# Patient Record
Sex: FEMALE | Race: WHITE | NOT HISPANIC OR LATINO | Employment: OTHER | URBAN - METROPOLITAN AREA
[De-identification: names, ages, dates, MRNs, and addresses within clinical notes are randomized per-mention and may not be internally consistent; named-entity substitution may affect disease eponyms.]

---

## 2020-06-26 ENCOUNTER — OFFICE VISIT (OUTPATIENT)
Dept: FAMILY MEDICINE CLINIC | Facility: CLINIC | Age: 70
End: 2020-06-26
Payer: COMMERCIAL

## 2020-06-26 VITALS
OXYGEN SATURATION: 95 % | HEART RATE: 75 BPM | RESPIRATION RATE: 16 BRPM | WEIGHT: 250.4 LBS | SYSTOLIC BLOOD PRESSURE: 140 MMHG | BODY MASS INDEX: 46.08 KG/M2 | HEIGHT: 62 IN | DIASTOLIC BLOOD PRESSURE: 70 MMHG | TEMPERATURE: 97.9 F

## 2020-06-26 DIAGNOSIS — R20.2 NUMBNESS AND TINGLING IN BOTH HANDS: ICD-10-CM

## 2020-06-26 DIAGNOSIS — E66.01 MORBID OBESITY (HCC): ICD-10-CM

## 2020-06-26 DIAGNOSIS — R10.13 EPIGASTRIC DISCOMFORT: Primary | ICD-10-CM

## 2020-06-26 DIAGNOSIS — R73.01 ELEVATED FASTING GLUCOSE: ICD-10-CM

## 2020-06-26 DIAGNOSIS — E03.9 ACQUIRED HYPOTHYROIDISM: ICD-10-CM

## 2020-06-26 DIAGNOSIS — K21.00 REFLUX ESOPHAGITIS: ICD-10-CM

## 2020-06-26 DIAGNOSIS — Z87.19 HISTORY OF HIATAL HERNIA: ICD-10-CM

## 2020-06-26 DIAGNOSIS — R20.0 NUMBNESS AND TINGLING IN BOTH HANDS: ICD-10-CM

## 2020-06-26 DIAGNOSIS — R14.0 BLOATING SYMPTOM: ICD-10-CM

## 2020-06-26 PROBLEM — F41.9 CHRONIC ANXIETY: Status: ACTIVE | Noted: 2019-11-01

## 2020-06-26 PROBLEM — R73.9 ELEVATED BLOOD SUGAR: Status: ACTIVE | Noted: 2019-09-09

## 2020-06-26 PROBLEM — R73.03 PRE-DIABETES: Status: ACTIVE | Noted: 2019-09-09

## 2020-06-26 PROBLEM — R26.2 AMBULATORY DYSFUNCTION: Status: ACTIVE | Noted: 2019-09-09

## 2020-06-26 PROBLEM — I10 ESSENTIAL HYPERTENSION: Status: ACTIVE | Noted: 2019-09-09

## 2020-06-26 PROBLEM — F32.A CHRONIC DEPRESSION: Status: ACTIVE | Noted: 2019-09-09

## 2020-06-26 PROBLEM — L40.9 PSORIASIS: Status: ACTIVE | Noted: 2019-09-09

## 2020-06-26 PROBLEM — Z79.1 NSAID LONG-TERM USE: Status: ACTIVE | Noted: 2019-09-09

## 2020-06-26 PROBLEM — E55.9 VITAMIN D DEFICIENCY: Status: ACTIVE | Noted: 2019-09-09

## 2020-06-26 PROBLEM — N18.30 CKD (CHRONIC KIDNEY DISEASE) STAGE 3, GFR 30-59 ML/MIN (HCC): Status: ACTIVE | Noted: 2019-09-09

## 2020-06-26 PROBLEM — M19.90 OSTEOARTHRITIS: Status: ACTIVE | Noted: 2019-09-09

## 2020-06-26 PROCEDURE — 99214 OFFICE O/P EST MOD 30 MIN: CPT | Performed by: NURSE PRACTITIONER

## 2020-06-26 PROCEDURE — 1036F TOBACCO NON-USER: CPT | Performed by: NURSE PRACTITIONER

## 2020-06-26 PROCEDURE — 4040F PNEUMOC VAC/ADMIN/RCVD: CPT | Performed by: NURSE PRACTITIONER

## 2020-06-26 PROCEDURE — 3078F DIAST BP <80 MM HG: CPT | Performed by: NURSE PRACTITIONER

## 2020-06-26 PROCEDURE — 3077F SYST BP >= 140 MM HG: CPT | Performed by: NURSE PRACTITIONER

## 2020-06-26 PROCEDURE — 1160F RVW MEDS BY RX/DR IN RCRD: CPT | Performed by: NURSE PRACTITIONER

## 2020-06-26 PROCEDURE — 3008F BODY MASS INDEX DOCD: CPT | Performed by: NURSE PRACTITIONER

## 2020-06-26 PROCEDURE — 1101F PT FALLS ASSESS-DOCD LE1/YR: CPT | Performed by: NURSE PRACTITIONER

## 2020-06-26 PROCEDURE — 3288F FALL RISK ASSESSMENT DOCD: CPT | Performed by: NURSE PRACTITIONER

## 2020-06-26 RX ORDER — LEVOTHYROXINE SODIUM 0.12 MG/1
125 TABLET ORAL DAILY
COMMUNITY
End: 2020-11-19

## 2020-06-26 RX ORDER — MELOXICAM 15 MG/1
15 TABLET ORAL DAILY
COMMUNITY
End: 2021-03-07

## 2020-06-26 RX ORDER — ACETAMINOPHEN 500 MG
500 TABLET ORAL EVERY 4 HOURS
COMMUNITY

## 2020-06-26 RX ORDER — BENAZEPRIL HYDROCHLORIDE 40 MG/1
40 TABLET, FILM COATED ORAL DAILY
COMMUNITY
Start: 2020-04-05 | End: 2020-12-27

## 2020-06-26 RX ORDER — CITALOPRAM 10 MG/1
10 TABLET ORAL DAILY
COMMUNITY
Start: 2020-04-05 | End: 2020-10-22 | Stop reason: SDUPTHER

## 2020-06-26 RX ORDER — FEXOFENADINE HCL 180 MG/1
180 TABLET ORAL DAILY
COMMUNITY

## 2020-06-26 RX ORDER — AMLODIPINE BESYLATE 5 MG/1
5 TABLET ORAL DAILY
COMMUNITY
Start: 2020-04-05 | End: 2021-03-11

## 2020-06-26 RX ORDER — NAPROXEN SODIUM 220 MG
220 TABLET ORAL EVERY 12 HOURS PRN
COMMUNITY
End: 2021-11-14

## 2020-06-26 RX ORDER — PANTOPRAZOLE SODIUM 40 MG/1
40 TABLET, DELAYED RELEASE ORAL DAILY
Qty: 30 TABLET | Refills: 1 | Status: SHIPPED | OUTPATIENT
Start: 2020-06-26 | End: 2020-08-21 | Stop reason: SDUPTHER

## 2020-06-26 RX ORDER — FAMOTIDINE 20 MG/1
20 TABLET, FILM COATED ORAL 2 TIMES DAILY
COMMUNITY
End: 2020-06-26 | Stop reason: ALTCHOICE

## 2020-06-27 ENCOUNTER — HOSPITAL ENCOUNTER (OUTPATIENT)
Dept: RADIOLOGY | Facility: HOSPITAL | Age: 70
Discharge: HOME | End: 2020-06-27
Payer: COMMERCIAL

## 2020-06-27 ENCOUNTER — OFFICE VISIT (OUTPATIENT)
Dept: LAB | Facility: CLINIC | Age: 70
End: 2020-06-27
Payer: COMMERCIAL

## 2020-06-27 ENCOUNTER — TRANSCRIBE ORDERS (OUTPATIENT)
Dept: LAB | Facility: CLINIC | Age: 70
End: 2020-06-27

## 2020-06-27 ENCOUNTER — APPOINTMENT (OUTPATIENT)
Dept: LAB | Facility: CLINIC | Age: 70
End: 2020-06-27
Payer: COMMERCIAL

## 2020-06-27 DIAGNOSIS — R10.13 EPIGASTRIC DISCOMFORT: ICD-10-CM

## 2020-06-27 DIAGNOSIS — R73.01 ELEVATED FASTING GLUCOSE: ICD-10-CM

## 2020-06-27 DIAGNOSIS — R14.0 BLOATING SYMPTOM: ICD-10-CM

## 2020-06-27 DIAGNOSIS — R20.2 NUMBNESS AND TINGLING IN BOTH HANDS: ICD-10-CM

## 2020-06-27 DIAGNOSIS — E03.9 ACQUIRED HYPOTHYROIDISM: ICD-10-CM

## 2020-06-27 DIAGNOSIS — E66.01 MORBID OBESITY (HCC): ICD-10-CM

## 2020-06-27 DIAGNOSIS — Z87.19 HISTORY OF HIATAL HERNIA: ICD-10-CM

## 2020-06-27 DIAGNOSIS — R20.0 NUMBNESS AND TINGLING IN BOTH HANDS: ICD-10-CM

## 2020-06-27 DIAGNOSIS — K21.00 REFLUX ESOPHAGITIS: ICD-10-CM

## 2020-06-27 LAB
ALBUMIN SERPL BCP-MCNC: 3.9 G/DL (ref 3.5–5)
ALP SERPL-CCNC: 92 U/L (ref 46–116)
ALT SERPL W P-5'-P-CCNC: 37 U/L (ref 12–78)
AMYLASE SERPL-CCNC: 80 IU/L (ref 25–115)
ANION GAP SERPL CALCULATED.3IONS-SCNC: 9 MMOL/L (ref 4–13)
AST SERPL W P-5'-P-CCNC: 22 U/L (ref 5–45)
BACTERIA UR QL AUTO: ABNORMAL /HPF
BILIRUB SERPL-MCNC: 0.47 MG/DL (ref 0.2–1)
BILIRUB UR QL STRIP: NEGATIVE
BUN SERPL-MCNC: 29 MG/DL (ref 5–25)
CALCIUM ALBUM COR SERPL-MCNC: 10.3 MG/DL (ref 8.3–10.1)
CALCIUM SERPL-MCNC: 10.2 MG/DL (ref 8.3–10.1)
CHLORIDE SERPL-SCNC: 102 MMOL/L (ref 100–108)
CHOLEST SERPL-MCNC: 183 MG/DL (ref 50–200)
CLARITY UR: CLEAR
CO2 SERPL-SCNC: 27 MMOL/L (ref 21–32)
COLOR UR: ABNORMAL
CREAT SERPL-MCNC: 1.02 MG/DL (ref 0.6–1.3)
CRP SERPL QL: 3.6 MG/L
ERYTHROCYTE [DISTWIDTH] IN BLOOD BY AUTOMATED COUNT: 13.2 % (ref 11.6–15.1)
ERYTHROCYTE [SEDIMENTATION RATE] IN BLOOD: 33 MM/HOUR (ref 0–20)
EST. AVERAGE GLUCOSE BLD GHB EST-MCNC: 126 MG/DL
GFR SERPL CREATININE-BSD FRML MDRD: 55 ML/MIN/1.73SQ M
GLUCOSE P FAST SERPL-MCNC: 107 MG/DL (ref 65–99)
GLUCOSE UR STRIP-MCNC: NEGATIVE MG/DL
HBA1C MFR BLD: 6 %
HCT VFR BLD AUTO: 42.7 % (ref 34.8–46.1)
HDLC SERPL-MCNC: 58 MG/DL
HGB BLD-MCNC: 13.7 G/DL (ref 11.5–15.4)
HGB UR QL STRIP.AUTO: ABNORMAL
KETONES UR STRIP-MCNC: NEGATIVE MG/DL
LDLC SERPL CALC-MCNC: 111 MG/DL (ref 0–100)
LEUKOCYTE ESTERASE UR QL STRIP: ABNORMAL
LIPASE SERPL-CCNC: 121 U/L (ref 73–393)
MCH RBC QN AUTO: 30.7 PG (ref 26.8–34.3)
MCHC RBC AUTO-ENTMCNC: 32.1 G/DL (ref 31.4–37.4)
MCV RBC AUTO: 96 FL (ref 82–98)
NITRITE UR QL STRIP: NEGATIVE
NON-SQ EPI CELLS URNS QL MICRO: ABNORMAL /HPF
NONHDLC SERPL-MCNC: 125 MG/DL
NT-PROBNP SERPL-MCNC: 45 PG/ML
PH UR STRIP.AUTO: 6 [PH]
PLATELET # BLD AUTO: 230 THOUSANDS/UL (ref 149–390)
PMV BLD AUTO: 10.2 FL (ref 8.9–12.7)
POTASSIUM SERPL-SCNC: 4.7 MMOL/L (ref 3.5–5.3)
PROT SERPL-MCNC: 8.1 G/DL (ref 6.4–8.2)
PROT UR STRIP-MCNC: NEGATIVE MG/DL
RBC # BLD AUTO: 4.46 MILLION/UL (ref 3.81–5.12)
RBC #/AREA URNS AUTO: ABNORMAL /HPF
SODIUM SERPL-SCNC: 138 MMOL/L (ref 136–145)
SP GR UR STRIP.AUTO: 1.01 (ref 1–1.03)
TRIGL SERPL-MCNC: 70 MG/DL
TROPONIN I SERPL-MCNC: <0.02 NG/ML
TSH SERPL DL<=0.05 MIU/L-ACNC: 0.9 UIU/ML (ref 0.36–3.74)
UROBILINOGEN UR QL STRIP.AUTO: 0.2 E.U./DL
WBC # BLD AUTO: 7.96 THOUSAND/UL (ref 4.31–10.16)
WBC #/AREA URNS AUTO: ABNORMAL /HPF

## 2020-06-27 PROCEDURE — 80061 LIPID PANEL: CPT

## 2020-06-27 PROCEDURE — 83880 ASSAY OF NATRIURETIC PEPTIDE: CPT

## 2020-06-27 PROCEDURE — 85652 RBC SED RATE AUTOMATED: CPT

## 2020-06-27 PROCEDURE — 86140 C-REACTIVE PROTEIN: CPT

## 2020-06-27 PROCEDURE — 81001 URINALYSIS AUTO W/SCOPE: CPT | Performed by: NURSE PRACTITIONER

## 2020-06-27 PROCEDURE — 82150 ASSAY OF AMYLASE: CPT

## 2020-06-27 PROCEDURE — 36415 COLL VENOUS BLD VENIPUNCTURE: CPT

## 2020-06-27 PROCEDURE — 80053 COMPREHEN METABOLIC PANEL: CPT

## 2020-06-27 PROCEDURE — 71046 X-RAY EXAM CHEST 2 VIEWS: CPT

## 2020-06-27 PROCEDURE — 83690 ASSAY OF LIPASE: CPT

## 2020-06-27 PROCEDURE — 85027 COMPLETE CBC AUTOMATED: CPT

## 2020-06-27 PROCEDURE — 93005 ELECTROCARDIOGRAM TRACING: CPT

## 2020-06-27 PROCEDURE — 83036 HEMOGLOBIN GLYCOSYLATED A1C: CPT

## 2020-06-27 PROCEDURE — 84443 ASSAY THYROID STIM HORMONE: CPT

## 2020-06-27 PROCEDURE — 84484 ASSAY OF TROPONIN QUANT: CPT

## 2020-06-30 LAB
ATRIAL RATE: 80 BPM
P AXIS: 61 DEGREES
PR INTERVAL: 146 MS
QRS AXIS: -22 DEGREES
QRSD INTERVAL: 94 MS
QT INTERVAL: 386 MS
QTC INTERVAL: 445 MS
T WAVE AXIS: 57 DEGREES
VENTRICULAR RATE: 80 BPM

## 2020-06-30 PROCEDURE — 93010 ELECTROCARDIOGRAM REPORT: CPT | Performed by: INTERNAL MEDICINE

## 2020-07-12 ENCOUNTER — HOSPITAL ENCOUNTER (EMERGENCY)
Facility: HOSPITAL | Age: 70
Discharge: HOME | End: 2020-07-12
Attending: EMERGENCY MEDICINE | Admitting: EMERGENCY MEDICINE
Payer: COMMERCIAL

## 2020-07-12 VITALS
RESPIRATION RATE: 16 BRPM | SYSTOLIC BLOOD PRESSURE: 154 MMHG | BODY MASS INDEX: 47.51 KG/M2 | TEMPERATURE: 98.7 F | DIASTOLIC BLOOD PRESSURE: 69 MMHG | HEIGHT: 62 IN | WEIGHT: 258.16 LBS | HEART RATE: 68 BPM | OXYGEN SATURATION: 97 %

## 2020-07-12 DIAGNOSIS — K92.1 HEMATOCHEZIA: Primary | ICD-10-CM

## 2020-07-12 LAB
ALBUMIN SERPL BCP-MCNC: 3.6 G/DL (ref 3.5–5)
ALP SERPL-CCNC: 108 U/L (ref 46–116)
ALT SERPL W P-5'-P-CCNC: 43 U/L (ref 12–78)
ANION GAP SERPL CALCULATED.3IONS-SCNC: 9 MMOL/L (ref 4–13)
APTT PPP: 34 SECONDS (ref 23–37)
AST SERPL W P-5'-P-CCNC: 23 U/L (ref 5–45)
BASOPHILS # BLD AUTO: 0.06 THOUSANDS/ΜL (ref 0–0.1)
BASOPHILS NFR BLD AUTO: 1 % (ref 0–1)
BILIRUB SERPL-MCNC: 0.35 MG/DL (ref 0.2–1)
BUN SERPL-MCNC: 25 MG/DL (ref 5–25)
CALCIUM SERPL-MCNC: 9.8 MG/DL (ref 8.3–10.1)
CHLORIDE SERPL-SCNC: 103 MMOL/L (ref 100–108)
CO2 SERPL-SCNC: 28 MMOL/L (ref 21–32)
CREAT SERPL-MCNC: 1.19 MG/DL (ref 0.6–1.3)
EOSINOPHIL # BLD AUTO: 0.4 THOUSAND/ΜL (ref 0–0.61)
EOSINOPHIL NFR BLD AUTO: 4 % (ref 0–6)
ERYTHROCYTE [DISTWIDTH] IN BLOOD BY AUTOMATED COUNT: 13.2 % (ref 11.6–15.1)
GFR SERPL CREATININE-BSD FRML MDRD: 46 ML/MIN/1.73SQ M
GLUCOSE SERPL-MCNC: 115 MG/DL (ref 65–140)
HCT VFR BLD AUTO: 40.1 % (ref 34.8–46.1)
HGB BLD-MCNC: 13.1 G/DL (ref 11.5–15.4)
IMM GRANULOCYTES # BLD AUTO: 0.04 THOUSAND/UL (ref 0–0.2)
IMM GRANULOCYTES NFR BLD AUTO: 0 % (ref 0–2)
INR PPP: 1.06 (ref 0.84–1.19)
LYMPHOCYTES # BLD AUTO: 1.6 THOUSANDS/ΜL (ref 0.6–4.47)
LYMPHOCYTES NFR BLD AUTO: 15 % (ref 14–44)
MCH RBC QN AUTO: 30.9 PG (ref 26.8–34.3)
MCHC RBC AUTO-ENTMCNC: 32.7 G/DL (ref 31.4–37.4)
MCV RBC AUTO: 95 FL (ref 82–98)
MONOCYTES # BLD AUTO: 1.27 THOUSAND/ΜL (ref 0.17–1.22)
MONOCYTES NFR BLD AUTO: 12 % (ref 4–12)
NEUTROPHILS # BLD AUTO: 7.28 THOUSANDS/ΜL (ref 1.85–7.62)
NEUTS SEG NFR BLD AUTO: 68 % (ref 43–75)
NRBC BLD AUTO-RTO: 0 /100 WBCS
PLATELET # BLD AUTO: 218 THOUSANDS/UL (ref 149–390)
PMV BLD AUTO: 10 FL (ref 8.9–12.7)
POTASSIUM SERPL-SCNC: 4.4 MMOL/L (ref 3.5–5.3)
PROT SERPL-MCNC: 7.6 G/DL (ref 6.4–8.2)
PROTHROMBIN TIME: 13.2 SECONDS (ref 11.6–14.5)
RBC # BLD AUTO: 4.24 MILLION/UL (ref 3.81–5.12)
SODIUM SERPL-SCNC: 140 MMOL/L (ref 136–145)
WBC # BLD AUTO: 10.65 THOUSAND/UL (ref 4.31–10.16)

## 2020-07-12 PROCEDURE — 36415 COLL VENOUS BLD VENIPUNCTURE: CPT | Performed by: EMERGENCY MEDICINE

## 2020-07-12 PROCEDURE — 99284 EMERGENCY DEPT VISIT MOD MDM: CPT

## 2020-07-12 PROCEDURE — 80053 COMPREHEN METABOLIC PANEL: CPT | Performed by: EMERGENCY MEDICINE

## 2020-07-12 PROCEDURE — 85025 COMPLETE CBC W/AUTO DIFF WBC: CPT | Performed by: EMERGENCY MEDICINE

## 2020-07-12 PROCEDURE — 85730 THROMBOPLASTIN TIME PARTIAL: CPT | Performed by: EMERGENCY MEDICINE

## 2020-07-12 PROCEDURE — 99283 EMERGENCY DEPT VISIT LOW MDM: CPT | Performed by: EMERGENCY MEDICINE

## 2020-07-12 PROCEDURE — 85610 PROTHROMBIN TIME: CPT | Performed by: EMERGENCY MEDICINE

## 2020-07-12 RX ORDER — FOLIC ACID/MULTIVIT,IRON,MINER .4-18-35
1 TABLET,CHEWABLE ORAL DAILY
COMMUNITY

## 2020-07-12 NOTE — ED PROVIDER NOTES
History  Chief Complaint   Patient presents with    Rectal Bleeding     Pt complains of rectal bleeding that started today  Pt states that a large amount of blood with clots came out over several episodes today  pt denies blood thinners but states that she takes NSAIDs       History provided by:  Patient   used: No    68-year-old female presented for evaluation of bright red rectal bleeding beginning this morning  No associated abdominal pain, rectal pain, nausea, vomiting, lightheadedness, dizziness, weakness  No similar episodes in the past   No anticoagulation but does take NSAIDs regularly for arthritis  She is on meloxicam daily and had also used Aleve over the last few days while holding meloxicam   States in total 3 episodes of rectal bleeding this morning without much stool  Notes that there were moderate-sized clots as well  Last colonoscopy was 6 years ago  Recently having some epigastric pain and was started on Protonix x 2 weeks  On exam here abdomen soft and nontender  Vitals are normal   No visible bleeding at the moment  No external anal lesions  Plan labs, monitoring, re-evaluate  Prior to Admission Medications   Prescriptions Last Dose Informant Patient Reported? Taking?    Cholecalciferol 1 25 MG (91357 UT) TABS 7/11/2020 at Unknown time  Yes Yes   Sig: Take 1 25 mg by mouth daily   acetaminophen (TYLENOL) 500 mg tablet 7/12/2020 at Unknown time  Yes Yes   Sig: Take 500 mg by mouth every 4 (four) hours   amLODIPine (NORVASC) 5 mg tablet 7/12/2020 at Unknown time  Yes Yes   Sig: Take 5 mg by mouth daily   benazepril (LOTENSIN) 40 MG tablet 7/12/2020 at Unknown time  Yes Yes   Sig: Take 40 mg by mouth daily   citalopram (CeleXA) 10 mg tablet 7/11/2020 at Unknown time  Yes Yes   Sig: Take 10 mg by mouth daily   fexofenadine (ALLEGRA) 180 MG tablet Past Week at Unknown time  Yes Yes   Sig: Take 180 mg by mouth daily   levothyroxine 125 mcg tablet 7/12/2020 at Unknown time  Yes Yes   Sig: Take 125 mcg by mouth daily   meloxicam (MOBIC) 15 mg tablet 7/12/2020 at Unknown time  Yes Yes   Sig: Take 15 mg by mouth daily   multivitamin-iron-minerals-folic acid (CENTRUM) chewable tablet 7/12/2020 at Unknown time Self Yes Yes   Sig: Chew 1 tablet daily   naproxen sodium (ALEVE) 220 MG tablet Past Week at Unknown time  Yes Yes   Sig: Take 220 mg by mouth every 12 (twelve) hours as needed   pantoprazole (PROTONIX) 40 mg tablet 7/12/2020 at Unknown time  No Yes   Sig: Take 1 tablet (40 mg total) by mouth daily      Facility-Administered Medications: None       Past Medical History:   Diagnosis Date    Anxiety     on med     Arthritis     Back problem     4 Bulging Disks    Fibrocystic disease of both breasts     Hip pain     Left    History of staph infection     Hypertension     Knee pain     Right    Spinal stenosis     Thyroid disease        Past Surgical History:   Procedure Laterality Date    CATARACT EXTRACTION  01/01/2017    CHOLECYSTECTOMY      MAMMO (HISTORICAL)  12/13/2019    OTHER SURGICAL HISTORY      Warts removal    REFRACTIVE SURGERY Bilateral     ROTATOR CUFF REPAIR Right        Family History   Problem Relation Age of Onset    Lung cancer Father     Breast cancer Other      I have reviewed and agree with the history as documented  E-Cigarette/Vaping    E-Cigarette Use Never User      E-Cigarette/Vaping Substances    Nicotine No     THC No     CBD No     Flavoring No     Other No     Unknown No      Social History     Tobacco Use    Smoking status: Former Smoker     Years: 14 00    Smokeless tobacco: Never Used   Substance Use Topics    Alcohol use: Not Currently     Frequency: Monthly or less     Comment: Occasional     Drug use: Not Currently       Review of Systems   Constitutional: Negative for activity change, appetite change, fatigue and fever  Respiratory: Negative for chest tightness and shortness of breath      Gastrointestinal: Positive for blood in stool  Negative for abdominal pain, nausea, rectal pain and vomiting  Musculoskeletal: Negative for back pain and neck pain  Skin: Negative for color change, pallor and rash  Neurological: Negative for dizziness and weakness  All other systems reviewed and are negative  Physical Exam  Physical Exam   Constitutional: She is oriented to person, place, and time  She appears well-developed and well-nourished  No distress  HENT:   Head: Normocephalic  Cardiovascular: Normal rate  Pulmonary/Chest: Effort normal  No respiratory distress  Abdominal: Soft  She exhibits no distension  There is no tenderness  Genitourinary:   Genitourinary Comments: No external anal lesions  No active bleeding at time of exam    Musculoskeletal:   Difficulty ranging left hip (chronic)  Neurological: She is alert and oriented to person, place, and time  Skin: Skin is warm and dry  Psychiatric: She has a normal mood and affect  Her behavior is normal    Nursing note and vitals reviewed        Vital Signs  ED Triage Vitals [07/12/20 1522]   Temperature Pulse Respirations Blood Pressure SpO2   98 7 °F (37 1 °C) 87 20 (!) 193/84 97 %      Temp Source Heart Rate Source Patient Position - Orthostatic VS BP Location FiO2 (%)   Oral Monitor Lying Right arm --      Pain Score       No Pain           Vitals:    07/12/20 1522 07/12/20 1636   BP: (!) 193/84 154/69   Pulse: 87 68   Patient Position - Orthostatic VS: Lying          Visual Acuity      ED Medications  Medications - No data to display    Diagnostic Studies  Results Reviewed     Procedure Component Value Units Date/Time    Comprehensive metabolic panel [[id number]] Collected:  07/12/20 1609    Lab Status:  Final result Specimen:  Blood from Arm, Left Updated:  07/12/20 1643     Sodium 140 mmol/L      Potassium 4 4 mmol/L      Chloride 103 mmol/L      CO2 28 mmol/L      ANION GAP 9 mmol/L      BUN 25 mg/dL      Creatinine 1 19 mg/dL      Glucose 115 mg/dL      Calcium 9 8 mg/dL      AST 23 U/L      ALT 43 U/L      Alkaline Phosphatase 108 U/L      Total Protein 7 6 g/dL      Albumin 3 6 g/dL      Total Bilirubin 0 35 mg/dL      eGFR 46 ml/min/1 73sq m     Narrative:       [place] guidelines for Chronic Kidney Disease (CKD):     Stage 1 with normal or high GFR (GFR > 90 mL/min/1 73 square meters)    Stage 2 Mild CKD (GFR = 60-89 mL/min/1 73 square meters)    Stage 3A Moderate CKD (GFR = 45-59 mL/min/1 73 square meters)    Stage 3B Moderate CKD (GFR = 30-44 mL/min/1 73 square meters)    Stage 4 Severe CKD (GFR = 15-29 mL/min/1 73 square meters)    Stage 5 End Stage CKD (GFR <15 mL/min/1 73 square meters)  Note: GFR calculation is accurate only with a steady state creatinine    Protime-INR [[id number]]  (Normal) Collected:  07/12/20 1609    Lab Status:  Final result Specimen:  Blood from Arm, Left Updated:  07/12/20 1632     Protime 13 2 seconds      INR 1 06    APTT [[id number]]  (Normal) Collected:  07/12/20 1609    Lab Status:  Final result Specimen:  Blood from Arm, Left Updated:  07/12/20 1632     PTT 34 seconds     CBC and differential [[id number]]  (Abnormal) Collected:  07/12/20 1609    Lab Status:  Final result Specimen:  Blood from Arm, Left Updated:  07/12/20 1620     WBC 10 65 Thousand/uL      RBC 4 24 Million/uL      Hemoglobin 13 1 g/dL      Hematocrit 40 1 %      MCV 95 fL      MCH 30 9 pg      MCHC 32 7 g/dL      RDW 13 2 %      MPV 10 0 fL      Platelets 409 Thousands/uL      nRBC 0 /100 WBCs      Neutrophils Relative 68 %      Immat GRANS % 0 %      Lymphocytes Relative 15 %      Monocytes Relative 12 %      Eosinophils Relative 4 %      Basophils Relative 1 %      Neutrophils Absolute 7 28 Thousands/µL      Immature Grans Absolute 0 04 Thousand/uL      Lymphocytes Absolute 1 60 Thousands/µL      Monocytes Absolute 1 27 Thousand/µL      Eosinophils Absolute 0 40 Thousand/µL      Basophils Absolute 0 06 Thousands/µL                  No orders to display              Procedures  Procedures         ED Course  ED Course as of Jul 12 2002   Sun Jul 12, 2020   1659 Patient feeling well has not had any recurrent bloody stool here  Will continue to observe and plan discharge if no continued bleeding  1758 No recurrence of bleeding  Patient stable for discharge home and outpatient GI follow-up  MDM  Number of Diagnoses or Management Options  Hematochezia:   Diagnosis management comments: 78-year-old female presented with 3 episodes bright red rectal bleeding this morning  No associated pain  No nausea or vomiting  No known history of GI bleed  She is currently taking Protonix for GERD  No significant change in hemoglobin from labs 2 weeks ago  No continued rectal bleeding in the ED  Patient is otherwise asymptomatic  She is due for colonoscopy regardless of her acute symptoms  Needs close GI follow-up  Return to ED for any continued significant bleeding  Amount and/or Complexity of Data Reviewed  Clinical lab tests: ordered and reviewed    Patient Progress  Patient progress: improved        Disposition  Final diagnoses:   Hematochezia     Time reflects when diagnosis was documented in both MDM as applicable and the Disposition within this note     Time User Action Codes Description Comment    7/12/2020  5:59 PM Osmandavida Anderson Add [K92 1] Hematochezia       ED Disposition     ED Disposition Condition Date/Time Comment    Discharge Stable [place] Jul 12, 2020  5:59 PM Misael Calrke discharge to home/self care              Follow-up Information     Follow up With Specialties Details Why Jocelyne Riojas MD Gastroenterology   [address] [place] [address]  [phone]            Discharge Medication List as of 7/12/2020  5:59 PM      CONTINUE these medications which have NOT CHANGED    Details   acetaminophen (TYLENOL) 500 mg tablet Take 500 mg by mouth every 4 (four) hours, Historical Med      amLODIPine (NORVASC) 5 mg tablet Take 5 mg by mouth daily, Starting Sun 4/5/2020, Historical Med      benazepril (LOTENSIN) 40 MG tablet Take 40 mg by mouth daily, Starting Sun 4/5/2020, Historical Med      Cholecalciferol 1 25 MG (97738 UT) TABS Take 1 25 mg by mouth daily, Historical Med      citalopram (CeleXA) 10 mg tablet Take 10 mg by mouth daily, Starting Sun 4/5/2020, Historical Med      fexofenadine (ALLEGRA) 180 MG tablet Take 180 mg by mouth daily, Historical Med      levothyroxine 125 mcg tablet Take 125 mcg by mouth daily, Historical Med      meloxicam (MOBIC) 15 mg tablet Take 15 mg by mouth daily, Historical Med      multivitamin-iron-minerals-folic acid (CENTRUM) chewable tablet Chew 1 tablet daily, Historical Med      naproxen sodium (ALEVE) 220 MG tablet Take 220 mg by mouth every 12 (twelve) hours as needed, Historical Med      pantoprazole (PROTONIX) 40 mg tablet Take 1 tablet (40 mg total) by mouth daily, Starting Fri 6/26/2020, Until Sun 7/26/2020, Normal           No discharge procedures on file      PDMP Review     None          ED Provider  Electronically Signed by           Aleksander Montilla MD  07/12/20 2002

## 2020-07-14 ENCOUNTER — OFFICE VISIT (OUTPATIENT)
Dept: FAMILY MEDICINE CLINIC | Facility: CLINIC | Age: 70
End: 2020-07-14
Payer: COMMERCIAL

## 2020-07-14 VITALS
OXYGEN SATURATION: 97 % | WEIGHT: 251 LBS | RESPIRATION RATE: 14 BRPM | SYSTOLIC BLOOD PRESSURE: 118 MMHG | BODY MASS INDEX: 46.19 KG/M2 | HEIGHT: 62 IN | TEMPERATURE: 97.2 F | DIASTOLIC BLOOD PRESSURE: 68 MMHG | HEART RATE: 86 BPM

## 2020-07-14 DIAGNOSIS — Z11.59 NEED FOR HEPATITIS C SCREENING TEST: ICD-10-CM

## 2020-07-14 DIAGNOSIS — K59.03 DRUG-INDUCED CONSTIPATION: ICD-10-CM

## 2020-07-14 DIAGNOSIS — M16.12 PRIMARY OSTEOARTHRITIS OF LEFT HIP: Primary | ICD-10-CM

## 2020-07-14 PROCEDURE — 3078F DIAST BP <80 MM HG: CPT | Performed by: FAMILY MEDICINE

## 2020-07-14 PROCEDURE — 3008F BODY MASS INDEX DOCD: CPT | Performed by: FAMILY MEDICINE

## 2020-07-14 PROCEDURE — 99214 OFFICE O/P EST MOD 30 MIN: CPT | Performed by: FAMILY MEDICINE

## 2020-07-14 PROCEDURE — 1160F RVW MEDS BY RX/DR IN RCRD: CPT | Performed by: FAMILY MEDICINE

## 2020-07-14 PROCEDURE — 1036F TOBACCO NON-USER: CPT | Performed by: FAMILY MEDICINE

## 2020-07-14 PROCEDURE — 3074F SYST BP LT 130 MM HG: CPT | Performed by: FAMILY MEDICINE

## 2020-07-14 PROCEDURE — 4040F PNEUMOC VAC/ADMIN/RCVD: CPT | Performed by: FAMILY MEDICINE

## 2020-07-14 RX ORDER — FAMOTIDINE 20 MG/1
TABLET, FILM COATED ORAL EVERY 12 HOURS
COMMUNITY
End: 2020-07-14

## 2020-07-14 RX ORDER — LEVOFLOXACIN 500 MG/1
TABLET, FILM COATED ORAL
COMMUNITY
End: 2021-04-21

## 2020-07-14 RX ORDER — TRAMADOL HYDROCHLORIDE 50 MG/1
50 TABLET ORAL EVERY 6 HOURS PRN
Qty: 60 TABLET | Refills: 1 | Status: SHIPPED | OUTPATIENT
Start: 2020-07-14 | End: 2021-04-21

## 2020-07-14 NOTE — PATIENT INSTRUCTIONS
Arthritis   AMBULATORY CARE:   Arthritis  is a disease that causes inflammation in one or more joints  There are many types of arthritis, such as osteoarthritis, rheumatoid arthritis, and septic arthritis  Some types cause inflammation in the joints  Other types wear away the cartilage between joints  This makes the bones of the joint rub together when you move the joint  Your symptoms may be constant, or symptoms may come and go  Arthritis often gets worse over time and can cause permanent joint damage  Common signs and symptoms of arthritis:   · Pain, swelling, or stiffness in the joint    · Limited range of motion in the joint    · Warmth or redness over the joint    · Tenderness when you touch the joint    · Stiff joints in the morning that loosen with movement    · A creaking or grinding sound when you move the joint    · Fever  Seek care immediately if:   · You have a fever and severe joint pain or swelling  · You cannot move the affected joint  · You have severe joint pain you cannot tolerate  Contact your healthcare provider if:   · Your pain or swelling does not get better with treatment  · You have questions or concerns about your condition or care  Treatment  will depend on the type of arthritis you have and if it is severe  You may need any of the following:  · Acetaminophen  decreases pain and fever  It is available without a doctor's order  Ask how much to take and how often to take it  Follow directions  Acetaminophen can cause liver damage if not taken correctly  · NSAIDs , such as ibuprofen, help decrease swelling, pain, and fever  This medicine is available with or without a doctor's order  NSAIDs can cause stomach bleeding or kidney problems in certain people  If you take blood thinner medicine, always ask your healthcare provider if NSAIDs are safe for you  Always read the medicine label and follow directions  · Steroid medicine  helps reduce swelling and pain       · Surgery may be needed to repair or replace a damaged joint  Manage arthritis:   · Rest your painful joint so it can heal   Your healthcare provider may recommend crutches or a walker if the affected joint is in a leg  · Apply ice or heat to the joint  Both can help decrease swelling and pain  Ice may also help prevent tissue damage  Use an ice pack, or put crushed ice in a plastic bag  Cover it with a towel and place it on your joint for 15 to 20 minutes every hour or as directed  You can apply heat for 20 minutes every 2 hours  Heat treatment includes hot packs or heat lamps  · Elevate your joint  Elevation helps reduce swelling and pain  Raise your joint above the level of your heart as often as you can  Prop your painful joint on pillows to keep it above your heart comfortably  · Go to physical or occupational therapy as directed  A physical therapist can teach you exercises to improve flexibility and range of motion  You may also be shown non-weight-bearing exercises that are safe for your joints, such as swimming  Exercise can help keep your joints flexible and reduce pain  An occupational therapist can help you learn to do your daily activities when your joints are stiff or sore  · Maintain a healthy weight  Extra weight puts increased pressure on your joints  Ask your healthcare provider what you should weigh  If you need to lose weight, he can help you create a weight loss program  Weight loss can help reduce pain and increase your ability to do your activities  The amount of exercise you do may vary each day, depending on your symptoms  · Wear flat or low-heeled shoes  This will help decrease pain and reduce pressure on your ankle, knee, and hip joints  · Use support devices as directed  You may be given splints to wear on your hands to help your joints rest and to decrease inflammation  While you sleep, use a pillow that is firm enough to support your neck and head    Other equipment  that may help you move and prevent falls:  · Orthotic shoes or insoles  help support your feet when you walk  · Crutches, a cane, or a walker  may help decrease your risk for falling  They also decrease stress on affected joints  · Devices to prevent falls  include raised toilet seats and bathtub bars to help you get up from sitting  Handrails can be placed in areas where you need balance and support  Follow up with your healthcare provider or rheumatologist as directed:  Write down your questions so you remember to ask them during your visits  © 2017 [facility] Information is for End User's use only and may not be sold, redistributed or otherwise used for commercial purposes  All illustrations and images included in CareNotes® are the copyrighted property of [employer] , [employer]  or Justin Pace  The above information is an  only  It is not intended as medical advice for individual conditions or treatments  Talk to your doctor, nurse or pharmacist before following any medical regimen to see if it is safe and effective for you

## 2020-08-21 DIAGNOSIS — R14.0 BLOATING SYMPTOM: ICD-10-CM

## 2020-08-21 DIAGNOSIS — R10.13 EPIGASTRIC DISCOMFORT: ICD-10-CM

## 2020-08-21 DIAGNOSIS — K21.00 REFLUX ESOPHAGITIS: ICD-10-CM

## 2020-08-22 RX ORDER — PANTOPRAZOLE SODIUM 40 MG/1
40 TABLET, DELAYED RELEASE ORAL DAILY
Qty: 90 TABLET | Refills: 3 | Status: SHIPPED | OUTPATIENT
Start: 2020-08-22 | End: 2021-02-24 | Stop reason: SDUPTHER

## 2020-09-20 RX ORDER — CITALOPRAM 10 MG/1
TABLET ORAL
Qty: 90 TABLET | Refills: 3 | OUTPATIENT
Start: 2020-09-20

## 2020-09-28 ENCOUNTER — OFFICE VISIT (OUTPATIENT)
Dept: FAMILY MEDICINE CLINIC | Facility: CLINIC | Age: 70
End: 2020-09-28
Payer: COMMERCIAL

## 2020-09-28 VITALS
HEIGHT: 62 IN | DIASTOLIC BLOOD PRESSURE: 62 MMHG | BODY MASS INDEX: 46.38 KG/M2 | WEIGHT: 252 LBS | RESPIRATION RATE: 18 BRPM | HEART RATE: 90 BPM | OXYGEN SATURATION: 96 % | SYSTOLIC BLOOD PRESSURE: 128 MMHG | TEMPERATURE: 99.2 F

## 2020-09-28 DIAGNOSIS — M16.12 PRIMARY OSTEOARTHRITIS OF LEFT HIP: ICD-10-CM

## 2020-09-28 DIAGNOSIS — R26.2 AMBULATORY DYSFUNCTION: ICD-10-CM

## 2020-09-28 DIAGNOSIS — N18.30 CKD (CHRONIC KIDNEY DISEASE) STAGE 3, GFR 30-59 ML/MIN (HCC): ICD-10-CM

## 2020-09-28 DIAGNOSIS — I10 ESSENTIAL HYPERTENSION: ICD-10-CM

## 2020-09-28 DIAGNOSIS — L40.9 PSORIASIS: Primary | ICD-10-CM

## 2020-09-28 PROCEDURE — 99214 OFFICE O/P EST MOD 30 MIN: CPT | Performed by: FAMILY MEDICINE

## 2020-09-28 PROCEDURE — 1101F PT FALLS ASSESS-DOCD LE1/YR: CPT | Performed by: FAMILY MEDICINE

## 2020-09-28 PROCEDURE — 3725F SCREEN DEPRESSION PERFORMED: CPT | Performed by: FAMILY MEDICINE

## 2020-09-28 PROCEDURE — 3074F SYST BP LT 130 MM HG: CPT | Performed by: FAMILY MEDICINE

## 2020-09-28 PROCEDURE — 3078F DIAST BP <80 MM HG: CPT | Performed by: FAMILY MEDICINE

## 2020-09-28 PROCEDURE — 3288F FALL RISK ASSESSMENT DOCD: CPT | Performed by: FAMILY MEDICINE

## 2020-09-28 RX ORDER — TRIAMCINOLONE ACETONIDE 1 MG/G
CREAM TOPICAL 2 TIMES DAILY
Qty: 30 G | Refills: 0 | Status: SHIPPED | OUTPATIENT
Start: 2020-09-28 | End: 2022-07-19 | Stop reason: SDUPTHER

## 2020-09-28 RX ORDER — KETOCONAZOLE 20 MG/G
CREAM TOPICAL DAILY
Qty: 30 G | Refills: 1 | Status: SHIPPED | OUTPATIENT
Start: 2020-09-28

## 2020-09-28 RX ORDER — BETAMETHASONE DIPROPIONATE 0.5 MG/G
CREAM TOPICAL 2 TIMES DAILY
Qty: 30 G | Refills: 0 | Status: SHIPPED | OUTPATIENT
Start: 2020-09-28

## 2020-09-28 NOTE — PROGRESS NOTES
Assessment/Plan:    64 yo female with CC pain behing the R ear and swollen lobe of ear  Has psoriasis of the scalp and post - auricular  There are 3 prob going on here:    · Psoriasis  · Seborrheic dermatitis  · Eczema, behind the ears         Problem List Items Addressed This Visit     None            Subjective:      Patient ID: Elizabeth Pastrana is a 65 y o  female  HPI    The following portions of the patient's history were reviewed and updated as appropriate:   She has a past medical history of Anxiety, Arthritis, Back problem, Fibrocystic disease of both breasts, Hip pain, History of staph infection, Hypertension, Knee pain, Spinal stenosis, and Thyroid disease  ,  does not have any pertinent problems on file  ,   has a past surgical history that includes Mammo (historical) (12/13/2019); Cataract extraction (01/01/2017); Refractive surgery (Bilateral); Other surgical history; Cholecystectomy; and Rotator cuff repair (Right)  ,  family history includes Breast cancer in her other; Lung cancer in her father  ,   reports that she has quit smoking  She quit after 14 00 years of use  She has never used smokeless tobacco  She reports previous alcohol use  She reports previous drug use ,  is allergic to diphenhydramine; erythromycin; tetanus toxoids; and tetracycline     Current Outpatient Medications   Medication Sig Dispense Refill    acetaminophen (TYLENOL) 500 mg tablet Take 500 mg by mouth every 4 (four) hours      amLODIPine (NORVASC) 5 mg tablet Take 5 mg by mouth daily      benazepril (LOTENSIN) 40 MG tablet Take 40 mg by mouth daily      Cholecalciferol 1 25 MG (57308 UT) TABS Take 1 25 mg by mouth daily      citalopram (CeleXA) 10 mg tablet Take 10 mg by mouth daily      fexofenadine (ALLEGRA) 180 MG tablet Take 180 mg by mouth daily      levofloxacin (LEVAQUIN) 500 mg tablet levofloxacin 500 mg tablet      levothyroxine 125 mcg tablet Take 125 mcg by mouth daily      linaCLOtide (Linzess) 145 MCG CAPS Take 1 capsule (145 mcg total) by mouth daily 30 capsule 3    meloxicam (MOBIC) 15 mg tablet Take 15 mg by mouth daily      multivitamin-iron-minerals-folic acid (CENTRUM) chewable tablet Chew 1 tablet daily      naproxen sodium (ALEVE) 220 MG tablet Take 220 mg by mouth every 12 (twelve) hours as needed      pantoprazole (PROTONIX) 40 mg tablet Take 1 tablet (40 mg total) by mouth daily 90 tablet 3    traMADol (ULTRAM) 50 mg tablet Take 1 tablet (50 mg total) by mouth every 6 (six) hours as needed for moderate pain 60 tablet 1     No current facility-administered medications for this visit  Review of Systems   Constitutional: Negative for activity change, appetite change, chills, diaphoresis, fatigue and fever  HENT: Negative for congestion, ear discharge, ear pain, facial swelling, nosebleeds, sore throat, tinnitus, trouble swallowing and voice change  Eyes: Negative for photophobia, pain, discharge, redness, itching and visual disturbance  Respiratory: Negative for apnea, cough, choking, chest tightness and shortness of breath  Cardiovascular: Negative for chest pain, palpitations and leg swelling  Gastrointestinal: Negative for abdominal distention, abdominal pain, blood in stool, constipation, diarrhea, nausea and vomiting  Endocrine: Negative for cold intolerance, heat intolerance, polydipsia, polyphagia and polyuria  Genitourinary: Negative for decreased urine volume, difficulty urinating, dysuria, enuresis, frequency, hematuria, pelvic pain, urgency and vaginal bleeding  Musculoskeletal: Negative for arthralgias, back pain, gait problem, joint swelling, neck pain and neck stiffness  Skin: Positive for rash  Negative for color change and pallor  Psoriasis behind ears, seb dermatitis of nose, face, eyebrows, and  eczema   Allergic/Immunologic: Negative for immunocompromised state     Neurological: Negative for dizziness, seizures, facial asymmetry, light-headedness, numbness and headaches  Hematological: Negative for adenopathy  Psychiatric/Behavioral: Negative for agitation, behavioral problems, confusion, decreased concentration, dysphoric mood and hallucinations  Objective:  Vitals:    09/28/20 1427   BP: 128/62   Pulse: 90   Resp: 18   Temp: 99 2 °F (37 3 °C)   SpO2: 96%   Weight: 114 kg (252 lb)   Height: 5' 2" (1 575 m)     Body mass index is 46 09 kg/m²  Physical Exam  Vitals signs and nursing note reviewed  Constitutional:       General: She is not in acute distress  Appearance: She is well-developed  She is not diaphoretic  HENT:      Head: Normocephalic and atraumatic  Eyes:      Conjunctiva/sclera: Conjunctivae normal       Pupils: Pupils are equal, round, and reactive to light  Neck:      Musculoskeletal: Normal range of motion and neck supple  Thyroid: No thyromegaly  Trachea: No tracheal deviation  Cardiovascular:      Rate and Rhythm: Normal rate and regular rhythm  Heart sounds: Normal heart sounds  Pulmonary:      Effort: No respiratory distress  Breath sounds: Normal breath sounds  No wheezing or rales  Chest:      Chest wall: No tenderness  Abdominal:      General: Bowel sounds are normal  There is no distension  Palpations: Abdomen is soft  There is no mass  Tenderness: There is no abdominal tenderness  There is no guarding or rebound  Musculoskeletal: Normal range of motion  General: No tenderness or deformity  Comments: Will see Dr Kalen Fabian in 2 days for eval of hip (L) repllacement  Skin:     General: Skin is warm and dry  Coloration: Skin is not pale  Findings: No erythema or rash  Neurological:      Mental Status: She is alert and oriented to person, place, and time  Cranial Nerves: No cranial nerve deficit  Psychiatric:         Behavior: Behavior normal          Thought Content:  Thought content normal          Judgment: Judgment normal  NOTE:  Spent 30 min with pt and  counseling wt losee (abilio 20+ lbs now) and exercise, all meds and explain neuritis

## 2020-09-28 NOTE — PATIENT INSTRUCTIONS
Arthritis   AMBULATORY CARE:   Arthritis  is a disease that causes inflammation in one or more joints  There are many types of arthritis, such as osteoarthritis, rheumatoid arthritis, and septic arthritis  Some types cause inflammation in the joints  Other types wear away the cartilage between joints  This makes the bones of the joint rub together when you move the joint  Your symptoms may be constant, or symptoms may come and go  Arthritis often gets worse over time and can cause permanent joint damage  Common signs and symptoms of arthritis:   · Pain, swelling, or stiffness in the joint    · Limited range of motion in the joint    · Warmth or redness over the joint    · Tenderness when you touch the joint    · Stiff joints in the morning that loosen with movement    · A creaking or grinding sound when you move the joint    · Fever  Seek care immediately if:   · You have a fever and severe joint pain or swelling  · You cannot move the affected joint  · You have severe joint pain you cannot tolerate  Contact your healthcare provider if:   · Your pain or swelling does not get better with treatment  · You have questions or concerns about your condition or care  Treatment  will depend on the type of arthritis you have and if it is severe  You may need any of the following:  · Acetaminophen  decreases pain and fever  It is available without a doctor's order  Ask how much to take and how often to take it  Follow directions  Acetaminophen can cause liver damage if not taken correctly  · NSAIDs , such as ibuprofen, help decrease swelling, pain, and fever  This medicine is available with or without a doctor's order  NSAIDs can cause stomach bleeding or kidney problems in certain people  If you take blood thinner medicine, always ask your healthcare provider if NSAIDs are safe for you  Always read the medicine label and follow directions  · Steroid medicine  helps reduce swelling and pain       · Surgery may be needed to repair or replace a damaged joint  Manage arthritis:   · Rest your painful joint so it can heal   Your healthcare provider may recommend crutches or a walker if the affected joint is in a leg  · Apply ice or heat to the joint  Both can help decrease swelling and pain  Ice may also help prevent tissue damage  Use an ice pack, or put crushed ice in a plastic bag  Cover it with a towel and place it on your joint for 15 to 20 minutes every hour or as directed  You can apply heat for 20 minutes every 2 hours  Heat treatment includes hot packs or heat lamps  · Elevate your joint  Elevation helps reduce swelling and pain  Raise your joint above the level of your heart as often as you can  Prop your painful joint on pillows to keep it above your heart comfortably  · Go to physical or occupational therapy as directed  A physical therapist can teach you exercises to improve flexibility and range of motion  You may also be shown non-weight-bearing exercises that are safe for your joints, such as swimming  Exercise can help keep your joints flexible and reduce pain  An occupational therapist can help you learn to do your daily activities when your joints are stiff or sore  · Maintain a healthy weight  Extra weight puts increased pressure on your joints  Ask your healthcare provider what you should weigh  If you need to lose weight, he can help you create a weight loss program  Weight loss can help reduce pain and increase your ability to do your activities  The amount of exercise you do may vary each day, depending on your symptoms  · Wear flat or low-heeled shoes  This will help decrease pain and reduce pressure on your ankle, knee, and hip joints  · Use support devices as directed  You may be given splints to wear on your hands to help your joints rest and to decrease inflammation  While you sleep, use a pillow that is firm enough to support your neck and head    Other equipment  that may help you move and prevent falls:  · Orthotic shoes or insoles  help support your feet when you walk  · Crutches, a cane, or a walker  may help decrease your risk for falling  They also decrease stress on affected joints  · Devices to prevent falls  include raised toilet seats and bathtub bars to help you get up from sitting  Handrails can be placed in areas where you need balance and support  Follow up with your healthcare provider or rheumatologist as directed:  Write down your questions so you remember to ask them during your visits  © 2017 [facility] Information is for End User's use only and may not be sold, redistributed or otherwise used for commercial purposes  All illustrations and images included in CareNotes® are the copyrighted property of [employer]  or Justin Pace  The above information is an  only  It is not intended as medical advice for individual conditions or treatments  Talk to your doctor, nurse or pharmacist before following any medical regimen to see if it is safe and effective for you

## 2020-10-06 ENCOUNTER — OFFICE VISIT (OUTPATIENT)
Dept: FAMILY MEDICINE CLINIC | Facility: CLINIC | Age: 70
End: 2020-10-06
Payer: COMMERCIAL

## 2020-10-06 DIAGNOSIS — Z23 ENCOUNTER FOR IMMUNIZATION: Primary | ICD-10-CM

## 2020-10-06 PROCEDURE — 1160F RVW MEDS BY RX/DR IN RCRD: CPT | Performed by: FAMILY MEDICINE

## 2020-10-06 PROCEDURE — G0008 ADMIN INFLUENZA VIRUS VAC: HCPCS

## 2020-10-06 PROCEDURE — 1036F TOBACCO NON-USER: CPT | Performed by: FAMILY MEDICINE

## 2020-10-06 PROCEDURE — 90662 IIV NO PRSV INCREASED AG IM: CPT

## 2020-10-22 DIAGNOSIS — F32.A CHRONIC DEPRESSION: Primary | ICD-10-CM

## 2020-10-22 RX ORDER — CITALOPRAM 10 MG/1
10 TABLET ORAL DAILY
Qty: 90 TABLET | Refills: 2 | Status: SHIPPED | OUTPATIENT
Start: 2020-10-22 | End: 2021-07-18

## 2020-11-18 DIAGNOSIS — E03.9 ACQUIRED HYPOTHYROIDISM: Primary | ICD-10-CM

## 2020-11-19 RX ORDER — LEVOTHYROXINE SODIUM 125 UG/1
TABLET ORAL
Qty: 90 TABLET | Refills: 3 | Status: SHIPPED | OUTPATIENT
Start: 2020-11-19 | End: 2021-10-19

## 2020-12-27 DIAGNOSIS — I10 ESSENTIAL HYPERTENSION: Primary | ICD-10-CM

## 2020-12-27 RX ORDER — BENAZEPRIL HYDROCHLORIDE 40 MG/1
TABLET, FILM COATED ORAL
Qty: 90 TABLET | Refills: 3 | Status: SHIPPED | OUTPATIENT
Start: 2020-12-27 | End: 2021-11-14 | Stop reason: SDUPTHER

## 2021-02-24 DIAGNOSIS — R14.0 BLOATING SYMPTOM: ICD-10-CM

## 2021-02-24 DIAGNOSIS — R10.13 EPIGASTRIC DISCOMFORT: ICD-10-CM

## 2021-02-24 DIAGNOSIS — K21.00 REFLUX ESOPHAGITIS: ICD-10-CM

## 2021-02-24 RX ORDER — PANTOPRAZOLE SODIUM 40 MG/1
40 TABLET, DELAYED RELEASE ORAL DAILY
Qty: 90 TABLET | Refills: 3 | Status: SHIPPED | OUTPATIENT
Start: 2021-02-24 | End: 2021-11-14 | Stop reason: SDUPTHER

## 2021-03-02 ENCOUNTER — OFFICE VISIT (OUTPATIENT)
Dept: FAMILY MEDICINE CLINIC | Facility: CLINIC | Age: 71
End: 2021-03-02
Payer: COMMERCIAL

## 2021-03-02 VITALS
RESPIRATION RATE: 18 BRPM | OXYGEN SATURATION: 96 % | WEIGHT: 258 LBS | SYSTOLIC BLOOD PRESSURE: 132 MMHG | BODY MASS INDEX: 47.48 KG/M2 | HEART RATE: 95 BPM | HEIGHT: 62 IN | DIASTOLIC BLOOD PRESSURE: 72 MMHG | TEMPERATURE: 99.3 F

## 2021-03-02 DIAGNOSIS — M16.12 PRIMARY OSTEOARTHRITIS OF LEFT HIP: ICD-10-CM

## 2021-03-02 DIAGNOSIS — E66.01 CLASS 3 SEVERE OBESITY WITH SERIOUS COMORBIDITY AND BODY MASS INDEX (BMI) OF 45.0 TO 49.9 IN ADULT, UNSPECIFIED OBESITY TYPE (HCC): ICD-10-CM

## 2021-03-02 DIAGNOSIS — I10 ESSENTIAL HYPERTENSION: ICD-10-CM

## 2021-03-02 DIAGNOSIS — E66.01 MORBID OBESITY (HCC): ICD-10-CM

## 2021-03-02 DIAGNOSIS — Z79.899 POLYPHARMACY: ICD-10-CM

## 2021-03-02 DIAGNOSIS — R63.8 INCREASED BMI: Primary | ICD-10-CM

## 2021-03-02 PROBLEM — E66.813 CLASS 3 SEVERE OBESITY WITH SERIOUS COMORBIDITY AND BODY MASS INDEX (BMI) OF 45.0 TO 49.9 IN ADULT (HCC): Status: ACTIVE | Noted: 2021-03-02

## 2021-03-02 PROCEDURE — 1125F AMNT PAIN NOTED PAIN PRSNT: CPT | Performed by: FAMILY MEDICINE

## 2021-03-02 PROCEDURE — 1160F RVW MEDS BY RX/DR IN RCRD: CPT | Performed by: FAMILY MEDICINE

## 2021-03-02 PROCEDURE — 3008F BODY MASS INDEX DOCD: CPT | Performed by: FAMILY MEDICINE

## 2021-03-02 PROCEDURE — 3288F FALL RISK ASSESSMENT DOCD: CPT | Performed by: FAMILY MEDICINE

## 2021-03-02 PROCEDURE — 3078F DIAST BP <80 MM HG: CPT | Performed by: FAMILY MEDICINE

## 2021-03-02 PROCEDURE — 1036F TOBACCO NON-USER: CPT | Performed by: FAMILY MEDICINE

## 2021-03-02 PROCEDURE — 3075F SYST BP GE 130 - 139MM HG: CPT | Performed by: FAMILY MEDICINE

## 2021-03-02 PROCEDURE — G0439 PPPS, SUBSEQ VISIT: HCPCS | Performed by: FAMILY MEDICINE

## 2021-03-02 PROCEDURE — 1101F PT FALLS ASSESS-DOCD LE1/YR: CPT | Performed by: FAMILY MEDICINE

## 2021-03-02 PROCEDURE — 99215 OFFICE O/P EST HI 40 MIN: CPT | Performed by: FAMILY MEDICINE

## 2021-03-02 PROCEDURE — 3725F SCREEN DEPRESSION PERFORMED: CPT | Performed by: FAMILY MEDICINE

## 2021-03-02 PROCEDURE — 1170F FXNL STATUS ASSESSED: CPT | Performed by: FAMILY MEDICINE

## 2021-03-02 RX ORDER — AMOXICILLIN 500 MG/1
TABLET, FILM COATED ORAL
COMMUNITY
Start: 2021-01-21 | End: 2021-04-07

## 2021-03-02 NOTE — PATIENT INSTRUCTIONS
Medicare Preventive Visit Patient Instructions  Thank you for completing your Welcome to Medicare Visit or Medicare Annual Wellness Visit today  Your next wellness visit will be due in one year (3/2/2022)  The screening/preventive services that you may require over the next 5-10 years are detailed below  Some tests may not apply to you based off risk factors and/or age  Screening tests ordered at today's visit but not completed yet may show as past due  Also, please note that scanned in results may not display below  Preventive Screenings:  Service Recommendations Previous Testing/Comments   Colorectal Cancer Screening  * Colonoscopy    * Fecal Occult Blood Test (FOBT)/Fecal Immunochemical Test (FIT)  * Fecal DNA/Cologuard Test  * Flexible Sigmoidoscopy Age: 54-65 years old   Colonoscopy: every 10 years (may be performed more frequently if at higher risk)  OR  FOBT/FIT: every 1 year  OR  Cologuard: every 3 years  OR  Sigmoidoscopy: every 5 years  Screening may be recommended earlier than age 48 if at higher risk for colorectal cancer  Also, an individualized decision between you and your healthcare provider will decide whether screening between the ages of 74-80 would be appropriate  Colonoscopy: 07/30/2020  FOBT/FIT: Not on file  Cologuard: Not on file  Sigmoidoscopy: Not on file    Screening Current     Breast Cancer Screening Age: 36 years old  Frequency: every 1-2 years  Not required if history of left and right mastectomy Mammogram: 12/13/2019    Screening Current   Cervical Cancer Screening Between the ages of 21-29, pap smear recommended once every 3 years  Between the ages of 33-67, can perform pap smear with HPV co-testing every 5 years     Recommendations may differ for women with a history of total hysterectomy, cervical cancer, or abnormal pap smears in past  Pap Smear: Not on file    Screening Not Indicated   Hepatitis C Screening Once for adults born between 1945 and 1965  More frequently in patients at high risk for Hepatitis C Hep C Antibody: Not on file       Diabetes Screening 1-2 times per year if you're at risk for diabetes or have pre-diabetes Fasting glucose: 107 mg/dL   A1C: 6 3 %    Screening Current   Cholesterol Screening Once every 5 years if you don't have a lipid disorder  May order more often based on risk factors  Lipid panel: 06/27/2020    Screening Current     Other Preventive Screenings Covered by Medicare:  1  Abdominal Aortic Aneurysm (AAA) Screening: covered once if your at risk  You're considered to be at risk if you have a family history of AAA  2  Lung Cancer Screening: covers low dose CT scan once per year if you meet all of the following conditions: (1) Age 50-69; (2) No signs or symptoms of lung cancer; (3) Current smoker or have quit smoking within the last 15 years; (4) You have a tobacco smoking history of at least 30 pack years (packs per day multiplied by number of years you smoked); (5) You get a written order from a healthcare provider  3  Glaucoma Screening: covered annually if you're considered high risk: (1) You have diabetes OR (2) Family history of glaucoma OR (3)  aged 48 and older OR (3)  American aged 72 and older  3  Osteoporosis Screening: covered every 2 years if you meet one of the following conditions: (1) You're estrogen deficient and at risk for osteoporosis based off medical history and other findings; (2) Have a vertebral abnormality; (3) On glucocorticoid therapy for more than 3 months; (4) Have primary hyperparathyroidism; (5) On osteoporosis medications and need to assess response to drug therapy  · Last bone density test (DXA Scan): Not on file  5  HIV Screening: covered annually if you're between the age of 12-76  Also covered annually if you are younger than 13 and older than 72 with risk factors for HIV infection  For pregnant patients, it is covered up to 3 times per pregnancy      Immunizations:  Immunization Recommendations   Influenza Vaccine Annual influenza vaccination during flu season is recommended for all persons aged >= 6 months who do not have contraindications   Pneumococcal Vaccine (Prevnar and Pneumovax)  * Prevnar = PCV13  * Pneumovax = PPSV23   Adults 25-60 years old: 1-3 doses may be recommended based on certain risk factors  Adults 72 years old: Prevnar (PCV13) vaccine recommended followed by Pneumovax (PPSV23) vaccine  If already received PPSV23 since turning 65, then PCV13 recommended at least one year after PPSV23 dose  Hepatitis B Vaccine 3 dose series if at intermediate or high risk (ex: diabetes, end stage renal disease, liver disease)   Tetanus (Td) Vaccine - COST NOT COVERED BY MEDICARE PART B Following completion of primary series, a booster dose should be given every 10 years to maintain immunity against tetanus  Td may also be given as tetanus wound prophylaxis  Tdap Vaccine - COST NOT COVERED BY MEDICARE PART B Recommended at least once for all adults  For pregnant patients, recommended with each pregnancy  Shingles Vaccine (Shingrix) - COST NOT COVERED BY MEDICARE PART B  2 shot series recommended in those aged 48 and above     Health Maintenance Due:      Topic Date Due    Hepatitis C Screening  1950    MAMMOGRAM  12/13/2020    Colorectal Cancer Screening  07/30/2030     Immunizations Due:      Topic Date Due    DTaP,Tdap,and Td Vaccines (1 - Tdap) 01/25/1969    Pneumococcal Vaccine: 65+ Years (2 of 2 - PPSV23) 01/27/2016     Advance Directives   What are advance directives? Advance directives are legal documents that state your wishes and plans for medical care  These plans are made ahead of time in case you lose your ability to make decisions for yourself  Advance directives can apply to any medical decision, such as the treatments you want, and if you want to donate organs  What are the types of advance directives?   There are many types of advance directives, and each state has rules about how to use them  You may choose a combination of any of the following:  · Living will: This is a written record of the treatment you want  You can also choose which treatments you do not want, which to limit, and which to stop at a certain time  This includes surgery, medicine, IV fluid, and tube feedings  · Durable power of  for healthcare [facility]): This is a written record that states who you want to make healthcare choices for you when you are unable to make them for yourself  This person, called a proxy, is usually a family member or a friend  You may choose more than 1 proxy  · Do not resuscitate (DNR) order:  A DNR order is used in case your heart stops beating or you stop breathing  It is a request not to have certain forms of treatment, such as CPR  A DNR order may be included in other types of advance directives  · Medical directive: This covers the care that you want if you are in a coma, near death, or unable to make decisions for yourself  You can list the treatments you want for each condition  Treatment may include pain medicine, surgery, blood transfusions, dialysis, IV or tube feedings, and a ventilator (breathing machine)  · Values history: This document has questions about your views, beliefs, and how you feel and think about life  This information can help others choose the care that you would choose  Why are advance directives important? An advance directive helps you control your care  Although spoken wishes may be used, it is better to have your wishes written down  Spoken wishes can be misunderstood, or not followed  Treatments may be given even if you do not want them  An advance directive may make it easier for your family to make difficult choices about your care     Weight Management   Why it is important to manage your weight:  Being overweight increases your risk of health conditions such as heart disease, high blood pressure, type 2 diabetes, and certain types of cancer  It can also increase your risk for osteoarthritis, sleep apnea, and other respiratory problems  Aim for a slow, steady weight loss  Even a small amount of weight loss can lower your risk of health problems  How to lose weight safely:  A safe and healthy way to lose weight is to eat fewer calories and get regular exercise  You can lose up about 1 pound a week by decreasing the number of calories you eat by 500 calories each day  Healthy meal plan for weight management:  A healthy meal plan includes a variety of foods, contains fewer calories, and helps you stay healthy  A healthy meal plan includes the following:  · Eat whole-grain foods more often  A healthy meal plan should contain fiber  Fiber is the part of grains, fruits, and vegetables that is not broken down by your body  Whole-grain foods are healthy and provide extra fiber in your diet  Some examples of whole-grain foods are whole-wheat breads and pastas, oatmeal, brown rice, and bulgur  · Eat a variety of vegetables every day  Include dark, leafy greens such as spinach, kale, rex greens, and mustard greens  Eat yellow and orange vegetables such as carrots, sweet potatoes, and winter squash  · Eat a variety of fruits every day  Choose fresh or canned fruit (canned in its own juice or light syrup) instead of juice  Fruit juice has very little or no fiber  · Eat low-fat dairy foods  Drink fat-free (skim) milk or 1% milk  Eat fat-free yogurt and low-fat cottage cheese  Try low-fat cheeses such as mozzarella and other reduced-fat cheeses  · Choose meat and other protein foods that are low in fat  Choose beans or other legumes such as split peas or lentils  Choose fish, skinless poultry (chicken or turkey), or lean cuts of red meat (beef or pork)  Before you cook meat or poultry, cut off any visible fat  · Use less fat and oil  Try baking foods instead of frying them   Add less fat, such as margarine, sour cream, regular salad dressing and mayonnaise to foods  Eat fewer high-fat foods  Some examples of high-fat foods include french fries, doughnuts, ice cream, and cakes  · Eat fewer sweets  Limit foods and drinks that are high in sugar  This includes candy, cookies, regular soda, and sweetened drinks  Exercise:  Exercise at least 30 minutes per day on most days of the week  Some examples of exercise include walking, biking, dancing, and swimming  You can also fit in more physical activity by taking the stairs instead of the elevator or parking farther away from stores  Ask your healthcare provider about the best exercise plan for you  © Copyright [employer] 2018 Information is for End User's use only and may not be sold, redistributed or otherwise used for commercial purposes   All illustrations and images included in CareNotes® are the copyrighted property of [employer]  or [address] [place] [address]

## 2021-03-02 NOTE — PROGRESS NOTES
Assessment and Plan:     Problem List Items Addressed This Visit     None        BMI Counseling: Body mass index is 47 19 kg/m²  The BMI is above normal  Nutrition recommendations include decreasing portion sizes, encouraging healthy choices of fruits and vegetables, decreasing fast food intake, consuming healthier snacks, limiting drinks that contain sugar, moderation in carbohydrate intake, increasing intake of lean protein and reducing intake of saturated and trans fat  Exercise recommendations include moderate physical activity 150 minutes/week and exercising 3-5 times per week  No pharmacotherapy was ordered  Preventive health issues were discussed with patient, and age appropriate screening tests were ordered as noted in patient's After Visit Summary  Personalized health advice and appropriate referrals for health education or preventive services given if needed, as noted in patient's After Visit Summary       History of Present Illness:     Patient presents for Medicare Annual Wellness visit    Patient Care Team:  Kevin Foy DO as PCP - General (Family Medicine)     Problem List:     Patient Active Problem List   Diagnosis    Vitamin D deficiency    Psoriasis    Pre-diabetes    Osteoarthritis    NSAID long-term use    Morbid obesity ([address] )    Hypothyroidism    Essential hypertension    Elevated blood sugar    CKD (chronic kidney disease) stage 3, GFR 30-59 ml/min    Chronic depression    Chronic anxiety    Ambulatory dysfunction      Past Medical and Surgical History:     Past Medical History:   Diagnosis Date    Anxiety     on med     Arthritis     Back problem     4 Bulging Disks    Fibrocystic disease of both breasts     Hip pain     Left    History of staph infection     Hypertension     Knee pain     Right    Spinal stenosis     Thyroid disease      Past Surgical History:   Procedure Laterality Date    CATARACT EXTRACTION  01/01/2017    CHOLECYSTECTOMY      MAMMO (HISTORICAL)  2019    OTHER SURGICAL HISTORY      Warts removal    REFRACTIVE SURGERY Bilateral     ROTATOR CUFF REPAIR Right       Family History:     Family History   Problem Relation Age of Onset    Lung cancer Father     Breast cancer Other       Social History:     E-Cigarette/Vaping    E-Cigarette Use Never User      E-Cigarette/Vaping Substances    Nicotine No     THC No     CBD No     Flavoring No     Other No     Unknown No      Social History     Socioeconomic History    Marital status: /Civil Union     Spouse name: None    Number of children: 1    Years of education: 15    Highest education level: 12th grade   Occupational History    Occupation: reitred    Social Needs    Financial resource strain: None    Food insecurity     Worry: None     Inability: None    Transportation needs     Medical: None     Non-medical: None   Tobacco Use    Smoking status: Former Smoker     Years: 14 00    Smokeless tobacco: Never Used   Substance and Sexual Activity    Alcohol use: Not Currently     Frequency: Monthly or less     Comment: Occasional     Drug use: Not Currently    Sexual activity: Yes     Partners: Male     Birth control/protection: Post-menopausal   Lifestyle    Physical activity     Days per week: 2 days     Minutes per session: 30 min    Stress: None   Relationships    Social connections     Talks on phone: None     Gets together: None     Attends Taoist service: None     Active member of club or organization: None     Attends meetings of clubs or organizations: None     Relationship status: None    Intimate partner violence     Fear of current or ex partner: None     Emotionally abused: None     Physically abused: None     Forced sexual activity: None   Other Topics Concern    None   Social History Narrative    · Most recent tobacco use screenin2019      · Do you currently or have you served in the [employer]:   No      · Were you activated, into active duty, as a member of the [employer] or as a Reservist:   No      · Caffeine intake:   Occasional      · Guns present in home:   No      · Seat belts used routinely:   Yes      · Sunscreen used routinely:   Yes      · Smoke alarm in home:    Yes      · Advance directive:   Yes       Medications and Allergies:     Current Outpatient Medications   Medication Sig Dispense Refill    acetaminophen (TYLENOL) 500 mg tablet Take 500 mg by mouth every 4 (four) hours      amLODIPine (NORVASC) 5 mg tablet Take 5 mg by mouth daily      benazepril (LOTENSIN) 40 MG tablet TAKE 1 TABLET DAILY 90 tablet 3    betamethasone dipropionate (DIPROSONE) 0 05 % cream Apply topically 2 (two) times a day ADD:  To the SCALP and rub in well 30 g 0    Cholecalciferol 1 25 MG (70911 UT) TABS Take 1 25 mg by mouth daily      citalopram (CeleXA) 10 mg tablet Take 1 tablet (10 mg total) by mouth daily 90 tablet 2    fexofenadine (ALLEGRA) 180 MG tablet Take 180 mg by mouth daily      ketoconazole (NIZORAL) 2 % cream Apply topically daily ADD:  To the face, nose, eyebrows 30 g 1    linaCLOtide (Linzess) 145 MCG CAPS Take 1 capsule (145 mcg total) by mouth daily 30 capsule 3    meloxicam (MOBIC) 15 mg tablet Take 15 mg by mouth daily      multivitamin-iron-minerals-folic acid (CENTRUM) chewable tablet Chew 1 tablet daily      naproxen sodium (ALEVE) 220 MG tablet Take 220 mg by mouth every 12 (twelve) hours as needed      pantoprazole (PROTONIX) 40 mg tablet Take 1 tablet (40 mg total) by mouth daily 90 tablet 3    Synthroid 125 MCG tablet TAKE 1 TABLET DAILY 90 tablet 3    triamcinolone (KENALOG) 0 1 % cream Apply topically 2 (two) times a day ADD to the Sig:  Apply behind the ears 30 g 0    amoxicillin (AMOXIL) 500 MG tablet take 4 tablets by mouth 1 hour PRIOR TO PROCEDURE      levofloxacin (LEVAQUIN) 500 mg tablet levofloxacin 500 mg tablet      traMADol (ULTRAM) 50 mg tablet Take 1 tablet (50 mg total) by mouth every 6 (six) hours as needed for moderate pain (Patient not taking: Reported on 3/2/2021) 60 tablet 1     No current facility-administered medications for this visit  Allergies   Allergen Reactions    Diphenhydramine Other (See Comments)    Erythromycin Other (See Comments)    Tetanus Toxoids Other (See Comments)    Tetracycline Other (See Comments)      Immunizations:     Immunization History   Administered Date(s) Administered    INFLUENZA 12/05/2014, 11/09/2015, 11/07/2017, 09/20/2018    Influenza, high dose seasonal 0 7 mL 10/06/2020    Pneumococcal Conjugate 13-Valent 01/27/2015    Pneumococcal Polysaccharide PPV23 01/01/2002, 01/01/2009, 03/10/2009    Td (adult), adsorbed 01/01/1996, 01/01/2006      Health Maintenance:         Topic Date Due    Hepatitis C Screening  1950    MAMMOGRAM  12/13/2020    Colorectal Cancer Screening  07/30/2030         Topic Date Due    DTaP,Tdap,and Td Vaccines (1 - Tdap) 01/25/1969    Pneumococcal Vaccine: 65+ Years (2 of 2 - PPSV23) 01/27/2016      Medicare Health Risk Assessment:     /72   Pulse 95   Temp 99 3 °F (37 4 °C)   Resp 18   Ht 5' 2" (1 575 m)   Wt 117 kg (258 lb)   SpO2 96%   BMI 47 19 kg/m²      Helen Masters is here for her Subsequent Wellness visit  Health Risk Assessment:   Patient rates overall health as fair  Patient feels that their physical health rating is much better  Eyesight was rated as same  Hearing was rated as much worse  Patient feels that their emotional and mental health rating is slightly better  Pain experienced in the last 7 days has been some  Patient's pain rating has been 7/10  Patient states that she has experienced no weight loss or gain in last 6 months  Ont 2020 had L hip replaced - Dr Philippe Brambila    Depression Screening:   PHQ-2 Score: 0  PHQ-9 Score: 0      Fall Risk Screening:    In the past year, patient has experienced: no history of falling in past year      Urinary Incontinence Screening:   Patient has not leaked urine accidently in the last six months  Home Safety:  Patient does not have trouble with stairs inside or outside of their home  Patient has working smoke alarms and has working carbon monoxide detector  Home safety hazards include: none  Nutrition:   Current diet is Regular  Medications:   Patient is currently taking over-the-counter supplements  OTC medications include: see medication list  Patient is able to manage medications  Activities of Daily Living (ADLs)/Instrumental Activities of Daily Living (IADLs):   Walk and transfer into and out of bed and chair?: Yes  Dress and groom yourself?: Yes    Bathe or shower yourself?: Yes    Feed yourself? Yes  Do your laundry/housekeeping?: Yes  Manage your money, pay your bills and track your expenses?: Yes  Make your own meals?: Yes    Do your own shopping?: Yes    Previous Hospitalizations:   Any hospitalizations or ED visits within the last 12 months?: Yes      Hospitalization Comments: Surgery on Left  hip in October    Advance Care Planning:   Living will: Yes    Durable POA for healthcare:  Yes    Advanced directive: Yes    Advanced directive counseling given: Yes    Five wishes given: Yes    Patient declined ACP directive: No    End of Life Decisions reviewed with patient: Yes    Provider agrees with end of life decisions: Yes      Cognitive Screening:   Provider or family/friend/caregiver concerned regarding cognition?: No    PREVENTIVE SCREENINGS      Cardiovascular Screening:    General: Screening Current      Diabetes Screening:     General: Screening Current      Colorectal Cancer Screening:     General: Screening Current      Breast Cancer Screening:     General: Screening Current    Due for: Mammogram        Cervical Cancer Screening:    General: Screening Not Indicated and Risks and Benefits Discussed      Osteoporosis Screening:    General: Patient Declines      Lung Cancer Screening:     General: Screening Not Indicated Hepatitis C Screening:      Hep C Screening Accepted: Yes        Preventive Screening Comments: Mammo done in Jan 2021 at Dr Sammy Galaviz office     Other Counseling Topics:   Alcohol use counseling, car/seat belt/driving safety, skin self-exam, sunscreen and calcium and vitamin D intake and regular weightbearing exercise         Taj Park DO

## 2021-03-02 NOTE — PROGRESS NOTES
BMI Counseling: Body mass index is 47 19 kg/m²  The BMI is above normal  Nutrition recommendations include decreasing portion sizes, encouraging healthy choices of fruits and vegetables, decreasing fast food intake, consuming healthier snacks, limiting drinks that contain sugar, moderation in carbohydrate intake, increasing intake of lean protein and reducing intake of saturated and trans fat  Exercise recommendations include moderate physical activity 150 minutes/week, exercising 3-5 times per week and obtaining a gym membership  No pharmacotherapy was ordered  Assessment/Plan:   66 y o female well-known to me for many years presents for f/u and evaluation of the following medical issues:     · F/u and eval HTN:controlled 132/72    · F/u and eval NIDDM: no issue here     · F/u and eval HLD:  No meds, just diet    · F/u and eval deconditioning: a p[ro needs more activity - after Covid     · F/u and eval polypharmacy:  All medications reviewed in depth and discussed with pt, bertrand the use of acid reducing medications, both OTC and Rx    ·  f/u and eval NEW ISSUE:  R ulnar neuropathy--wants to wait to do the EMG  Pt aware of long term effects  · F/u and eval obesity -- really needs the GBS, but pt refusing     · F/u and eval bilat knee pain, bertrand the R   L hip was replaced earlier       · F/u dry nose - use [facility] Nasal Emolient - sample given     · F/u and eval R CTS - surg in past, NOW, has R ulnar neuropahty on the R hand -- needs EMG                  Problem List Items Addressed This Visit        Cardiovascular and Mediastinum    Essential hypertension       Musculoskeletal and Integument    Osteoarthritis       Other    Morbid obesity ([address] )    Class 3 severe obesity with serious comorbidity and body mass index (BMI) of 45 0 to 49 9 in [facility])     Strongly consider GBS, but not too interested           Other Visit Diagnoses     Increased BMI    -  Primary    Polypharmacy                Subjective:  71 y o white female seen with  Agusto Vega is working   Patient ID: Bryant Soto is a 66 y o  female  HPI-- 66 y o female well-known to me for many years presents for f/u and evaluation of the following medical issues:     F/u and eval HTN:controlled 132/72    F/u and eval NIDDM: no issue here     F/u and eval HLD:  No meds, just diet    F/u and eval deconditioning:  Must need more activity - after Covid     F/u and eval polypharmacy:  All medications reviewed in depth and discussed with pt, bertrand the use of acid reducing medications, both OTC and Rx       f/u and eval NEW ISSUE:  R ulnar neuropathy--wants to wait to do the EMG  Pt aware of long term effects  F/u and eval obesity -- really needs the GBS, but pt refusing     F/u and eval bilat knee pain, bertrand the R   L hip was replaced earlier  The following portions of the patient's history were reviewed and updated as appropriate:   Past Medical History:  She has a past medical history of Anxiety, Arthritis, Back problem, Fibrocystic disease of both breasts, Hip pain, History of staph infection, Hypertension, Knee pain, Spinal stenosis, and Thyroid disease ,  _______________________________________________________________________  Medical Problems:  does not have any pertinent problems on file ,  _______________________________________________________________________  Past Surgical History:   has a past surgical history that includes Mammo (historical) (12/13/2019); Cataract extraction (01/01/2017); Refractive surgery (Bilateral); Other surgical history; Cholecystectomy; and Rotator cuff repair (Right)  ,  _______________________________________________________________________  Family History:  family history includes Breast cancer in her other; Lung cancer in her father ,  _______________________________________________________________________  Social History:   reports that she has quit smoking  She quit after 14 00 years of use   She has never used smokeless tobacco  She reports previous alcohol use  She reports previous drug use ,  _______________________________________________________________________  Allergies:  is allergic to diphenhydramine; erythromycin; tetanus toxoids; and tetracycline     _______________________________________________________________________  Current Outpatient Medications   Medication Sig Dispense Refill    acetaminophen (TYLENOL) 500 mg tablet Take 500 mg by mouth every 4 (four) hours      amLODIPine (NORVASC) 5 mg tablet Take 5 mg by mouth daily      benazepril (LOTENSIN) 40 MG tablet TAKE 1 TABLET DAILY 90 tablet 3    betamethasone dipropionate (DIPROSONE) 0 05 % cream Apply topically 2 (two) times a day ADD:  To the SCALP and rub in well 30 g 0    Cholecalciferol 1 25 MG (79983 UT) TABS Take 1 25 mg by mouth daily      citalopram (CeleXA) 10 mg tablet Take 1 tablet (10 mg total) by mouth daily 90 tablet 2    fexofenadine (ALLEGRA) 180 MG tablet Take 180 mg by mouth daily      ketoconazole (NIZORAL) 2 % cream Apply topically daily ADD:  To the face, nose, eyebrows 30 g 1    linaCLOtide (Linzess) 145 MCG CAPS Take 1 capsule (145 mcg total) by mouth daily 30 capsule 3    meloxicam (MOBIC) 15 mg tablet Take 15 mg by mouth daily      multivitamin-iron-minerals-folic acid (CENTRUM) chewable tablet Chew 1 tablet daily      naproxen sodium (ALEVE) 220 MG tablet Take 220 mg by mouth every 12 (twelve) hours as needed      pantoprazole (PROTONIX) 40 mg tablet Take 1 tablet (40 mg total) by mouth daily 90 tablet 3    Synthroid 125 MCG tablet TAKE 1 TABLET DAILY 90 tablet 3    triamcinolone (KENALOG) 0 1 % cream Apply topically 2 (two) times a day ADD to the Sig:  Apply behind the ears 30 g 0    amoxicillin (AMOXIL) 500 MG tablet take 4 tablets by mouth 1 hour PRIOR TO PROCEDURE      levofloxacin (LEVAQUIN) 500 mg tablet levofloxacin 500 mg tablet      traMADol (ULTRAM) 50 mg tablet Take 1 tablet (50 mg total) by mouth every 6 (six) hours as needed for moderate pain (Patient not taking: Reported on 3/2/2021) 60 tablet 1     No current facility-administered medications for this visit       _______________________________________________________________________  Review of Systems   Constitutional: Negative for activity change, appetite change, chills, diaphoresis, fatigue and fever  HENT: Negative for congestion, ear discharge, ear pain, facial swelling, nosebleeds, sore throat, tinnitus, trouble swallowing and voice change  Eyes: Negative for photophobia, pain, discharge, redness, itching and visual disturbance  Respiratory: Negative for apnea, cough, choking, chest tightness and shortness of breath  Cardiovascular: Negative for chest pain, palpitations and leg swelling  Gastrointestinal: Negative for abdominal distention, abdominal pain, blood in stool, constipation, diarrhea, nausea and vomiting  Endocrine: Negative for cold intolerance, heat intolerance, polydipsia, polyphagia and polyuria  Genitourinary: Negative for decreased urine volume, difficulty urinating, dysuria, enuresis, frequency, hematuria, pelvic pain, urgency and vaginal bleeding  Musculoskeletal: Positive for arthralgias, back pain (due to spinal stenosis and 4 bulging disc), gait problem (walking with cane, due to R knee pain) and myalgias  Negative for joint swelling, neck pain and neck stiffness  Oct, 2020, Dr Marylene Kohler replaced the L hip and no prob  He did the side incision and delayed recovery (due to large pineculus)  Did PT well, etc    This shifted the wt to the R knee and that is the prob now  Pain and some swelling  Burning sensation  Hurts  Will f/u wht Dr Casanova Screen   Skin: Negative for color change, pallor and rash  Psoriasis behind ears, seb dermatitis of nose, face, eyebrows, and  eczema   Allergic/Immunologic: Negative for immunocompromised state     Neurological: Negative for dizziness, seizures, facial asymmetry, light-headedness, numbness and headaches  Hematological: Negative for adenopathy  Psychiatric/Behavioral: Negative for agitation, behavioral problems, confusion, decreased concentration, dysphoric mood and hallucinations  Objective:  Vitals:    03/02/21 1313   BP: 132/72   Pulse: 95   Resp: 18   Temp: 99 3 °F (37 4 °C)   SpO2: 96%   Weight: 117 kg (258 lb)   Height: 5' 2" (1 575 m)     Body mass index is 47 19 kg/m²  Physical Exam  Vitals signs and nursing note reviewed  Constitutional:       General: She is not in acute distress  Appearance: She is well-developed  She is obese  She is not ill-appearing, toxic-appearing or diaphoretic  HENT:      Head: Normocephalic and atraumatic  Eyes:      General: No scleral icterus  Conjunctiva/sclera: Conjunctivae normal       Pupils: Pupils are equal, round, and reactive to light  Neck:      Musculoskeletal: Normal range of motion and neck supple  Thyroid: No thyromegaly  Trachea: No tracheal deviation  Cardiovascular:      Rate and Rhythm: Normal rate and regular rhythm  Heart sounds: Normal heart sounds  Pulmonary:      Effort: No respiratory distress  Breath sounds: Normal breath sounds  No wheezing or rales  Chest:      Chest wall: No tenderness  Musculoskeletal: Normal range of motion  General: No tenderness or deformity  Comments: Will see Dr Sterling Garcia in 2 days for eval of hip (L) repllacement  Skin:     General: Skin is warm and dry  Coloration: Skin is not pale  Findings: No erythema or rash  Comments: WEI vasquez- / [facility] surg  5 days ago -- looks good   Neurological:      General: No focal deficit present  Mental Status: She is alert and oriented to person, place, and time  Cranial Nerves: No cranial nerve deficit  Psychiatric:         Mood and Affect: Mood normal          Behavior: Behavior normal          Thought Content:  Thought content normal          Judgment: Judgment normal          NOTE: all of the above issues discussed include chronic illness(es)  with severe exacerbations or pose threats to life or body function if not managed/monitored effectively  I am as concerned about the long term effects of these disease states, as well as the short term effects  I have spent considerable time in assuring that my patient  understands  In addition, I have reviewed prior internal and external notes, consults, hospital discharges, any other appropriate documents and have discussed the management as well as the interpretation of them to the patient  Again, patient expresses understanding

## 2021-03-07 DIAGNOSIS — M16.12 PRIMARY OSTEOARTHRITIS OF LEFT HIP: Primary | ICD-10-CM

## 2021-03-07 RX ORDER — MELOXICAM 15 MG/1
TABLET ORAL
Qty: 90 TABLET | Refills: 3 | Status: SHIPPED | OUTPATIENT
Start: 2021-03-07 | End: 2022-03-15

## 2021-03-10 DIAGNOSIS — Z23 ENCOUNTER FOR IMMUNIZATION: ICD-10-CM

## 2021-03-11 DIAGNOSIS — I10 ESSENTIAL HYPERTENSION: Primary | ICD-10-CM

## 2021-03-11 RX ORDER — AMLODIPINE BESYLATE 5 MG/1
TABLET ORAL
Qty: 90 TABLET | Refills: 3 | Status: SHIPPED | OUTPATIENT
Start: 2021-03-11 | End: 2021-11-14 | Stop reason: SDUPTHER

## 2021-03-16 ENCOUNTER — IMMUNIZATIONS (OUTPATIENT)
Dept: FAMILY MEDICINE CLINIC | Facility: HOSPITAL | Age: 71
End: 2021-03-16

## 2021-03-16 DIAGNOSIS — Z23 ENCOUNTER FOR IMMUNIZATION: Primary | ICD-10-CM

## 2021-03-16 PROCEDURE — 0001A SARS-COV-2 / COVID-19 MRNA VACCINE (PFIZER-BIONTECH) 30 MCG: CPT

## 2021-03-16 PROCEDURE — 91300 SARS-COV-2 / COVID-19 MRNA VACCINE (PFIZER-BIONTECH) 30 MCG: CPT

## 2021-04-07 ENCOUNTER — OFFICE VISIT (OUTPATIENT)
Dept: FAMILY MEDICINE CLINIC | Facility: CLINIC | Age: 71
End: 2021-04-07
Payer: COMMERCIAL

## 2021-04-07 VITALS
DIASTOLIC BLOOD PRESSURE: 62 MMHG | SYSTOLIC BLOOD PRESSURE: 130 MMHG | TEMPERATURE: 97.7 F | RESPIRATION RATE: 16 BRPM | BODY MASS INDEX: 47.26 KG/M2 | HEIGHT: 62 IN | HEART RATE: 95 BPM | WEIGHT: 256.8 LBS | OXYGEN SATURATION: 97 %

## 2021-04-07 DIAGNOSIS — H65.115 RECURRENT ACUTE ALLERGIC OTITIS MEDIA OF LEFT EAR: Primary | ICD-10-CM

## 2021-04-07 PROBLEM — M16.11 PRIMARY OSTEOARTHRITIS OF RIGHT HIP: Status: ACTIVE | Noted: 2021-01-20

## 2021-04-07 PROBLEM — I73.9 PAD (PERIPHERAL ARTERY DISEASE) (HCC): Status: ACTIVE | Noted: 2021-04-07

## 2021-04-07 PROBLEM — Z96.642 HISTORY OF TOTAL LEFT HIP REPLACEMENT: Status: ACTIVE | Noted: 2020-12-01

## 2021-04-07 PROBLEM — M54.50 LUMBAR PAIN: Status: ACTIVE | Noted: 2020-12-09

## 2021-04-07 PROCEDURE — 99213 OFFICE O/P EST LOW 20 MIN: CPT | Performed by: NURSE PRACTITIONER

## 2021-04-07 PROCEDURE — 1036F TOBACCO NON-USER: CPT | Performed by: NURSE PRACTITIONER

## 2021-04-07 PROCEDURE — 1160F RVW MEDS BY RX/DR IN RCRD: CPT | Performed by: NURSE PRACTITIONER

## 2021-04-07 RX ORDER — CEPHALEXIN 500 MG/1
500 CAPSULE ORAL EVERY 8 HOURS SCHEDULED
Qty: 30 CAPSULE | Refills: 0 | Status: SHIPPED | OUTPATIENT
Start: 2021-04-07 | End: 2021-04-17

## 2021-04-09 ENCOUNTER — IMMUNIZATIONS (OUTPATIENT)
Dept: FAMILY MEDICINE CLINIC | Facility: HOSPITAL | Age: 71
End: 2021-04-09

## 2021-04-09 DIAGNOSIS — Z23 ENCOUNTER FOR IMMUNIZATION: Primary | ICD-10-CM

## 2021-04-09 PROCEDURE — 0002A SARS-COV-2 / COVID-19 MRNA VACCINE (PFIZER-BIONTECH) 30 MCG: CPT

## 2021-04-09 PROCEDURE — 91300 SARS-COV-2 / COVID-19 MRNA VACCINE (PFIZER-BIONTECH) 30 MCG: CPT

## 2021-04-18 ENCOUNTER — TELEPHONE (OUTPATIENT)
Dept: FAMILY MEDICINE CLINIC | Facility: CLINIC | Age: 71
End: 2021-04-18

## 2021-04-18 NOTE — PROGRESS NOTES
BMI Counseling: Body mass index is 46 97 kg/m²  The BMI is above normal  Nutrition recommendations include decreasing portion sizes, encouraging healthy choices of fruits and vegetables, decreasing fast food intake, consuming healthier snacks, limiting drinks that contain sugar, moderation in carbohydrate intake, increasing intake of lean protein, reducing intake of saturated and trans fat and reducing intake of cholesterol  Exercise recommendations include vigorous physical activity 75 minutes/week, exercising 3-5 times per week and strength training exercises  No pharmacotherapy was ordered  Patient referred to PCP due to patient being overweight  BMI 46 97       Falls Plan of Care: Recommended assistive device to help with gait and balance  Medications that increase falls were reviewed  Assessed feet and footwear  Home safety evaluation by OT recommended  Home safety education provided  Assessment/Plan:    Patient of Dr Mariella Lewis present with mild swelling and lump to left ear area  Does have some localized ear pain    has had intermittent headaches   Denies ant fevers cough or congestion   Does have left otitis media   Will treat and have her follow up with us or ENT if further issues   Started on amoxicillin   Take with food   Follow up in few weeks      Problem List Items Addressed This Visit     None      Visit Diagnoses     Recurrent acute allergic otitis media of left ear    -  Primary            Subjective:      Patient ID: Rand Garcia is a 66 y o  female  Patient of Dr Mariella Lewis present with mild swelling and lump to left ear area   Does have some localized ear pain    has had intermittent headaches   Denies ant fevers cough or congestion   Does have left otitis media   Will treat and have her follow up with us or ENT if further issues   Started on amoxicillin   Take with food   Follow up in few weeks         The following portions of the patient's history were reviewed and updated as appropriate:   Past Medical History:  She has a past medical history of Anxiety, Arthritis, Back problem, Fibrocystic disease of both breasts, Hip pain, History of staph infection, Hypertension, Knee pain, Spinal stenosis, and Thyroid disease ,  _______________________________________________________________________  Medical Problems:  does not have any pertinent problems on file ,  _______________________________________________________________________  Past Surgical History:   has a past surgical history that includes Mammo (historical) (12/13/2019); Cataract extraction (01/01/2017); Refractive surgery (Bilateral); Other surgical history; Cholecystectomy; and Rotator cuff repair (Right)  ,  _______________________________________________________________________  Family History:  family history includes Breast cancer in her other; Lung cancer in her father ,  _______________________________________________________________________  Social History:   reports that she has quit smoking  She quit after 14 00 years of use  She has never used smokeless tobacco  She reports previous alcohol use  She reports previous drug use ,  _______________________________________________________________________  Allergies:  is allergic to diphenhydramine; erythromycin; tetanus toxoids; and tetracycline     _______________________________________________________________________  Current Outpatient Medications   Medication Sig Dispense Refill    acetaminophen (TYLENOL) 500 mg tablet Take 500 mg by mouth every 4 (four) hours      amLODIPine (NORVASC) 5 mg tablet TAKE 1 TABLET DAILY 90 tablet 3    benazepril (LOTENSIN) 40 MG tablet TAKE 1 TABLET DAILY 90 tablet 3    betamethasone dipropionate (DIPROSONE) 0 05 % cream Apply topically 2 (two) times a day ADD:  To the SCALP and rub in well 30 g 0    Cholecalciferol 1 25 MG (82084 UT) TABS Take 1 25 mg by mouth daily      citalopram (CeleXA) 10 mg tablet Take 1 tablet (10 mg total) by mouth daily 90 tablet 2  fexofenadine (ALLEGRA) 180 MG tablet Take 180 mg by mouth daily      ketoconazole (NIZORAL) 2 % cream Apply topically daily ADD:  To the face, nose, eyebrows 30 g 1    levofloxacin (LEVAQUIN) 500 mg tablet levofloxacin 500 mg tablet      linaCLOtide (Linzess) 145 MCG CAPS Take 1 capsule (145 mcg total) by mouth daily 30 capsule 3    meloxicam (MOBIC) 15 mg tablet TAKE 1 TABLET DAILY 90 tablet 3    multivitamin-iron-minerals-folic acid (CENTRUM) chewable tablet Chew 1 tablet daily      naproxen sodium (ALEVE) 220 MG tablet Take 220 mg by mouth every 12 (twelve) hours as needed      pantoprazole (PROTONIX) 40 mg tablet Take 1 tablet (40 mg total) by mouth daily 90 tablet 3    Synthroid 125 MCG tablet TAKE 1 TABLET DAILY 90 tablet 3    triamcinolone (KENALOG) 0 1 % cream Apply topically 2 (two) times a day ADD to the Sig:  Apply behind the ears 30 g 0    traMADol (ULTRAM) 50 mg tablet Take 1 tablet (50 mg total) by mouth every 6 (six) hours as needed for moderate pain (Patient not taking: Reported on 3/2/2021) 60 tablet 1     No current facility-administered medications for this visit       _______________________________________________________________________  Review of Systems   Constitutional: Positive for fatigue  Negative for chills and fever  HENT: Positive for ear pain (left ear and facial selling  anterior to the left ear )  Eyes: Negative  Respiratory: Negative for cough and shortness of breath  Cardiovascular: Negative for chest pain and palpitations  Gastrointestinal: Negative for abdominal distention and abdominal pain  Endocrine: Negative  Genitourinary: Negative for difficulty urinating and flank pain  Neurological: Positive for headaches  Psychiatric/Behavioral: Negative for sleep disturbance  The patient is not nervous/anxious            Objective:  Vitals:    04/07/21 1344   BP: 130/62   BP Location: Left arm   Patient Position: Sitting   Cuff Size: Large   Pulse: 95 Resp: 16   Temp: 97 7 °F (36 5 °C)   TempSrc: Temporal   SpO2: 97%   Weight: 116 kg (256 lb 12 8 oz)   Height: 5' 2" (1 575 m)     Body mass index is 46 97 kg/m²  Physical Exam  HENT:      Head: Atraumatic  Comments: Left otitis media   Eyes:      Extraocular Movements: Extraocular movements intact  Neck:      Musculoskeletal: Normal range of motion  Pulmonary:      Effort: Pulmonary effort is normal    Neurological:      Mental Status: She is alert and oriented to person, place, and time     Psychiatric:         Mood and Affect: Mood normal          Behavior: Behavior normal

## 2021-04-18 NOTE — PATIENT INSTRUCTIONS
Otitis Media, Ambulatory Care   GENERAL INFORMATION:   Otitis media  is an ear infection  Common symptoms include the following:   · Fever or a headache    · Ear pain    · Trouble hearing    · Ear feels plugged or full or you have ringing or buzzing in your ear    · Dizziness or you lose your balance    · Nausea or vomiting  Seek immediate care for the following symptoms:   · Seizure    · Fever and a stiff neck  Treatment for otitis media  may include any of the following:  · NSAIDs  help decrease swelling and pain or fever  This medicine is available with or without a doctor's order  NSAIDs can cause stomach bleeding or kidney problems in certain people  If you take blood thinner medicine, always ask your healthcare provider if NSAIDs are safe for you  Always read the medicine label and follow directions  · Ear drops  to help treat your ear pain  · Antibiotics  to help kill the germs that caused your ear infection  Care for otitis media:   · Use heat  Place a warm, moist washcloth on your ear to decrease pain  Apply for 15 to 20 minutes, 3 to 4 times a day    · Use ice  Ice helps decrease swelling and pain  Use an ice pack or put crushed ice in a plastic bag  Cover the ice pack with a towel and place it on your ear for 15 to 20 minutes, 3 to 4 times a day for 2 days  Prevent otitis media:   · Wash your hands often  This will help prevent the spread of germs  Encourage everyone in your house to wash their hands with soap and water after they use the bathroom  Everyone should also wash their hands after they change a child's diaper and before they prepare or eat food  · Stay away from people who are ill  Germs are easily and quickly spread through contact  Follow up with your healthcare provider as directed:  Write down your questions so you remember to ask them during your visits  CARE AGREEMENT:   You have the right to help plan your care   Learn about your health condition and how it may be treated  Discuss treatment options with your caregivers to decide what care you want to receive  You always have the right to refuse treatment  The above information is an  only  It is not intended as medical advice for individual conditions or treatments  Talk to your doctor, nurse or pharmacist before following any medical regimen to see if it is safe and effective for you  © 2014 [address] is for End User's use only and may not be sold, redistributed or otherwise used for commercial purposes  All illustrations and images included in CareNotes® are the copyrighted property of A D A M [employer]  or [employer]

## 2021-04-19 NOTE — TELEPHONE ENCOUNTER
Patient's ear is doing better, however she reports that the lump still remains  Patient is scheduled for a follow up visit on Wednesday 4/21

## 2021-04-21 ENCOUNTER — OFFICE VISIT (OUTPATIENT)
Dept: FAMILY MEDICINE CLINIC | Facility: CLINIC | Age: 71
End: 2021-04-21
Payer: COMMERCIAL

## 2021-04-21 VITALS
TEMPERATURE: 98.2 F | DIASTOLIC BLOOD PRESSURE: 70 MMHG | HEART RATE: 75 BPM | WEIGHT: 254 LBS | RESPIRATION RATE: 18 BRPM | OXYGEN SATURATION: 95 % | BODY MASS INDEX: 46.74 KG/M2 | SYSTOLIC BLOOD PRESSURE: 134 MMHG | HEIGHT: 62 IN

## 2021-04-21 DIAGNOSIS — I73.9 PAD (PERIPHERAL ARTERY DISEASE) (HCC): ICD-10-CM

## 2021-04-21 DIAGNOSIS — N18.9 CHRONIC KIDNEY DISEASE, UNSPECIFIED CKD STAGE: Primary | ICD-10-CM

## 2021-04-21 DIAGNOSIS — R59.1 LYMPHADENOPATHY OF HEAD AND NECK: ICD-10-CM

## 2021-04-21 DIAGNOSIS — I10 ESSENTIAL HYPERTENSION: ICD-10-CM

## 2021-04-21 DIAGNOSIS — R60.1 GENERALIZED EDEMA: ICD-10-CM

## 2021-04-21 DIAGNOSIS — R22.0 SWELLING, MASS, OR LUMP ON FACE: ICD-10-CM

## 2021-04-21 PROCEDURE — 99214 OFFICE O/P EST MOD 30 MIN: CPT | Performed by: NURSE PRACTITIONER

## 2021-04-21 PROCEDURE — 3008F BODY MASS INDEX DOCD: CPT | Performed by: NURSE PRACTITIONER

## 2021-04-21 NOTE — PROGRESS NOTES
Assessment/Plan:    presents in office for follow up swollen lympoh nodes and ear pressure after treated    Will order CT scan at this point and some lab work   Worried about prolonged lymphoadenopathy despite treatment  Will switch antibiotic for now now and follow up in few weeks     Follow up with ENT discuss also especially if not responding to this treatment  Problem List Items Addressed This Visit        Cardiovascular and Mediastinum    Essential hypertension    Relevant Orders    Comprehensive metabolic panel    CBC and differential    TSH, 3rd generation    Lipid panel    UA (URINE) with reflex to Scope    HEMOGLOBIN A1C W/ EAG ESTIMATION    PAD (peripheral artery disease) (HCC)    Relevant Orders    Comprehensive metabolic panel    CBC and differential    TSH, 3rd generation    Lipid panel    UA (URINE) with reflex to Scope    HEMOGLOBIN A1C W/ EAG ESTIMATION      Other Visit Diagnoses     Chronic kidney disease, unspecified CKD stage    -  Primary    Relevant Orders    Comprehensive metabolic panel    CBC and differential    TSH, 3rd generation    Lipid panel    UA (URINE) with reflex to Scope    HEMOGLOBIN A1C W/ EAG ESTIMATION    Generalized edema        Relevant Orders    Comprehensive metabolic panel    CBC and differential    TSH, 3rd generation    Lipid panel    UA (URINE) with reflex to Scope    HEMOGLOBIN A1C W/ EAG ESTIMATION    NT-BNP PRO            Subjective:      Patient ID: Nitin Sigala is a 66 y o  female  Patient of Dr Mcdermott Fus present with mild swelling and lump to left ear area   Does have some localized ear pain    has had intermittent headaches - she was seen few weeks ago  With amoxicillin however did not respond to treatment   Denies ant fevers cough or congestion   Does have left otitis media - continues to have erythema   Will treat and have her follow up with us or ENT if further issues - will discuss changing antibiotics and ordering a CT at this point   I also ordered labs I need to have her do also  Patient Active Problem List:     Vitamin D deficiency     Psoriasis     Pre-diabetes     Osteoarthritis     NSAID long-term use     Morbid obesity ([facility] [address] )     Hypothyroidism     Essential hypertension     Elevated blood sugar     CKD (chronic kidney disease) stage 3, GFR 30-59 ml/min ([facility])     Chronic depression     Chronic anxiety     Ambulatory dysfunction     Class 3 severe obesity with serious comorbidity and body mass index (BMI) of 45 0 to 49 9 in adult [facility])     Primary osteoarthritis of right hip     Lumbar pain     History of total left hip replacement     PAD [facility]per[facility]) [facility] )            The following portions of the patient's history were reviewed and updated as appropriate:   Past Medical History:  She has a past medical history of Anxiety, Arthritis, Back problem, Fibrocystic disease of both breasts, Hip pain, History of staph infection, Hypertension, Knee pain, Spinal stenosis, and Thyroid disease ,  _______________________________________________________________________  Medical Problems:  does not have any pertinent problems on file ,  _______________________________________________________________________  Past Surgical History:   has a past surgical history that includes Mammo (historical) (12/13/2019); Cataract extraction (01/01/2017); Refractive surgery (Bilateral); Other surgical history; Cholecystectomy; and Rotator cuff repair (Right)  ,  _______________________________________________________________________  Family History:  family history includes Breast cancer in her other; Lung cancer in her father ,  _______________________________________________________________________  Social History:   reports that she has quit smoking  She quit after 14 00 years of use  She has never used smokeless tobacco  She reports previous alcohol use   She reports previous drug use ,  _______________________________________________________________________  Allergies:  is allergic to diphenhydramine; erythromycin; tetanus toxoids; and tetracycline     _______________________________________________________________________  Current Outpatient Medications   Medication Sig Dispense Refill    acetaminophen (TYLENOL) 500 mg tablet Take 500 mg by mouth every 4 (four) hours      amLODIPine (NORVASC) 5 mg tablet TAKE 1 TABLET DAILY 90 tablet 3    benazepril (LOTENSIN) 40 MG tablet TAKE 1 TABLET DAILY 90 tablet 3    betamethasone dipropionate (DIPROSONE) 0 05 % cream Apply topically 2 (two) times a day ADD:  To the SCALP and rub in well 30 g 0    Cholecalciferol 1 25 MG (11167 UT) TABS Take 1 25 mg by mouth daily      citalopram (CeleXA) 10 mg tablet Take 1 tablet (10 mg total) by mouth daily 90 tablet 2    fexofenadine (ALLEGRA) 180 MG tablet Take 180 mg by mouth daily      ketoconazole (NIZORAL) 2 % cream Apply topically daily ADD:  To the face, nose, eyebrows 30 g 1    linaCLOtide (Linzess) 145 MCG CAPS Take 1 capsule (145 mcg total) by mouth daily 30 capsule 3    meloxicam (MOBIC) 15 mg tablet TAKE 1 TABLET DAILY 90 tablet 3    multivitamin-iron-minerals-folic acid (CENTRUM) chewable tablet Chew 1 tablet daily      naproxen sodium (ALEVE) 220 MG tablet Take 220 mg by mouth every 12 (twelve) hours as needed      pantoprazole (PROTONIX) 40 mg tablet Take 1 tablet (40 mg total) by mouth daily 90 tablet 3    Synthroid 125 MCG tablet TAKE 1 TABLET DAILY 90 tablet 3    triamcinolone (KENALOG) 0 1 % cream Apply topically 2 (two) times a day ADD to the Sig:  Apply behind the ears 30 g 0     No current facility-administered medications for this visit       _______________________________________________________________________  Review of Systems   Constitutional: Positive for fatigue  Negative for chills and fever     HENT: Positive for ear pain (left ear and facial selling  anterior to the left ear )  Multiple swollen lymph nodes to left neck and anterior to her left ear    Eyes: Negative  Respiratory: Negative for cough and shortness of breath  Cardiovascular: Negative for chest pain and palpitations  Gastrointestinal: Negative for abdominal distention and abdominal pain  Endocrine: Negative  Genitourinary: Negative for difficulty urinating and flank pain  Neurological: Positive for headaches  Psychiatric/Behavioral: Negative for sleep disturbance  The patient is not nervous/anxious  Objective:  Vitals:    04/21/21 1030   BP: 160/92   Pulse: 75   Resp: 18   Temp: 98 2 °F (36 8 °C)   SpO2: 95%   Weight: 115 kg (254 lb)   Height: 5' 2" (1 575 m)     Body mass index is 46 46 kg/m²  Physical Exam  HENT:      Head: Atraumatic  Comments: Left otitis media   Multiple swollen lymph nodes to parotid area   And left face anterior to the left ear      Mouth/Throat:      Pharynx: Posterior oropharyngeal erythema present  Eyes:      Extraocular Movements: Extraocular movements intact  Neck:      Musculoskeletal: Normal range of motion  Cardiovascular:      Rate and Rhythm: Normal rate  Pulmonary:      Effort: Pulmonary effort is normal    Musculoskeletal:         General: Swelling (to left neck ) present  Neurological:      Mental Status: She is alert and oriented to person, place, and time     Psychiatric:         Mood and Affect: Mood normal          Behavior: Behavior normal

## 2021-04-28 ENCOUNTER — HOSPITAL ENCOUNTER (OUTPATIENT)
Dept: CT IMAGING | Facility: HOSPITAL | Age: 71
Discharge: HOME | End: 2021-04-28
Payer: COMMERCIAL

## 2021-04-28 DIAGNOSIS — R22.0 SWELLING, MASS, OR LUMP ON FACE: ICD-10-CM

## 2021-04-28 PROCEDURE — G1004 CDSM NDSC: HCPCS

## 2021-04-28 PROCEDURE — 70486 CT MAXILLOFACIAL W/O DYE: CPT

## 2021-05-03 NOTE — PATIENT INSTRUCTIONS
Lymphadenopathy   WHAT YOU NEED TO KNOW:   Lymphadenopathy is swelling of your lymph nodes  Lymph nodes are small organs that are part of your immune system  The lymph nodes are found throughout your body  They are most easily felt in your neck, under your arms, and near your groin  Lymphadenopathy can occur in one or more areas of your body  It is usually caused by an infection  DISCHARGE INSTRUCTIONS:   Return to the emergency department if:   · The swollen lymph nodes bleed  · You have swollen lymph nodes in your neck that affect your breathing or swallowing  Contact your healthcare provider if:   · You have a fever  · You have a new swollen and painful lymph node  · You have a skin rash  · Your lymph node remains swollen or painful, or it gets bigger  · Your lymph node has red streaks around it, or the skin around the lymph node is red  · You have questions or concerns about your condition or care  Follow up with your healthcare provider as directed:  Write down your questions so you remember to ask them during your visits  Self-care:   · Do not poke or squeeze  the swollen lymph nodes  · Apply heat to the swollen glands  You may use warm compresses, or an electric heating pad set on low  · Rest as needed  If you have a fever, rest until your temperature returns to normal  Return to your normal daily activities slowly after your fever is gone  © Copyright [address] Information is for End User's use only and may not be sold, redistributed or otherwise used for commercial purposes  All illustrations and images included in CareNotes® are the copyrighted property of A D A M , Inc  or [employer]  The above information is an  only  It is not intended as medical advice for individual conditions or treatments  Talk to your doctor, nurse or pharmacist before following any medical regimen to see if it is safe and effective for you

## 2021-05-04 ENCOUNTER — APPOINTMENT (OUTPATIENT)
Dept: LAB | Facility: CLINIC | Age: 71
End: 2021-05-04
Payer: COMMERCIAL

## 2021-05-04 ENCOUNTER — TRANSCRIBE ORDERS (OUTPATIENT)
Dept: LAB | Facility: CLINIC | Age: 71
End: 2021-05-04

## 2021-05-04 ENCOUNTER — OFFICE VISIT (OUTPATIENT)
Dept: FAMILY MEDICINE CLINIC | Facility: CLINIC | Age: 71
End: 2021-05-04
Payer: COMMERCIAL

## 2021-05-04 ENCOUNTER — TELEPHONE (OUTPATIENT)
Dept: HEMATOLOGY ONCOLOGY | Facility: CLINIC | Age: 71
End: 2021-05-04

## 2021-05-04 VITALS
BODY MASS INDEX: 46.41 KG/M2 | SYSTOLIC BLOOD PRESSURE: 144 MMHG | HEART RATE: 76 BPM | DIASTOLIC BLOOD PRESSURE: 76 MMHG | WEIGHT: 252.2 LBS | RESPIRATION RATE: 12 BRPM | OXYGEN SATURATION: 96 % | TEMPERATURE: 97.4 F | HEIGHT: 62 IN

## 2021-05-04 DIAGNOSIS — I10 ESSENTIAL HYPERTENSION: ICD-10-CM

## 2021-05-04 DIAGNOSIS — Z11.59 NEED FOR HEPATITIS C SCREENING TEST: ICD-10-CM

## 2021-05-04 DIAGNOSIS — R60.1 GENERALIZED EDEMA: ICD-10-CM

## 2021-05-04 DIAGNOSIS — R59.1 LYMPHADENOPATHY OF HEAD AND NECK: Primary | ICD-10-CM

## 2021-05-04 DIAGNOSIS — R59.1 LYMPHADENOPATHY OF HEAD AND NECK: ICD-10-CM

## 2021-05-04 DIAGNOSIS — I73.9 PAD (PERIPHERAL ARTERY DISEASE) (HCC): ICD-10-CM

## 2021-05-04 DIAGNOSIS — N18.9 CHRONIC KIDNEY DISEASE, UNSPECIFIED CKD STAGE: ICD-10-CM

## 2021-05-04 DIAGNOSIS — K11.1 ENLARGED PAROTID GLAND: ICD-10-CM

## 2021-05-04 DIAGNOSIS — N18.31 STAGE 3A CHRONIC KIDNEY DISEASE (HCC): ICD-10-CM

## 2021-05-04 LAB
ALBUMIN SERPL BCP-MCNC: 4 G/DL (ref 3.5–5)
ALP SERPL-CCNC: 91 U/L (ref 46–116)
ALT SERPL W P-5'-P-CCNC: 46 U/L (ref 12–78)
ANION GAP SERPL CALCULATED.3IONS-SCNC: 8 MMOL/L (ref 4–13)
AST SERPL W P-5'-P-CCNC: 25 U/L (ref 5–45)
BACTERIA UR QL AUTO: NORMAL /HPF
BASOPHILS # BLD AUTO: 0.06 THOUSANDS/ΜL (ref 0–0.1)
BASOPHILS NFR BLD AUTO: 1 % (ref 0–1)
BILIRUB SERPL-MCNC: 0.58 MG/DL (ref 0.2–1)
BILIRUB UR QL STRIP: NEGATIVE
BUN SERPL-MCNC: 26 MG/DL (ref 5–25)
CALCIUM SERPL-MCNC: 10.7 MG/DL (ref 8.3–10.1)
CHLORIDE SERPL-SCNC: 102 MMOL/L (ref 100–108)
CHOLEST SERPL-MCNC: 176 MG/DL (ref 50–200)
CLARITY UR: CLEAR
CO2 SERPL-SCNC: 29 MMOL/L (ref 21–32)
COLOR UR: YELLOW
CREAT SERPL-MCNC: 1.18 MG/DL (ref 0.6–1.3)
CRP SERPL QL: 4.2 MG/L
EOSINOPHIL # BLD AUTO: 0.19 THOUSAND/ΜL (ref 0–0.61)
EOSINOPHIL NFR BLD AUTO: 2 % (ref 0–6)
ERYTHROCYTE [DISTWIDTH] IN BLOOD BY AUTOMATED COUNT: 14.4 % (ref 11.6–15.1)
ERYTHROCYTE [SEDIMENTATION RATE] IN BLOOD: 24 MM/HOUR (ref 0–29)
EST. AVERAGE GLUCOSE BLD GHB EST-MCNC: 128 MG/DL
GFR SERPL CREATININE-BSD FRML MDRD: 46 ML/MIN/1.73SQ M
GLUCOSE P FAST SERPL-MCNC: 110 MG/DL (ref 65–99)
GLUCOSE UR STRIP-MCNC: NEGATIVE MG/DL
HBA1C MFR BLD: 6.1 %
HCT VFR BLD AUTO: 41.9 % (ref 34.8–46.1)
HCV AB SER QL: NORMAL
HDLC SERPL-MCNC: 60 MG/DL
HGB BLD-MCNC: 13.5 G/DL (ref 11.5–15.4)
HGB UR QL STRIP.AUTO: NEGATIVE
IMM GRANULOCYTES # BLD AUTO: 0.04 THOUSAND/UL (ref 0–0.2)
IMM GRANULOCYTES NFR BLD AUTO: 0 % (ref 0–2)
KETONES UR STRIP-MCNC: NEGATIVE MG/DL
LDLC SERPL CALC-MCNC: 101 MG/DL (ref 0–100)
LEUKOCYTE ESTERASE UR QL STRIP: ABNORMAL
LYMPHOCYTES # BLD AUTO: 1.6 THOUSANDS/ΜL (ref 0.6–4.47)
LYMPHOCYTES NFR BLD AUTO: 17 % (ref 14–44)
MCH RBC QN AUTO: 29.9 PG (ref 26.8–34.3)
MCHC RBC AUTO-ENTMCNC: 32.2 G/DL (ref 31.4–37.4)
MCV RBC AUTO: 93 FL (ref 82–98)
MONOCYTES # BLD AUTO: 0.99 THOUSAND/ΜL (ref 0.17–1.22)
MONOCYTES NFR BLD AUTO: 10 % (ref 4–12)
NEUTROPHILS # BLD AUTO: 6.69 THOUSANDS/ΜL (ref 1.85–7.62)
NEUTS SEG NFR BLD AUTO: 70 % (ref 43–75)
NITRITE UR QL STRIP: NEGATIVE
NON-SQ EPI CELLS URNS QL MICRO: NORMAL /HPF
NONHDLC SERPL-MCNC: 116 MG/DL
NRBC BLD AUTO-RTO: 0 /100 WBCS
NT-PROBNP SERPL-MCNC: 68 PG/ML
PH UR STRIP.AUTO: 6 [PH]
PLATELET # BLD AUTO: 210 THOUSANDS/UL (ref 149–390)
PMV BLD AUTO: 10.3 FL (ref 8.9–12.7)
POTASSIUM SERPL-SCNC: 4.5 MMOL/L (ref 3.5–5.3)
PROT SERPL-MCNC: 8.4 G/DL (ref 6.4–8.2)
PROT UR STRIP-MCNC: ABNORMAL MG/DL
RBC # BLD AUTO: 4.51 MILLION/UL (ref 3.81–5.12)
RBC #/AREA URNS AUTO: NORMAL /HPF
SODIUM SERPL-SCNC: 139 MMOL/L (ref 136–145)
SP GR UR STRIP.AUTO: 1.01 (ref 1–1.03)
TRIGL SERPL-MCNC: 77 MG/DL
TSH SERPL DL<=0.05 MIU/L-ACNC: 0.65 UIU/ML (ref 0.36–3.74)
UROBILINOGEN UR QL STRIP.AUTO: 0.2 E.U./DL
WBC # BLD AUTO: 9.57 THOUSAND/UL (ref 4.31–10.16)
WBC #/AREA URNS AUTO: NORMAL /HPF

## 2021-05-04 PROCEDURE — 86665 EPSTEIN-BARR CAPSID VCA: CPT

## 2021-05-04 PROCEDURE — 99214 OFFICE O/P EST MOD 30 MIN: CPT | Performed by: NURSE PRACTITIONER

## 2021-05-04 PROCEDURE — 83036 HEMOGLOBIN GLYCOSYLATED A1C: CPT

## 2021-05-04 PROCEDURE — 83880 ASSAY OF NATRIURETIC PEPTIDE: CPT

## 2021-05-04 PROCEDURE — 86664 EPSTEIN-BARR NUCLEAR ANTIGEN: CPT

## 2021-05-04 PROCEDURE — 86140 C-REACTIVE PROTEIN: CPT

## 2021-05-04 PROCEDURE — 81001 URINALYSIS AUTO W/SCOPE: CPT | Performed by: NURSE PRACTITIONER

## 2021-05-04 PROCEDURE — 85025 COMPLETE CBC W/AUTO DIFF WBC: CPT

## 2021-05-04 PROCEDURE — 36415 COLL VENOUS BLD VENIPUNCTURE: CPT

## 2021-05-04 PROCEDURE — 88185 FLOWCYTOMETRY/TC ADD-ON: CPT

## 2021-05-04 PROCEDURE — 88184 FLOWCYTOMETRY/ TC 1 MARKER: CPT

## 2021-05-04 PROCEDURE — 86663 EPSTEIN-BARR ANTIBODY: CPT

## 2021-05-04 PROCEDURE — 80061 LIPID PANEL: CPT

## 2021-05-04 PROCEDURE — 85652 RBC SED RATE AUTOMATED: CPT

## 2021-05-04 PROCEDURE — 80053 COMPREHEN METABOLIC PANEL: CPT

## 2021-05-04 PROCEDURE — 86803 HEPATITIS C AB TEST: CPT

## 2021-05-04 PROCEDURE — 84443 ASSAY THYROID STIM HORMONE: CPT

## 2021-05-04 RX ORDER — AMOXICILLIN AND CLAVULANATE POTASSIUM 875; 125 MG/1; MG/1
1 TABLET, FILM COATED ORAL EVERY 12 HOURS SCHEDULED
Qty: 20 TABLET | Refills: 0 | Status: SHIPPED | OUTPATIENT
Start: 2021-05-04 | End: 2021-05-14

## 2021-05-04 NOTE — TELEPHONE ENCOUNTER
New Patient Encounter    New Patient Intake Form   Patient Details:  Radha Lovett  1950  [id number]    Background Information:  [address] starts by opening a telephone encounter and gathering the following information   Who is calling to schedule? If not self, relationship to patient? Leland Gillespie (spouse)   Referring Provider Harris Steele   What is the diagnosis? Lymphadenopathy of head and neck /Enlarged parotid gland    Is this diagnosis confirmed? Yes   When was the diagnosis? 5/3/21   Is there a confirmed diagnosis from a biopsy/tissue reviewed by pathology? na   Were outside slides requested? NA   Is patient aware of diagnosis? Yes   Is there a personal history and what kind? No   Is there a family history and what kind? No   Reason for visit? New Diagnosis   Have you had any imaging or labs done? If so: when, where? yes  [facility]   Are [facility] in [facility]? yes   If patient has a prior history of breast cancer were old records obtained? NA   Was the patient told to bring a disk? No   Does the patient smoke or Vape? No   If yes, how many packs or cartridges per day? Scheduling Information:   Preferred [place]: [facility]     Are there any dates/time the patient cannot be seen? Miscellaneous:    After completing the above information, please route to Financial Counselor and the appropriate Nurse Navigator for review

## 2021-05-04 NOTE — PROGRESS NOTES
Assessment/Plan:    Presents in office for follow up   LEFT parotid gland and left neck lymphoadenopathy now for about 1 month   None painful to touch   Non tender   No redness   Was treated with Keflex - did not respond, was on amoxicillin few months ago for different issue  She noticed some of this swelling after getting her COVID immunization     Ordered CT of the face - reviewed abnormal findings and follow up   Reviewed labs thus far, I added EBV to labs done this morning     GFR 46 - additional testing ordered with be deferred to Hematology if needed and or ENT- to preserve kidney functio    changed antibiotic to Augmentin for now          Problem List Items Addressed This Visit        Genitourinary    Stage 3a chronic kidney disease ([facility] [address] )      Other Visit Diagnoses     Lymphadenopathy of head and neck    -  Primary    Relevant Medications    amoxicillin-clavulanate (Augmentin) 875-125 mg per tablet    Other Relevant Orders    Ambulatory Referral to Otolaryngology    Ambulatory referral to Hematology / Oncology    Enlarged parotid gland        Relevant Medications    amoxicillin-clavulanate (Augmentin) 875-125 mg per tablet    Other Relevant Orders    Ambulatory Referral to Otolaryngology    Ambulatory referral to Hematology / Oncology            Subjective:      Patient ID: Nitin Sigala is a 66 y o  female      HPI    The following portions of the patient's history were reviewed and updated as appropriate:   Past Medical History:  She has a past medical history of Anxiety, Arthritis, Back problem, Fibrocystic disease of both breasts, Hip pain, History of staph infection, Hypertension, Knee pain, Spinal stenosis, and Thyroid disease ,  _______________________________________________________________________  Medical Problems:  does not have any pertinent problems on file ,  _______________________________________________________________________  Past Surgical History:   has a past surgical history that includes Mammo (historical) (12/13/2019); Cataract extraction (01/01/2017); Refractive surgery (Bilateral); Other surgical history; Cholecystectomy; and Rotator cuff repair (Right)  ,  _______________________________________________________________________  Family History:  family history includes Breast cancer in her other; Lung cancer in her father ,  _______________________________________________________________________  Social History:   reports that she has quit smoking  She quit after 14 00 years of use  She has never used smokeless tobacco  She reports previous alcohol use  She reports previous drug use ,  _______________________________________________________________________  Allergies:  is allergic to diphenhydramine; erythromycin; tetanus toxoids; and tetracycline     _______________________________________________________________________  Current Outpatient Medications   Medication Sig Dispense Refill    acetaminophen (TYLENOL) 500 mg tablet Take 500 mg by mouth every 4 (four) hours      amLODIPine (NORVASC) 5 mg tablet TAKE 1 TABLET DAILY 90 tablet 3    benazepril (LOTENSIN) 40 MG tablet TAKE 1 TABLET DAILY 90 tablet 3    betamethasone dipropionate (DIPROSONE) 0 05 % cream Apply topically 2 (two) times a day ADD:  To the SCALP and rub in well 30 g 0    Cholecalciferol 1 25 MG (54902 UT) TABS Take 1 25 mg by mouth daily      citalopram (CeleXA) 10 mg tablet Take 1 tablet (10 mg total) by mouth daily 90 tablet 2    fexofenadine (ALLEGRA) 180 MG tablet Take 180 mg by mouth daily      ketoconazole (NIZORAL) 2 % cream Apply topically daily ADD:  To the face, nose, eyebrows 30 g 1    linaCLOtide (Linzess) 145 MCG CAPS Take 1 capsule (145 mcg total) by mouth daily 30 capsule 3    meloxicam (MOBIC) 15 mg tablet TAKE 1 TABLET DAILY 90 tablet 3    multivitamin-iron-minerals-folic acid (CENTRUM) chewable tablet Chew 1 tablet daily      naproxen sodium (ALEVE) 220 MG tablet Take 220 mg by mouth every 12 (twelve) hours as needed      pantoprazole (PROTONIX) 40 mg tablet Take 1 tablet (40 mg total) by mouth daily 90 tablet 3    Synthroid 125 MCG tablet TAKE 1 TABLET DAILY 90 tablet 3    triamcinolone (KENALOG) 0 1 % cream Apply topically 2 (two) times a day ADD to the Sig:  Apply behind the ears 30 g 0    amoxicillin-clavulanate (Augmentin) 875-125 mg per tablet Take 1 tablet by mouth every 12 (twelve) hours for 10 days 20 tablet 0     No current facility-administered medications for this visit       _______________________________________________________________________  Review of Systems      Objective:  Vitals:    05/04/21 1314   BP: 144/76   Pulse: 76   Resp: 12   Temp: (!) 97 4 °F (36 3 °C)   TempSrc: Temporal   SpO2: 96%   Weight: 114 kg (252 lb 3 2 oz)   Height: 5' 2" (1 575 m)     Body mass index is 46 13 kg/m²  Physical Exam      Urine Microscopic  Order: [id number] - Reflex for Order [id number]  Status:  Final result   Visible to patient:  Yes (Dedra Judd)   Next appt:  06/01/2021 at 11:30 AM in [facility], [facility] [place])   Dx:  Chronic kidney disease, unspecified C    Ref Range & Units 5/4/21 10:18 AM   RBC, UA None Seen, 0-1, 1-2, 2-4, 0-5 /hpf 0-1    WBC, UA None Seen, 0-1, 1-2, 0-5, 2-4 /hpf 1-2    Epithelial Cells None Seen, Occasional /hpf Occasional    Bacteria, UA None Seen, Occasional /hpf Occasional          Specimen Collected: 05/04/21 10:18 AM   Last Resulted: 05/04/21 12:02 PM        Lab Flowsheet     Order Details     View Encounter     Lab and Collection Details     Routing     Result History              Contains abnormal data UA (URINE) with reflex to Scope  Order: [id number]  Status:  Final result   Visible to patient:  Yes ([facility])   Next appt:  06/01/2021 at 11:30 AM in Family Medicine [facility], [facility] [place] [place])   Dx:  Chronic kidney disease, unspecified C       Ref Range & Units 5/4/21 10:18 AM   Color, UA  Yellow    Clarity, UA  Clear    Specific [place], UA 1 003 - 1 030 1 010    pH, UA 4 5, 5 0, 5 5, 6 0, 6 5, 7 0, 7 5, 8 0 6 0    Leukocytes, UA Negative SmallAbnormal     Nitrite, UA Negative Negative    Protein, UA Negative mg/dl TraceAbnormal     Glucose, UA Negative mg/dl Negative    Ketones, UA Negative mg/dl Negative    Urobilinogen, UA 0 2, 1 0 E U /dl E U /dl 0 2    Bilirubin, UA Negative Negative    Blood, UA Negative Negative          Specimen Collected: 05/04/21 10:18 AM   Last Resulted: 05/04/21 11:12 AM        Lab Flowsheet     Order Details     View Encounter     Lab and Collection Details     Routing     Result History              Ordered On 5/4/2021 11:12 AM    Sedimentation rate, automated  Order: [id number]  Status:  Final result   Visible to patient:  Yes ([facility])   Next appt:  06/01/2021 at 11:30 AM in [facility]   Dx:  Lymphadenopathy of head and neck   Ref Range & Units 5/4/21 10:18 AM   Sed Rate 0 - 29 mm/hour 24          Specimen Collected: 05/04/21 10:18 AM   Last Resulted: 05/04/21 11:15 AM        Lab Flowsheet     Order Details     View Encounter     Lab and Collection Details     Routing     Result History         Contains abnormal data C-reactive protein  Order: [id number]  Status:  Final result   Visible to patient:  Yes ([facility])   Next appt:  06/01/2021 at 11:30 AM in [facility]   Dx:  Lymphadenopathy of head and neck   Ref Range & Units 5/4/21 10:18 AM   CRP <3 0 mg/L 4 2High           Specimen Collected: 05/04/21 10:18 AM   Last Resulted: 05/04/21 12:04 PM            Hepatitis C antibody  Order: [id number]  Status:  Final result   Visible to patient:  Yes ([facility])   Next appt:  06/01/2021 at 11:30 AM in [facility]   Dx:  Need for hepatitis C screening test   Ref Range & Units 5/4/21 10:18 AM   Hepatitis C Ab Non-reactive Non-reactive          Specimen Collected: 05/04/21 10:18 AM   Last Resulted: 05/04/21  2:52 PM         NT-BNP PRO  Order: [id number]  Status:  Final result   Visible to patient:  Yes ([address])   Next [facility]   Dx:  Generalized edema   Ref Range & Units 5/4/21 10:18 AM   NT-proBNP <125 pg/mL 68          Specimen Collected: 05/04/21 10:18 AM   Last Resulted: 05/04/21 12:04 PM           Contains abnormal data Lipid panel  Order: [id number]  Status:  Final result   Visible to patient:  Yes ([address])   Next appt:  06/01/2021 at 11:30 AM in [facility])   Dx:  Generalized edema; Essential hyperten    Ref Range & Units 5/4/21 10:18 AM   Cholesterol 50 - 200 mg/dL 176    Comment: Cholesterol:       Desirable         <200 mg/dl       Borderline         200-239 mg/dl       High              >239          Triglycerides <=150 mg/dL 77    Comment: Triglyceride:      Normal          <150 mg/dl      Borderline High 150-199 mg/dl      High            200-499 mg/dl        Very High       >499 mg/dl     Specimen collection should occur prior to N-Acetylcysteine or Metamizole administration due to the potential for falsely depressed results  HDL, Direct >=40 mg/dL 60    Comment: HDL Cholesterol:       Low     <41 mg/dL   Specimen collection should occur prior to Metamizole administration due to the potential for falsley depressed results  LDL Calculated 0 - 100 mg/dL 101High     Comment: LDL Cholesterol:     Optimal           <100 mg/dl     Near Optimal      100-129 mg/dl     Above Optimal       Borderline High 130-159 mg/dl       High            160-189 mg/dl       Very High       >189 mg/dl                TSH, 3rd generation  Order: [id number]  Status:  Final result   Visible to patient:  Yes ([facility])   Next appt:  06/01/2021 at 11:30 AM in [facility] [place])   Dx:  Generalized edema; Essential hyperten       Ref Range & Units 5/4/21 10:18 AM   TSH 3RD GENERATON 0 358 - 3 740 uIU/mL 0 649            CBC and differential  Order: [id number]  Status:  Final result   Visible to patient:  Yes (Dedra Judd)   Next appt:  06/01/2021 at 11:30 AM in [facility])   Dx:  Generalized edema; Essential hyperten    Ref Range & Units 5/4/21 10:18 AM   WBC 4 31 - 10 16 Thousand/uL 9 57    RBC 3 81 - 5 12 Million/uL 4 51    Hemoglobin 11 5 - 15 4 g/dL 13 5    Hematocrit 34 8 - 46 1 % 41 9    MCV 82 - 98 fL 93    MCH 26 8 - 34 3 pg 29 9    MCHC 31 4 - 37 4 g/dL 32 2    RDW 11 6 - 15 1 % 14 4    MPV 8 9 - 12 7 fL 10 3    Platelets 363 - 458 Thousands/uL 210    nRBC /100 WBCs 0    Neutrophils Relative 43 - 75 % 70    Immat GRANS % 0 - 2 % 0    Lymphocytes Relative 14 - 44 % 17    Monocytes Relative 4 - 12 % 10    Eosinophils Relative 0 - 6 % 2    Basophils Relative 0 - 1 % 1    Neutrophils Absolute 1 85 - 7 62 Thousands/µL 6 69    Immature Grans Absolute 0 00 - 0 20 Thousand/uL 0 04    Lymphocytes Absolute 0 60 - 4 47 Thousands/µL 1 60    Monocytes Absolute 0 17 - 1 22 Thousand/µL 0 99    Eosinophils Absolute 0 00 - 0 61 Thousand/µL 0 19    Basophils Absolute 0 00 - 0 10 Thousands/µL 0 06          Specimen Collected: 05/04/21 10:18 AM   Last Resulted: 05/04/21 11:11 AM              Contains abnormal data Comprehensive metabolic panel  Order: [id number]  Status:  Final result   Visible to patient:  Yes ([facility])   Next appt:  06/01/2021 at 11:30 AM in [facility])   Dx:  Generalized edema; Essential hyperten       Ref Range & Units 5/4/21 10:18 AM   Sodium 136 - 145 mmol/L 139    Potassium 3 5 - 5 3 mmol/L 4 5    Chloride 100 - 108 mmol/L 102    CO2 21 - 32 mmol/L 29    ANION GAP 4 - 13 mmol/L 8    BUN 5 - 25 mg/dL 26High     Creatinine 0 60 - 1 30 mg/dL 1 18    Comment: Standardized to IDMS reference method   Glucose, Fasting 65 - 99 mg/dL 110High     Comment: Specimen collection should occur prior to Sulfasalazine administration due to the potential for falsely depressed results  Specimen collection should occur prior to Sulfapyridine administration due to the potential for falsely elevated results  Calcium 8 3 - 10 1 mg/dL 10 7High     AST 5 - 45 U/L 25    Comment: Specimen collection should occur prior to Sulfasalazine administration due to the potential for falsely depressed results  ALT 12 - 78 U/L 46    Comment: Specimen collection should occur prior to Sulfasalazine administration due to the potential for falsely depressed results  Alkaline Phosphatase 46 - 116 U/L 91    Total Protein 6 4 - 8 2 g/dL 8  4High     Albumin 3 5 - 5 0 g/dL 4 0    Total Bilirubin 0 20 - 1 00 mg/dL 0 58    Comment: Use of this assay is not recommended for patients undergoing treatment with eltrombopag due to the potential for falsely elevated results  eGFR ml/min/1 73sq m 46            Contains abnormal data UA (URINE) with reflex to Scope  Order: [id number]  Status:  Final result   Visible to patient:  Yes ([address])   Next appt:  06/01/2021 at 11:30 AM in [address]   Dx:  Chronic kidney disease, unspecified C       Ref Range & Units 5/4/21 10:18 AM   Color, UA  Yellow    Clarity, UA  Clear    Specific [place], UA 1 003 - 1 030 1 010    pH, UA 4 5, 5 0, 5 5, 6 0, 6 5, 7 0, 7 5, 8 0 6 0    Leukocytes, UA Negative SmallAbnormal     Nitrite, UA Negative Negative    Protein, UA Negative mg/dl TraceAbnormal     Glucose, UA Negative mg/dl Negative    Ketones, UA Negative mg/dl Negative    Urobilinogen, UA 0 2, 1 0 E U /dl E U /dl 0 2    Bilirubin, UA Negative Negative    Blood, UA Negative Negative          Specimen Collected: 05/04/21 10:18 AM   Last Resulted: 05/04/21 11:12 AM              CT facial bones wo contrast  Status: Final result   PACS Images     Show images for CT facial bones wo contrast   Study Result    CT FACIAL BONES WITHOUT INTRAVENOUS CONTRAST     INDICATION:   R22 0: Localized swelling, mass and lump, head  Palpable abnormalities      COMPARISON: None      TECHNIQUE:  Axial CT images were obtained through the facial bones with additional sagittal and coronal reconstructions       Radiation dose length product (DLP) for this visit:  630 8 mGy-cm   This examination, like all CT scans performed in the [facility], was performed utilizing techniques to minimize radiation dose exposure, including the use of iterative   reconstruction and automated exposure control        IMAGE QUALITY:  Diagnostic      FINDINGS:      FACIAL BONES:   No facial bone fracture identified  Normal alignment of the temporomandibular joints  No lytic or blastic lesion      ORBITS:  Orbital globes, optic nerves, and extraocular muscles appear symmetric and normal  There is no evidence of retrobulbar mass, abscess, or hematoma      SINUSES:  Normal      SOFT TISSUES:  There are multiple prominent lymph nodes identified throughout the neck including level 1 nodes, intraparotid nodes and jugular chain nodes both anteriorly and posteriorly  These nodes are homogeneous and sharply defined  The largest   level 1 node is seen on the right along the anterolateral aspect of the submandibular gland measuring 1 9 cm in maximum dimension  Intraparotid nodes are seen bilaterally including several on the left below the marker placed at the site of palpable   abnormality      IMPRESSION:     Multiple enlarged lymph nodes are seen level 1, level 2 and within the parotid glands  These nodes are homogeneous and sharply defined on this noncontrast examination  Although findings may be reactive, as a result of an infectious/inflammatory   process, findings are suspicious for a lymphoproliferative disorder such as lymphoma or leukemia  Consider computed tomography of the chest, abdomen and pelvis to evaluate for other lymphadenopathy and hematology workup    Several of these would be   amenable to ultrasound-guided biopsy if necessary      This examination was marked "immediate notification" in Epic in order to begin the standard process by which the radiology reading room liaison alerts the referring practitioner                 Workstation performed: [id number]      Imaging    CT facial bones wo contrast (Order: [id number]) - 4/28/2021

## 2021-05-05 LAB
EBV NA IGG SER IA-ACNC: 244 U/ML (ref 0–17.9)
EBV VCA IGG SER IA-ACNC: 306 U/ML (ref 0–17.9)
EBV VCA IGM SER IA-ACNC: <36 U/ML (ref 0–35.9)
INTERPRETATION: ABNORMAL

## 2021-05-07 LAB — SCAN RESULT: NORMAL

## 2021-05-18 ENCOUNTER — CONSULT (OUTPATIENT)
Dept: HEMATOLOGY ONCOLOGY | Facility: CLINIC | Age: 71
End: 2021-05-18
Payer: COMMERCIAL

## 2021-05-18 VITALS
TEMPERATURE: 97.1 F | WEIGHT: 254 LBS | HEIGHT: 62 IN | OXYGEN SATURATION: 97 % | BODY MASS INDEX: 46.74 KG/M2 | HEART RATE: 86 BPM | DIASTOLIC BLOOD PRESSURE: 84 MMHG | SYSTOLIC BLOOD PRESSURE: 142 MMHG | RESPIRATION RATE: 18 BRPM

## 2021-05-18 DIAGNOSIS — K11.1 ENLARGED PAROTID GLAND: ICD-10-CM

## 2021-05-18 DIAGNOSIS — R59.1 LYMPHADENOPATHY OF HEAD AND NECK: ICD-10-CM

## 2021-05-18 PROCEDURE — 99204 OFFICE O/P NEW MOD 45 MIN: CPT | Performed by: INTERNAL MEDICINE

## 2021-05-18 RX ORDER — AMOXICILLIN 500 MG/1
TABLET, FILM COATED ORAL
COMMUNITY
Start: 2021-05-17

## 2021-05-18 NOTE — LETTER
May 18, 2021     Quang Pastrana, [address] [facility] [place]  [address] [facility]  [address]    Patient: Sarah Hunt   YOB: 1950   Date of Visit: 5/18/2021       Dear Dr Darhsana Templeton: Thank you for referring Sarah Hunt to me for evaluation  Below are my notes for this consultation  If you have questions, please do not hesitate to call me  I look forward to following your patient along with you  Sincerely,        Carol Boo MD        CC: DUY Melo MD Elner Dales, MD  5/18/2021 10:24 AM  Sign when Signing Visit  Hematology / Oncology Outpatient Consult Note    Sarah Hunt 66 y o  female [id number]         Date:  5/18/2021    Assessment / Plan:    A 66-year-old female with left parotid gland enlargement as well as multiple lymphadenopathy of the neck  This was noted 2 weeks after the COVID vaccine injection  We discussed the differential diagnosis  Her swelling in the left parotid gland is not subsiding even 2 months after the 1st vaccination  In addition, location of swelling is quite high  Therefore, this may not be immune reaction to the COVID vaccine  This may be lymphoproliferative disorder  I agree with Dr Gonzalez Rucker recommendation for the excisional lymph node biopsy  I will see her again in mid June 2021 to discuss the pathology report and treatment options  She is in agreement with my recommendation  All the patient and her daughter and son-in-law's questions were answered to their satisfaction  Subjective:     HPI:    A 66-year-old female who had 1st Pfizer COVID vaccine in March 16, 2021  2 weeks after the 1st vaccine, she noticed the swelling in her left upper neck close to the ER  She brought this to medical attention  She had injection of vaccine in the left deltoid  She underwent CT scan of the neck which showed multiple enlarged level 1 and level 2 lymphadenopathy as well as swelling of the parotid gland    She was seen by Dr Aleksandr Miller who recommended excisional lymph node biopsy  She is scheduled to have this procedure in [place] 3, 2021  She presents today with her daughter and son-in-law to discuss further evaluation  She feels well  She has no fever, chills or no or night sweats  She has no recent weight loss  She denied any respiratory symptoms  She has hypertension as well as arthritis  She is status post left hip replacement last year  Therefore, she has mild ambulatory dysfunction  She quit smoking 40 years ago  She does not drink alcohol  She is in usual state of health  Her performance status is 0 to 1/4 on the ECOG scale  Interval History:          Objective:     Primary Diagnosis:      Left neck lymphadenopathy  Cancer Staging:  Cancer Staging  No matching staging information was found for the patient  Previous Hematologic/ Oncologic Treatment:         Current Hematologic/ Oncologic Treatment: To be determined  Disease Status:         Test Results:    Pathology:        Radiology:      CT scan showed multiple enlarged level 1 and level 2 lymphadenopathy as well as left parotid gland enlargement  Laboratory: Flow cytometry of peripheral blood showed no clonal disease  See below for CBC and CMP  Physical Exam:      General Appearance:    Alert, oriented        Eyes:    PERRL   Ears:    Normal external ear canals, both ears  3 cm of enlarged parotid gland was palpable  Nose:   Nares normal, septum midline   Throat:   Mucosa moist  Pharynx without injection  Neck:   Supple       Lungs:     Clear to auscultation bilaterally   Chest Wall:    No tenderness or deformity    Heart:    Regular rate and rhythm       Abdomen:     Soft, non-tender, bowel sounds +, no organomegaly           Extremities:   Extremities no cyanosis or edema       Skin:   no rash or icterus      Lymph nodes:   Cervical, supraclavicular, and axillary nodes normal   Neurologic:   CNII-XII intact, normal strength, sensation and reflexes     Throughout          Breast exam:   NA         ROS: Review of Systems   All other systems reviewed and are negative  Imaging: Ct Facial Bones Wo Contrast    Result Date: 5/3/2021  Narrative: CT FACIAL BONES WITHOUT INTRAVENOUS CONTRAST INDICATION:   R22 0: Localized swelling, mass and lump, head  Palpable abnormalities  COMPARISON: None  TECHNIQUE:  Axial CT images were obtained through the facial bones with additional sagittal and coronal reconstructions  Radiation dose length product (DLP) for this visit:  630 8 mGy-cm   This examination, like all CT scans performed in the [facility], was performed utilizing techniques to minimize radiation dose exposure, including the use of iterative reconstruction and automated exposure control  IMAGE QUALITY:  Diagnostic  FINDINGS: FACIAL BONES:   No facial bone fracture identified  Normal alignment of the temporomandibular joints  No lytic or blastic lesion  ORBITS:  Orbital globes, optic nerves, and extraocular muscles appear symmetric and normal  There is no evidence of retrobulbar mass, abscess, or hematoma  SINUSES:  Normal  SOFT TISSUES:  There are multiple prominent lymph nodes identified throughout the neck including level 1 nodes, intraparotid nodes and jugular chain nodes both anteriorly and posteriorly  These nodes are homogeneous and sharply defined  The largest level 1 node is seen on the right along the anterolateral aspect of the submandibular gland measuring 1 9 cm in maximum dimension  Intraparotid nodes are seen bilaterally including several on the left below the marker placed at the site of palpable abnormality  Impression: Multiple enlarged lymph nodes are seen level 1, level 2 and within the parotid glands  These nodes are homogeneous and sharply defined on this noncontrast examination    Although findings may be reactive, as a result of an infectious/inflammatory process, findings are suspicious for a lymphoproliferative disorder such as lymphoma or leukemia  Consider computed tomography of the chest, abdomen and pelvis to evaluate for other lymphadenopathy and hematology workup  Several of these would be amenable to ultrasound-guided biopsy if necessary  This examination was marked "immediate notification" in Epic in order to begin the standard process by which the radiology reading room liaison alerts the referring practitioner  Workstation performed: [id number]         Labs:   Lab Results   Component Value Date    WBC 9 57 05/04/2021    HGB 13 5 05/04/2021    HCT 41 9 05/04/2021    MCV 93 05/04/2021     05/04/2021     Lab Results   Component Value Date    K 4 5 05/04/2021     05/04/2021    CO2 29 05/04/2021    BUN 26 (H) 05/04/2021    CREATININE 1 18 05/04/2021    GLUF 110 (H) 05/04/2021    CALCIUM 10 7 (H) 05/04/2021    CORRECTEDCA 10 3 (H) 06/27/2020    AST 25 05/04/2021    ALT 46 05/04/2021    ALKPHOS 91 05/04/2021    EGFR 46 05/04/2021           Vital Sign:    Body surface area is 2 11 meters squared      Wt Readings from Last 3 Encounters:   05/18/21 115 kg (254 lb)   05/14/21 114 kg (252 lb)   05/04/21 114 kg (252 lb 3 2 oz)        Temp Readings from Last 3 Encounters:   05/18/21 (!) 97 1 °F (36 2 °C) (Tympanic Core)   05/14/21 (!) 97 °F (36 1 °C)   05/04/21 (!) 97 4 °F (36 3 °C) (Temporal)        BP Readings from Last 3 Encounters:   05/18/21 142/84   05/04/21 144/76   04/21/21 134/70         Pulse Readings from Last 3 Encounters:   05/18/21 86   05/04/21 76   04/21/21 75     @LASTSAO2(3)@    Active Problems:   Patient Active Problem List   Diagnosis    Vitamin D deficiency    Psoriasis    Pre-diabetes    Osteoarthritis    NSAID long-term use    Morbid obesity ([address] )    Hypothyroidism    Essential hypertension    Elevated blood sugar    Stage 3a chronic kidney disease (HCC)    Chronic depression    Chronic anxiety    Ambulatory dysfunction    Class 3 severe obesity with serious comorbidity and body mass index (BMI) of 45 0 to 49 9 in adult [facility])    Primary osteoarthritis of right hip    Lumbar pain    History of total left hip replacement    PAD (peripheral artery disease) (HCC)       Past Medical History:   Past Medical History:   Diagnosis Date    Anxiety     on med     Arthritis     Back problem     4 Bulging Disks    Fibrocystic disease of both breasts     Hip pain     Left    History of staph infection     Hypertension     Knee pain     Right    Spinal stenosis     Thyroid disease        Surgical History:   Past Surgical History:   Procedure Laterality Date    CATARACT EXTRACTION  01/01/2017    CHOLECYSTECTOMY      MAMMO (HISTORICAL)  12/13/2019    OTHER SURGICAL HISTORY      Warts removal    REFRACTIVE SURGERY Bilateral     ROTATOR CUFF REPAIR Right        Family History:    Family History   Problem Relation Age of Onset    Lung cancer Father     Breast cancer Other        Cancer-related family history includes Breast cancer in her other; Lung cancer in her father      Social History:   Social History     Socioeconomic History    Marital status: /Civil Union     Spouse name: Not on file    Number of children: 1    Years of education: 12    Highest education level: 12th grade   Occupational History    Occupation: reitred    Social Needs    Financial resource strain: Not on file    Food insecurity     Worry: Not on file     Inability: Not on file   Pressable needs     Medical: Not on file     Non-medical: Not on file   Tobacco Use    Smoking status: Former Smoker     Years: 14 00    Smokeless tobacco: Never Used   Substance and Sexual Activity    Alcohol use: Not Currently     Frequency: Monthly or less     Comment: Occasional     Drug use: Not Currently    Sexual activity: Yes     Partners: Male     Birth control/protection: Post-menopausal   Lifestyle    Physical activity     Days per week: 2 days Minutes per session: 30 min    Stress: Not on file   Relationships    Social connections     Talks on phone: Not on file     Gets together: Not on file     Attends [employer] service: Not on file     Active member of club or organization: Not on file     Attends meetings of clubs or organizations: Not on file     Relationship status: Not on file    Intimate partner violence     Fear of current or ex partner: Not on file     Emotionally abused: Not on file     Physically abused: Not on file     Forced sexual activity: Not on file   Other Topics Concern    Not on file   Social History Narrative    · Most recent tobacco use screenin2019      · Do you currently or have you served in [employer]:   No      · Were you activated, into active duty, as a member of the [employer] or as a Reservist:   No      · Caffeine intake:   Occasional      · Guns present in home:   No      · Seat belts used routinely:   Yes      · Sunscreen used routinely:   Yes      · Smoke alarm in home:    Yes      · Advance directive:   Yes        Current Medications:   Current Outpatient Medications   Medication Sig Dispense Refill    acetaminophen (TYLENOL) 500 mg tablet Take 500 mg by mouth every 4 (four) hours      amLODIPine (NORVASC) 5 mg tablet TAKE 1 TABLET DAILY 90 tablet 3    amoxicillin (AMOXIL) 500 MG tablet       benazepril (LOTENSIN) 40 MG tablet TAKE 1 TABLET DAILY 90 tablet 3    betamethasone dipropionate (DIPROSONE) 0 05 % cream Apply topically 2 (two) times a day ADD:  To the SCALP and rub in well 30 g 0    Cholecalciferol 1 25 MG (46102 UT) TABS Take 1 25 mg by mouth daily      citalopram (CeleXA) 10 mg tablet Take 1 tablet (10 mg total) by mouth daily 90 tablet 2    docusate calcium (Surfak) 240 mg capsule Take 240 mg by mouth 2 (two) times a day      fexofenadine (ALLEGRA) 180 MG tablet Take 180 mg by mouth daily      ketoconazole (NIZORAL) 2 % cream Apply topically daily ADD:  To the face, nose, eyebrows 30 g 1    linaCLOtide (Linzess) 145 MCG CAPS Take 1 capsule (145 mcg total) by mouth daily 30 capsule 3    meloxicam (MOBIC) 15 mg tablet TAKE 1 TABLET DAILY 90 tablet 3    multivitamin-iron-minerals-folic acid (CENTRUM) chewable tablet Chew 1 tablet daily      naproxen sodium (ALEVE) 220 MG tablet Take 220 mg by mouth every 12 (twelve) hours as needed      Omega 3 1200 MG CAPS Take by mouth      pantoprazole (PROTONIX) 40 mg tablet Take 1 tablet (40 mg total) by mouth daily 90 tablet 3    Synthroid 125 MCG tablet TAKE 1 TABLET DAILY 90 tablet 3    triamcinolone (KENALOG) 0 1 % cream Apply topically 2 (two) times a day ADD to the Sig:  Apply behind the ears 30 g 0     No current facility-administered medications for this visit  Allergies:    Allergies   Allergen Reactions    Diphenhydramine Other (See Comments)    Erythromycin Other (See Comments)    Tetanus Toxoids Other (See Comments)    Tetracycline Other (See Comments)

## 2021-05-18 NOTE — PROGRESS NOTES
Hematology / Oncology Outpatient Consult Note    Nitin Sigala 66 y o  female [id number]         Date:  5/18/2021    Assessment / Plan:    A 78-year-old female with left parotid gland enlargement as well as multiple lymphadenopathy of the neck  This was noted 2 weeks after the COVID vaccine injection  We discussed the differential diagnosis  Her swelling in the left parotid gland is not subsiding even 2 months after the 1st vaccination  In addition, location of swelling is quite high  Therefore, this may not be immune reaction to the COVID vaccine  This may be lymphoproliferative disorder  I agree with Dr Jorge Luis Osborn recommendation for the excisional lymph node biopsy  I will see her again in mid June 2021 to discuss the pathology report and treatment options  She is in agreement with my recommendation  All the patient and her daughter and son-in-law's questions were answered to their satisfaction  Subjective:     HPI:    A 78-year-old female who had 1st Pfizer COVID vaccine in March 16, 2021  2 weeks after the 1st vaccine, she noticed the swelling in her left upper neck close to the ER  She brought this to medical attention  She had injection of vaccine in the left deltoid  She underwent CT scan of the neck which showed multiple enlarged level 1 and level 2 lymphadenopathy as well as swelling of the parotid gland  She was seen by Dr Leeann Castorena who recommended excisional lymph node biopsy  She is scheduled to have this procedure in [place] 3, 2021  She presents today with her daughter and son-in-law to discuss further evaluation  She feels well  She has no fever, chills or no or night sweats  She has no recent weight loss  She denied any respiratory symptoms  She has hypertension as well as arthritis  She is status post left hip replacement last year  Therefore, she has mild ambulatory dysfunction  She quit smoking 40 years ago  She does not drink alcohol    She is in usual state of health  Her performance status is 0 to 1/4 on the ECOG scale  Interval History:          Objective:     Primary Diagnosis:      Left neck lymphadenopathy  Cancer Staging:  Cancer Staging  No matching staging information was found for the patient  Previous Hematologic/ Oncologic Treatment:         Current Hematologic/ Oncologic Treatment: To be determined  Disease Status:         Test Results:    Pathology:        Radiology:      CT scan showed multiple enlarged level 1 and level 2 lymphadenopathy as well as left parotid gland enlargement  Laboratory: Flow cytometry of peripheral blood showed no clonal disease  See below for CBC and CMP  Physical Exam:      General Appearance:    Alert, oriented        Eyes:    PERRL   Ears:    Normal external ear canals, both ears  3 cm of enlarged parotid gland was palpable  Nose:   Nares normal, septum midline   Throat:   Mucosa moist  Pharynx without injection  Neck:   Supple       Lungs:     Clear to auscultation bilaterally   Chest Wall:    No tenderness or deformity    Heart:    Regular rate and rhythm       Abdomen:     Soft, non-tender, bowel sounds +, no organomegaly           Extremities:   Extremities no cyanosis or edema       Skin:   no rash or icterus  Lymph nodes:   Cervical, supraclavicular, and axillary nodes normal   Neurologic:   CNII-XII intact, normal strength, sensation and reflexes     Throughout          Breast exam:   NA         ROS: Review of Systems   All other systems reviewed and are negative  Imaging: Ct Facial Bones Wo Contrast    Result Date: 5/3/2021  Narrative: CT FACIAL BONES WITHOUT INTRAVENOUS CONTRAST INDICATION:   R22 0: Localized swelling, mass and lump, head  Palpable abnormalities  COMPARISON: None  TECHNIQUE:  Axial CT images were obtained through the facial bones with additional sagittal and coronal reconstructions   Radiation dose length product (DLP) for this visit:  630 8 mGy-cm   This examination, like all CT scans performed in the [facility], was performed utilizing techniques to minimize radiation dose exposure, including the use of iterative reconstruction and automated exposure control  IMAGE QUALITY:  Diagnostic  FINDINGS: FACIAL BONES:   No facial bone fracture identified  Normal alignment of the temporomandibular joints  No lytic or blastic lesion  ORBITS:  Orbital globes, optic nerves, and extraocular muscles appear symmetric and normal  There is no evidence of retrobulbar mass, abscess, or hematoma  SINUSES:  Normal  SOFT TISSUES:  There are multiple prominent lymph nodes identified throughout the neck including level 1 nodes, intraparotid nodes and jugular chain nodes both anteriorly and posteriorly  These nodes are homogeneous and sharply defined  The largest level 1 node is seen on the right along the anterolateral aspect of the submandibular gland measuring 1 9 cm in maximum dimension  Intraparotid nodes are seen bilaterally including several on the left below the marker placed at the site of palpable abnormality  Impression: Multiple enlarged lymph nodes are seen level 1, level 2 and within the parotid glands  These nodes are homogeneous and sharply defined on this noncontrast examination  Although findings may be reactive, as a result of an infectious/inflammatory process, findings are suspicious for a lymphoproliferative disorder such as lymphoma or leukemia  Consider computed tomography of the chest, abdomen and pelvis to evaluate for other lymphadenopathy and hematology workup  Several of these would be amenable to ultrasound-guided biopsy if necessary  This examination was marked "immediate notification" in Epic in order to begin the standard process by which the radiology reading room liaison alerts the referring practitioner    Workstation performed: [id number]         Labs:   Lab Results   Component Value Date    WBC 9 57 05/04/2021    HGB 13 5 05/04/2021    HCT 41 9 05/04/2021    MCV 93 05/04/2021     05/04/2021     Lab Results   Component Value Date    K 4 5 05/04/2021     05/04/2021    CO2 29 05/04/2021    BUN 26 (H) 05/04/2021    CREATININE 1 18 05/04/2021    GLUF 110 (H) 05/04/2021    CALCIUM 10 7 (H) 05/04/2021    CORRECTEDCA 10 3 (H) 06/27/2020    AST 25 05/04/2021    ALT 46 05/04/2021    ALKPHOS 91 05/04/2021    EGFR 46 05/04/2021           Vital Sign:    Body surface area is 2 11 meters squared      Wt Readings from Last 3 Encounters:   05/18/21 115 kg (254 lb)   05/14/21 114 kg (252 lb)   05/04/21 114 kg (252 lb 3 2 oz)        Temp Readings from Last 3 Encounters:   05/18/21 (!) 97 1 °F (36 2 °C) (Tympanic Core)   05/14/21 (!) 97 °F (36 1 °C)   05/04/21 (!) 97 4 °F (36 3 °C) (Temporal)        BP Readings from Last 3 Encounters:   05/18/21 142/84   05/04/21 144/76   04/21/21 134/70         Pulse Readings from Last 3 Encounters:   05/18/21 86   05/04/21 76   04/21/21 75     @LASTSAO2(3)@    Active Problems:   Patient Active Problem List   Diagnosis    Vitamin D deficiency    Psoriasis    Pre-diabetes    Osteoarthritis    NSAID long-term use    Morbid obesity ([address] )    Hypothyroidism    Essential hypertension    Elevated blood sugar    Stage 3a chronic kidney disease (HCC)    Chronic depression    Chronic anxiety    Ambulatory dysfunction    Class 3 severe obesity with serious comorbidity and body mass index (BMI) of 45 0 to 49 9 in [facility])    Primary osteoarthritis of right hip    Lumbar pain    History of total left hip replacement    PAD (peripheral artery disease) (HCC)       Past Medical History:   Past Medical History:   Diagnosis Date    Anxiety     on med     Arthritis     Back problem     4 Bulging Disks    Fibrocystic disease of both breasts     Hip pain     Left    History of staph infection     Hypertension     Knee pain     Right    Spinal stenosis     Thyroid disease Surgical History:   Past Surgical History:   Procedure Laterality Date    CATARACT EXTRACTION  01/01/2017    CHOLECYSTECTOMY      MAMMO (HISTORICAL)  12/13/2019    OTHER SURGICAL HISTORY      Warts removal    REFRACTIVE SURGERY Bilateral     ROTATOR CUFF REPAIR Right        Family History:    Family History   Problem Relation Age of Onset    Lung cancer Father     Breast cancer Other        Cancer-related family history includes Breast cancer in her other; Lung cancer in her father      Social History:   Social History     Socioeconomic History    Marital status: /Civil Union     Spouse name: Not on file    Number of children: 1    Years of education: 12    Highest education level: 12th grade   Occupational History    Occupation: reitred    Social Needs    Financial resource strain: Not on file    Food insecurity     Worry: Not on file     Inability: Not on file   [employer] needs     Medical: Not on file     Non-medical: Not on file   Tobacco Use    Smoking status: Former Smoker     Years: 14 00    Smokeless tobacco: Never Used   Substance and Sexual Activity    Alcohol use: Not Currently     Frequency: Monthly or less     Comment: Occasional     Drug use: Not Currently    Sexual activity: Yes     Partners: Male     Birth control/protection: Post-menopausal   Lifestyle    Physical activity     Days per week: 2 days     Minutes per session: 30 min    Stress: Not on file   Relationships    Social connections     Talks on phone: Not on file     Gets together: Not on file     Attends [employer] service: Not on file     Active member of club or organization: Not on file     Attends meetings of clubs or organizations: Not on file     Relationship status: Not on file    Intimate partner violence     Fear of current or ex partner: Not on file     Emotionally abused: Not on file     Physically abused: Not on file     Forced sexual activity: Not on file   Other Topics Concern    Not on file   Social History Narrative    · Most recent tobacco use screenin2019      · Do you currently or have you served in the [employer]:   No      · Were you activated, into active duty, as a member of the [employer] or as a Reservist:   No      · Caffeine intake:   Occasional      · Guns present in home:   No      · Seat belts used routinely:   Yes      · Sunscreen used routinely:   Yes      · Smoke alarm in home:    Yes      · Advance directive:   Yes        Current Medications:   Current Outpatient Medications   Medication Sig Dispense Refill    acetaminophen (TYLENOL) 500 mg tablet Take 500 mg by mouth every 4 (four) hours      amLODIPine (NORVASC) 5 mg tablet TAKE 1 TABLET DAILY 90 tablet 3    amoxicillin (AMOXIL) 500 MG tablet       benazepril (LOTENSIN) 40 MG tablet TAKE 1 TABLET DAILY 90 tablet 3    betamethasone dipropionate (DIPROSONE) 0 05 % cream Apply topically 2 (two) times a day ADD:  To the SCALP and rub in well 30 g 0    Cholecalciferol 1 25 MG (58692 UT) TABS Take 1 25 mg by mouth daily      citalopram (CeleXA) 10 mg tablet Take 1 tablet (10 mg total) by mouth daily 90 tablet 2    docusate calcium (Surfak) 240 mg capsule Take 240 mg by mouth 2 (two) times a day      fexofenadine (ALLEGRA) 180 MG tablet Take 180 mg by mouth daily      ketoconazole (NIZORAL) 2 % cream Apply topically daily ADD:  To the face, nose, eyebrows 30 g 1    linaCLOtide (Linzess) 145 MCG CAPS Take 1 capsule (145 mcg total) by mouth daily 30 capsule 3    meloxicam (MOBIC) 15 mg tablet TAKE 1 TABLET DAILY 90 tablet 3    multivitamin-iron-minerals-folic acid (CENTRUM) chewable tablet Chew 1 tablet daily      naproxen sodium (ALEVE) 220 MG tablet Take 220 mg by mouth every 12 (twelve) hours as needed      Omega 3 1200 MG CAPS Take by mouth      pantoprazole (PROTONIX) 40 mg tablet Take 1 tablet (40 mg total) by mouth daily 90 tablet 3    Synthroid 125 MCG tablet TAKE 1 TABLET DAILY 90 tablet 3    triamcinolone (KENALOG) 0 1 % cream Apply topically 2 (two) times a day ADD to the Sig:  Apply behind the ears 30 g 0     No current facility-administered medications for this visit  Allergies:    Allergies   Allergen Reactions    Diphenhydramine Other (See Comments)    Erythromycin Other (See Comments)    Tetanus Toxoids Other (See Comments)    Tetracycline Other (See Comments)

## 2021-05-20 ENCOUNTER — DOCUMENTATION (OUTPATIENT)
Dept: HEMATOLOGY ONCOLOGY | Facility: CLINIC | Age: 71
End: 2021-05-20

## 2021-05-24 ENCOUNTER — TELEPHONE (OUTPATIENT)
Dept: HEMATOLOGY ONCOLOGY | Facility: CLINIC | Age: 71
End: 2021-05-24

## 2021-05-24 NOTE — TELEPHONE ENCOUNTER
Patient was instructed to call  [facility] with symptoms    FYI to Dr Pooja Alvarado in previous note

## 2021-05-24 NOTE — TELEPHONE ENCOUNTER
Patient was seen in consult by Dr Porsche Pichardo on 5/18/2021    Patient now palpates a lump on right side of neck  the size of a grape, tender to touch and softer than the other side  Patient is scheduled for biospy of left parotid gland on 6/3/2021  Patient is afebrile, has headache, aching  pain in back of neck  Will pass on to Dr Laquita Ortega RN  Emotional support given as patient is tearful and anxious  Patient aware of plan

## 2021-05-24 NOTE — TELEPHONE ENCOUNTER
Patient should follow up with Dr Vick Muñoz office as they are doing the biopsy and have taken care of this since then  We are to see her in June 2021 to discuss treatment options, based on the biopsy results

## 2021-05-25 ENCOUNTER — CLINICAL SUPPORT (OUTPATIENT)
Dept: URGENT CARE | Facility: CLINIC | Age: 71
End: 2021-05-25

## 2021-05-25 DIAGNOSIS — R59.1 LYMPHADENOPATHY OF HEAD AND NECK: ICD-10-CM

## 2021-05-25 DIAGNOSIS — K11.1 ENLARGED PAROTID GLAND: ICD-10-CM

## 2021-05-25 NOTE — PROGRESS NOTES
Chris Can had walk-in EKG completed on 05/25/21 at 4:59 PM by [facility].

## 2021-06-01 ENCOUNTER — CLINICAL SUPPORT (OUTPATIENT)
Dept: URGENT CARE | Facility: CLINIC | Age: 71
End: 2021-06-01
Payer: COMMERCIAL

## 2021-06-01 DIAGNOSIS — K11.1 ENLARGED PAROTID GLAND: ICD-10-CM

## 2021-06-01 DIAGNOSIS — R59.1 LYMPHADENOPATHY OF HEAD AND NECK: Primary | ICD-10-CM

## 2021-06-01 PROCEDURE — 93005 ELECTROCARDIOGRAM TRACING: CPT

## 2021-06-02 LAB
ATRIAL RATE: 84 BPM
P AXIS: 43 DEGREES
PR INTERVAL: 144 MS
QRS AXIS: -26 DEGREES
QRSD INTERVAL: 98 MS
QT INTERVAL: 358 MS
QTC INTERVAL: 423 MS
T WAVE AXIS: 75 DEGREES
VENTRICULAR RATE: 84 BPM

## 2021-06-02 PROCEDURE — 93010 ELECTROCARDIOGRAM REPORT: CPT | Performed by: INTERNAL MEDICINE

## 2021-06-03 ENCOUNTER — LAB REQUISITION (OUTPATIENT)
Dept: LAB | Facility: HOSPITAL | Age: 71
End: 2021-06-03
Payer: COMMERCIAL

## 2021-06-03 DIAGNOSIS — R59.1 GENERALIZED ENLARGED LYMPH NODES: ICD-10-CM

## 2021-06-03 PROCEDURE — 88305 TISSUE EXAM BY PATHOLOGIST: CPT | Performed by: PATHOLOGY

## 2021-06-03 PROCEDURE — 88185 FLOWCYTOMETRY/TC ADD-ON: CPT | Performed by: OTOLARYNGOLOGY

## 2021-06-03 PROCEDURE — 88184 FLOWCYTOMETRY/ TC 1 MARKER: CPT | Performed by: OTOLARYNGOLOGY

## 2021-06-07 LAB — SCAN RESULT: NORMAL

## 2021-06-08 ENCOUNTER — TELEPHONE (OUTPATIENT)
Dept: HEMATOLOGY ONCOLOGY | Facility: CLINIC | Age: 71
End: 2021-06-08

## 2021-06-08 NOTE — TELEPHONE ENCOUNTER
Patient's daughter, Megan Sahu, is calling in inquiring whether waiting until 06/23 for her appointment being that they saw some results in her mychart that are very alarming   Megan Sahu can be reached back at [phone]

## 2021-06-09 ENCOUNTER — OFFICE VISIT (OUTPATIENT)
Dept: HEMATOLOGY ONCOLOGY | Facility: CLINIC | Age: 71
End: 2021-06-09
Payer: COMMERCIAL

## 2021-06-09 VITALS
HEART RATE: 62 BPM | RESPIRATION RATE: 14 BRPM | SYSTOLIC BLOOD PRESSURE: 150 MMHG | DIASTOLIC BLOOD PRESSURE: 88 MMHG | OXYGEN SATURATION: 97 % | HEIGHT: 62 IN | TEMPERATURE: 98 F | WEIGHT: 253.5 LBS | BODY MASS INDEX: 46.65 KG/M2

## 2021-06-09 DIAGNOSIS — C82.31 GRADE 3A FOLLICULAR LYMPHOMA OF LYMPH NODES OF NECK (HCC): Primary | ICD-10-CM

## 2021-06-09 PROCEDURE — 99214 OFFICE O/P EST MOD 30 MIN: CPT | Performed by: INTERNAL MEDICINE

## 2021-06-09 PROCEDURE — 1036F TOBACCO NON-USER: CPT | Performed by: INTERNAL MEDICINE

## 2021-06-09 PROCEDURE — 3079F DIAST BP 80-89 MM HG: CPT | Performed by: INTERNAL MEDICINE

## 2021-06-09 PROCEDURE — 3077F SYST BP >= 140 MM HG: CPT | Performed by: INTERNAL MEDICINE

## 2021-06-09 PROCEDURE — 1160F RVW MEDS BY RX/DR IN RCRD: CPT | Performed by: INTERNAL MEDICINE

## 2021-06-09 PROCEDURE — 3008F BODY MASS INDEX DOCD: CPT | Performed by: INTERNAL MEDICINE

## 2021-06-09 NOTE — PROGRESS NOTES
Hematology / Oncology Outpatient Follow Up Note    Brittany Mace 66 y o  female :1950 [id number]         Date:  2021    Assessment / Plan:    A 70-year-old female with newly diagnosed follicular lymphoma, grade     3A  She has bilateral neck lymphadenopathy  She presents today with her  and daughter to discuss the diagnosis and treatment options  We had extensive discussion regarding the diagnosis, natural course of disease as well as treatment options  I told her the follicular lymphoma is 1 of most common indolent lymphoma  However, she has relatively high grade disease among the follicular lymphoma  Immediate systemic therapy may or may not be indicated  To determine this, I recommended her to have PET-CT scan as well as CBC, CMP, LDH and hepatitis panel  I will see her again in 3 weeks to discuss those results and make further recommendations  She is in agreement with my recommendations         Subjective:      HPI:    A 70-year-old female who had [address] vaccine in 2021  2 weeks after the 1st vaccine, she noticed the swelling in her left upper neck close to the ER  She brought this to medical attention  She had injection of vaccine in the left deltoid  She underwent CT scan of the neck which showed multiple enlarged level 1 and level 2 lymphadenopathy as well as swelling of the parotid gland  She was seen by Dr Sunshine Pool who recommended excisional lymph node biopsy  She is scheduled to have this procedure in [place] 3, 2021  She presents today with her daughter and son-in-law to discuss further evaluation  She feels well  She has no fever, chills or no or night sweats  She has no recent weight loss  She denied any respiratory symptoms  She has hypertension as well as arthritis  She is status post left hip replacement last year  Therefore, she has mild ambulatory dysfunction  She quit smoking 40 years ago  She does not drink alcohol    She is in usual state of health  Her performance status is 0 to 1/4 on the ECOG scale            Interval History:  A 73-year-old female with left parotid gland enlargement as well as multiple lymphadenopathy of the neck  Therefore, she underwent left neck lymph node biopsy by Dr Daisy Do which showed follicular lymphoma, grade 3A  She presents today with her  and daughter to discuss the diagnosis and treatment options  She also noticed a right neck adenopathy  She has no complaint of pain  She denied fever, chills or night sweats  She has no recent weight loss  She has no respiratory symptoms  She has baseline ambulatory dysfunction due to the osteoarthritis of the knee  Her performance status is 1/4 on the ECOG scale            Objective:      Primary Diagnosis:        Follicular lymphoma, grade 3A  Diagnosed in June 2021      Cancer Staging:  Cancer Staging  No matching staging information was found for the patient         Previous Hematologic/ Oncologic Treatment:            Current Hematologic/ Oncologic Treatment:         To be determined      Disease Status:            Test Results:     Pathology:       left neck lymph node biopsy showed follicular lymphoma, grade 3A      Radiology:       CT scan showed multiple enlarged level 1 and level 2 lymphadenopathy as well as left parotid gland enlargement      Laboratory:       See below      Physical Exam:        General Appearance:    Alert, oriented          Eyes:    PERRL   Ears:    Normal external ear canals, both ears  3 cm of enlarged parotid gland was palpable  Nose:   Nares normal, septum midline   Throat:   Mucosa moist  Pharynx without injection      Neck:   Supple         Lungs:     Clear to auscultation bilaterally   Chest Wall:    No tenderness or deformity    Heart:    Regular rate and rhythm         Abdomen:     Soft, non-tender, bowel sounds +, no organomegaly               Extremities:   Extremities no cyanosis or edema         Skin:   no rash or icterus  Lymph nodes:   Cervical, supraclavicular, and axillary nodes normal   Neurologic:   CNII-XII intact, normal strength, sensation and reflexes     Throughout             Breast exam:   NA           ROS: Review of Systems   All other systems reviewed and are negative  Imaging: No results found  Labs:   Lab Results   Component Value Date    WBC 9 57 05/04/2021    HGB 13 5 05/04/2021    HCT 41 9 05/04/2021    MCV 93 05/04/2021     05/04/2021     Lab Results   Component Value Date    K 4 5 05/04/2021     05/04/2021    CO2 29 05/04/2021    BUN 26 (H) 05/04/2021    CREATININE 1 18 05/04/2021    GLUF 110 (H) 05/04/2021    CALCIUM 10 7 (H) 05/04/2021    CORRECTEDCA 10 3 (H) 06/27/2020    AST 25 05/04/2021    ALT 46 05/04/2021    ALKPHOS 91 05/04/2021    EGFR 46 05/04/2021         Current Medications: Reviewed  Allergies: Reviewed  PMH/FH/SH:  Reviewed      Vital Sign:    Body surface area is 2 11 meters squared      Wt Readings from Last 3 Encounters:   06/09/21 115 kg (253 lb 8 oz)   05/18/21 115 kg (254 lb)   05/14/21 114 kg (252 lb)        Temp Readings from Last 3 Encounters:   06/09/21 98 °F (36 7 °C) (Temporal)   05/18/21 (!) 97 1 °F (36 2 °C) (Tympanic Core)   05/14/21 (!) 97 °F (36 1 °C)        BP Readings from Last 3 Encounters:   06/09/21 150/88   05/18/21 142/84   05/04/21 144/76         Pulse Readings from Last 3 Encounters:   06/09/21 62   05/18/21 86   05/04/21 76     @LASTSAO2(3)@

## 2021-06-09 NOTE — LETTER
2021     Enedelia Maguire, [facility]  [address] [facility]  [address] [address]    Patient: Pierce Garcia   YOB: 1950   Date of Visit: 2021       Dear Dr Jamey Macias: Thank you for referring Pierce Garcia to me for evaluation  Below are my notes for this consultation  If you have questions, please do not hesitate to call me  I look forward to following your patient along with you  Sincerely,        Lexie Schofield MD        CC: MD Lexie Anne MD  2021 10:03 AM  Sign when Signing Visit  Hematology / Oncology Outpatient Follow Up Note    Pierce Garcia 66 y o  female :1950 [id number]         Date:  2021    Assessment / Plan:    A 77-year-old female with newly diagnosed follicular lymphoma, grade     3A  She has bilateral neck lymphadenopathy  She presents today with her  and daughter to discuss the diagnosis and treatment options  We had extensive discussion regarding the diagnosis, natural course of disease as well as treatment options  I told her the follicular lymphoma is 1 of most common indolent lymphoma  However, she has relatively high grade disease among the follicular lymphoma  Immediate systemic therapy may or may not be indicated  To determine this, I recommended her to have PET-CT scan as well as CBC, CMP, LDH and hepatitis panel  I will see her again in 3 weeks to discuss those results and make further recommendations  She is in agreement with my recommendations         Subjective:      HPI:    A 77-year-old female who had [address] vaccine in 2021  2 weeks after the 1st vaccine, she noticed the swelling in her left upper neck close to the ER  She brought this to medical attention  She had injection of vaccine in the left deltoid  She underwent CT scan of the neck which showed multiple enlarged level 1 and level 2 lymphadenopathy as well as swelling of the parotid gland    She was seen by Dr Marli Marquez who recommended excisional lymph node biopsy  She is scheduled to have this procedure in [place] 3, 2021  She presents today with her daughter and son-in-law to discuss further evaluation  She feels well  She has no fever, chills or no or night sweats  She has no recent weight loss  She denied any respiratory symptoms  She has hypertension as well as arthritis  She is status post left hip replacement last year  Therefore, she has mild ambulatory dysfunction  She quit smoking 40 years ago  She does not drink alcohol  She is in usual state of health  Her performance status is 0 to 1/4 on the ECOG scale            Interval History:  A 77-year-old female with left parotid gland enlargement as well as multiple lymphadenopathy of the neck  Therefore, she underwent left neck lymph node biopsy by Dr Naren Foy which showed follicular lymphoma, grade 3A  She presents today with her  and daughter to discuss the diagnosis and treatment options  She also noticed a right neck adenopathy  She has no complaint of pain  She denied fever, chills or night sweats  She has no recent weight loss  She has no respiratory symptoms  She has baseline ambulatory dysfunction due to the osteoarthritis of the knee  Her performance status is 1/4 on the ECOG scale            Objective:      Primary Diagnosis:        Follicular lymphoma, grade 3A    Diagnosed in June 2021      Cancer Staging:  Cancer Staging  No matching staging information was found for the patient         Previous Hematologic/ Oncologic Treatment:            Current Hematologic/ Oncologic Treatment:         To be determined      Disease Status:            Test Results:     Pathology:       left neck lymph node biopsy showed follicular lymphoma, grade 3A      Radiology:       CT scan showed multiple enlarged level 1 and level 2 lymphadenopathy as well as left parotid gland enlargement      Laboratory:       See below      Physical Exam:        General Appearance:    Alert, oriented          Eyes:    PERRL   Ears:    Normal external ear canals, both ears  3 cm of enlarged parotid gland was palpable  Nose:   Nares normal, septum midline   Throat:   Mucosa moist  Pharynx without injection  Neck:   Supple         Lungs:     Clear to auscultation bilaterally   Chest Wall:    No tenderness or deformity    Heart:    Regular rate and rhythm         Abdomen:     Soft, non-tender, bowel sounds +, no organomegaly               Extremities:   Extremities no cyanosis or edema         Skin:   no rash or icterus  Lymph nodes:   Cervical, supraclavicular, and axillary nodes normal   Neurologic:   CNII-XII intact, normal strength, sensation and reflexes     Throughout             Breast exam:   NA           ROS: Review of Systems   All other systems reviewed and are negative  Imaging: No results found  Labs:   Lab Results   Component Value Date    WBC 9 57 05/04/2021    HGB 13 5 05/04/2021    HCT 41 9 05/04/2021    MCV 93 05/04/2021     05/04/2021     Lab Results   Component Value Date    K 4 5 05/04/2021     05/04/2021    CO2 29 05/04/2021    BUN 26 (H) 05/04/2021    CREATININE 1 18 05/04/2021    GLUF 110 (H) 05/04/2021    CALCIUM 10 7 (H) 05/04/2021    CORRECTEDCA 10 3 (H) 06/27/2020    AST 25 05/04/2021    ALT 46 05/04/2021    ALKPHOS 91 05/04/2021    EGFR 46 05/04/2021         Current Medications: Reviewed  Allergies: Reviewed  PMH/FH/SH:  Reviewed      Vital Sign:    Body surface area is 2 11 meters squared      Wt Readings from Last 3 Encounters:   06/09/21 115 kg (253 lb 8 oz)   05/18/21 115 kg (254 lb)   05/14/21 114 kg (252 lb)        Temp Readings from Last 3 Encounters:   06/09/21 98 °F (36 7 °C) (Temporal)   05/18/21 (!) 97 1 °F (36 2 °C) (Tympanic Core)   05/14/21 (!) 97 °F (36 1 °C)        BP Readings from Last 3 Encounters:   06/09/21 150/88   05/18/21 142/84   05/04/21 144/76         Pulse Readings from Last 3 Encounters: 06/09/21 62   05/18/21 86   05/04/21 76     @LASTSAO2(3)@

## 2021-06-22 ENCOUNTER — TELEPHONE (OUTPATIENT)
Dept: HEMATOLOGY ONCOLOGY | Facility: CLINIC | Age: 71
End: 2021-06-22

## 2021-06-22 NOTE — TELEPHONE ENCOUNTER
Patient's daughter Scottjudy Azevedogess called to see if Hepatitus Panel is the same lab done on 5-4-2021  I informed Scott Pro it is not the same lab test  Scott Pro understood

## 2021-06-23 ENCOUNTER — APPOINTMENT (OUTPATIENT)
Dept: LAB | Facility: CLINIC | Age: 71
End: 2021-06-23
Payer: COMMERCIAL

## 2021-06-23 DIAGNOSIS — C82.31 GRADE 3A FOLLICULAR LYMPHOMA OF LYMPH NODES OF NECK (HCC): ICD-10-CM

## 2021-06-23 LAB
ALBUMIN SERPL BCP-MCNC: 3.9 G/DL (ref 3.5–5)
ALP SERPL-CCNC: 82 U/L (ref 46–116)
ALT SERPL W P-5'-P-CCNC: 41 U/L (ref 12–78)
ANION GAP SERPL CALCULATED.3IONS-SCNC: 5 MMOL/L (ref 4–13)
AST SERPL W P-5'-P-CCNC: 25 U/L (ref 5–45)
BASOPHILS # BLD AUTO: 0.07 THOUSANDS/ΜL (ref 0–0.1)
BASOPHILS NFR BLD AUTO: 1 % (ref 0–1)
BILIRUB SERPL-MCNC: 0.55 MG/DL (ref 0.2–1)
BUN SERPL-MCNC: 29 MG/DL (ref 5–25)
CALCIUM SERPL-MCNC: 10.7 MG/DL (ref 8.3–10.1)
CHLORIDE SERPL-SCNC: 103 MMOL/L (ref 100–108)
CO2 SERPL-SCNC: 28 MMOL/L (ref 21–32)
CREAT SERPL-MCNC: 1.17 MG/DL (ref 0.6–1.3)
EOSINOPHIL # BLD AUTO: 0.3 THOUSAND/ΜL (ref 0–0.61)
EOSINOPHIL NFR BLD AUTO: 4 % (ref 0–6)
ERYTHROCYTE [DISTWIDTH] IN BLOOD BY AUTOMATED COUNT: 13.7 % (ref 11.6–15.1)
GFR SERPL CREATININE-BSD FRML MDRD: 46 ML/MIN/1.73SQ M
GLUCOSE P FAST SERPL-MCNC: 101 MG/DL (ref 65–99)
HBV CORE AB SER QL: NORMAL
HBV CORE IGM SER QL: NORMAL
HBV SURFACE AG SER QL: NORMAL
HCT VFR BLD AUTO: 42 % (ref 34.8–46.1)
HCV AB SER QL: NORMAL
HGB BLD-MCNC: 13.6 G/DL (ref 11.5–15.4)
IMM GRANULOCYTES # BLD AUTO: 0.03 THOUSAND/UL (ref 0–0.2)
IMM GRANULOCYTES NFR BLD AUTO: 0 % (ref 0–2)
LDH SERPL-CCNC: 242 U/L (ref 81–234)
LYMPHOCYTES # BLD AUTO: 1.54 THOUSANDS/ΜL (ref 0.6–4.47)
LYMPHOCYTES NFR BLD AUTO: 19 % (ref 14–44)
MCH RBC QN AUTO: 30.4 PG (ref 26.8–34.3)
MCHC RBC AUTO-ENTMCNC: 32.4 G/DL (ref 31.4–37.4)
MCV RBC AUTO: 94 FL (ref 82–98)
MONOCYTES # BLD AUTO: 1.08 THOUSAND/ΜL (ref 0.17–1.22)
MONOCYTES NFR BLD AUTO: 13 % (ref 4–12)
NEUTROPHILS # BLD AUTO: 5.3 THOUSANDS/ΜL (ref 1.85–7.62)
NEUTS SEG NFR BLD AUTO: 63 % (ref 43–75)
NRBC BLD AUTO-RTO: 0 /100 WBCS
PLATELET # BLD AUTO: 219 THOUSANDS/UL (ref 149–390)
PMV BLD AUTO: 10.1 FL (ref 8.9–12.7)
POTASSIUM SERPL-SCNC: 4.8 MMOL/L (ref 3.5–5.3)
PROT SERPL-MCNC: 8.1 G/DL (ref 6.4–8.2)
RBC # BLD AUTO: 4.48 MILLION/UL (ref 3.81–5.12)
SODIUM SERPL-SCNC: 136 MMOL/L (ref 136–145)
WBC # BLD AUTO: 8.32 THOUSAND/UL (ref 4.31–10.16)

## 2021-06-23 PROCEDURE — 86704 HEP B CORE ANTIBODY TOTAL: CPT

## 2021-06-23 PROCEDURE — 87340 HEPATITIS B SURFACE AG IA: CPT

## 2021-06-23 PROCEDURE — 80053 COMPREHEN METABOLIC PANEL: CPT

## 2021-06-23 PROCEDURE — 86705 HEP B CORE ANTIBODY IGM: CPT

## 2021-06-23 PROCEDURE — 86803 HEPATITIS C AB TEST: CPT

## 2021-06-23 PROCEDURE — 85025 COMPLETE CBC W/AUTO DIFF WBC: CPT

## 2021-06-23 PROCEDURE — 83615 LACTATE (LD) (LDH) ENZYME: CPT

## 2021-06-23 PROCEDURE — 36415 COLL VENOUS BLD VENIPUNCTURE: CPT

## 2021-06-25 ENCOUNTER — HOSPITAL ENCOUNTER (OUTPATIENT)
Dept: RADIOLOGY | Age: 71
Discharge: HOME | End: 2021-06-25
Payer: COMMERCIAL

## 2021-06-25 DIAGNOSIS — C82.31 GRADE 3A FOLLICULAR LYMPHOMA OF LYMPH NODES OF NECK (HCC): ICD-10-CM

## 2021-06-25 LAB — GLUCOSE SERPL-MCNC: 112 MG/DL (ref 65–140)

## 2021-06-25 PROCEDURE — A9552 F18 FDG: HCPCS

## 2021-06-25 PROCEDURE — 82948 REAGENT STRIP/BLOOD GLUCOSE: CPT

## 2021-06-25 PROCEDURE — 78815 PET IMAGE W/CT SKULL-THIGH: CPT

## 2021-06-30 ENCOUNTER — OFFICE VISIT (OUTPATIENT)
Dept: HEMATOLOGY ONCOLOGY | Facility: CLINIC | Age: 71
End: 2021-06-30
Payer: COMMERCIAL

## 2021-06-30 ENCOUNTER — RA CDI HCC (OUTPATIENT)
Dept: OTHER | Facility: HOSPITAL | Age: 71
End: 2021-06-30

## 2021-06-30 ENCOUNTER — DOCUMENTATION (OUTPATIENT)
Dept: HEMATOLOGY ONCOLOGY | Facility: CLINIC | Age: 71
End: 2021-06-30

## 2021-06-30 VITALS
OXYGEN SATURATION: 95 % | HEART RATE: 86 BPM | TEMPERATURE: 98 F | BODY MASS INDEX: 48.58 KG/M2 | DIASTOLIC BLOOD PRESSURE: 78 MMHG | SYSTOLIC BLOOD PRESSURE: 132 MMHG | RESPIRATION RATE: 18 BRPM | WEIGHT: 264 LBS | HEIGHT: 62 IN

## 2021-06-30 DIAGNOSIS — C82.31 GRADE 3A FOLLICULAR LYMPHOMA OF LYMPH NODES OF NECK (HCC): Primary | ICD-10-CM

## 2021-06-30 PROCEDURE — 99215 OFFICE O/P EST HI 40 MIN: CPT | Performed by: INTERNAL MEDICINE

## 2021-06-30 PROCEDURE — 3075F SYST BP GE 130 - 139MM HG: CPT | Performed by: INTERNAL MEDICINE

## 2021-06-30 PROCEDURE — 3078F DIAST BP <80 MM HG: CPT | Performed by: INTERNAL MEDICINE

## 2021-06-30 PROCEDURE — 1160F RVW MEDS BY RX/DR IN RCRD: CPT | Performed by: INTERNAL MEDICINE

## 2021-06-30 RX ORDER — ALLOPURINOL 100 MG/1
200 TABLET ORAL DAILY
Qty: 60 TABLET | Refills: 0 | Status: SHIPPED | OUTPATIENT
Start: 2021-06-30 | End: 2021-08-10 | Stop reason: ALTCHOICE

## 2021-06-30 RX ORDER — SODIUM CHLORIDE 9 MG/ML
20 INJECTION, SOLUTION INTRAVENOUS ONCE
Status: CANCELLED | OUTPATIENT
Start: 2021-08-17

## 2021-06-30 RX ORDER — SODIUM CHLORIDE 9 MG/ML
20 INJECTION, SOLUTION INTRAVENOUS ONCE
Status: CANCELLED | OUTPATIENT
Start: 2021-07-21

## 2021-06-30 RX ORDER — SODIUM CHLORIDE 9 MG/ML
20 INJECTION, SOLUTION INTRAVENOUS ONCE
Status: CANCELLED | OUTPATIENT
Start: 2021-07-20

## 2021-06-30 RX ORDER — ACETAMINOPHEN 325 MG/1
650 TABLET ORAL ONCE
Status: CANCELLED | OUTPATIENT
Start: 2021-08-17

## 2021-06-30 RX ORDER — SODIUM CHLORIDE 9 MG/ML
20 INJECTION, SOLUTION INTRAVENOUS ONCE
Status: CANCELLED | OUTPATIENT
Start: 2021-08-18

## 2021-06-30 RX ORDER — ACETAMINOPHEN 325 MG/1
650 TABLET ORAL ONCE
Status: CANCELLED | OUTPATIENT
Start: 2021-07-20

## 2021-06-30 NOTE — PROGRESS NOTES
[facility]  [facility] opportunities          Chart reviewed, no opportunity found: CHART REVIEWED, NO OPPORTUNITY FOUND                     Patients insurance company:  [facility] (Medicare Advantage and Commercial)

## 2021-06-30 NOTE — PROGRESS NOTES
Hematology / Oncology Outpatient Follow Up Note    Neelima Gresham 66 y o  female :1950 [id number]         Date:  2021    Assessment / Plan:    A 73-year-old female with newly diagnosed follicular lymphoma, HBAVQ 6T,  At least stage IIIA disease with diffuse hypermetabolic lymphadenopathy in the chest axillary, abdominal as well as pelvis  SUV based on PET-CT scan was up to 15  I had long discussion with patient as well as her daughter and son-in-law regarding treatment options  This is higher grade of follicular lymphoma with more than expected level of FDG uptake based on PET-CT scan  She has no constitutional symptoms  Because of the aggressiveness of the disease as well as high FDG uptake, I recommended her to start systemic chemotherapy with bendamustine and rituximab  Side effects of this regimen was thoroughly discussed, including but not limited to neutropenia, some risk of infection, allergic reaction as well as infusion reaction with rituximab  She understood and wished to proceed  I told her that there is more than 90% of response rate  She may have 60 to 70% chance of complete remission  We discussed the natural course of follicular lymphoma which is not curable  Therefore, follicular lymphoma will recur at some point in the future  They understood  She is going to start 1st cycle of bendamustine next week  She was instructed to give us a call immediately if she has fever above 100 4  I will see her again prior to the 2nd cycle of chemotherapy    All the patient and her family members questions were answered to their satisfaction    I also recommended her to start taking allopurinol 200 mg daily as well as vigorous oral hydration for 7 days after the 1st cycle chemotherapy      Subjective:      HPI:    A 73-year-old female who had [address] vaccine in 2021  2 weeks after the 1st vaccine, she noticed the swelling in her left upper neck close to the ER  Jo Simons brought this to medical attention   She had injection of vaccine in the left deltoid   She underwent CT scan of the neck which showed multiple enlarged level 1 and level 2 lymphadenopathy as well as swelling of the parotid gland   She was seen by Dr Honey Naqvi who recommended excisional lymph node biopsy  Peri Henson is scheduled to have this procedure in [place] 3, 2021  She presents today with her daughter and son-in-law to discuss further evaluation   She feels well  Peri Henson has no fever, chills or no or night sweats   She has no recent weight loss   She denied any respiratory symptoms   She has hypertension as well as arthritis   She is status post left hip replacement last year  Georgette Mathews, she has mild ambulatory dysfunction   She quit smoking 40 years ago  Peri Henson does not drink alcohol   She is in usual state of health   Her performance status is 0 to 1/4 on the ECOG scale            Interval History:  A 28-year-old female with left parotid gland enlargement as well as multiple lymphadenopathy of the neck  Therefore, she underwent left neck lymph node biopsy by Dr Honey Naqvi which showed follicular lymphoma, grade 3A  She subsequent underwent PET-CT scan which showed diffuse hypermetabolic lymphadenopathy in the chest, axillary, abdominal and pelvis  SUV or is up to 15  She presents today with her daughter and son-in-law to discuss the treatment options  She has no complaint of pain  Her weight is stable  She has no fever, chills or night sweats  Her weight is stable  Her performance status is 1/4 on the ECOG scale       Objective:      Primary Diagnosis:        Follicular lymphoma, grade 3A  Diagnosed in June 2021      Cancer Staging:  Cancer Staging  No matching staging information was found for the patient         Previous Hematologic/ Oncologic Treatment:            Current Hematologic/ Oncologic Treatment:         Bendamustine and rituximab x6 cycles    1st cycle to be started in July 7, 2021      Disease Status:          Test Results:     Pathology:       left neck lymph node biopsy showed follicular lymphoma, grade 3A      Radiology:       CT scan showed multiple enlarged level 1 and level 2 lymphadenopathy as well as left parotid gland enlargement  PET-CT scan in June 2021 showed diffuse hypermetabolic lymphadenopathy in the chest, abdomen and pelvis with SUV up to 15      Laboratory:       See below      Physical Exam:        General Appearance:    Alert, oriented          Eyes:    PERRL   Ears:    Normal external ear canals, both ears       Nose:   Nares normal, septum midline   Throat:   Mucosa moist  Pharynx without injection  Neck:   Supple         Lungs:     Clear to auscultation bilaterally   Chest Wall:    No tenderness or deformity    Heart:    Regular rate and rhythm         Abdomen:     Soft, non-tender, bowel sounds +, no organomegaly               Extremities:   Extremities no cyanosis or edema         Skin:   no rash or icterus  Lymph nodes:    2 5 cm of right upper neck adenopathy  No other palpable superficial adenopathy  Neurologic:   CNII-XII intact, normal strength, sensation and reflexes     Throughout             Breast exam:   NA           ROS: Review of Systems   All other systems reviewed and are negative  Imaging: NM PET CT skull base to mid thigh    Result Date: 6/25/2021  Narrative: PET/CT SCAN INDICATION:  K72 11: Follicular lymphoma grade iiia, lymph nodes of head, face, and neck   , initial staging for treatment management MODIFIER: PI COMPARISON: CT facial bones 8/45/9230 CELL TYPE:  Follicular lymphoma, left neck node biopsy 6/3/2021 TECHNIQUE:   12 7 mCi F-18-FDG administered IV  Multiplanar attenuation corrected and non attenuation corrected PET images were acquired 60 minutes post injection  Contiguous, low dose, axial CT sections were obtained from the vertex through the femurs   Intravenous contrast material was not utilized    This examination, like all CT scans performed in the [facility], was performed utilizing techniques to minimize radiation dose exposure, including the use of iterative reconstruction and automated exposure control  Fasting serum glucose: 112 mg/dl FINDINGS: VISUALIZED BRAIN:   No acute abnormalities are seen  HEAD/NECK:   There are numerous hypermetabolic bilateral cervical nodes extending from the level of the parotid glands to the supraclavicular regions, compatible with the history of lymphoma  Example right upper cervical node along the inferior parotid image 301/56 measures 2 4 x 1 5 cm, SUV 38  Left parotid node image 301/51 measures 1 2 x 1 5 cm, SUV 17 4  Small hypermetabolic foci are also noted along the posterior calvarium  There is also nodular intense tonsillar activity bilaterally, SUV measuring 15 3 on the right and 10 on the left  This is concerning for lymphoma involvement as well  CT images: Otherwise unremarkable  CHEST:   There are multiple hypermetabolic mediastinal, bilateral hilar, and left axillary nodes, compatible with lymphoma involvement  Example hypermetabolic node lateral to the aortic arch image 3 over 1/101 measures 2 4 x 1 2 cm, SUV 10 2  Right hilar SUV 5 5  Left hilar SUV 8 9  Left axillary node image 301/110 measures 1 9 x 1 4 cm, SUV 14 1  A tiny left retroclavicular hypermetabolic focus is also noted  CT images: 6 mm right lung base nodule image 301/114, too small for accurate PET evaluation  ABDOMEN/PELVIS:   There is diffusely intense splenic activity, SUV 8, concerning for lymphoma involvement  For comparison, liver parenchyma has an SUV of 4 1  Spleen is otherwise normal in size measuring 11 3 cm AP dimension  There is also hypermetabolic retroperitoneal adenopathy extending into the pelvis along the bilateral iliac vessels and pelvic sidewalls, to the level of the inguinal regions bilaterally  Example lesions include the following:  Aortocaval node image 301/179 measures 2 3 x 1 9 cm, SUV 9 9  Left common iliac node image 301/102 measures 1 3 x 1 7 cm, SUV 17 9  Right external iliac node image 301/221 measures 2 4 x 1 5 cm, SUV 5 4  Left inguinal node image 301/221 measures 3 6 x 1 7 cm, SUV 18 right inguinal node image 301/231 measures 1 7 x 1 7 cm, SUV 14 4  Kim Gaetano 6  CT images: Calcified uterine fibroid  Cholecystectomy  Minimal mesenteric stranding without FDG uptake  Duodenal diverticulum  Colon diverticula  Small fat-containing umbilical hernia  OSSEOUS STRUCTURES: No FDG avid lesions are seen  CT images: Total left hip arthroplasty  Spine degenerative change  Impression: 1  Extensive hypermetabolic adenopathy in the neck, chest, abdomen and pelvis, compatible with the history of lymphoma  2   Intense splenic activity likely representing lymphoma involvement  Workstation performed: [id number]         Labs:   Lab Results   Component Value Date    WBC 8 32 06/23/2021    HGB 13 6 06/23/2021    HCT 42 0 06/23/2021    MCV 94 06/23/2021     06/23/2021     Lab Results   Component Value Date    K 4 8 06/23/2021     06/23/2021    CO2 28 06/23/2021    BUN 29 (H) 06/23/2021    CREATININE 1 17 06/23/2021    GLUF 101 (H) 06/23/2021    CALCIUM 10 7 (H) 06/23/2021    CORRECTEDCA 10 3 (H) 06/27/2020    AST 25 06/23/2021    ALT 41 06/23/2021    ALKPHOS 82 06/23/2021    EGFR 46 06/23/2021         Lab Results   Component Value Date     (H) 06/23/2021         Current Medications: Reviewed  Allergies: Reviewed  PMH/FH/SH:  Reviewed      Vital Sign:    Body surface area is 2 15 meters squared      Wt Readings from Last 3 Encounters:   06/30/21 120 kg (264 lb)   06/11/21 115 kg (253 lb)   06/09/21 115 kg (253 lb 8 oz)        Temp Readings from Last 3 Encounters:   06/30/21 98 °F (36 7 °C) (Tympanic Core)   06/11/21 (!) 97 3 °F (36 3 °C)   06/09/21 98 °F (36 7 °C) (Temporal)        BP Readings from Last 3 Encounters:   06/30/21 132/78   06/09/21 150/88   05/18/21 142/84         Pulse Readings from Last 3 Encounters:   06/30/21 86   06/09/21 62   05/18/21 86     @LASTSAO2(3)@

## 2021-07-01 ENCOUNTER — TELEPHONE (OUTPATIENT)
Dept: HEMATOLOGY ONCOLOGY | Facility: CLINIC | Age: 71
End: 2021-07-01

## 2021-07-01 NOTE — TELEPHONE ENCOUNTER
Call from daughter Ericka Yao  Patient will begin allopurinol 1 week prior to start of chemotherapy  Hanna Ramesh understanding

## 2021-07-02 ENCOUNTER — DOCUMENTATION (OUTPATIENT)
Dept: HEMATOLOGY ONCOLOGY | Facility: CLINIC | Age: 71
End: 2021-07-02

## 2021-07-02 NOTE — PROGRESS NOTES
Called ins & spoke to [place] call ref# [id number] pt has an active  [employer] plan that runs on a rod year  Effective 01/01/15  No deduct co-ins 100% out of pocket $5500 met $0201 68 out of pocket doesn't apply to chemo  For chemo & chemo drugs covered 100%  Called pt to go over this & she sd I need to talk to her husb   She put him on the phone & I went over the benefits whim & he thanked me for the call

## 2021-07-08 ENCOUNTER — OFFICE VISIT (OUTPATIENT)
Dept: FAMILY MEDICINE CLINIC | Facility: CLINIC | Age: 71
End: 2021-07-08
Payer: COMMERCIAL

## 2021-07-08 VITALS
DIASTOLIC BLOOD PRESSURE: 78 MMHG | TEMPERATURE: 97.5 F | RESPIRATION RATE: 22 BRPM | WEIGHT: 254 LBS | HEIGHT: 62 IN | OXYGEN SATURATION: 94 % | HEART RATE: 93 BPM | BODY MASS INDEX: 46.74 KG/M2 | SYSTOLIC BLOOD PRESSURE: 132 MMHG

## 2021-07-08 DIAGNOSIS — E66.01 MORBID OBESITY (HCC): Primary | ICD-10-CM

## 2021-07-08 DIAGNOSIS — I10 ESSENTIAL HYPERTENSION: ICD-10-CM

## 2021-07-08 DIAGNOSIS — F32.A CHRONIC DEPRESSION: ICD-10-CM

## 2021-07-08 DIAGNOSIS — M16.12 PRIMARY OSTEOARTHRITIS OF LEFT HIP: ICD-10-CM

## 2021-07-08 DIAGNOSIS — L40.9 PSORIASIS: ICD-10-CM

## 2021-07-08 DIAGNOSIS — E03.9 ACQUIRED HYPOTHYROIDISM: ICD-10-CM

## 2021-07-08 PROCEDURE — 1036F TOBACCO NON-USER: CPT | Performed by: FAMILY MEDICINE

## 2021-07-08 PROCEDURE — 99215 OFFICE O/P EST HI 40 MIN: CPT | Performed by: FAMILY MEDICINE

## 2021-07-08 PROCEDURE — 1160F RVW MEDS BY RX/DR IN RCRD: CPT | Performed by: FAMILY MEDICINE

## 2021-07-08 PROCEDURE — 3008F BODY MASS INDEX DOCD: CPT | Performed by: FAMILY MEDICINE

## 2021-07-08 NOTE — PATIENT INSTRUCTIONS
B-cell leukemia lymphoma 2 gene rearrangement analysis   GENERAL INFORMATION:  What is this test?  This test identifies the rearrangement of a gene called bcl-2 in the body  It is used to help diagnose and manage lymphoid cancers such as follicular lymphoma and diffuse large B-cell lymphoma  A sample from the bone marrow, cerebrospinal fluid, lymph node tissue, extranodal mass tissue, blood or another body fluid or tissue may be collected for this test   What are other names for this test?  · B-cell leukaemia lymphoma 2 (BCL-2) gene rearrangement analysis  · B-cell leukaemia lymphoma 2 gene rearrangement analysis  · B-cell leukemia lymphoma 2 (BCL-2) gene rearrangement analysis  · BCL-2 gene analysis  Why do I need this test?  Laboratory tests may be done for many reasons  Tests are performed for routine health screenings or if a disease or toxicity is suspected  Lab tests may be used to determine if a medical condition is improving or worsening  Lab tests may also be used to measure the success or failure of a medication or treatment plan  Lab tests may be ordered for professional or legal reasons  You may need this test if you have:   · Diffuse large B-cell lymphoma  · Follicular lymphoma  When and how often should I have this test?  When and how often laboratory tests are done may depend on many factors  The timing of laboratory tests may rely on the results or completion of other tests, procedures, or treatments  Lab tests may be performed immediately in an emergency, or tests may be delayed as a condition is treated or monitored  A test may be suggested or become necessary when certain signs or symptoms appear  Due to changes in the way your body naturally functions through the course of a day, lab tests may need to be performed at a certain time of day  If you have prepared for a test by changing your food or fluid intake, lab tests may be timed in accordance with those changes   Timing of tests may be based on increased and decreased levels of medications, drugs or other substances in the body  The age or gender of the person being tested may affect when and how often a lab test is required  Chronic or progressive conditions may need ongoing monitoring through the use of lab tests  Conditions that worsen and improve may also need frequent monitoring  Certain tests may be repeated to obtain a series of results, or tests may need to be repeated to confirm or disprove results  Timing and frequency of lab tests may vary if they are performed for professional or legal reasons  How should I get ready for the test?  Bone marrow:   A bone marrow biopsy is a procedure that requires written consent  Review the consent form with the healthcare worker and ask any questions that you have before signing the consent form  Tell the healthcare worker if you have a medical condition or are using a medication or supplement that causes excessive bleeding  You should also report if you have a history of allergic or other reactions to local anesthetics  Inform the healthcare worker of any past or present bone diseases  You may need to have other tests done before a bone marrow biopsy  To prepare for a bone marrow biopsy, you may be offered a mild sedative prior to the procedure to help you relax  To decrease pain, you will also receive a topical or local anesthetic injection at the biopsy site  Cerebrospinal fluid:   A lumbar puncture is a procedure that requires your written consent  Review the consent form with the healthcare worker and ask any questions that you have before signing the consent form  You may receive certain medications and need imaging tests done prior to the procedure  Tell the person doing the lumbar puncture if you have a medical condition or are using a medication or supplement that causes excessive bleeding   Contact the healthcare worker if you have a history of chronic back pain, structural defects in your spine, or a past spinal surgery  Tell the healthcare worker if you have an infection on your back or if you have any psychiatric or neurological (nerve) conditions  You should also report if you have a history of allergic or other reactions to local anesthetics  Fluid/Tissue:   Written consent may be required for a fine needle aspiration biopsy  Review the consent form with the healthcare worker and ask any questions you have before signing the form  Tell the healthcare worker if you have a medical condition or are using a medication or supplement that causes excessive bleeding  You should also report if you have a history of allergic or other reactions to local anesthetics  Blood:   Before having blood collected, tell the person drawing your blood if you are allergic to latex  Tell the healthcare worker if you have a medical condition or are using a medication or supplement that causes excessive bleeding  Also tell the healthcare worker if you have felt nauseated, lightheaded, or have fainted while having blood drawn in the past   Other body fluid and tissue samples:   A different sample other than the samples listed above may be used for this test  Ask your healthcare worker for information about how to prepare for this test  If you have questions or concerns about the preparation for this test, talk to the healthcare worker  How is the test done? A sample from the bone marrow, cerebrospinal fluid, lymph node tissue, extranodal mass tissue, blood or another body fluid or tissue may be collected for this test   Bone marrow:   Bone marrow is the tissue inside certain bones where new blood cells are made  A bone marrow sample is collected by biopsy  Local anesthesia may be used for a bone marrow biopsy  Your skin will be shaved and cleaned, and a sterile area will be prepared for the procedure  A needle will be inserted through the skin and into the bone using a twisting motion   A sample of marrow will be removed with a syringe, and another needle will be used to remove a piece of tissue  When the samples are collected, the needle will be removed  Cerebrospinal fluid:   Cerebrospinal fluid is the fluid that surrounds the brain and spinal cord tissues  The procedure that collects a sample of cerebrospinal fluid is called a lumbar puncture  For this procedure, you may need to lie on your side facing away from the person performing the procedure  You may be asked to roll up into a ball with your knees brought close to your chest  This position allows your spine to spread apart slightly and helps direct needle placement  This procedure may also be done while you are in an upright sitting position, with your knees drawn up toward your chest   An area in the lower back will be chosen for the needle insertion site  This is about at the level of the top of your hip bone  Your skin will be cleaned with antiseptic and the area will be draped with sterile cloth  Local anesthetic will be injected with a small needle to numb the area  Anesthetic cream may also be applied  After the anesthetic has taken effect, the lumbar puncture needle is inserted between the vertebrae and into the spinal column  Cerebrospinal fluid is drawn out for testing and the needle is removed  You may need to change positions during the procedure if the healthcare worker is having difficulty collecting a sample of fluid  This procedure may be done more than one time if collecting cerebrospinal fluid is difficult  Fluid/Tissue:   A fine needle aspiration biopsy is used to collect fluid, or take small tissue samples from a cyst, lump or mass  First, the area over the biopsy site will be cleaned  A local anesthetic may be used to numb the area where the needle will be inserted  A very tiny needle will be inserted into the area selected for testing  If fluid is present, it will be withdrawn from the area using a syringe   If a tissue sample is required for testing, doing the test if you feel pain or numbness down your leg during the procedure  You may have back discomfort for several days after the procedure  Fluid/Tissue:   A local anesthetic is used to minimize pain during a fine needle aspiration biopsy  Even when local anesthetic is used, you may feel discomfort or pressure as the needle is inserted and moved during the procedure  After the biopsy, the procedure site may be sore for several days  Blood:   During a blood draw, you may feel mild discomfort at the location where the blood sample is being collected  Other body fluid and tissue samples:   A different sample other than the samples listed above may be used for this test  This test may feel different depending on many factors, including the sample needed and how it is collected  Ask the healthcare worker what to expect during this test   What should I do after the test?  Bone marrow:   After the sample of bone marrow is collected, pressure may be applied and a bandage will be placed over the biopsy site  You will be given instructions for when to remove the bandage, and the signs and symptoms of infection to watch for  Contact your healthcare worker if you experience a fever or increased pain, and if you see increasing redness, swelling, or pus at the procedure site  Cerebrospinal fluid:   After the lumbar puncture is complete, a bandage will be placed over the site and pressure held until the bleeding or drainage has stopped  You will need to lie flat for at least 1 to 2 hours after the lumbar puncture  Healthcare workers will monitor for drainage from the puncture site for a period of time after the test  You may be able carefully turn from your back to your side  You will be offered fluids to drink  Fluid/tissue:   After a fine needle biopsy, pressure may be held to the site briefly and a small bandage will be placed over the biopsy site   Ask for instructions for when to remove the dressing and how to monitor for signs and symptoms of infection  Contact your healthcare worker if you have a fever or increased pain, and if you see increasing redness, swelling, or pus at the procedure site  Blood:   After a blood sample is collected from your vein, a bandage, cotton ball, or gauze may be placed on the area where the needle was inserted  You may be asked to apply pressure to the area  Avoid strenuous exercise immediately after your blood draw  Contact your healthcare worker if you feel pain or see redness, swelling, or discharge from the puncture site  Other body fluid and tissue samples:   A different sample other than the samples listed above may be used for this test  Instructions for what to do after a collection of other body fluid or tissue samples may vary  Ask the healthcare worker to instruct you on what to expect after this test is completed  If you have questions or concerns about what to expect after the test is completed, talk to the healthcare worker  What are the risks? Bone marrow: Bone marrow is collected by a procedure called aspiration and biopsy  The sample may be taken from multiple sites, but the most common site for biopsy is the pelvis  Risks of a bone marrow biopsy vary depending on the biopsy method used and the site selected for the biopsy  General risks of this procedure are infection and bleeding from the biopsy site  If you have a medical condition, or are using a medication or supplement that causes excessive bleeding, you are at a higher risk of bleeding from the procedure site  Bone marrow biopsies performed on the sternum (breastbone) have the most risk  This area is only tested on adults, and only certain types of biopsies are done on the sternum  Due to the location and thickness of the sternum, it is rare but possible to damage the heart, major blood vessels, and the mediastinum (space of the chest that holds essential organs)   A puncture to these areas could lead to severe bleeding, infection, or trapped air in the chest cavity  The person doing this procedure may need to perform it more than once  Talk to your healthcare worker if you have any concerns about the risks of having a bone marrow aspiration and biopsy  Cerebrospinal fluid: Cerebrospinal fluid is collected by a procedure called a lumbar puncture  The most common risk of a lumbar puncture is spinal fluid leakage from the puncture site  This procedure may cause a mild to severe headache, which may last for several days  Although rare, other risks include infection, nerve injury, bleeding in the spinal canal, and damage to the discs in between your spine  If you have a medical condition, or are using a medication or supplement that causes excessive bleeding, you are at a higher risk of bleeding from the puncture site  If you have a condition where you have increased pressure within your skull, such as a traumatic head injury or a large brain tumor, this procedure carries a risk of brain herniation (where the skull contents are pressed down on to the spinal cord causing brain damage)  The person doing this test may need to perform it more than once  Talk to your healthcare worker if you have any concerns about the risks of this procedure  Fluid/Tissue: A fine needle aspiration biopsy of tissue or fluid may be taken from several different body areas  However, this procedure is usually done on cysts, lumps, or masses  Risks of this procedure vary depending on the site where the sample is taken for this test, and include bleeding, bruising, and infection at the puncture site  If you have a medical condition, or are using a medication or supplement that causes excessive bleeding, you are at a higher risk of bleeding during or after this procedure  The person doing this procedure may need to perform it more than once   Talk to your healthcare worker if you have any concerns about the risks of a fine needle aspiration biopsy  Blood: During a blood draw, a hematoma (blood-filled bump under the skin) or slight bleeding from the puncture site may occur  After a blood draw, a bruise or infection may occur at the puncture site  The person doing this test may need to perform it more than once  Talk to your healthcare worker if you have any concerns about the risks of this test   Other body fluid or tissue samples: A different sample other than the samples listed above may be used for this test  Ask your healthcare worker to explain the risks of this test to you  If you have questions or concerns about this test, talk to the healthcare worker  What are normal results for this test?  Experts do not agree on what normal results are for this test   What follow up should I do after this test?  Ask your healthcare worker how you will be informed of the test results  You may be asked to call for results, schedule an appointment to discuss results, or notified of results by mail  Follow up care varies depending on many factors related to your test  Sometimes there is no follow up after you have been notified of test results  At other times follow up may be suggested or necessary  Some examples of follow up care include changes to medication or treatment plans, referral to a specialist, more or less frequent monitoring, and additional tests or procedures  Talk with your healthcare worker about any concerns or questions you have regarding follow up care or instructions  Cerebrospinal fluid:   You may experience headaches for hours to days, or even weeks after a lumbar puncture  The headaches are usually worse when sitting or standing upright and are relieved by lying down  Headaches are often associated with nausea, a stiff neck, tinnitus (ringing in the ears), photophobia (abnormal sensitivity to or intolerance of light), vertigo (dizziness), and blurred vision  Contact your healthcare worker if your symptoms worsen    Other body fluid and tissue samples:   A different sample other than the samples listed above may be collected for this test  Ask the healthcare worker for follow up care instructions after this test    Where can I get more information? Related Companies   ? [employer]  - [employer]   org    CARE AGREEMENT:   You have the right to help plan your care  To help with this plan, you must learn about your health condition and how it may be treated  You can then discuss treatment options with your caregivers  Work with them to decide what care may be used to treat you  You always have the right to refuse treatment  © Copyright [facility] [address]  Information is for End User's use only and may not be sold, redistributed or otherwise used for commercial purposes  The above information is an  only  It is not intended as a substitute for medical advice for your individual conditions or treatments  Talk to your doctor, nurse or pharmacist before following any medical regimen to see if it is safe and effective for you

## 2021-07-08 NOTE — PROGRESS NOTES
Assessment/Plan:  Seen with Ed, esteban, today  Recent Dx of follicular  Lymphoma Grade 3A,  now seeing Dr Van Godoy  Had Bx L neck by Dr Julius Lewis on [facility]  Dx'd about May, 2021 with bx by Dr Julius Lewis on [facility], and bx made  PET scan on June 25  Will start chemo on July 20  Dx is:  "Follicular lymphoma grade 3A" Starting chemTx  July 20--one cycle (2days) q 30 day x 6 mo  No hair loss  · F/u and eval R knee: compromised it after L hip surgery in OCT 2020  May need to see Dr Taffy Opitz  · Both Covid shots were done  · L hip surgery, now better  BUT, this has thrown off the R knee  Not severely swollen, I could barely detect any edema, but OA is prob and , of course, obesity compromises matters /     · F/u and eval A1c of 6 4 and diabetic control  Discussed with pt and :  Diet, wt loss, etc      · F/u and eval HTN - on med and controlled at goal 132/78    · F/u and eval Vit D insuff - stabilized at 39 in July 2019- will jaciel         Problem List Items Addressed This Visit        Endocrine    Hypothyroidism       Cardiovascular and Mediastinum    Essential hypertension       Musculoskeletal and Integument    Psoriasis    Osteoarthritis       Other    Morbid obesity ([address] ) - Primary    Chronic depression            Subjective: 65 yo obese, very pleasant, younger-then stated age female, seen with , in NAD  Patient ID: Matilda Gallegos is a 66 y o  female  HPI Assessment/Plan:  Seen with Ed, esteban, today  Recent Dx of follicular  Lymphoma Grade 3A,  now seeing Dr Van Godoy  Had Bx L neck by Dr Julius Lewis on [facility]  Dx'd about May, 2021 with bx by Dr Julius Lewis on [place] [facility], and bx made  PET scan on June 25  Will start chemo on July 20  Dx is:  "Follicular lymphoma grade 3A"  Starting chemTx  July 20--one cycle (2days) q 30 day x 6 mo  No hair loss  F/u and eval R knee: compromised it after L hip surgery in OCT 2020  May need to see Dr Taffy Opitz       Both Covid shots were done  L hip surgery, now better  BUT, this has thrown off the R knee  Not severely swollen, I could barely detect any edema, but OA is prob and , of course, obesity compromises matters /     F/u and eval A1c of 6 4 and diabetic control  Discussed with pt and :  Diet, wt loss, etc        The following portions of the patient's history were reviewed and updated as appropriate:   Past Medical History:  She has a past medical history of Anxiety, Arthritis, Back problem, Fibrocystic disease of both breasts, Follicular lymphoma grade 3a ([address] ), Hip pain, History of staph infection, Hypertension, Knee pain, Spinal stenosis, and Thyroid disease ,  _______________________________________________________________________  Medical Problems:  does not have any pertinent problems on file ,  _______________________________________________________________________  Past Surgical History:   has a past surgical history that includes Mammo (historical) (12/13/2019); Cataract extraction (01/01/2017); Refractive surgery (Bilateral); Other surgical history; Cholecystectomy; and Rotator cuff repair (Right)  ,  _______________________________________________________________________  Family History:  family history includes Breast cancer in her other; Lung cancer in her father ,  _______________________________________________________________________  Social History:   reports that she has quit smoking  She quit after 14 00 years of use  She has never used smokeless tobacco  She reports previous alcohol use  She reports previous drug use ,  _______________________________________________________________________  Allergies:  is allergic to diphenhydramine, erythromycin, tetanus toxoids, and tetracycline     _______________________________________________________________________  Current Outpatient Medications   Medication Sig Dispense Refill    acetaminophen (TYLENOL) 500 mg tablet Take 500 mg by mouth every 4 (four) hours      allopurinol (ZYLOPRIM) 100 mg tablet Take 2 tablets (200 mg total) by mouth daily 60 tablet 0    amLODIPine (NORVASC) 5 mg tablet TAKE 1 TABLET DAILY 90 tablet 3    amoxicillin (AMOXIL) 500 MG tablet       benazepril (LOTENSIN) 40 MG tablet TAKE 1 TABLET DAILY 90 tablet 3    betamethasone dipropionate (DIPROSONE) 0 05 % cream Apply topically 2 (two) times a day ADD:  To the SCALP and rub in well 30 g 0    Cholecalciferol 1 25 MG (89274 UT) TABS Take 1 25 mg by mouth daily      citalopram (CeleXA) 10 mg tablet Take 1 tablet (10 mg total) by mouth daily 90 tablet 2    docusate calcium (Surfak) 240 mg capsule Take 240 mg by mouth 2 (two) times a day      fexofenadine (ALLEGRA) 180 MG tablet Take 180 mg by mouth daily      ketoconazole (NIZORAL) 2 % cream Apply topically daily ADD:  To the face, nose, eyebrows 30 g 1    linaCLOtide (Linzess) 145 MCG CAPS Take 1 capsule (145 mcg total) by mouth daily 30 capsule 3    meloxicam (MOBIC) 15 mg tablet TAKE 1 TABLET DAILY 90 tablet 3    multivitamin-iron-minerals-folic acid (CENTRUM) chewable tablet Chew 1 tablet daily      naproxen sodium (ALEVE) 220 MG tablet Take 220 mg by mouth every 12 (twelve) hours as needed      Omega 3 1200 MG CAPS Take by mouth      pantoprazole (PROTONIX) 40 mg tablet Take 1 tablet (40 mg total) by mouth daily 90 tablet 3    Synthroid 125 MCG tablet TAKE 1 TABLET DAILY 90 tablet 3    triamcinolone (KENALOG) 0 1 % cream Apply topically 2 (two) times a day ADD to the Sig:  Apply behind the ears 30 g 0     No current facility-administered medications for this visit      _______________________________________________________________________  Review of Systems   Constitutional: Negative for activity change, appetite change, chills, diaphoresis, fatigue and fever  HENT: Negative for congestion, ear discharge, ear pain, facial swelling, nosebleeds, sore throat, tinnitus, trouble swallowing and voice change      Eyes: Negative for photophobia, pain, discharge, redness, itching and visual disturbance  Respiratory: Negative for apnea, cough, choking, chest tightness and shortness of breath  Denies any and all respiratory issues - SOB, orthopnea, etc    Cardiovascular: Negative for chest pain, palpitations and leg swelling  Denies any and all cardiac symptoms   Gastrointestinal: Negative for abdominal distention, abdominal pain, blood in stool, constipation, diarrhea, nausea and vomiting  Endocrine: Negative for cold intolerance, heat intolerance, polydipsia, polyphagia and polyuria  Genitourinary: Negative for decreased urine volume, difficulty urinating, dysuria, enuresis, frequency, hematuria, pelvic pain, urgency and vaginal bleeding  Musculoskeletal: Positive for arthralgias, back pain (due to spinal stenosis and 4 bulging disc), gait problem (walking with cane, due to R knee pain) and myalgias  Negative for joint swelling, neck pain and neck stiffness  Oct, 2020, Dr Miguel Garcia replaced the L hip and no prob  He did the side incision and delayed recovery (due to large pineculus)  Did PT well, etc    This shifted the wt to the R knee and that is the prob now  Pain and some swelling  Burning sensation  Hurts  Will f/u wht Dr Cliff Harrison   Skin: Negative for color change, pallor and rash  Psoriasis behind ears, seb dermatitis of nose, face, eyebrows, and  Eczema    Incision from biopsy sub parotid gland on left noted and healed  This was done about June 3rd, 2021   Allergic/Immunologic: Negative for immunocompromised state  Neurological: Negative for dizziness, seizures, facial asymmetry, light-headedness, numbness and headaches  Hematological: Negative for adenopathy  Psychiatric/Behavioral: Negative for agitation, behavioral problems, confusion, decreased concentration, dysphoric mood and hallucinations           Objective:  Vitals:    07/08/21 1220   BP: 132/78   Pulse: 93   Resp: 22 Temp: 97 5 °F (36 4 °C)   SpO2: 94%   Weight: 115 kg (254 lb)   Height: 5' 2" (1 575 m)     Body mass index is 46 46 kg/m²  Physical Exam  Vitals and nursing note reviewed  Constitutional:       General: She is not in acute distress  Appearance: She is well-developed  She is obese  She is not ill-appearing, toxic-appearing or diaphoretic  HENT:      Head: Normocephalic and atraumatic  Eyes:      General: No scleral icterus  Conjunctiva/sclera: Conjunctivae normal       Pupils: Pupils are equal, round, and reactive to light  Neck:      Thyroid: No thyromegaly  Trachea: No tracheal deviation  Cardiovascular:      Rate and Rhythm: Normal rate and regular rhythm  Heart sounds: Normal heart sounds  Pulmonary:      Effort: No respiratory distress  Breath sounds: Normal breath sounds  No wheezing or rales  Chest:      Chest wall: No tenderness  Musculoskeletal:         General: No tenderness or deformity  Normal range of motion  Cervical back: Normal range of motion and neck supple  Comments: Will see Dr Kash Parekh in 2 days for eval of hip (L) repllacement  Skin:     General: Skin is warm and dry  Coloration: Skin is not pale  Findings: No erythema or rash  Comments: WEI vasquez- / [facility] surg  5 days ago -- looks good   Neurological:      General: No focal deficit present  Mental Status: She is alert and oriented to person, place, and time  Cranial Nerves: No cranial nerve deficit  Psychiatric:         Mood and Affect: Mood normal          Behavior: Behavior normal          Thought Content: Thought content normal          Judgment: Judgment normal        40-44 minutes spent with patient and  in uninterrupted time as pt not seen by me in many mo's and has been through a lot medically and surgically-- see Dr Brandt Nova reports and flow cytometry reports    This time was spent reviewing previous records, reviewing previous laboratory and other tests, taking history from patient, examination of patient, discussion of prognosis and treatment, ordering laboratory tests, ordering medications, and completion of the medical record

## 2021-07-09 ENCOUNTER — TELEPHONE (OUTPATIENT)
Dept: HEMATOLOGY ONCOLOGY | Facility: CLINIC | Age: 71
End: 2021-07-09

## 2021-07-09 NOTE — TELEPHONE ENCOUNTER
Patient's daughter [facility] called back at above number and advised that patient can have her annual GYN appointment on Monday and treatment the following day  There will be no issue with that  Dr Micheal Leyva RN notified as Kathie Corrigan

## 2021-07-09 NOTE — TELEPHONE ENCOUNTER
Danny Vernon would like to discuss patient's upcoming yearly GYN appt on Monday, she  would like to know If she should reschedule her appt, she has infusion the next day and wasn't sure if that would cause any problems  Please call Danny Vernon to discuss at her work [employer]: [phone] and will be there until 4:30 pm today

## 2021-07-15 ENCOUNTER — APPOINTMENT (OUTPATIENT)
Dept: LAB | Facility: CLINIC | Age: 71
End: 2021-07-15
Payer: COMMERCIAL

## 2021-07-15 DIAGNOSIS — C82.31 GRADE 3A FOLLICULAR LYMPHOMA OF LYMPH NODES OF NECK (HCC): ICD-10-CM

## 2021-07-15 LAB
ALBUMIN SERPL BCP-MCNC: 3.7 G/DL (ref 3.5–5)
ALP SERPL-CCNC: 102 U/L (ref 46–116)
ALT SERPL W P-5'-P-CCNC: 39 U/L (ref 12–78)
ANION GAP SERPL CALCULATED.3IONS-SCNC: 9 MMOL/L (ref 4–13)
AST SERPL W P-5'-P-CCNC: 24 U/L (ref 5–45)
BASOPHILS # BLD AUTO: 0.07 THOUSANDS/ΜL (ref 0–0.1)
BASOPHILS NFR BLD AUTO: 1 % (ref 0–1)
BILIRUB SERPL-MCNC: 0.46 MG/DL (ref 0.2–1)
BUN SERPL-MCNC: 29 MG/DL (ref 5–25)
CALCIUM SERPL-MCNC: 10.2 MG/DL (ref 8.3–10.1)
CHLORIDE SERPL-SCNC: 104 MMOL/L (ref 100–108)
CO2 SERPL-SCNC: 29 MMOL/L (ref 21–32)
CREAT SERPL-MCNC: 1.28 MG/DL (ref 0.6–1.3)
EOSINOPHIL # BLD AUTO: 0.39 THOUSAND/ΜL (ref 0–0.61)
EOSINOPHIL NFR BLD AUTO: 4 % (ref 0–6)
ERYTHROCYTE [DISTWIDTH] IN BLOOD BY AUTOMATED COUNT: 13.8 % (ref 11.6–15.1)
GFR SERPL CREATININE-BSD FRML MDRD: 42 ML/MIN/1.73SQ M
GLUCOSE SERPL-MCNC: 105 MG/DL (ref 65–140)
HCT VFR BLD AUTO: 43.2 % (ref 34.8–46.1)
HGB BLD-MCNC: 13.8 G/DL (ref 11.5–15.4)
IMM GRANULOCYTES # BLD AUTO: 0.06 THOUSAND/UL (ref 0–0.2)
IMM GRANULOCYTES NFR BLD AUTO: 1 % (ref 0–2)
LYMPHOCYTES # BLD AUTO: 1.73 THOUSANDS/ΜL (ref 0.6–4.47)
LYMPHOCYTES NFR BLD AUTO: 18 % (ref 14–44)
MCH RBC QN AUTO: 30.7 PG (ref 26.8–34.3)
MCHC RBC AUTO-ENTMCNC: 31.9 G/DL (ref 31.4–37.4)
MCV RBC AUTO: 96 FL (ref 82–98)
MONOCYTES # BLD AUTO: 1.29 THOUSAND/ΜL (ref 0.17–1.22)
MONOCYTES NFR BLD AUTO: 13 % (ref 4–12)
NEUTROPHILS # BLD AUTO: 6.2 THOUSANDS/ΜL (ref 1.85–7.62)
NEUTS SEG NFR BLD AUTO: 63 % (ref 43–75)
NRBC BLD AUTO-RTO: 0 /100 WBCS
PLATELET # BLD AUTO: 230 THOUSANDS/UL (ref 149–390)
PMV BLD AUTO: 10 FL (ref 8.9–12.7)
POTASSIUM SERPL-SCNC: 4.9 MMOL/L (ref 3.5–5.3)
PROT SERPL-MCNC: 7.6 G/DL (ref 6.4–8.2)
RBC # BLD AUTO: 4.5 MILLION/UL (ref 3.81–5.12)
SODIUM SERPL-SCNC: 142 MMOL/L (ref 136–145)
WBC # BLD AUTO: 9.74 THOUSAND/UL (ref 4.31–10.16)

## 2021-07-15 PROCEDURE — 36415 COLL VENOUS BLD VENIPUNCTURE: CPT

## 2021-07-15 PROCEDURE — 85025 COMPLETE CBC W/AUTO DIFF WBC: CPT

## 2021-07-15 PROCEDURE — 80053 COMPREHEN METABOLIC PANEL: CPT

## 2021-07-17 DIAGNOSIS — F32.A CHRONIC DEPRESSION: ICD-10-CM

## 2021-07-18 RX ORDER — CITALOPRAM 10 MG/1
TABLET ORAL
Qty: 90 TABLET | Refills: 3 | Status: SHIPPED | OUTPATIENT
Start: 2021-07-18 | End: 2021-11-14 | Stop reason: SDUPTHER

## 2021-07-19 ENCOUNTER — ANNUAL EXAM (OUTPATIENT)
Dept: OBGYN CLINIC | Facility: CLINIC | Age: 71
End: 2021-07-19
Payer: COMMERCIAL

## 2021-07-19 VITALS
SYSTOLIC BLOOD PRESSURE: 130 MMHG | BODY MASS INDEX: 46.41 KG/M2 | HEIGHT: 62 IN | WEIGHT: 252.2 LBS | DIASTOLIC BLOOD PRESSURE: 80 MMHG

## 2021-07-19 DIAGNOSIS — Z12.31 ENCOUNTER FOR SCREENING MAMMOGRAM FOR BREAST CANCER: ICD-10-CM

## 2021-07-19 DIAGNOSIS — Z01.419 ENCOUNTER FOR GYNECOLOGICAL EXAMINATION WITHOUT ABNORMAL FINDING: Primary | ICD-10-CM

## 2021-07-19 PROCEDURE — G0101 CA SCREEN;PELVIC/BREAST EXAM: HCPCS | Performed by: PHYSICIAN ASSISTANT

## 2021-07-19 NOTE — PROGRESS NOTES
Assessment/Plan:    No problem-specific Assessment & Plan notes found for this encounter  Diagnoses and all orders for this visit:    Encounter for gynecological examination without abnormal finding    Encounter for screening mammogram for breast cancer  -     Mammo screening bilateral w 3d & cad; Future        Pap not indicated  Order for mammogram for 2022 entered  If no problems, patient to return in 1 year for routine gyn care  Subjective:      Patient ID: Bakari Salamanca is a 66 y o  female  Patient is here for yearly gyn exam   Will be starting chemo for lymphoma tomorrow  Had a calcified fibroid noted on PET scan  She denies bowel/bladder changes, vaginal bleeding, pelvic pain, bloating, abdominal pain, n/v, change in appetite, and thyroid disease  Mammogram due in January  She is performing self-breast exam   Denies new masses, skin changes, nipple discharge, and pain/tenderness  The following portions of the patient's history were reviewed and updated as appropriate: allergies, current medications, past family history, past medical history, past social history, past surgical history and problem list     Review of Systems   Constitutional: Negative for appetite change and unexpected weight change  Cardiovascular:        No masses, skin changes, nipple discharge, and pain/tenderness  Gastrointestinal: Negative for abdominal distention, abdominal pain, constipation, diarrhea, nausea and vomiting  Genitourinary: Negative for difficulty urinating, dysuria, frequency, genital sores, hematuria, menstrual problem, pelvic pain, urgency, vaginal bleeding, vaginal discharge and vaginal pain  Objective:      /80 (BP Location: Left arm, Patient Position: Sitting, Cuff Size: Standard)   Ht 5' 2" (1 575 m)   Wt 114 kg (252 lb 3 2 oz)   BMI 46 13 kg/m²          Physical Exam  Vitals reviewed  Exam conducted with a chaperone present     Constitutional:       Appearance: Normal appearance  She is well-developed  She is obese  Neck:      Thyroid: No thyromegaly  Pulmonary:      Effort: Pulmonary effort is normal    Chest:      Breasts: Breasts are symmetrical          Right: Normal  No swelling, bleeding, inverted nipple, mass, nipple discharge, skin change or tenderness  Left: Normal  No swelling, bleeding, inverted nipple, mass, nipple discharge, skin change or tenderness  Abdominal:      General: Abdomen is protuberant  Palpations: Abdomen is soft  Tenderness: There is no abdominal tenderness  Genitourinary:     General: Normal vulva  Pubic Area: No rash  Labia:         Right: No rash, tenderness, lesion or injury  Left: No rash, tenderness, lesion or injury  Vagina: Normal  No vaginal discharge, erythema, tenderness or bleeding  Cervix: Normal       Uterus: Normal        Adnexa: Right adnexa normal and left adnexa normal         Right: No mass, tenderness or fullness  Left: No mass, tenderness or fullness  Musculoskeletal:      Cervical back: Neck supple  Lymphadenopathy:      Upper Body:      Right upper body: No supraclavicular or axillary adenopathy  Left upper body: No supraclavicular or axillary adenopathy  Lower Body: No right inguinal adenopathy  No left inguinal adenopathy  Skin:     General: Skin is warm and dry  Neurological:      Mental Status: She is alert and oriented to person, place, and time  Psychiatric:         Mood and Affect: Mood normal          Behavior: Behavior normal  Behavior is cooperative  Thought Content:  Thought content normal          Judgment: Judgment normal

## 2021-07-20 ENCOUNTER — HOSPITAL ENCOUNTER (OUTPATIENT)
Dept: INFUSION CENTER | Facility: CLINIC | Age: 71
Discharge: HOME | End: 2021-07-20
Payer: COMMERCIAL

## 2021-07-20 ENCOUNTER — PATIENT OUTREACH (OUTPATIENT)
Dept: CASE MANAGEMENT | Facility: HOSPITAL | Age: 71
End: 2021-07-20

## 2021-07-20 VITALS
RESPIRATION RATE: 18 BRPM | SYSTOLIC BLOOD PRESSURE: 114 MMHG | TEMPERATURE: 97.6 F | OXYGEN SATURATION: 96 % | WEIGHT: 252 LBS | HEART RATE: 70 BPM | HEIGHT: 62 IN | DIASTOLIC BLOOD PRESSURE: 72 MMHG | BODY MASS INDEX: 46.38 KG/M2

## 2021-07-20 DIAGNOSIS — C82.31 GRADE 3A FOLLICULAR LYMPHOMA OF LYMPH NODES OF NECK (HCC): Primary | ICD-10-CM

## 2021-07-20 PROCEDURE — 96411 CHEMO IV PUSH ADDL DRUG: CPT

## 2021-07-20 PROCEDURE — 96413 CHEMO IV INFUSION 1 HR: CPT

## 2021-07-20 PROCEDURE — 96367 TX/PROPH/DG ADDL SEQ IV INF: CPT

## 2021-07-20 PROCEDURE — 96415 CHEMO IV INFUSION ADDL HR: CPT

## 2021-07-20 RX ORDER — ACETAMINOPHEN 325 MG/1
650 TABLET ORAL ONCE
Status: COMPLETED | OUTPATIENT
Start: 2021-07-20 | End: 2021-07-20

## 2021-07-20 RX ORDER — SODIUM CHLORIDE 9 MG/ML
20 INJECTION, SOLUTION INTRAVENOUS ONCE
Status: COMPLETED | OUTPATIENT
Start: 2021-07-20 | End: 2021-07-20

## 2021-07-20 RX ADMIN — RITUXIMAB 800 MG: 10 INJECTION, SOLUTION INTRAVENOUS at 09:47

## 2021-07-20 RX ADMIN — BENDAMUSTINE HYDROCHLORIDE 150.5 MG: 25 INJECTION, SOLUTION INTRAVENOUS at 13:51

## 2021-07-20 RX ADMIN — SODIUM CHLORIDE 20 ML/HR: 0.9 INJECTION, SOLUTION INTRAVENOUS at 08:20

## 2021-07-20 RX ADMIN — ACETAMINOPHEN 650 MG: 325 TABLET, FILM COATED ORAL at 08:47

## 2021-07-20 RX ADMIN — DEXAMETHASONE SODIUM PHOSPHATE: 10 INJECTION, SOLUTION INTRAMUSCULAR; INTRAVENOUS at 08:22

## 2021-07-20 RX ADMIN — DIPHENHYDRAMINE HYDROCHLORIDE 25 MG: 50 INJECTION, SOLUTION INTRAMUSCULAR; INTRAVENOUS at 08:47

## 2021-07-20 NOTE — PLAN OF CARE
Problem: Potential for Falls  Goal: Patient will remain free of falls  Description: INTERVENTIONS:  - Educate patient/family on patient safety including physical limitations  - Instruct patient to call for assistance with activity   - Consult OT/PT to assist with strengthening/mobility   - Keep Call bell within reach  - Keep bed low and locked with side rails adjusted as appropriate  - Keep care items and personal belongings within reach  - Initiate and maintain comfort rounds  - Make Fall Risk Sign visible to staff  - Offer Toileting every 2 Hours, in advance of need  - Apply yellow socks and bracelet for high fall risk patients  - Consider moving patient to room near nurses station  Outcome: Progressing

## 2021-07-20 NOTE — PATIENT INSTRUCTIONS
Continue taking allopurinol 200mg daily  Get bloodwork drawn within 7 days of day 1 infusion appointments  Call Dr Millicent Flores office for any new or worsening symptoms such as nausea, vomiting or diarrhea, constipation, signs of bleeding, fever of 100 4 or higher

## 2021-07-20 NOTE — PROGRESS NOTES
Patient here for day 1 cycle 1 rituxan and bendeka  Patient resting with no complaints  Patient's daughter confirmed patient did start taking allopurinol  Vitals stable  Labs within parameters for treatment  Per patient and her daughter patient has had issue with benadryl in the past causing chest pressure and chest pain requiring ED visit and are unsure about taking it today, spoke with Yann Kramer RN in med onc regarding benadryl premedication, per Dr Eddie Roach okay to proceed with treatment if patient does or does not take benadryl today  Patient and daughter decided to take it to help prevent any issues with chemotherapy infusion  Patient tolerated premeds well, no issue  Callbell within reach of patient  Will continue to monitor

## 2021-07-20 NOTE — PROGRESS NOTES
Patient tolerated premedications and treatment today  About half way through rituxan infusion patient had a few minutes of itchy scalp and ears, per patient and daughter this is not new or patient and she sees a dermatologist for this and usually takes an OTC allegra which resolves the itchiness  No hives notes on patients ears, head, chest, back or arms  Patient had no redness or flushing noted, otherwise felt okay during infusion, had drinks, snacks and played games on tablet  Reviewed when to have labs drawn, when to call Dr Salome Felty office, answered patient's questions and reviewed next appointment with patient and daughter  Patient aware to return tomorrow for day 2, given AVS  PIV left in for tomorrow's infusion per patient request, wrapped with gauze and coban  Patient d/c'd using roller walker with daughter

## 2021-07-20 NOTE — PROGRESS NOTES
XAVIERW met with new pt in the [facility] infusion center during pts 1st treatment  Pt was accompanied by her daughter, Haley Bhakta  LSW made introduction and offered support to pt and daughter  LSW provided contact information and educated pt on the role of a   Pt stated she is not in need of any assistance at this time and has her family for support and transportation needs  Pt and her daughter were very pleasant and thanked XAVIERW for the information

## 2021-07-21 ENCOUNTER — HOSPITAL ENCOUNTER (OUTPATIENT)
Dept: INFUSION CENTER | Facility: CLINIC | Age: 71
Discharge: HOME | End: 2021-07-21
Payer: COMMERCIAL

## 2021-07-21 VITALS
BODY MASS INDEX: 46.25 KG/M2 | SYSTOLIC BLOOD PRESSURE: 114 MMHG | RESPIRATION RATE: 18 BRPM | TEMPERATURE: 98.1 F | DIASTOLIC BLOOD PRESSURE: 72 MMHG | WEIGHT: 251.32 LBS | HEART RATE: 70 BPM | HEIGHT: 62 IN

## 2021-07-21 DIAGNOSIS — C82.31 GRADE 3A FOLLICULAR LYMPHOMA OF LYMPH NODES OF NECK (HCC): Primary | ICD-10-CM

## 2021-07-21 PROCEDURE — 96409 CHEMO IV PUSH SNGL DRUG: CPT

## 2021-07-21 PROCEDURE — 96367 TX/PROPH/DG ADDL SEQ IV INF: CPT

## 2021-07-21 RX ORDER — SODIUM CHLORIDE 9 MG/ML
20 INJECTION, SOLUTION INTRAVENOUS ONCE
Status: COMPLETED | OUTPATIENT
Start: 2021-07-21 | End: 2021-07-21

## 2021-07-21 RX ADMIN — BENDAMUSTINE HYDROCHLORIDE 150.5 MG: 25 INJECTION, SOLUTION INTRAVENOUS at 15:23

## 2021-07-21 RX ADMIN — SODIUM CHLORIDE 20 ML/HR: 0.9 INJECTION, SOLUTION INTRAVENOUS at 14:42

## 2021-07-21 RX ADMIN — DEXAMETHASONE SODIUM PHOSPHATE: 10 INJECTION, SOLUTION INTRAMUSCULAR; INTRAVENOUS at 14:43

## 2021-07-21 NOTE — PLAN OF CARE
Problem: Potential for Falls  Goal: Patient will remain free of falls  Description: INTERVENTIONS:  - Educate patient/family on patient safety including physical limitations  - Instruct patient to call for assistance with activity   - Consult OT/PT to assist with strengthening/mobility   - Keep Call bell within reach  - Keep bed low and locked with side rails adjusted as appropriate  - Keep care items and personal belongings within reach  - Initiate and maintain comfort rounds

## 2021-07-21 NOTE — PROGRESS NOTES
Pt resting with no complaints  Stated that she had no residual itching from yesterday's treatment, vitals stable, labs within parameters for treatment, call bell within reach  All questions answered and emotional support given  Will continue to monitor

## 2021-07-23 ENCOUNTER — TELEPHONE (OUTPATIENT)
Dept: HEMATOLOGY ONCOLOGY | Facility: CLINIC | Age: 71
End: 2021-07-23

## 2021-07-23 NOTE — TELEPHONE ENCOUNTER
Call from patient  Patient has not moved bowels in 4 days   Abdomen is soft and non distended and patient is passing gas  Patient will take dulcolax tabs tonight and call if no BM  Will report to ED if no BM, not passing gas and or firmness of abdomen  Patient verbalizes understanding    FYI to Dr Micheal Leyva RN

## 2021-07-23 NOTE — TELEPHONE ENCOUNTER
Patient is calling in to inform that she has been constipated for 4 days and not sure what she can do, she can be reached back [phone]

## 2021-07-26 ENCOUNTER — TELEPHONE (OUTPATIENT)
Dept: HEMATOLOGY ONCOLOGY | Facility: CLINIC | Age: 71
End: 2021-07-26

## 2021-07-26 ENCOUNTER — APPOINTMENT (OUTPATIENT)
Dept: LAB | Facility: CLINIC | Age: 71
End: 2021-07-26
Payer: COMMERCIAL

## 2021-07-26 DIAGNOSIS — C82.31 GRADE 3A FOLLICULAR LYMPHOMA OF LYMPH NODES OF NECK (HCC): ICD-10-CM

## 2021-07-26 DIAGNOSIS — C82.31 GRADE 3A FOLLICULAR LYMPHOMA OF LYMPH NODES OF NECK (HCC): Primary | ICD-10-CM

## 2021-07-26 LAB
BASOPHILS # BLD AUTO: 0.03 THOUSANDS/ΜL (ref 0–0.1)
BASOPHILS NFR BLD AUTO: 0 % (ref 0–1)
EOSINOPHIL # BLD AUTO: 0.57 THOUSAND/ΜL (ref 0–0.61)
EOSINOPHIL NFR BLD AUTO: 7 % (ref 0–6)
ERYTHROCYTE [DISTWIDTH] IN BLOOD BY AUTOMATED COUNT: 14 % (ref 11.6–15.1)
HCT VFR BLD AUTO: 39.9 % (ref 34.8–46.1)
HGB BLD-MCNC: 12.9 G/DL (ref 11.5–15.4)
IMM GRANULOCYTES # BLD AUTO: 0.05 THOUSAND/UL (ref 0–0.2)
IMM GRANULOCYTES NFR BLD AUTO: 1 % (ref 0–2)
LYMPHOCYTES # BLD AUTO: 0.36 THOUSANDS/ΜL (ref 0.6–4.47)
LYMPHOCYTES NFR BLD AUTO: 5 % (ref 14–44)
MCH RBC QN AUTO: 30.4 PG (ref 26.8–34.3)
MCHC RBC AUTO-ENTMCNC: 32.3 G/DL (ref 31.4–37.4)
MCV RBC AUTO: 94 FL (ref 82–98)
MONOCYTES # BLD AUTO: 1.49 THOUSAND/ΜL (ref 0.17–1.22)
MONOCYTES NFR BLD AUTO: 19 % (ref 4–12)
NEUTROPHILS # BLD AUTO: 5.37 THOUSANDS/ΜL (ref 1.85–7.62)
NEUTS SEG NFR BLD AUTO: 68 % (ref 43–75)
NRBC BLD AUTO-RTO: 0 /100 WBCS
PLATELET # BLD AUTO: 169 THOUSANDS/UL (ref 149–390)
PMV BLD AUTO: 10.5 FL (ref 8.9–12.7)
RBC # BLD AUTO: 4.25 MILLION/UL (ref 3.81–5.12)
WBC # BLD AUTO: 7.87 THOUSAND/UL (ref 4.31–10.16)

## 2021-07-26 PROCEDURE — 85025 COMPLETE CBC W/AUTO DIFF WBC: CPT

## 2021-07-26 PROCEDURE — 36415 COLL VENOUS BLD VENIPUNCTURE: CPT

## 2021-07-26 NOTE — TELEPHONE ENCOUNTER
Patient's  Jaxon Julian is calling from the lab  He stated that her MyChart stated that she needed to have a CBCD 1 week post her treatment  IM sent to verify to [facility] to enter CBCD order into Epic  Order entered into Epic  Jaxon Julian advised of above  He verbalized understanding of above  [id number]

## 2021-08-09 ENCOUNTER — TELEPHONE (OUTPATIENT)
Dept: FAMILY MEDICINE CLINIC | Facility: CLINIC | Age: 71
End: 2021-08-09

## 2021-08-09 ENCOUNTER — TELEPHONE (OUTPATIENT)
Dept: HEMATOLOGY ONCOLOGY | Facility: CLINIC | Age: 71
End: 2021-08-09

## 2021-08-09 NOTE — TELEPHONE ENCOUNTER
Patient called she thinks she has a bladder infection  She has pressure and slight burning   ----it started on Saturday   ---she would like something called in she does not want to come to office ---she has an appointment this week with oncology and would prefer not to come in   She prefers not to be around a lot of people at this time  ----[facility] [address]

## 2021-08-09 NOTE — TELEPHONE ENCOUNTER
Call from patient  Patient receiving bendamustine /rituxan for follicular lymphoma  Next treatment scheduled for 8/17    Patient c/o pressure when urinating and oliguria since Saturday  Patient is afebrile  Patient has difficulty reaching PCP  Patient will reach out to PCP and call us back without success  Patient has a pruritic rash on chest and back of arms after starting allopurinol  Patient has 2 doses left, patient instructed to stop allopurinol now  Patient asked if she may take meloxicam and tums  Patient has periodic bouts of arthritic pain and indigestion  Patient will inform orthopedic doctor if these meds are ineffective  Patient is ok to do so  Patient verbalized understanding      FYI to Dr Sameer Masters RN

## 2021-08-10 ENCOUNTER — OFFICE VISIT (OUTPATIENT)
Dept: HEMATOLOGY ONCOLOGY | Facility: CLINIC | Age: 71
End: 2021-08-10
Payer: COMMERCIAL

## 2021-08-10 VITALS
BODY MASS INDEX: 46.38 KG/M2 | HEIGHT: 62 IN | DIASTOLIC BLOOD PRESSURE: 70 MMHG | TEMPERATURE: 98.3 F | OXYGEN SATURATION: 98 % | RESPIRATION RATE: 18 BRPM | HEART RATE: 96 BPM | WEIGHT: 252 LBS | SYSTOLIC BLOOD PRESSURE: 126 MMHG

## 2021-08-10 DIAGNOSIS — C82.31 GRADE 3A FOLLICULAR LYMPHOMA OF LYMPH NODES OF NECK (HCC): Primary | ICD-10-CM

## 2021-08-10 PROCEDURE — 3008F BODY MASS INDEX DOCD: CPT | Performed by: FAMILY MEDICINE

## 2021-08-10 PROCEDURE — 99214 OFFICE O/P EST MOD 30 MIN: CPT | Performed by: INTERNAL MEDICINE

## 2021-08-10 RX ORDER — ACETAMINOPHEN 325 MG/1
650 TABLET ORAL ONCE
Status: CANCELLED | OUTPATIENT
Start: 2021-10-12

## 2021-08-10 RX ORDER — ACETAMINOPHEN 325 MG/1
650 TABLET ORAL ONCE
Status: CANCELLED | OUTPATIENT
Start: 2021-09-14

## 2021-08-10 RX ORDER — SODIUM CHLORIDE 9 MG/ML
20 INJECTION, SOLUTION INTRAVENOUS ONCE
Status: CANCELLED | OUTPATIENT
Start: 2021-09-14

## 2021-08-10 RX ORDER — SODIUM CHLORIDE 9 MG/ML
20 INJECTION, SOLUTION INTRAVENOUS ONCE
Status: CANCELLED | OUTPATIENT
Start: 2021-09-15

## 2021-08-10 RX ORDER — SODIUM CHLORIDE 9 MG/ML
20 INJECTION, SOLUTION INTRAVENOUS ONCE
Status: CANCELLED | OUTPATIENT
Start: 2021-10-13

## 2021-08-10 RX ORDER — SODIUM CHLORIDE 9 MG/ML
20 INJECTION, SOLUTION INTRAVENOUS ONCE
Status: CANCELLED | OUTPATIENT
Start: 2021-10-12

## 2021-08-10 NOTE — PROGRESS NOTES
Hematology / Oncology Outpatient Follow Up Note    Marilee Bamberger 66 y o  female YI OYC:[id number]         Date:  8/10/2021    Assessment / Plan:    A 41-year-old female with newly diagnosed follicular lymphoma, KPTMT 7U,  At least stage IIIA disease with diffuse hypermetabolic lymphadenopathy in the chest axillary, abdominal as well as pelvis  SUV based on PET-CT scan was up to 15  She is currently on systemic chemotherapy with bendamustine and rituximab with excellent tolerance  Based on physical examination, she already have very good partial response  I recommended her to continue with current regimen every 4 weeks  She may discontinue allopurinol  I will see her again in early 2021 prior to the 3rd cycle treatment  She is in agreement with my recommendations        Subjective:      HPI:    A 41-year-old female who had [address] vaccine in 2021  2 weeks after the 1st vaccine, she noticed the swelling in her left upper neck close to the ER  Jose Eduardo Joaquin brought this to medical attention   She had injection of vaccine in the left deltoid   She underwent CT scan of the neck which showed multiple enlarged level 1 and level 2 lymphadenopathy as well as swelling of the parotid gland   She was seen by Dr Cliff Nascimento who recommended excisional lymph node biopsy  Jose Eduardo Joaquin is scheduled to have this procedure in [place] 3, 2021   She presents today with her daughter and son-in-law to discuss further evaluation   She feels well  Jose Eduardo Joaquin has no fever, chills or no or night sweats   She has no recent weight loss   She denied any respiratory symptoms   She has hypertension as well as arthritis   She is status post left hip replacement last year  Hua Gray, she has mild ambulatory dysfunction   She quit smoking 40 years ago  Jose Eduardo Joaquin does not drink alcohol   She is in usual state of health   Her performance status is 0 to 1/4 on the ECOG scale            Interval History:  A 41-year-old female with left parotid gland enlargement as well as multiple lymphadenopathy of the neck    Therefore, she underwent left neck lymph node biopsy by Dr Arnulfo Hutchins which showed follicular lymphoma, grade 3A  She subsequent underwent PET-CT scan which showed diffuse hypermetabolic lymphadenopathy in the chest, axillary, abdominal and pelvis  SUV or is up to 15  She is currently on systemic chemotherapy with bendamustine and rituximab which she tolerated 1st cycle very well  She did not have infusion reaction with rituximab  She had some skin rash  She has no febrile episode  She has no respiratory symptoms  She has no complaint of pain  She is maintaining her weight  Her performance status is normal   She is scheduled to have 2nd cycle treatment next week        Objective:      Primary Diagnosis:        Follicular lymphoma, grade 3A   Diagnosed in June 2021      Cancer Staging:  Cancer Staging  No matching staging information was found for the patient         Previous Hematologic/ Oncologic Treatment:            Current Hematologic/ Oncologic Treatment:         Bendamustine and rituximab x6 cycles  2nd cycle to be given in August 17, 2021      Disease Status:         Clinical good partial response      Test Results:     Pathology:       left neck lymph node biopsy showed follicular lymphoma, grade 3A      Radiology:       CT scan showed multiple enlarged level 1 and level 2 lymphadenopathy as well as left parotid gland enlargement        PET-CT scan in June 2021 showed diffuse hypermetabolic lymphadenopathy in the chest, abdomen and pelvis with SUV up to 15      Laboratory:       See below      Physical Exam:        General Appearance:    Alert, oriented          Eyes:    PERRL   Ears:    Normal external ear canals, both ears       Nose:   Nares normal, septum midline   Throat:   Mucosa moist  Pharynx without injection      Neck:   Supple         Lungs:     Clear to auscultation bilaterally   Chest Wall:    No tenderness or deformity  Heart:    Regular rate and rhythm         Abdomen:     Soft, non-tender, bowel sounds +, no organomegaly               Extremities:   Extremities no cyanosis or edema         Skin:   no rash or icterus  Lymph nodes:   previous bilateral neck adenopathy is no longer palpable  No other palpable superficial adenopathy  Neurologic:   CNII-XII intact, normal strength, sensation and reflexes     Throughout             Breast exam:   NA           ROS: Review of Systems   All other systems reviewed and are negative  Imaging: No results found  Labs:   Lab Results   Component Value Date    WBC 7 87 07/26/2021    HGB 12 9 07/26/2021    HCT 39 9 07/26/2021    MCV 94 07/26/2021     07/26/2021     Lab Results   Component Value Date    K 4 9 07/15/2021     07/15/2021    CO2 29 07/15/2021    BUN 29 (H) 07/15/2021    CREATININE 1 28 07/15/2021    GLUF 101 (H) 06/23/2021    CALCIUM 10 2 (H) 07/15/2021    CORRECTEDCA 10 3 (H) 06/27/2020    AST 24 07/15/2021    ALT 39 07/15/2021    ALKPHOS 102 07/15/2021    EGFR 42 07/15/2021         Lab Results   Component Value Date     (H) 06/23/2021           Current Medications: Reviewed  Allergies: Reviewed  PMH/FH/SH:  Reviewed      Vital Sign:    Body surface area is 2 11 meters squared      Wt Readings from Last 3 Encounters:   08/10/21 114 kg (252 lb)   07/21/21 114 kg (251 lb 5 2 oz)   07/20/21 114 kg (252 lb)        Temp Readings from Last 3 Encounters:   08/10/21 98 3 °F (36 8 °C) (Tympanic Core)   07/21/21 98 1 °F (36 7 °C) (Temporal)   07/20/21 97 6 °F (36 4 °C) (Temporal)        BP Readings from Last 3 Encounters:   08/10/21 126/70   07/21/21 114/72   07/20/21 114/72         Pulse Readings from Last 3 Encounters:   08/10/21 96   07/21/21 70   07/20/21 70     @LASTSAO2(3)@

## 2021-08-11 ENCOUNTER — TELEPHONE (OUTPATIENT)
Dept: HEMATOLOGY ONCOLOGY | Facility: CLINIC | Age: 71
End: 2021-08-11

## 2021-08-11 DIAGNOSIS — C82.31 GRADE 3A FOLLICULAR LYMPHOMA OF LYMPH NODES OF NECK (HCC): Primary | ICD-10-CM

## 2021-08-11 NOTE — TELEPHONE ENCOUNTER
Patient called stating that she would be going for labs on Friday  Entered CBCD and CMP in case the lab would not take standing 8/17/21 orders  Patient stated that she did not want to have an issue at the lab  RN-FYI

## 2021-08-13 ENCOUNTER — TELEPHONE (OUTPATIENT)
Dept: INFUSION CENTER | Facility: CLINIC | Age: 71
End: 2021-08-13

## 2021-08-13 ENCOUNTER — APPOINTMENT (OUTPATIENT)
Dept: LAB | Facility: CLINIC | Age: 71
End: 2021-08-13
Payer: COMMERCIAL

## 2021-08-13 DIAGNOSIS — C82.31 GRADE 3A FOLLICULAR LYMPHOMA OF LYMPH NODES OF NECK (HCC): ICD-10-CM

## 2021-08-13 LAB
ALBUMIN SERPL BCP-MCNC: 3.3 G/DL (ref 3.5–5)
ALP SERPL-CCNC: 87 U/L (ref 46–116)
ALT SERPL W P-5'-P-CCNC: 42 U/L (ref 12–78)
ANION GAP SERPL CALCULATED.3IONS-SCNC: 9 MMOL/L (ref 4–13)
AST SERPL W P-5'-P-CCNC: 31 U/L (ref 5–45)
BASOPHILS # BLD AUTO: 0.12 THOUSANDS/ΜL (ref 0–0.1)
BASOPHILS NFR BLD AUTO: 1 % (ref 0–1)
BILIRUB SERPL-MCNC: 0.53 MG/DL (ref 0.2–1)
BUN SERPL-MCNC: 22 MG/DL (ref 5–25)
CALCIUM ALBUM COR SERPL-MCNC: 10.3 MG/DL (ref 8.3–10.1)
CALCIUM SERPL-MCNC: 9.7 MG/DL (ref 8.3–10.1)
CHLORIDE SERPL-SCNC: 103 MMOL/L (ref 100–108)
CO2 SERPL-SCNC: 26 MMOL/L (ref 21–32)
CREAT SERPL-MCNC: 1.28 MG/DL (ref 0.6–1.3)
EOSINOPHIL # BLD AUTO: 0.71 THOUSAND/ΜL (ref 0–0.61)
EOSINOPHIL NFR BLD AUTO: 8 % (ref 0–6)
ERYTHROCYTE [DISTWIDTH] IN BLOOD BY AUTOMATED COUNT: 14.3 % (ref 11.6–15.1)
GFR SERPL CREATININE-BSD FRML MDRD: 42 ML/MIN/1.73SQ M
GLUCOSE SERPL-MCNC: 117 MG/DL (ref 65–140)
HCT VFR BLD AUTO: 38.8 % (ref 34.8–46.1)
HGB BLD-MCNC: 12.6 G/DL (ref 11.5–15.4)
IMM GRANULOCYTES # BLD AUTO: 0.04 THOUSAND/UL (ref 0–0.2)
IMM GRANULOCYTES NFR BLD AUTO: 0 % (ref 0–2)
LYMPHOCYTES # BLD AUTO: 0.5 THOUSANDS/ΜL (ref 0.6–4.47)
LYMPHOCYTES NFR BLD AUTO: 6 % (ref 14–44)
MCH RBC QN AUTO: 30.9 PG (ref 26.8–34.3)
MCHC RBC AUTO-ENTMCNC: 32.5 G/DL (ref 31.4–37.4)
MCV RBC AUTO: 95 FL (ref 82–98)
MONOCYTES # BLD AUTO: 2.08 THOUSAND/ΜL (ref 0.17–1.22)
MONOCYTES NFR BLD AUTO: 23 % (ref 4–12)
NEUTROPHILS # BLD AUTO: 5.53 THOUSANDS/ΜL (ref 1.85–7.62)
NEUTS SEG NFR BLD AUTO: 62 % (ref 43–75)
NRBC BLD AUTO-RTO: 0 /100 WBCS
PLATELET # BLD AUTO: 207 THOUSANDS/UL (ref 149–390)
PMV BLD AUTO: 10.5 FL (ref 8.9–12.7)
POTASSIUM SERPL-SCNC: 4.4 MMOL/L (ref 3.5–5.3)
PROT SERPL-MCNC: 7.4 G/DL (ref 6.4–8.2)
RBC # BLD AUTO: 4.08 MILLION/UL (ref 3.81–5.12)
SODIUM SERPL-SCNC: 138 MMOL/L (ref 136–145)
WBC # BLD AUTO: 8.98 THOUSAND/UL (ref 4.31–10.16)

## 2021-08-13 PROCEDURE — 36415 COLL VENOUS BLD VENIPUNCTURE: CPT

## 2021-08-13 PROCEDURE — 85025 COMPLETE CBC W/AUTO DIFF WBC: CPT

## 2021-08-13 PROCEDURE — 80053 COMPREHEN METABOLIC PANEL: CPT

## 2021-08-13 NOTE — TELEPHONE ENCOUNTER
Patient contacted and notified of updated visitor policy now in effect  No visitors in the waiting area or in the treatments areas of the infusion center unless accommodations are approved by management for individuals with  special needs

## 2021-08-17 ENCOUNTER — HOSPITAL ENCOUNTER (OUTPATIENT)
Dept: INFUSION CENTER | Facility: CLINIC | Age: 71
Discharge: HOME | End: 2021-08-17
Payer: COMMERCIAL

## 2021-08-17 VITALS
RESPIRATION RATE: 18 BRPM | BODY MASS INDEX: 45.27 KG/M2 | TEMPERATURE: 97.9 F | SYSTOLIC BLOOD PRESSURE: 124 MMHG | DIASTOLIC BLOOD PRESSURE: 60 MMHG | WEIGHT: 246 LBS | HEIGHT: 62 IN | HEART RATE: 70 BPM | OXYGEN SATURATION: 99 %

## 2021-08-17 DIAGNOSIS — C82.31 GRADE 3A FOLLICULAR LYMPHOMA OF LYMPH NODES OF NECK (HCC): Primary | ICD-10-CM

## 2021-08-17 PROCEDURE — 96367 TX/PROPH/DG ADDL SEQ IV INF: CPT

## 2021-08-17 PROCEDURE — 96415 CHEMO IV INFUSION ADDL HR: CPT

## 2021-08-17 PROCEDURE — 96413 CHEMO IV INFUSION 1 HR: CPT

## 2021-08-17 PROCEDURE — 96411 CHEMO IV PUSH ADDL DRUG: CPT

## 2021-08-17 RX ORDER — SODIUM CHLORIDE 9 MG/ML
20 INJECTION, SOLUTION INTRAVENOUS ONCE
Status: COMPLETED | OUTPATIENT
Start: 2021-08-17 | End: 2021-08-17

## 2021-08-17 RX ORDER — ACETAMINOPHEN 325 MG/1
650 TABLET ORAL ONCE
Status: COMPLETED | OUTPATIENT
Start: 2021-08-17 | End: 2021-08-17

## 2021-08-17 RX ADMIN — RITUXIMAB 800 MG: 10 INJECTION, SOLUTION INTRAVENOUS at 09:37

## 2021-08-17 RX ADMIN — DEXAMETHASONE SODIUM PHOSPHATE: 10 INJECTION, SOLUTION INTRAMUSCULAR; INTRAVENOUS at 08:11

## 2021-08-17 RX ADMIN — SODIUM CHLORIDE 20 ML/HR: 0.9 INJECTION, SOLUTION INTRAVENOUS at 07:54

## 2021-08-17 RX ADMIN — BENDAMUSTINE HYDROCHLORIDE 150.5 MG: 25 INJECTION, SOLUTION INTRAVENOUS at 12:32

## 2021-08-17 RX ADMIN — DIPHENHYDRAMINE HYDROCHLORIDE 25 MG: 50 INJECTION, SOLUTION INTRAMUSCULAR; INTRAVENOUS at 08:33

## 2021-08-17 RX ADMIN — ACETAMINOPHEN 650 MG: 325 TABLET, FILM COATED ORAL at 08:12

## 2021-08-17 NOTE — PROGRESS NOTES
Patient tolerated treatment well today without issue  PIV flushed, excellent blood return noted, flushed well  Gauze and coband dressing in place to PIV site, PIV left in place to LEFT flower forearm for treatment tomorrow  Patient offers no comlpaints at this time  AVS printed and provided to patient  Patient AAox4 and ambulatory upon DC with walker

## 2021-08-17 NOTE — PROGRESS NOTES
Patient arrives to infusion center for D1 C2 Rituxan and Bendeka  Patient reports arthritis pain r/t the weather  Patient reports she recently finished antibiotics for a bladder infection, reports resolution of symptoms  Labs reviewed from 8/13/21, within parameters for treatment today  Patient reports her allopurinol was recently discontinued related to rash - patient reports rash cleared up in about 5 days upon stopping that medication  No rash noted  PIV obtained in x2 sticks  Patient will leave PIV in place for treatment tomorrow  Patient resting on recliner chair, call bell within reach

## 2021-08-18 ENCOUNTER — HOSPITAL ENCOUNTER (OUTPATIENT)
Dept: INFUSION CENTER | Facility: CLINIC | Age: 71
Discharge: HOME | End: 2021-08-18
Payer: COMMERCIAL

## 2021-08-18 VITALS
BODY MASS INDEX: 46.1 KG/M2 | RESPIRATION RATE: 18 BRPM | HEIGHT: 62 IN | SYSTOLIC BLOOD PRESSURE: 98 MMHG | WEIGHT: 250.5 LBS | OXYGEN SATURATION: 99 % | HEART RATE: 68 BPM | DIASTOLIC BLOOD PRESSURE: 58 MMHG | TEMPERATURE: 98.1 F

## 2021-08-18 DIAGNOSIS — C82.31 GRADE 3A FOLLICULAR LYMPHOMA OF LYMPH NODES OF NECK (HCC): Primary | ICD-10-CM

## 2021-08-18 PROCEDURE — 96409 CHEMO IV PUSH SNGL DRUG: CPT

## 2021-08-18 PROCEDURE — 96367 TX/PROPH/DG ADDL SEQ IV INF: CPT

## 2021-08-18 RX ORDER — SODIUM CHLORIDE 9 MG/ML
20 INJECTION, SOLUTION INTRAVENOUS ONCE
Status: COMPLETED | OUTPATIENT
Start: 2021-08-18 | End: 2021-08-18

## 2021-08-18 RX ADMIN — BENDAMUSTINE HYDROCHLORIDE 150.5 MG: 25 INJECTION, SOLUTION INTRAVENOUS at 14:58

## 2021-08-18 RX ADMIN — SODIUM CHLORIDE 20 ML/HR: 0.9 INJECTION, SOLUTION INTRAVENOUS at 14:19

## 2021-08-18 RX ADMIN — DEXAMETHASONE SODIUM PHOSPHATE: 10 INJECTION, SOLUTION INTRAMUSCULAR; INTRAVENOUS at 14:17

## 2021-08-18 NOTE — PROGRESS NOTES
Patient tolerated Bendeka infusion without incident  Peripheral IV removed  Patient's next appointment confirmed  AVS offered and declined

## 2021-08-18 NOTE — PROGRESS NOTES
Patient presents today for day two of Bendeka  Patient offers no complaints  VSS  Peripheral IV left in place from yesterday intact, flushes with ease, good blood return noted  No lab parameters ordered on this treatment plan

## 2021-08-19 ENCOUNTER — TELEPHONE (OUTPATIENT)
Dept: HEMATOLOGY ONCOLOGY | Facility: CLINIC | Age: 71
End: 2021-08-19

## 2021-08-19 NOTE — TELEPHONE ENCOUNTER
Patient is in treatment for lymphoma and will receive covid vaccine booster    FYI to Dr Rolando Bentley RN

## 2021-08-23 ENCOUNTER — TELEPHONE (OUTPATIENT)
Dept: HEMATOLOGY ONCOLOGY | Facility: CLINIC | Age: 71
End: 2021-08-23

## 2021-08-23 NOTE — TELEPHONE ENCOUNTER
Patient had treatment on 8/18/21  Patient is complaining that on Saturday she noted a firm lump at previous left lower forearm IV site  Area is pink and firm and painful to touch  Denies hot to touch, discharge, or open area at site  Denies fevers  Advised to do warm compresses to site 4 times a day for 15-20 minutes  Can use Tylenol for discomfort PRN  Will forward to Dr Kathryn Duran RN for review with MD   ? Possible need for antibiotics      Tanya Alvarez (Self) [phone] (H)

## 2021-08-23 NOTE — TELEPHONE ENCOUNTER
Patient advised of above  Patient advised to call on Wednesday with an update or sooner if symptoms worsen  Patient verbalized understanding of above  RN-FYI

## 2021-08-26 NOTE — TELEPHONE ENCOUNTER
Patient advised of above  Patient verbalized understanding of above  She will call back on Monday with an update  She will keep doing warm soaks  She will call if symptoms worsen  RN-FRI

## 2021-08-26 NOTE — TELEPHONE ENCOUNTER
Patient called with an update  Symptoms still persist with only mild improvement  Area is still pink, firm and tender to touch  Denies fevers  Patient stated that she really does not want to go on antibiotics      Will forward to Dr Jasper Aase RN to review with Ramesh STEEN Masters (Self) [phone] (H)

## 2021-08-30 NOTE — TELEPHONE ENCOUNTER
Patient called to report that there really is no improvement in site  Vein is rope like  Reports that site is not as tender or swollen as it was but has not resolved  Scab still noted at old IV insertion site  Denies drainage  + mild fatigue, poor appetite  Denies fevers  8/18/21 last treatment      Will forward to Dr Deion Lambert RN to review with MD Milan Callahan (Self) [phone] (H)

## 2021-08-30 NOTE — TELEPHONE ENCOUNTER
Patient advised of above  Patient verbalized understanding of above  Patient declined to have a port placed  RN-NORAH

## 2021-08-30 NOTE — TELEPHONE ENCOUNTER
Per Dr Christelle Garber: "Nothing need to be done  This is probably the vein damage from inside the vein   Would she consider port placement? "    If she is willing to accept port placement, I will ask our schedulers to set this up

## 2021-09-08 ENCOUNTER — OFFICE VISIT (OUTPATIENT)
Dept: HEMATOLOGY ONCOLOGY | Facility: CLINIC | Age: 71
End: 2021-09-08
Payer: COMMERCIAL

## 2021-09-08 VITALS
BODY MASS INDEX: 47.11 KG/M2 | HEART RATE: 75 BPM | HEIGHT: 62 IN | SYSTOLIC BLOOD PRESSURE: 134 MMHG | OXYGEN SATURATION: 95 % | TEMPERATURE: 97.4 F | DIASTOLIC BLOOD PRESSURE: 76 MMHG | WEIGHT: 256 LBS | RESPIRATION RATE: 18 BRPM

## 2021-09-08 DIAGNOSIS — C82.31 GRADE 3A FOLLICULAR LYMPHOMA OF LYMPH NODES OF NECK (HCC): Primary | ICD-10-CM

## 2021-09-08 PROCEDURE — 99214 OFFICE O/P EST MOD 30 MIN: CPT | Performed by: INTERNAL MEDICINE

## 2021-09-08 PROCEDURE — 3008F BODY MASS INDEX DOCD: CPT | Performed by: INTERNAL MEDICINE

## 2021-09-08 PROCEDURE — 3075F SYST BP GE 130 - 139MM HG: CPT | Performed by: INTERNAL MEDICINE

## 2021-09-08 PROCEDURE — 3078F DIAST BP <80 MM HG: CPT | Performed by: INTERNAL MEDICINE

## 2021-09-08 PROCEDURE — 1160F RVW MEDS BY RX/DR IN RCRD: CPT | Performed by: INTERNAL MEDICINE

## 2021-09-08 RX ORDER — ACETAMINOPHEN 325 MG/1
650 TABLET ORAL ONCE
Status: CANCELLED | OUTPATIENT
Start: 2021-11-09

## 2021-09-08 RX ORDER — SODIUM CHLORIDE 9 MG/ML
20 INJECTION, SOLUTION INTRAVENOUS ONCE
Status: CANCELLED | OUTPATIENT
Start: 2021-11-10

## 2021-09-08 RX ORDER — SODIUM CHLORIDE 9 MG/ML
20 INJECTION, SOLUTION INTRAVENOUS ONCE
Status: CANCELLED | OUTPATIENT
Start: 2021-11-09

## 2021-09-08 NOTE — PROGRESS NOTES
Hematology / Oncology Outpatient Follow Up Note    Agusto Simmons 66 y o  female KSW: [facility]         Date:  2021    Assessment / Plan:    A 77-year-old female with newly diagnosed follicular lymphoma, IHPOL 0X, at least stage IIIA disease with diffuse hypermetabolic lymphadenopathy in the chest axillary, abdominal as well as pelvis   SUV based on PET-CT scan was up to 15  She is currently on systemic chemotherapy with bendamustine and rituximab with excellent tolerance  Based on physical examination, she already have very good partial response  Assuming that she has adequate ANC, she is going to have 3rd cycle treatment next week  I recommended her to continue with current regimen every 4 weeks  I will see her again in 4 weeks at which time I am going to set up PET-CT scan for disease evaluation  She is in agreement with my recommendation        Subjective:      HPI:    A 77-year-old female who had [address] vaccine in 2021  2 weeks after the 1st vaccine, she noticed the swelling in her left upper neck close to the ER  Andriy Suarez brought this to medical attention   She had injection of vaccine in the left deltoid   She underwent CT scan of the neck which showed multiple enlarged level 1 and level 2 lymphadenopathy as well as swelling of the parotid gland   She was seen by Dr Elina Perez who recommended excisional lymph node biopsy  Andriy Suarez is scheduled to have this procedure in [place] 3, 2021   She presents today with her daughter and son-in-law to discuss further evaluation   She feels well  Andriy Suarez has no fever, chills or no or night sweats   She has no recent weight loss   She denied any respiratory symptoms   She has hypertension as well as arthritis   She is status post left hip replacement last year  Jude Heart, she has mild ambulatory dysfunction   She quit smoking 40 years ago  Andriy Suarez does not drink alcohol   She is in usual state of health   Her performance status is 0 to 1/4 on the ECOG scale            Interval History:  A 42-year-old female with left parotid gland enlargement as well as multiple lymphadenopathy of the neck    Therefore, she underwent left neck lymph node biopsy by Dr Miroslava Canada which showed follicular lymphoma, grade 3A   She subsequent underwent PET-CT scan which showed diffuse hypermetabolic lymphadenopathy in the chest, axillary, abdominal and pelvis   SUV or is up to 15  She is currently on systemic chemotherapy with bendamustine and rituximab which she is tolerating very well  She is going to have 3rd cycle of treatment next week  She has no febrile episode  She denied any pain  She has very minimal exertional shortness of breath  She already have clinical good response  Her weight is stable  However, she has mild-to-moderate fatigue  Her performance status is 0 to 1/4 on the ECOG scale       Objective:      Primary Diagnosis:        Follicular lymphoma, grade 3A   Diagnosed in June 2021      Cancer Staging:  Cancer Staging  No matching staging information was found for the patient         Previous Hematologic/ Oncologic Treatment:            Current Hematologic/ Oncologic Treatment:         Bendamustine and rituximab x6 cycles  3rd cycle to be given in  September 14, 2021      Disease Status:         Clinical good partial response      Test Results:     Pathology:       left neck lymph node biopsy showed follicular lymphoma, grade 3A      Radiology:       CT scan showed multiple enlarged level 1 and level 2 lymphadenopathy as well as left parotid gland enlargement        PET-CT scan in June 2021 showed diffuse hypermetabolic lymphadenopathy in the chest, abdomen and pelvis with SUV up to 15      Laboratory:       See below      Physical Exam:        General Appearance:    Alert, oriented          Eyes:    PERRL   Ears:    Normal external ear canals, both ears       Nose:   Nares normal, septum midline   Throat:   Mucosa moist  Pharynx without injection  Neck:   Supple         Lungs:     Clear to auscultation bilaterally   Chest Wall:    No tenderness or deformity    Heart:    Regular rate and rhythm         Abdomen:     Soft, non-tender, bowel sounds +, no organomegaly               Extremities:   Extremities no cyanosis or edema         Skin:   no rash or icterus  Lymph nodes:   previous bilateral neck adenopathy is no longer palpable   No other palpable superficial adenopathy  Neurologic:   CNII-XII intact, normal strength, sensation and reflexes     Throughout             Breast exam:   NA           ROS: Review of Systems   All other systems reviewed and are negative  Imaging: No results found  Labs:   Lab Results   Component Value Date    WBC 8 98 08/13/2021    HGB 12 6 08/13/2021    HCT 38 8 08/13/2021    MCV 95 08/13/2021     08/13/2021     Lab Results   Component Value Date    K 4 4 08/13/2021     08/13/2021    CO2 26 08/13/2021    BUN 22 08/13/2021    CREATININE 1 28 08/13/2021    GLUF 101 (H) 06/23/2021    CALCIUM 9 7 08/13/2021    CORRECTEDCA 10 3 (H) 08/13/2021    AST 31 08/13/2021    ALT 42 08/13/2021    ALKPHOS 87 08/13/2021    EGFR 42 08/13/2021         Lab Results   Component Value Date     (H) 06/23/2021           Current Medications: Reviewed  Allergies: Reviewed  PMH/FH/SH:  Reviewed      Vital Sign:    Body surface area is 2 12 meters squared      Wt Readings from Last 3 Encounters:   09/08/21 116 kg (256 lb)   08/18/21 114 kg (250 lb 8 oz)   08/17/21 112 kg (246 lb)        Temp Readings from Last 3 Encounters:   09/08/21 (!) 97 4 °F (36 3 °C) (Tympanic Core)   08/18/21 98 1 °F (36 7 °C) (Temporal)   08/17/21 97 9 °F (36 6 °C) (Temporal)        BP Readings from Last 3 Encounters:   09/08/21 134/76   08/18/21 98/58   08/17/21 124/60         Pulse Readings from Last 3 Encounters:   09/08/21 75   08/18/21 68   08/17/21 70     @LASTSAO2(3)@

## 2021-09-13 ENCOUNTER — APPOINTMENT (OUTPATIENT)
Dept: LAB | Facility: CLINIC | Age: 71
End: 2021-09-13
Payer: COMMERCIAL

## 2021-09-13 DIAGNOSIS — C82.31 GRADE 3A FOLLICULAR LYMPHOMA OF LYMPH NODES OF NECK (HCC): ICD-10-CM

## 2021-09-13 LAB
ALBUMIN SERPL BCP-MCNC: 3.4 G/DL (ref 3.5–5)
ALP SERPL-CCNC: 103 U/L (ref 46–116)
ALT SERPL W P-5'-P-CCNC: 50 U/L (ref 12–78)
ANION GAP SERPL CALCULATED.3IONS-SCNC: 6 MMOL/L (ref 4–13)
AST SERPL W P-5'-P-CCNC: 31 U/L (ref 5–45)
BASOPHILS # BLD AUTO: 0.09 THOUSANDS/ΜL (ref 0–0.1)
BASOPHILS NFR BLD AUTO: 1 % (ref 0–1)
BILIRUB SERPL-MCNC: 0.53 MG/DL (ref 0.2–1)
BUN SERPL-MCNC: 22 MG/DL (ref 5–25)
CALCIUM ALBUM COR SERPL-MCNC: 10.7 MG/DL (ref 8.3–10.1)
CALCIUM SERPL-MCNC: 10.2 MG/DL (ref 8.3–10.1)
CHLORIDE SERPL-SCNC: 104 MMOL/L (ref 100–108)
CO2 SERPL-SCNC: 28 MMOL/L (ref 21–32)
CREAT SERPL-MCNC: 1.26 MG/DL (ref 0.6–1.3)
EOSINOPHIL # BLD AUTO: 0.5 THOUSAND/ΜL (ref 0–0.61)
EOSINOPHIL NFR BLD AUTO: 8 % (ref 0–6)
ERYTHROCYTE [DISTWIDTH] IN BLOOD BY AUTOMATED COUNT: 14.6 % (ref 11.6–15.1)
GFR SERPL CREATININE-BSD FRML MDRD: 42 ML/MIN/1.73SQ M
GLUCOSE SERPL-MCNC: 127 MG/DL (ref 65–140)
HCT VFR BLD AUTO: 38.5 % (ref 34.8–46.1)
HGB BLD-MCNC: 12.3 G/DL (ref 11.5–15.4)
IMM GRANULOCYTES # BLD AUTO: 0.04 THOUSAND/UL (ref 0–0.2)
IMM GRANULOCYTES NFR BLD AUTO: 1 % (ref 0–2)
LYMPHOCYTES # BLD AUTO: 1.11 THOUSANDS/ΜL (ref 0.6–4.47)
LYMPHOCYTES NFR BLD AUTO: 17 % (ref 14–44)
MCH RBC QN AUTO: 30.6 PG (ref 26.8–34.3)
MCHC RBC AUTO-ENTMCNC: 31.9 G/DL (ref 31.4–37.4)
MCV RBC AUTO: 96 FL (ref 82–98)
MONOCYTES # BLD AUTO: 1.48 THOUSAND/ΜL (ref 0.17–1.22)
MONOCYTES NFR BLD AUTO: 22 % (ref 4–12)
NEUTROPHILS # BLD AUTO: 3.42 THOUSANDS/ΜL (ref 1.85–7.62)
NEUTS SEG NFR BLD AUTO: 51 % (ref 43–75)
NRBC BLD AUTO-RTO: 0 /100 WBCS
PLATELET # BLD AUTO: 192 THOUSANDS/UL (ref 149–390)
PMV BLD AUTO: 9.7 FL (ref 8.9–12.7)
POTASSIUM SERPL-SCNC: 4.2 MMOL/L (ref 3.5–5.3)
PROT SERPL-MCNC: 7.3 G/DL (ref 6.4–8.2)
RBC # BLD AUTO: 4.02 MILLION/UL (ref 3.81–5.12)
SODIUM SERPL-SCNC: 138 MMOL/L (ref 136–145)
WBC # BLD AUTO: 6.64 THOUSAND/UL (ref 4.31–10.16)

## 2021-09-13 PROCEDURE — 36415 COLL VENOUS BLD VENIPUNCTURE: CPT

## 2021-09-13 PROCEDURE — 85025 COMPLETE CBC W/AUTO DIFF WBC: CPT

## 2021-09-13 PROCEDURE — 80053 COMPREHEN METABOLIC PANEL: CPT

## 2021-09-14 ENCOUNTER — HOSPITAL ENCOUNTER (OUTPATIENT)
Dept: INFUSION CENTER | Facility: CLINIC | Age: 71
Discharge: HOME | End: 2021-09-14
Payer: COMMERCIAL

## 2021-09-14 DIAGNOSIS — C82.31 GRADE 3A FOLLICULAR LYMPHOMA OF LYMPH NODES OF NECK (HCC): Primary | ICD-10-CM

## 2021-09-14 PROCEDURE — 96413 CHEMO IV INFUSION 1 HR: CPT

## 2021-09-14 PROCEDURE — 96367 TX/PROPH/DG ADDL SEQ IV INF: CPT

## 2021-09-14 PROCEDURE — 96411 CHEMO IV PUSH ADDL DRUG: CPT

## 2021-09-14 PROCEDURE — 96415 CHEMO IV INFUSION ADDL HR: CPT

## 2021-09-14 RX ORDER — ACETAMINOPHEN 325 MG/1
650 TABLET ORAL ONCE
Status: COMPLETED | OUTPATIENT
Start: 2021-09-14 | End: 2021-09-14

## 2021-09-14 RX ORDER — SODIUM CHLORIDE 9 MG/ML
20 INJECTION, SOLUTION INTRAVENOUS ONCE
Status: COMPLETED | OUTPATIENT
Start: 2021-09-14 | End: 2021-09-14

## 2021-09-14 RX ADMIN — DEXAMETHASONE SODIUM PHOSPHATE: 10 INJECTION, SOLUTION INTRAMUSCULAR; INTRAVENOUS at 08:14

## 2021-09-14 RX ADMIN — SODIUM CHLORIDE 20 ML/HR: 0.9 INJECTION, SOLUTION INTRAVENOUS at 08:14

## 2021-09-14 RX ADMIN — ACETAMINOPHEN 650 MG: 325 TABLET, FILM COATED ORAL at 08:34

## 2021-09-14 RX ADMIN — DIPHENHYDRAMINE HYDROCHLORIDE 25 MG: 50 INJECTION, SOLUTION INTRAMUSCULAR; INTRAVENOUS at 08:34

## 2021-09-14 RX ADMIN — BENDAMUSTINE HYDROCHLORIDE 150.5 MG: 25 INJECTION, SOLUTION INTRAVENOUS at 12:15

## 2021-09-14 RX ADMIN — RITUXIMAB 800 MG: 10 INJECTION, SOLUTION INTRAVENOUS at 09:17

## 2021-09-14 NOTE — PROGRESS NOTES
Pt tolerated treatment well  Pt requested to keep saline lock in place for tomorrow's treatment  SL secured  Aware of next appt  AVS declined

## 2021-09-14 NOTE — PROGRESS NOTES
Pt to clinic for rituxan and bendeka infusion, pt offers no complaints at this time, will continue to monitor

## 2021-09-15 ENCOUNTER — HOSPITAL ENCOUNTER (OUTPATIENT)
Dept: INFUSION CENTER | Facility: CLINIC | Age: 71
Discharge: HOME | End: 2021-09-15
Payer: COMMERCIAL

## 2021-09-15 VITALS
BODY MASS INDEX: 45.54 KG/M2 | RESPIRATION RATE: 18 BRPM | HEIGHT: 62 IN | DIASTOLIC BLOOD PRESSURE: 58 MMHG | SYSTOLIC BLOOD PRESSURE: 124 MMHG | WEIGHT: 247.5 LBS | OXYGEN SATURATION: 100 % | TEMPERATURE: 97.7 F | HEART RATE: 76 BPM

## 2021-09-15 VITALS
RESPIRATION RATE: 18 BRPM | HEIGHT: 62 IN | HEART RATE: 83 BPM | TEMPERATURE: 97.4 F | WEIGHT: 244 LBS | OXYGEN SATURATION: 97 % | BODY MASS INDEX: 44.9 KG/M2 | DIASTOLIC BLOOD PRESSURE: 64 MMHG | SYSTOLIC BLOOD PRESSURE: 118 MMHG

## 2021-09-15 DIAGNOSIS — C82.31 GRADE 3A FOLLICULAR LYMPHOMA OF LYMPH NODES OF NECK (HCC): Primary | ICD-10-CM

## 2021-09-15 PROCEDURE — 96413 CHEMO IV INFUSION 1 HR: CPT

## 2021-09-15 PROCEDURE — 96367 TX/PROPH/DG ADDL SEQ IV INF: CPT

## 2021-09-15 RX ORDER — SODIUM CHLORIDE 9 MG/ML
20 INJECTION, SOLUTION INTRAVENOUS ONCE
Status: COMPLETED | OUTPATIENT
Start: 2021-09-15 | End: 2021-09-15

## 2021-09-15 RX ADMIN — BENDAMUSTINE HYDROCHLORIDE 150.5 MG: 25 INJECTION, SOLUTION INTRAVENOUS at 14:49

## 2021-09-15 RX ADMIN — DEXAMETHASONE SODIUM PHOSPHATE: 10 INJECTION, SOLUTION INTRAMUSCULAR; INTRAVENOUS at 14:12

## 2021-09-15 RX ADMIN — SODIUM CHLORIDE 20 ML/HR: 0.9 INJECTION, SOLUTION INTRAVENOUS at 14:12

## 2021-09-24 ENCOUNTER — TELEPHONE (OUTPATIENT)
Dept: HEMATOLOGY ONCOLOGY | Facility: CLINIC | Age: 71
End: 2021-09-24

## 2021-09-24 NOTE — TELEPHONE ENCOUNTER
Spoke with patient   Explained that per Dr Jing Mancia patient is ok to get the COVID booster and flu vaccine    No specific time recommendation given by Dr Jing Mancia  Patient verbalized understanding

## 2021-09-24 NOTE — TELEPHONE ENCOUNTER
Patient calling to see if she is able to have the covid booster shot and when to get her flu shot in correlation with her chemo   She can be reached at [phone]

## 2021-10-06 ENCOUNTER — OFFICE VISIT (OUTPATIENT)
Dept: HEMATOLOGY ONCOLOGY | Facility: CLINIC | Age: 71
End: 2021-10-06
Payer: COMMERCIAL

## 2021-10-06 VITALS
HEART RATE: 100 BPM | TEMPERATURE: 96.8 F | BODY MASS INDEX: 44.53 KG/M2 | WEIGHT: 242 LBS | DIASTOLIC BLOOD PRESSURE: 70 MMHG | RESPIRATION RATE: 18 BRPM | OXYGEN SATURATION: 95 % | HEIGHT: 62 IN | SYSTOLIC BLOOD PRESSURE: 132 MMHG

## 2021-10-06 DIAGNOSIS — C82.31 GRADE 3A FOLLICULAR LYMPHOMA OF LYMPH NODES OF NECK (HCC): Primary | ICD-10-CM

## 2021-10-06 PROCEDURE — 3075F SYST BP GE 130 - 139MM HG: CPT | Performed by: INTERNAL MEDICINE

## 2021-10-06 PROCEDURE — 99214 OFFICE O/P EST MOD 30 MIN: CPT | Performed by: INTERNAL MEDICINE

## 2021-10-06 PROCEDURE — 1160F RVW MEDS BY RX/DR IN RCRD: CPT | Performed by: INTERNAL MEDICINE

## 2021-10-06 PROCEDURE — 3078F DIAST BP <80 MM HG: CPT | Performed by: INTERNAL MEDICINE

## 2021-10-06 PROCEDURE — 3008F BODY MASS INDEX DOCD: CPT | Performed by: INTERNAL MEDICINE

## 2021-10-06 RX ORDER — ACETAMINOPHEN 325 MG/1
650 TABLET ORAL ONCE
Status: CANCELLED | OUTPATIENT
Start: 2021-12-07

## 2021-10-06 RX ORDER — SODIUM CHLORIDE 9 MG/ML
20 INJECTION, SOLUTION INTRAVENOUS ONCE
Status: CANCELLED | OUTPATIENT
Start: 2021-12-08

## 2021-10-06 RX ORDER — SODIUM CHLORIDE 9 MG/ML
20 INJECTION, SOLUTION INTRAVENOUS ONCE
Status: CANCELLED | OUTPATIENT
Start: 2021-12-07

## 2021-10-09 ENCOUNTER — IMMUNIZATIONS (OUTPATIENT)
Dept: FAMILY MEDICINE CLINIC | Facility: HOSPITAL | Age: 71
End: 2021-10-09

## 2021-10-09 DIAGNOSIS — Z23 ENCOUNTER FOR IMMUNIZATION: Primary | ICD-10-CM

## 2021-10-09 PROCEDURE — 0001A SARS-COV-2 / COVID-19 MRNA VACCINE (PFIZER-BIONTECH) 30 MCG: CPT

## 2021-10-09 PROCEDURE — 91300 SARS-COV-2 / COVID-19 MRNA VACCINE (PFIZER-BIONTECH) 30 MCG: CPT

## 2021-10-11 ENCOUNTER — APPOINTMENT (OUTPATIENT)
Dept: LAB | Facility: CLINIC | Age: 71
End: 2021-10-11
Payer: COMMERCIAL

## 2021-10-11 DIAGNOSIS — C82.31 GRADE 3A FOLLICULAR LYMPHOMA OF LYMPH NODES OF NECK (HCC): ICD-10-CM

## 2021-10-11 LAB
ALBUMIN SERPL BCP-MCNC: 3.5 G/DL (ref 3.5–5)
ALP SERPL-CCNC: 99 U/L (ref 46–116)
ALT SERPL W P-5'-P-CCNC: 46 U/L (ref 12–78)
ANION GAP SERPL CALCULATED.3IONS-SCNC: 10 MMOL/L (ref 4–13)
AST SERPL W P-5'-P-CCNC: 32 U/L (ref 5–45)
BASOPHILS # BLD AUTO: 0.11 THOUSANDS/ΜL (ref 0–0.1)
BASOPHILS NFR BLD AUTO: 2 % (ref 0–1)
BILIRUB SERPL-MCNC: 0.47 MG/DL (ref 0.2–1)
BUN SERPL-MCNC: 24 MG/DL (ref 5–25)
CALCIUM SERPL-MCNC: 9.8 MG/DL (ref 8.3–10.1)
CHLORIDE SERPL-SCNC: 104 MMOL/L (ref 100–108)
CO2 SERPL-SCNC: 27 MMOL/L (ref 21–32)
CREAT SERPL-MCNC: 1.08 MG/DL (ref 0.6–1.3)
EOSINOPHIL # BLD AUTO: 0.51 THOUSAND/ΜL (ref 0–0.61)
EOSINOPHIL NFR BLD AUTO: 7 % (ref 0–6)
ERYTHROCYTE [DISTWIDTH] IN BLOOD BY AUTOMATED COUNT: 14.4 % (ref 11.6–15.1)
GFR SERPL CREATININE-BSD FRML MDRD: 51 ML/MIN/1.73SQ M
GLUCOSE SERPL-MCNC: 121 MG/DL (ref 65–140)
HCT VFR BLD AUTO: 39.6 % (ref 34.8–46.1)
HGB BLD-MCNC: 12.6 G/DL (ref 11.5–15.4)
IMM GRANULOCYTES # BLD AUTO: 0.03 THOUSAND/UL (ref 0–0.2)
IMM GRANULOCYTES NFR BLD AUTO: 0 % (ref 0–2)
LYMPHOCYTES # BLD AUTO: 1.22 THOUSANDS/ΜL (ref 0.6–4.47)
LYMPHOCYTES NFR BLD AUTO: 17 % (ref 14–44)
MCH RBC QN AUTO: 30.7 PG (ref 26.8–34.3)
MCHC RBC AUTO-ENTMCNC: 31.8 G/DL (ref 31.4–37.4)
MCV RBC AUTO: 96 FL (ref 82–98)
MONOCYTES # BLD AUTO: 1.6 THOUSAND/ΜL (ref 0.17–1.22)
MONOCYTES NFR BLD AUTO: 22 % (ref 4–12)
NEUTROPHILS # BLD AUTO: 3.77 THOUSANDS/ΜL (ref 1.85–7.62)
NEUTS SEG NFR BLD AUTO: 52 % (ref 43–75)
NRBC BLD AUTO-RTO: 0 /100 WBCS
PLATELET # BLD AUTO: 198 THOUSANDS/UL (ref 149–390)
PMV BLD AUTO: 9.5 FL (ref 8.9–12.7)
POTASSIUM SERPL-SCNC: 3.7 MMOL/L (ref 3.5–5.3)
PROT SERPL-MCNC: 7.2 G/DL (ref 6.4–8.2)
RBC # BLD AUTO: 4.11 MILLION/UL (ref 3.81–5.12)
SODIUM SERPL-SCNC: 141 MMOL/L (ref 136–145)
WBC # BLD AUTO: 7.24 THOUSAND/UL (ref 4.31–10.16)

## 2021-10-11 PROCEDURE — 85025 COMPLETE CBC W/AUTO DIFF WBC: CPT

## 2021-10-11 PROCEDURE — 80053 COMPREHEN METABOLIC PANEL: CPT

## 2021-10-11 PROCEDURE — 36415 COLL VENOUS BLD VENIPUNCTURE: CPT

## 2021-10-12 ENCOUNTER — HOSPITAL ENCOUNTER (OUTPATIENT)
Dept: INFUSION CENTER | Facility: CLINIC | Age: 71
Discharge: HOME | End: 2021-10-12
Payer: COMMERCIAL

## 2021-10-12 VITALS
OXYGEN SATURATION: 97 % | DIASTOLIC BLOOD PRESSURE: 62 MMHG | SYSTOLIC BLOOD PRESSURE: 118 MMHG | HEIGHT: 62 IN | BODY MASS INDEX: 44.9 KG/M2 | TEMPERATURE: 97.5 F | RESPIRATION RATE: 17 BRPM | WEIGHT: 244 LBS | HEART RATE: 79 BPM

## 2021-10-12 DIAGNOSIS — C82.31 GRADE 3A FOLLICULAR LYMPHOMA OF LYMPH NODES OF NECK (HCC): Primary | ICD-10-CM

## 2021-10-12 PROCEDURE — 96411 CHEMO IV PUSH ADDL DRUG: CPT

## 2021-10-12 PROCEDURE — 96413 CHEMO IV INFUSION 1 HR: CPT

## 2021-10-12 PROCEDURE — 96415 CHEMO IV INFUSION ADDL HR: CPT

## 2021-10-12 PROCEDURE — 96367 TX/PROPH/DG ADDL SEQ IV INF: CPT

## 2021-10-12 RX ORDER — ACETAMINOPHEN 325 MG/1
650 TABLET ORAL ONCE
Status: COMPLETED | OUTPATIENT
Start: 2021-10-12 | End: 2021-10-12

## 2021-10-12 RX ORDER — SODIUM CHLORIDE 9 MG/ML
20 INJECTION, SOLUTION INTRAVENOUS ONCE
Status: COMPLETED | OUTPATIENT
Start: 2021-10-12 | End: 2021-10-12

## 2021-10-12 RX ADMIN — BENDAMUSTINE HYDROCHLORIDE 150.5 MG: 25 INJECTION, SOLUTION INTRAVENOUS at 12:27

## 2021-10-12 RX ADMIN — DIPHENHYDRAMINE HYDROCHLORIDE 25 MG: 50 INJECTION, SOLUTION INTRAMUSCULAR; INTRAVENOUS at 08:28

## 2021-10-12 RX ADMIN — DEXAMETHASONE SODIUM PHOSPHATE: 10 INJECTION, SOLUTION INTRAMUSCULAR; INTRAVENOUS at 08:53

## 2021-10-12 RX ADMIN — SODIUM CHLORIDE 20 ML/HR: 0.9 INJECTION, SOLUTION INTRAVENOUS at 07:57

## 2021-10-12 RX ADMIN — RITUXIMAB 800 MG: 10 INJECTION, SOLUTION INTRAVENOUS at 09:31

## 2021-10-12 RX ADMIN — ACETAMINOPHEN 650 MG: 325 TABLET ORAL at 08:29

## 2021-10-13 ENCOUNTER — HOSPITAL ENCOUNTER (OUTPATIENT)
Dept: INFUSION CENTER | Facility: CLINIC | Age: 71
Discharge: HOME | End: 2021-10-13
Payer: COMMERCIAL

## 2021-10-13 VITALS
BODY MASS INDEX: 44.9 KG/M2 | HEIGHT: 62 IN | RESPIRATION RATE: 18 BRPM | WEIGHT: 244 LBS | HEART RATE: 78 BPM | TEMPERATURE: 97.6 F | DIASTOLIC BLOOD PRESSURE: 62 MMHG | OXYGEN SATURATION: 98 % | SYSTOLIC BLOOD PRESSURE: 114 MMHG

## 2021-10-13 DIAGNOSIS — C82.31 GRADE 3A FOLLICULAR LYMPHOMA OF LYMPH NODES OF NECK (HCC): Primary | ICD-10-CM

## 2021-10-13 PROCEDURE — 96409 CHEMO IV PUSH SNGL DRUG: CPT

## 2021-10-13 PROCEDURE — 96367 TX/PROPH/DG ADDL SEQ IV INF: CPT

## 2021-10-13 RX ORDER — SODIUM CHLORIDE 9 MG/ML
20 INJECTION, SOLUTION INTRAVENOUS ONCE
Status: COMPLETED | OUTPATIENT
Start: 2021-10-13 | End: 2021-10-13

## 2021-10-13 RX ADMIN — BENDAMUSTINE HYDROCHLORIDE 150.5 MG: 25 INJECTION, SOLUTION INTRAVENOUS at 14:57

## 2021-10-13 RX ADMIN — SODIUM CHLORIDE 20 ML/HR: 0.9 INJECTION, SOLUTION INTRAVENOUS at 14:22

## 2021-10-13 RX ADMIN — DEXAMETHASONE SODIUM PHOSPHATE: 10 INJECTION, SOLUTION INTRAMUSCULAR; INTRAVENOUS at 14:23

## 2021-10-15 ENCOUNTER — IMMUNIZATIONS (OUTPATIENT)
Dept: FAMILY MEDICINE CLINIC | Facility: CLINIC | Age: 71
End: 2021-10-15
Payer: COMMERCIAL

## 2021-10-15 DIAGNOSIS — Z23 NEED FOR INFLUENZA VACCINATION: Primary | ICD-10-CM

## 2021-10-15 PROCEDURE — 90662 IIV NO PRSV INCREASED AG IM: CPT

## 2021-10-15 PROCEDURE — G0008 ADMIN INFLUENZA VIRUS VAC: HCPCS

## 2021-10-19 DIAGNOSIS — E03.9 ACQUIRED HYPOTHYROIDISM: ICD-10-CM

## 2021-10-19 RX ORDER — LEVOTHYROXINE SODIUM 0.12 MG/1
TABLET ORAL
Qty: 90 TABLET | Refills: 3 | Status: SHIPPED | OUTPATIENT
Start: 2021-10-19 | End: 2021-11-14 | Stop reason: SDUPTHER

## 2021-10-22 ENCOUNTER — TELEPHONE (OUTPATIENT)
Dept: HEMATOLOGY ONCOLOGY | Facility: CLINIC | Age: 71
End: 2021-10-22

## 2021-10-25 ENCOUNTER — HOSPITAL ENCOUNTER (OUTPATIENT)
Dept: RADIOLOGY | Age: 71
Discharge: HOME | End: 2021-10-25
Payer: COMMERCIAL

## 2021-10-25 DIAGNOSIS — C82.31 GRADE 3A FOLLICULAR LYMPHOMA OF LYMPH NODES OF NECK (HCC): ICD-10-CM

## 2021-10-25 LAB — GLUCOSE SERPL-MCNC: 133 MG/DL (ref 65–140)

## 2021-10-25 PROCEDURE — 82948 REAGENT STRIP/BLOOD GLUCOSE: CPT

## 2021-10-25 PROCEDURE — 78815 PET IMAGE W/CT SKULL-THIGH: CPT

## 2021-10-25 PROCEDURE — A9552 F18 FDG: HCPCS

## 2021-11-03 ENCOUNTER — OFFICE VISIT (OUTPATIENT)
Dept: HEMATOLOGY ONCOLOGY | Facility: CLINIC | Age: 71
End: 2021-11-03
Payer: COMMERCIAL

## 2021-11-03 VITALS
DIASTOLIC BLOOD PRESSURE: 80 MMHG | BODY MASS INDEX: 45.27 KG/M2 | HEART RATE: 107 BPM | SYSTOLIC BLOOD PRESSURE: 132 MMHG | WEIGHT: 246 LBS | TEMPERATURE: 96.6 F | RESPIRATION RATE: 18 BRPM | HEIGHT: 62 IN | OXYGEN SATURATION: 93 %

## 2021-11-03 DIAGNOSIS — C82.31 GRADE 3A FOLLICULAR LYMPHOMA OF LYMPH NODES OF NECK (HCC): Primary | ICD-10-CM

## 2021-11-03 PROCEDURE — 99214 OFFICE O/P EST MOD 30 MIN: CPT | Performed by: INTERNAL MEDICINE

## 2021-11-08 ENCOUNTER — APPOINTMENT (OUTPATIENT)
Dept: LAB | Facility: CLINIC | Age: 71
End: 2021-11-08
Payer: COMMERCIAL

## 2021-11-08 DIAGNOSIS — C82.31 GRADE 3A FOLLICULAR LYMPHOMA OF LYMPH NODES OF NECK (HCC): ICD-10-CM

## 2021-11-08 LAB
ALBUMIN SERPL BCP-MCNC: 3.5 G/DL (ref 3.5–5)
ALP SERPL-CCNC: 88 U/L (ref 46–116)
ALT SERPL W P-5'-P-CCNC: 70 U/L (ref 12–78)
ANION GAP SERPL CALCULATED.3IONS-SCNC: 9 MMOL/L (ref 4–13)
AST SERPL W P-5'-P-CCNC: 41 U/L (ref 5–45)
BASOPHILS # BLD AUTO: 0.04 THOUSANDS/ΜL (ref 0–0.1)
BASOPHILS NFR BLD AUTO: 1 % (ref 0–1)
BILIRUB SERPL-MCNC: 0.57 MG/DL (ref 0.2–1)
BUN SERPL-MCNC: 26 MG/DL (ref 5–25)
CALCIUM SERPL-MCNC: 9.8 MG/DL (ref 8.3–10.1)
CHLORIDE SERPL-SCNC: 107 MMOL/L (ref 100–108)
CO2 SERPL-SCNC: 26 MMOL/L (ref 21–32)
CREAT SERPL-MCNC: 1.18 MG/DL (ref 0.6–1.3)
EOSINOPHIL # BLD AUTO: 0.32 THOUSAND/ΜL (ref 0–0.61)
EOSINOPHIL NFR BLD AUTO: 6 % (ref 0–6)
ERYTHROCYTE [DISTWIDTH] IN BLOOD BY AUTOMATED COUNT: 14.6 % (ref 11.6–15.1)
GFR SERPL CREATININE-BSD FRML MDRD: 46 ML/MIN/1.73SQ M
GLUCOSE SERPL-MCNC: 107 MG/DL (ref 65–140)
HCT VFR BLD AUTO: 39.2 % (ref 34.8–46.1)
HGB BLD-MCNC: 12.5 G/DL (ref 11.5–15.4)
IMM GRANULOCYTES # BLD AUTO: 0.05 THOUSAND/UL (ref 0–0.2)
IMM GRANULOCYTES NFR BLD AUTO: 1 % (ref 0–2)
LYMPHOCYTES # BLD AUTO: 0.67 THOUSANDS/ΜL (ref 0.6–4.47)
LYMPHOCYTES NFR BLD AUTO: 13 % (ref 14–44)
MCH RBC QN AUTO: 31.2 PG (ref 26.8–34.3)
MCHC RBC AUTO-ENTMCNC: 31.9 G/DL (ref 31.4–37.4)
MCV RBC AUTO: 98 FL (ref 82–98)
MONOCYTES # BLD AUTO: 1.7 THOUSAND/ΜL (ref 0.17–1.22)
MONOCYTES NFR BLD AUTO: 33 % (ref 4–12)
NEUTROPHILS # BLD AUTO: 2.32 THOUSANDS/ΜL (ref 1.85–7.62)
NEUTS SEG NFR BLD AUTO: 46 % (ref 43–75)
NRBC BLD AUTO-RTO: 0 /100 WBCS
PLATELET # BLD AUTO: 168 THOUSANDS/UL (ref 149–390)
PMV BLD AUTO: 9.8 FL (ref 8.9–12.7)
POTASSIUM SERPL-SCNC: 3.8 MMOL/L (ref 3.5–5.3)
PROT SERPL-MCNC: 7.1 G/DL (ref 6.4–8.2)
RBC # BLD AUTO: 4.01 MILLION/UL (ref 3.81–5.12)
SODIUM SERPL-SCNC: 142 MMOL/L (ref 136–145)
WBC # BLD AUTO: 5.1 THOUSAND/UL (ref 4.31–10.16)

## 2021-11-08 PROCEDURE — 80053 COMPREHEN METABOLIC PANEL: CPT

## 2021-11-08 PROCEDURE — 85025 COMPLETE CBC W/AUTO DIFF WBC: CPT

## 2021-11-08 PROCEDURE — 36415 COLL VENOUS BLD VENIPUNCTURE: CPT

## 2021-11-09 ENCOUNTER — HOSPITAL ENCOUNTER (OUTPATIENT)
Dept: INFUSION CENTER | Facility: CLINIC | Age: 71
Discharge: HOME | End: 2021-11-09
Payer: COMMERCIAL

## 2021-11-09 VITALS
HEIGHT: 62 IN | SYSTOLIC BLOOD PRESSURE: 126 MMHG | OXYGEN SATURATION: 99 % | BODY MASS INDEX: 45.08 KG/M2 | HEART RATE: 66 BPM | TEMPERATURE: 97.4 F | WEIGHT: 245 LBS | RESPIRATION RATE: 18 BRPM | DIASTOLIC BLOOD PRESSURE: 72 MMHG

## 2021-11-09 DIAGNOSIS — C82.31 GRADE 3A FOLLICULAR LYMPHOMA OF LYMPH NODES OF NECK (HCC): Primary | ICD-10-CM

## 2021-11-09 PROCEDURE — 96413 CHEMO IV INFUSION 1 HR: CPT

## 2021-11-09 PROCEDURE — 96411 CHEMO IV PUSH ADDL DRUG: CPT

## 2021-11-09 PROCEDURE — 96367 TX/PROPH/DG ADDL SEQ IV INF: CPT

## 2021-11-09 PROCEDURE — 96415 CHEMO IV INFUSION ADDL HR: CPT

## 2021-11-09 RX ORDER — SODIUM CHLORIDE 9 MG/ML
20 INJECTION, SOLUTION INTRAVENOUS ONCE
Status: COMPLETED | OUTPATIENT
Start: 2021-11-09 | End: 2021-11-09

## 2021-11-09 RX ORDER — ACETAMINOPHEN 325 MG/1
650 TABLET ORAL ONCE
Status: COMPLETED | OUTPATIENT
Start: 2021-11-09 | End: 2021-11-09

## 2021-11-09 RX ADMIN — SODIUM CHLORIDE 20 ML/HR: 0.9 INJECTION, SOLUTION INTRAVENOUS at 08:11

## 2021-11-09 RX ADMIN — BENDAMUSTINE HYDROCHLORIDE 150.5 MG: 25 INJECTION, SOLUTION INTRAVENOUS at 11:58

## 2021-11-09 RX ADMIN — DEXAMETHASONE SODIUM PHOSPHATE: 10 INJECTION, SOLUTION INTRAMUSCULAR; INTRAVENOUS at 08:11

## 2021-11-09 RX ADMIN — ACETAMINOPHEN 650 MG: 325 TABLET ORAL at 08:13

## 2021-11-09 RX ADMIN — RITUXIMAB 800 MG: 10 INJECTION, SOLUTION INTRAVENOUS at 09:07

## 2021-11-09 RX ADMIN — DIPHENHYDRAMINE HYDROCHLORIDE 25 MG: 50 INJECTION, SOLUTION INTRAMUSCULAR; INTRAVENOUS at 08:34

## 2021-11-10 ENCOUNTER — HOSPITAL ENCOUNTER (OUTPATIENT)
Dept: INFUSION CENTER | Facility: CLINIC | Age: 71
Discharge: HOME | End: 2021-11-10
Payer: COMMERCIAL

## 2021-11-10 VITALS
RESPIRATION RATE: 18 BRPM | TEMPERATURE: 98 F | WEIGHT: 245 LBS | BODY MASS INDEX: 45.08 KG/M2 | HEIGHT: 62 IN | SYSTOLIC BLOOD PRESSURE: 104 MMHG | DIASTOLIC BLOOD PRESSURE: 58 MMHG | OXYGEN SATURATION: 97 % | HEART RATE: 73 BPM

## 2021-11-10 DIAGNOSIS — C82.31 GRADE 3A FOLLICULAR LYMPHOMA OF LYMPH NODES OF NECK (HCC): Primary | ICD-10-CM

## 2021-11-10 PROCEDURE — 96367 TX/PROPH/DG ADDL SEQ IV INF: CPT

## 2021-11-10 PROCEDURE — 96409 CHEMO IV PUSH SNGL DRUG: CPT

## 2021-11-10 RX ORDER — SODIUM CHLORIDE 9 MG/ML
20 INJECTION, SOLUTION INTRAVENOUS ONCE
Status: COMPLETED | OUTPATIENT
Start: 2021-11-10 | End: 2021-11-10

## 2021-11-10 RX ADMIN — BENDAMUSTINE HYDROCHLORIDE 150.5 MG: 25 INJECTION, SOLUTION INTRAVENOUS at 11:13

## 2021-11-10 RX ADMIN — DEXAMETHASONE SODIUM PHOSPHATE: 10 INJECTION, SOLUTION INTRAMUSCULAR; INTRAVENOUS at 10:30

## 2021-11-10 RX ADMIN — SODIUM CHLORIDE 20 ML/HR: 0.9 INJECTION, SOLUTION INTRAVENOUS at 10:25

## 2021-11-12 ENCOUNTER — OFFICE VISIT (OUTPATIENT)
Dept: FAMILY MEDICINE CLINIC | Facility: CLINIC | Age: 71
End: 2021-11-12
Payer: COMMERCIAL

## 2021-11-12 VITALS
HEIGHT: 62 IN | SYSTOLIC BLOOD PRESSURE: 126 MMHG | DIASTOLIC BLOOD PRESSURE: 64 MMHG | HEART RATE: 74 BPM | OXYGEN SATURATION: 98 % | RESPIRATION RATE: 16 BRPM | BODY MASS INDEX: 45.45 KG/M2 | TEMPERATURE: 99.3 F | WEIGHT: 247 LBS

## 2021-11-12 DIAGNOSIS — I10 ESSENTIAL HYPERTENSION: ICD-10-CM

## 2021-11-12 DIAGNOSIS — E03.9 ACQUIRED HYPOTHYROIDISM: Primary | ICD-10-CM

## 2021-11-12 DIAGNOSIS — R10.13 EPIGASTRIC DISCOMFORT: ICD-10-CM

## 2021-11-12 DIAGNOSIS — E66.01 MORBID OBESITY (HCC): ICD-10-CM

## 2021-11-12 DIAGNOSIS — F32.A CHRONIC DEPRESSION: ICD-10-CM

## 2021-11-12 DIAGNOSIS — R14.0 BLOATING SYMPTOM: ICD-10-CM

## 2021-11-12 DIAGNOSIS — K21.00 REFLUX ESOPHAGITIS: ICD-10-CM

## 2021-11-12 DIAGNOSIS — C85.90 LYMPHOMA, UNSPECIFIED BODY REGION, UNSPECIFIED LYMPHOMA TYPE (HCC): ICD-10-CM

## 2021-11-12 PROCEDURE — 3074F SYST BP LT 130 MM HG: CPT | Performed by: NURSE PRACTITIONER

## 2021-11-12 PROCEDURE — 1036F TOBACCO NON-USER: CPT | Performed by: NURSE PRACTITIONER

## 2021-11-12 PROCEDURE — 99214 OFFICE O/P EST MOD 30 MIN: CPT | Performed by: NURSE PRACTITIONER

## 2021-11-12 PROCEDURE — 3008F BODY MASS INDEX DOCD: CPT | Performed by: NURSE PRACTITIONER

## 2021-11-12 PROCEDURE — 1160F RVW MEDS BY RX/DR IN RCRD: CPT | Performed by: NURSE PRACTITIONER

## 2021-11-12 PROCEDURE — 3078F DIAST BP <80 MM HG: CPT | Performed by: NURSE PRACTITIONER

## 2021-11-14 RX ORDER — PANTOPRAZOLE SODIUM 40 MG/1
40 TABLET, DELAYED RELEASE ORAL DAILY
Qty: 90 TABLET | Refills: 3 | Status: SHIPPED | OUTPATIENT
Start: 2021-11-14 | End: 2021-12-14 | Stop reason: SDUPTHER

## 2021-11-14 RX ORDER — BENAZEPRIL HYDROCHLORIDE 40 MG/1
40 TABLET, FILM COATED ORAL DAILY
Qty: 90 TABLET | Refills: 1 | Status: SHIPPED | OUTPATIENT
Start: 2021-11-14 | End: 2022-01-27 | Stop reason: SDUPTHER

## 2021-11-14 RX ORDER — LEVOTHYROXINE SODIUM 0.12 MG/1
125 TABLET ORAL DAILY
Qty: 90 TABLET | Refills: 1 | Status: SHIPPED | OUTPATIENT
Start: 2021-11-14 | End: 2022-03-15

## 2021-11-14 RX ORDER — AMLODIPINE BESYLATE 5 MG/1
5 TABLET ORAL DAILY
Qty: 90 TABLET | Refills: 1 | Status: SHIPPED | OUTPATIENT
Start: 2021-11-14 | End: 2022-01-27 | Stop reason: SDUPTHER

## 2021-11-14 RX ORDER — CITALOPRAM 10 MG/1
10 TABLET ORAL DAILY
Qty: 90 TABLET | Refills: 1 | Status: SHIPPED | OUTPATIENT
Start: 2021-11-14 | End: 2022-01-27 | Stop reason: SDUPTHER

## 2021-11-29 ENCOUNTER — PREPPED CHART (OUTPATIENT)
Dept: URBAN - METROPOLITAN AREA CLINIC 6 | Facility: CLINIC | Age: 71
End: 2021-11-29

## 2021-11-30 ENCOUNTER — ESTABLISHED COMPREHENSIVE EXAM (OUTPATIENT)
Dept: URBAN - METROPOLITAN AREA CLINIC 6 | Facility: CLINIC | Age: 71
End: 2021-11-30

## 2021-11-30 DIAGNOSIS — H40.023: ICD-10-CM

## 2021-11-30 DIAGNOSIS — Z96.1: ICD-10-CM

## 2021-11-30 DIAGNOSIS — H35.363: ICD-10-CM

## 2021-11-30 PROCEDURE — 92014 COMPRE OPH EXAM EST PT 1/>: CPT

## 2021-11-30 PROCEDURE — 92133 CPTRZD OPH DX IMG PST SGM ON: CPT

## 2021-11-30 PROCEDURE — 1036F TOBACCO NON-USER: CPT

## 2021-11-30 PROCEDURE — 92015 DETERMINE REFRACTIVE STATE: CPT

## 2021-11-30 PROCEDURE — G8427 DOCREV CUR MEDS BY ELIG CLIN: HCPCS

## 2021-11-30 ASSESSMENT — TONOMETRY
OD_IOP_MMHG: 18
OS_IOP_MMHG: 19

## 2021-11-30 ASSESSMENT — VISUAL ACUITY
OU_SC: J2
OD_SC: 20/30-2
OS_PH: 20/30+1
OS_SC: 20/40-2

## 2021-12-01 ENCOUNTER — TELEPHONE (OUTPATIENT)
Dept: HEMATOLOGY ONCOLOGY | Facility: CLINIC | Age: 71
End: 2021-12-01

## 2021-12-01 ENCOUNTER — OFFICE VISIT (OUTPATIENT)
Dept: HEMATOLOGY ONCOLOGY | Facility: CLINIC | Age: 71
End: 2021-12-01
Payer: COMMERCIAL

## 2021-12-01 VITALS
BODY MASS INDEX: 45.27 KG/M2 | OXYGEN SATURATION: 95 % | SYSTOLIC BLOOD PRESSURE: 120 MMHG | WEIGHT: 246 LBS | HEIGHT: 62 IN | HEART RATE: 105 BPM | RESPIRATION RATE: 18 BRPM | DIASTOLIC BLOOD PRESSURE: 72 MMHG | TEMPERATURE: 98.8 F

## 2021-12-01 DIAGNOSIS — C82.31 GRADE 3A FOLLICULAR LYMPHOMA OF LYMPH NODES OF NECK (HCC): Primary | ICD-10-CM

## 2021-12-01 PROCEDURE — 3008F BODY MASS INDEX DOCD: CPT | Performed by: INTERNAL MEDICINE

## 2021-12-01 PROCEDURE — 1160F RVW MEDS BY RX/DR IN RCRD: CPT | Performed by: INTERNAL MEDICINE

## 2021-12-01 PROCEDURE — 3078F DIAST BP <80 MM HG: CPT | Performed by: INTERNAL MEDICINE

## 2021-12-01 PROCEDURE — 99214 OFFICE O/P EST MOD 30 MIN: CPT | Performed by: INTERNAL MEDICINE

## 2021-12-01 PROCEDURE — 3074F SYST BP LT 130 MM HG: CPT | Performed by: INTERNAL MEDICINE

## 2021-12-06 ENCOUNTER — APPOINTMENT (OUTPATIENT)
Dept: LAB | Facility: CLINIC | Age: 71
End: 2021-12-06
Payer: COMMERCIAL

## 2021-12-06 DIAGNOSIS — C82.31 GRADE 3A FOLLICULAR LYMPHOMA OF LYMPH NODES OF NECK (HCC): ICD-10-CM

## 2021-12-06 LAB
ALBUMIN SERPL BCP-MCNC: 3.5 G/DL (ref 3.5–5)
ALP SERPL-CCNC: 97 U/L (ref 46–116)
ALT SERPL W P-5'-P-CCNC: 50 U/L (ref 12–78)
ANION GAP SERPL CALCULATED.3IONS-SCNC: 7 MMOL/L (ref 4–13)
AST SERPL W P-5'-P-CCNC: 30 U/L (ref 5–45)
BASOPHILS # BLD AUTO: 0.05 THOUSANDS/ΜL (ref 0–0.1)
BASOPHILS NFR BLD AUTO: 1 % (ref 0–1)
BILIRUB SERPL-MCNC: 0.55 MG/DL (ref 0.2–1)
BUN SERPL-MCNC: 25 MG/DL (ref 5–25)
CALCIUM SERPL-MCNC: 10 MG/DL (ref 8.3–10.1)
CHLORIDE SERPL-SCNC: 104 MMOL/L (ref 100–108)
CO2 SERPL-SCNC: 28 MMOL/L (ref 21–32)
CREAT SERPL-MCNC: 1.2 MG/DL (ref 0.6–1.3)
EOSINOPHIL # BLD AUTO: 0.28 THOUSAND/ΜL (ref 0–0.61)
EOSINOPHIL NFR BLD AUTO: 6 % (ref 0–6)
ERYTHROCYTE [DISTWIDTH] IN BLOOD BY AUTOMATED COUNT: 14.7 % (ref 11.6–15.1)
GFR SERPL CREATININE-BSD FRML MDRD: 45 ML/MIN/1.73SQ M
GLUCOSE SERPL-MCNC: 101 MG/DL (ref 65–140)
HCT VFR BLD AUTO: 37.7 % (ref 34.8–46.1)
HGB BLD-MCNC: 12.4 G/DL (ref 11.5–15.4)
IMM GRANULOCYTES # BLD AUTO: 0.06 THOUSAND/UL (ref 0–0.2)
IMM GRANULOCYTES NFR BLD AUTO: 1 % (ref 0–2)
LYMPHOCYTES # BLD AUTO: 0.67 THOUSANDS/ΜL (ref 0.6–4.47)
LYMPHOCYTES NFR BLD AUTO: 15 % (ref 14–44)
MCH RBC QN AUTO: 32.5 PG (ref 26.8–34.3)
MCHC RBC AUTO-ENTMCNC: 32.9 G/DL (ref 31.4–37.4)
MCV RBC AUTO: 99 FL (ref 82–98)
MONOCYTES # BLD AUTO: 0.98 THOUSAND/ΜL (ref 0.17–1.22)
MONOCYTES NFR BLD AUTO: 23 % (ref 4–12)
NEUTROPHILS # BLD AUTO: 2.31 THOUSANDS/ΜL (ref 1.85–7.62)
NEUTS SEG NFR BLD AUTO: 54 % (ref 43–75)
NRBC BLD AUTO-RTO: 0 /100 WBCS
PLATELET # BLD AUTO: 163 THOUSANDS/UL (ref 149–390)
PMV BLD AUTO: 9.7 FL (ref 8.9–12.7)
POTASSIUM SERPL-SCNC: 4.1 MMOL/L (ref 3.5–5.3)
PROT SERPL-MCNC: 7.3 G/DL (ref 6.4–8.2)
RBC # BLD AUTO: 3.82 MILLION/UL (ref 3.81–5.12)
SODIUM SERPL-SCNC: 139 MMOL/L (ref 136–145)
WBC # BLD AUTO: 4.35 THOUSAND/UL (ref 4.31–10.16)

## 2021-12-06 PROCEDURE — 80053 COMPREHEN METABOLIC PANEL: CPT

## 2021-12-06 PROCEDURE — 36415 COLL VENOUS BLD VENIPUNCTURE: CPT

## 2021-12-06 PROCEDURE — 85025 COMPLETE CBC W/AUTO DIFF WBC: CPT

## 2021-12-07 ENCOUNTER — HOSPITAL ENCOUNTER (OUTPATIENT)
Dept: INFUSION CENTER | Facility: CLINIC | Age: 71
Discharge: HOME | End: 2021-12-07
Payer: COMMERCIAL

## 2021-12-07 VITALS
HEART RATE: 74 BPM | BODY MASS INDEX: 45.08 KG/M2 | RESPIRATION RATE: 18 BRPM | SYSTOLIC BLOOD PRESSURE: 147 MMHG | DIASTOLIC BLOOD PRESSURE: 66 MMHG | WEIGHT: 245 LBS | HEIGHT: 62 IN | OXYGEN SATURATION: 96 % | TEMPERATURE: 97.3 F

## 2021-12-07 DIAGNOSIS — C82.31 GRADE 3A FOLLICULAR LYMPHOMA OF LYMPH NODES OF NECK (HCC): Primary | ICD-10-CM

## 2021-12-07 PROCEDURE — 96413 CHEMO IV INFUSION 1 HR: CPT

## 2021-12-07 PROCEDURE — 96367 TX/PROPH/DG ADDL SEQ IV INF: CPT

## 2021-12-07 PROCEDURE — 96411 CHEMO IV PUSH ADDL DRUG: CPT

## 2021-12-07 PROCEDURE — 96415 CHEMO IV INFUSION ADDL HR: CPT

## 2021-12-07 RX ORDER — ACETAMINOPHEN 325 MG/1
650 TABLET ORAL ONCE
Status: COMPLETED | OUTPATIENT
Start: 2021-12-07 | End: 2021-12-07

## 2021-12-07 RX ORDER — SODIUM CHLORIDE 9 MG/ML
20 INJECTION, SOLUTION INTRAVENOUS ONCE
Status: COMPLETED | OUTPATIENT
Start: 2021-12-07 | End: 2021-12-07

## 2021-12-07 RX ADMIN — DEXAMETHASONE SODIUM PHOSPHATE: 10 INJECTION, SOLUTION INTRAMUSCULAR; INTRAVENOUS at 09:06

## 2021-12-07 RX ADMIN — BENDAMUSTINE HYDROCHLORIDE 150.5 MG: 25 INJECTION, SOLUTION INTRAVENOUS at 12:41

## 2021-12-07 RX ADMIN — RITUXIMAB 800 MG: 10 INJECTION, SOLUTION INTRAVENOUS at 09:47

## 2021-12-07 RX ADMIN — ACETAMINOPHEN 650 MG: 325 TABLET, FILM COATED ORAL at 08:45

## 2021-12-07 RX ADMIN — DIPHENHYDRAMINE HYDROCHLORIDE 25 MG: 50 INJECTION, SOLUTION INTRAMUSCULAR; INTRAVENOUS at 08:43

## 2021-12-07 RX ADMIN — SODIUM CHLORIDE 20 ML/HR: 0.9 INJECTION, SOLUTION INTRAVENOUS at 08:40

## 2021-12-08 ENCOUNTER — HOSPITAL ENCOUNTER (OUTPATIENT)
Dept: INFUSION CENTER | Facility: CLINIC | Age: 71
Discharge: HOME | End: 2021-12-08
Payer: COMMERCIAL

## 2021-12-08 VITALS
RESPIRATION RATE: 18 BRPM | OXYGEN SATURATION: 99 % | DIASTOLIC BLOOD PRESSURE: 76 MMHG | WEIGHT: 249 LBS | SYSTOLIC BLOOD PRESSURE: 128 MMHG | HEIGHT: 62 IN | BODY MASS INDEX: 45.82 KG/M2 | HEART RATE: 74 BPM | TEMPERATURE: 97.5 F

## 2021-12-08 DIAGNOSIS — C82.31 GRADE 3A FOLLICULAR LYMPHOMA OF LYMPH NODES OF NECK (HCC): Primary | ICD-10-CM

## 2021-12-08 PROCEDURE — 96409 CHEMO IV PUSH SNGL DRUG: CPT

## 2021-12-08 PROCEDURE — 96367 TX/PROPH/DG ADDL SEQ IV INF: CPT

## 2021-12-08 RX ORDER — SODIUM CHLORIDE 9 MG/ML
20 INJECTION, SOLUTION INTRAVENOUS ONCE
Status: COMPLETED | OUTPATIENT
Start: 2021-12-08 | End: 2021-12-08

## 2021-12-08 RX ADMIN — BENDAMUSTINE HYDROCHLORIDE 150.5 MG: 25 INJECTION, SOLUTION INTRAVENOUS at 10:46

## 2021-12-08 RX ADMIN — SODIUM CHLORIDE 20 ML/HR: 0.9 INJECTION, SOLUTION INTRAVENOUS at 10:10

## 2021-12-08 RX ADMIN — DEXAMETHASONE SODIUM PHOSPHATE: 10 INJECTION, SOLUTION INTRAMUSCULAR; INTRAVENOUS at 10:10

## 2021-12-14 DIAGNOSIS — R10.13 EPIGASTRIC DISCOMFORT: ICD-10-CM

## 2021-12-14 DIAGNOSIS — K21.00 REFLUX ESOPHAGITIS: ICD-10-CM

## 2021-12-14 DIAGNOSIS — R14.0 BLOATING SYMPTOM: ICD-10-CM

## 2021-12-14 RX ORDER — PANTOPRAZOLE SODIUM 40 MG/1
40 TABLET, DELAYED RELEASE ORAL DAILY
Qty: 90 TABLET | Refills: 3 | Status: SHIPPED | OUTPATIENT
Start: 2021-12-14

## 2022-01-27 DIAGNOSIS — F32.A CHRONIC DEPRESSION: ICD-10-CM

## 2022-01-27 DIAGNOSIS — I10 ESSENTIAL HYPERTENSION: ICD-10-CM

## 2022-01-27 RX ORDER — AMLODIPINE BESYLATE 5 MG/1
5 TABLET ORAL DAILY
Qty: 90 TABLET | Refills: 1 | Status: SHIPPED | OUTPATIENT
Start: 2022-01-27 | End: 2022-07-21

## 2022-01-27 RX ORDER — CITALOPRAM 10 MG/1
10 TABLET ORAL DAILY
Qty: 90 TABLET | Refills: 1 | Status: SHIPPED | OUTPATIENT
Start: 2022-01-27 | End: 2022-07-21

## 2022-01-27 RX ORDER — BENAZEPRIL HYDROCHLORIDE 40 MG/1
40 TABLET, FILM COATED ORAL DAILY
Qty: 90 TABLET | Refills: 1 | Status: SHIPPED | OUTPATIENT
Start: 2022-01-27 | End: 2022-07-21

## 2022-03-03 ENCOUNTER — RA CDI HCC (OUTPATIENT)
Dept: OTHER | Facility: HOSPITAL | Age: 72
End: 2022-03-03

## 2022-03-03 NOTE — PROGRESS NOTES
[id number]    [facility] 75  coding opportunities             Chart Reviewed * (Number of) Inbaskdonal suggestions sent to Provider: 1                  Patients insurance company: [employer] (Medicare [employer]age and Commercial)

## 2022-03-11 ENCOUNTER — APPOINTMENT (OUTPATIENT)
Dept: LAB | Facility: CLINIC | Age: 72
End: 2022-03-11
Payer: COMMERCIAL

## 2022-03-11 DIAGNOSIS — E66.01 MORBID OBESITY (HCC): ICD-10-CM

## 2022-03-11 DIAGNOSIS — E03.9 ACQUIRED HYPOTHYROIDISM: ICD-10-CM

## 2022-03-11 DIAGNOSIS — C82.31 GRADE 3A FOLLICULAR LYMPHOMA OF LYMPH NODES OF NECK (HCC): ICD-10-CM

## 2022-03-11 DIAGNOSIS — I10 ESSENTIAL HYPERTENSION: ICD-10-CM

## 2022-03-11 LAB
ALBUMIN SERPL BCP-MCNC: 3.8 G/DL (ref 3.5–5)
ALP SERPL-CCNC: 87 U/L (ref 46–116)
ALT SERPL W P-5'-P-CCNC: 39 U/L (ref 12–78)
ANION GAP SERPL CALCULATED.3IONS-SCNC: 10 MMOL/L (ref 4–13)
AST SERPL W P-5'-P-CCNC: 23 U/L (ref 5–45)
BACTERIA UR QL AUTO: ABNORMAL /HPF
BASOPHILS # BLD AUTO: 0.03 THOUSANDS/ΜL (ref 0–0.1)
BASOPHILS NFR BLD AUTO: 1 % (ref 0–1)
BILIRUB SERPL-MCNC: 0.63 MG/DL (ref 0.2–1)
BILIRUB UR QL STRIP: NEGATIVE
BUN SERPL-MCNC: 25 MG/DL (ref 5–25)
CALCIUM SERPL-MCNC: 10.5 MG/DL (ref 8.3–10.1)
CHLORIDE SERPL-SCNC: 105 MMOL/L (ref 100–108)
CHOLEST SERPL-MCNC: 179 MG/DL
CLARITY UR: CLEAR
CO2 SERPL-SCNC: 26 MMOL/L (ref 21–32)
COLOR UR: YELLOW
CREAT SERPL-MCNC: 1.13 MG/DL (ref 0.6–1.3)
EOSINOPHIL # BLD AUTO: 0.17 THOUSAND/ΜL (ref 0–0.61)
EOSINOPHIL NFR BLD AUTO: 3 % (ref 0–6)
ERYTHROCYTE [DISTWIDTH] IN BLOOD BY AUTOMATED COUNT: 13.1 % (ref 11.6–15.1)
EST. AVERAGE GLUCOSE BLD GHB EST-MCNC: 128 MG/DL
GFR SERPL CREATININE-BSD FRML MDRD: 47 ML/MIN/1.73SQ M
GLUCOSE P FAST SERPL-MCNC: 103 MG/DL (ref 65–99)
GLUCOSE UR STRIP-MCNC: NEGATIVE MG/DL
HBA1C MFR BLD: 6.1 %
HCT VFR BLD AUTO: 39.7 % (ref 34.8–46.1)
HDLC SERPL-MCNC: 60 MG/DL
HGB BLD-MCNC: 13.1 G/DL (ref 11.5–15.4)
HGB UR QL STRIP.AUTO: NEGATIVE
IMM GRANULOCYTES # BLD AUTO: 0.02 THOUSAND/UL (ref 0–0.2)
IMM GRANULOCYTES NFR BLD AUTO: 0 % (ref 0–2)
KETONES UR STRIP-MCNC: NEGATIVE MG/DL
LDLC SERPL CALC-MCNC: 103 MG/DL (ref 0–100)
LEUKOCYTE ESTERASE UR QL STRIP: ABNORMAL
LYMPHOCYTES # BLD AUTO: 1.04 THOUSANDS/ΜL (ref 0.6–4.47)
LYMPHOCYTES NFR BLD AUTO: 17 % (ref 14–44)
MCH RBC QN AUTO: 32.8 PG (ref 26.8–34.3)
MCHC RBC AUTO-ENTMCNC: 33 G/DL (ref 31.4–37.4)
MCV RBC AUTO: 100 FL (ref 82–98)
MONOCYTES # BLD AUTO: 1.11 THOUSAND/ΜL (ref 0.17–1.22)
MONOCYTES NFR BLD AUTO: 18 % (ref 4–12)
NEUTROPHILS # BLD AUTO: 3.66 THOUSANDS/ΜL (ref 1.85–7.62)
NEUTS SEG NFR BLD AUTO: 61 % (ref 43–75)
NITRITE UR QL STRIP: NEGATIVE
NON-SQ EPI CELLS URNS QL MICRO: ABNORMAL /HPF
NONHDLC SERPL-MCNC: 119 MG/DL
NRBC BLD AUTO-RTO: 0 /100 WBCS
PH UR STRIP.AUTO: 6 [PH]
PLATELET # BLD AUTO: 222 THOUSANDS/UL (ref 149–390)
PMV BLD AUTO: 10 FL (ref 8.9–12.7)
POTASSIUM SERPL-SCNC: 4.7 MMOL/L (ref 3.5–5.3)
PROT SERPL-MCNC: 7.7 G/DL (ref 6.4–8.2)
PROT UR STRIP-MCNC: NEGATIVE MG/DL
RBC # BLD AUTO: 3.99 MILLION/UL (ref 3.81–5.12)
RBC #/AREA URNS AUTO: ABNORMAL /HPF
SODIUM SERPL-SCNC: 141 MMOL/L (ref 136–145)
SP GR UR STRIP.AUTO: 1.01 (ref 1–1.03)
TRIGL SERPL-MCNC: 80 MG/DL
TSH SERPL DL<=0.05 MIU/L-ACNC: 0.27 UIU/ML (ref 0.36–3.74)
UROBILINOGEN UR QL STRIP.AUTO: 0.2 E.U./DL
WBC # BLD AUTO: 6.03 THOUSAND/UL (ref 4.31–10.16)
WBC #/AREA URNS AUTO: ABNORMAL /HPF

## 2022-03-11 PROCEDURE — 84443 ASSAY THYROID STIM HORMONE: CPT

## 2022-03-11 PROCEDURE — 80061 LIPID PANEL: CPT

## 2022-03-11 PROCEDURE — 80053 COMPREHEN METABOLIC PANEL: CPT

## 2022-03-11 PROCEDURE — 83036 HEMOGLOBIN GLYCOSYLATED A1C: CPT

## 2022-03-11 PROCEDURE — 85025 COMPLETE CBC W/AUTO DIFF WBC: CPT

## 2022-03-11 PROCEDURE — 81001 URINALYSIS AUTO W/SCOPE: CPT | Performed by: NURSE PRACTITIONER

## 2022-03-11 PROCEDURE — 36415 COLL VENOUS BLD VENIPUNCTURE: CPT

## 2022-03-15 ENCOUNTER — OFFICE VISIT (OUTPATIENT)
Dept: FAMILY MEDICINE CLINIC | Facility: CLINIC | Age: 72
End: 2022-03-15
Payer: COMMERCIAL

## 2022-03-15 VITALS
TEMPERATURE: 98.7 F | BODY MASS INDEX: 44.53 KG/M2 | WEIGHT: 242 LBS | OXYGEN SATURATION: 98 % | SYSTOLIC BLOOD PRESSURE: 138 MMHG | HEART RATE: 79 BPM | HEIGHT: 62 IN | DIASTOLIC BLOOD PRESSURE: 82 MMHG | RESPIRATION RATE: 18 BRPM

## 2022-03-15 DIAGNOSIS — Z79.899 ENCOUNTER FOR LONG-TERM (CURRENT) USE OF MEDICATIONS: ICD-10-CM

## 2022-03-15 DIAGNOSIS — I10 ESSENTIAL HYPERTENSION: ICD-10-CM

## 2022-03-15 DIAGNOSIS — F33.41 RECURRENT MAJOR DEPRESSIVE DISORDER, IN PARTIAL REMISSION (HCC): ICD-10-CM

## 2022-03-15 DIAGNOSIS — E66.01 MORBID OBESITY (HCC): ICD-10-CM

## 2022-03-15 DIAGNOSIS — E03.9 ACQUIRED HYPOTHYROIDISM: Primary | ICD-10-CM

## 2022-03-15 DIAGNOSIS — R04.0 RIGHT-SIDED EPISTAXIS: ICD-10-CM

## 2022-03-15 DIAGNOSIS — N18.31 CHRONIC KIDNEY DISEASE, STAGE 3A (HCC): ICD-10-CM

## 2022-03-15 DIAGNOSIS — Z79.899 POLYPHARMACY: ICD-10-CM

## 2022-03-15 DIAGNOSIS — M17.11 PRIMARY OSTEOARTHRITIS OF RIGHT KNEE: ICD-10-CM

## 2022-03-15 DIAGNOSIS — C82.31 GRADE 3A FOLLICULAR LYMPHOMA OF LYMPH NODES OF HEAD (HCC): ICD-10-CM

## 2022-03-15 DIAGNOSIS — Z00.00 ENCOUNTER FOR MEDICARE ANNUAL WELLNESS EXAM: ICD-10-CM

## 2022-03-15 DIAGNOSIS — I73.9 PAD (PERIPHERAL ARTERY DISEASE) (HCC): ICD-10-CM

## 2022-03-15 PROCEDURE — 99215 OFFICE O/P EST HI 40 MIN: CPT | Performed by: FAMILY MEDICINE

## 2022-03-15 PROCEDURE — 1036F TOBACCO NON-USER: CPT | Performed by: FAMILY MEDICINE

## 2022-03-15 PROCEDURE — 3725F SCREEN DEPRESSION PERFORMED: CPT | Performed by: FAMILY MEDICINE

## 2022-03-15 PROCEDURE — G0438 PPPS, INITIAL VISIT: HCPCS | Performed by: FAMILY MEDICINE

## 2022-03-15 PROCEDURE — 3008F BODY MASS INDEX DOCD: CPT | Performed by: FAMILY MEDICINE

## 2022-03-15 PROCEDURE — 1101F PT FALLS ASSESS-DOCD LE1/YR: CPT | Performed by: FAMILY MEDICINE

## 2022-03-15 PROCEDURE — 3079F DIAST BP 80-89 MM HG: CPT | Performed by: FAMILY MEDICINE

## 2022-03-15 PROCEDURE — 3288F FALL RISK ASSESSMENT DOCD: CPT | Performed by: FAMILY MEDICINE

## 2022-03-15 PROCEDURE — 1125F AMNT PAIN NOTED PAIN PRSNT: CPT | Performed by: FAMILY MEDICINE

## 2022-03-15 PROCEDURE — 1170F FXNL STATUS ASSESSED: CPT | Performed by: FAMILY MEDICINE

## 2022-03-15 PROCEDURE — 3075F SYST BP GE 130 - 139MM HG: CPT | Performed by: FAMILY MEDICINE

## 2022-03-15 PROCEDURE — 1160F RVW MEDS BY RX/DR IN RCRD: CPT | Performed by: FAMILY MEDICINE

## 2022-03-15 RX ORDER — LEVOTHYROXINE SODIUM 0.1 MG/1
100 TABLET ORAL DAILY
Qty: 60 TABLET | Refills: 1 | Status: SHIPPED | OUTPATIENT
Start: 2022-03-15 | End: 2022-05-16 | Stop reason: SDUPTHER

## 2022-03-15 NOTE — PATIENT INSTRUCTIONS
Medicare Preventive Visit Patient Instructions  Thank you for completing your Welcome to Medicare Visit or Medicare Annual Wellness Visit today  Your next wellness visit will be due in one year (3/16/2023)  The screening/preventive services that you may require over the next 5-10 years are detailed below  Some tests may not apply to you based off risk factors and/or age  Screening tests ordered at today's visit but not completed yet may show as past due  Also, please note that scanned in results may not display below  Preventive Screenings:  Service Recommendations Previous Testing/Comments   Colorectal Cancer Screening  * Colonoscopy    * Fecal Occult Blood Test (FOBT)/Fecal Immunochemical Test (FIT)  * Fecal DNA/Cologuard Test  * Flexible Sigmoidoscopy Age: 54-65 years old   Colonoscopy: every 10 years (may be performed more frequently if at higher risk)  OR  FOBT/FIT: every 1 year  OR  Cologuard: every 3 years  OR  Sigmoidoscopy: every 5 years  Screening may be recommended earlier than age 48 if at higher risk for colorectal cancer  Also, an individualized decision between you and your healthcare provider will decide whether screening between the ages of 74-80 would be appropriate  Colonoscopy: 07/30/2020  FOBT/FIT: Not on file  Cologuard: Not on file  Sigmoidoscopy: Not on file    Screening Current     Breast Cancer Screening Age: 36 years old  Frequency: every 1-2 years  Not required if history of left and right mastectomy Mammogram: 12/13/2019        Cervical Cancer Screening Between the ages of 21-29, pap smear recommended once every 3 years  Between the ages of 33-67, can perform pap smear with HPV co-testing every 5 years     Recommendations may differ for women with a history of total hysterectomy, cervical cancer, or abnormal pap smears in past  Pap Smear: 07/19/2021    Screening Not Indicated   Hepatitis C Screening Once for adults born between 1945 and 1965  More frequently in patients at high risk for Hepatitis C Hep C Antibody: 05/04/2021    Screening Current   Diabetes Screening 1-2 times per year if you're at risk for diabetes or have pre-diabetes Fasting glucose: 103 mg/dL   A1C: 6 1 %    Screening Current   Cholesterol Screening Once every 5 years if you don't have a lipid disorder  May order more often based on risk factors  Lipid panel: 03/11/2022    Screening Current     Other Preventive Screenings Covered by Medicare:  1  Abdominal Aortic Aneurysm (AAA) Screening: covered once if your at risk  You're considered to be at risk if you have a family history of AAA  2  Lung Cancer Screening: covers low dose CT scan once per year if you meet all of the following conditions: (1) Age 50-69; (2) No signs or symptoms of lung cancer; (3) Current smoker or have quit smoking within the last 15 years; (4) You have a tobacco smoking history of at least 30 pack years (packs per day multiplied by number of years you smoked); (5) You get a written order from a healthcare provider  3  Glaucoma Screening: covered annually if you're considered high risk: (1) You have diabetes OR (2) Family history of glaucoma OR (3)  aged 48 and older OR (3)  American aged 72 and older  3  Osteoporosis Screening: covered every 2 years if you meet one of the following conditions: (1) You're estrogen deficient and at risk for osteoporosis based off medical history and other findings; (2) Have a vertebral abnormality; (3) On glucocorticoid therapy for more than 3 months; (4) Have primary hyperparathyroidism; (5) On osteoporosis medications and need to assess response to drug therapy  · Last bone density test (DXA Scan): Not on file  5  HIV Screening: covered annually if you're between the age of 12-76  Also covered annually if you are younger than 13 and older than 72 with risk factors for HIV infection  For pregnant patients, it is covered up to 3 times per pregnancy      Immunizations:  Immunization Recommendations   Influenza Vaccine Annual influenza vaccination during flu season is recommended for all persons aged >= 6 months who do not have contraindications   Pneumococcal Vaccine (Prevnar and Pneumovax)  * Prevnar = PCV13  * Pneumovax = PPSV23   Adults 25-60 years old: 1-3 doses may be recommended based on certain risk factors  Adults 72 years old: Prevnar (PCV13) vaccine recommended followed by Pneumovax (PPSV23) vaccine  If already received PPSV23 since turning 65, then PCV13 recommended at least one year after PPSV23 dose  Hepatitis B Vaccine 3 dose series if at intermediate or high risk (ex: diabetes, end stage renal disease, liver disease)   Tetanus (Td) Vaccine - COST NOT COVERED BY MEDICARE PART B Following completion of primary series, a booster dose should be given every 10 years to maintain immunity against tetanus  Td may also be given as tetanus wound prophylaxis  Tdap Vaccine - COST NOT COVERED BY MEDICARE PART B Recommended at least once for all adults  For pregnant patients, recommended with each pregnancy  Shingles Vaccine (Shingrix) - COST NOT COVERED BY MEDICARE PART B  2 shot series recommended in those aged 48 and above     Health Maintenance Due:      Topic Date Due    Breast Cancer Screening: Mammogram  12/13/2020    Colorectal Cancer Screening  07/30/2030    Hepatitis C Screening  Completed     Immunizations Due:      Topic Date Due    Pneumococcal Vaccine: 65+ Years (4 of 4 - PPSV23) 01/27/2016    COVID-19 Vaccine (4 - Booster for [employer] series) 03/09/2022     Advance Directives   What are advance directives? Advance directives are legal documents that state your wishes and plans for medical care  These plans are made ahead of time in case you lose your ability to make decisions for yourself  Advance directives can apply to any medical decision, such as the treatments you want, and if you want to donate organs  What are the types of advance directives?   There are many types of advance directives, and each state has rules about how to use them  You may choose a combination of any of the following:  · Living will: This is a written record of the treatment you want  You can also choose which treatments you do not want, which to limit, and which to stop at a certain time  This includes surgery, medicine, IV fluid, and tube feedings  · Durable power of  for healthcare [facility]): This is a written record that states who you want to make healthcare choices for you when you are unable to make them for yourself  This person, called a proxy, is usually a family member or a friend  You may choose more than 1 proxy  · Do not resuscitate (DNR) order:  A DNR order is used in case your heart stops beating or you stop breathing  It is a request not to have certain forms of treatment, such as CPR  A DNR order may be included in other types of advance directives  · Medical directive: This covers the care that you want if you are in a coma, near death, or unable to make decisions for yourself  You can list the treatments you want for each condition  Treatment may include pain medicine, surgery, blood transfusions, dialysis, IV or tube feedings, and a ventilator (breathing machine)  · Values history: This document has questions about your views, beliefs, and how you feel and think about life  This information can help others choose the care that you would choose  Why are advance directives important? An advance directive helps you control your care  Although spoken wishes may be used, it is better to have your wishes written down  Spoken wishes can be misunderstood, or not followed  Treatments may be given even if you do not want them  An advance directive may make it easier for your family to make difficult choices about your care     Weight Management   Why it is important to manage your weight:  Being overweight increases your risk of health conditions such as heart disease, high blood pressure, type 2 diabetes, and certain types of cancer  It can also increase your risk for osteoarthritis, sleep apnea, and other respiratory problems  Aim for a slow, steady weight loss  Even a small amount of weight loss can lower your risk of health problems  How to lose weight safely:  A safe and healthy way to lose weight is to eat fewer calories and get regular exercise  You can lose up about 1 pound a week by decreasing the number of calories you eat by 500 calories each day  Healthy meal plan for weight management:  A healthy meal plan includes a variety of foods, contains fewer calories, and helps you stay healthy  A healthy meal plan includes the following:  · Eat whole-grain foods more often  A healthy meal plan should contain fiber  Fiber is the part of grains, fruits, and vegetables that is not broken down by your body  Whole-grain foods are healthy and provide extra fiber in your diet  Some examples of whole-grain foods are whole-wheat breads and pastas, oatmeal, brown rice, and bulgur  · Eat a variety of vegetables every day  Include dark, leafy greens such as spinach, kale, rex greens, and mustard greens  Eat yellow and orange vegetables such as carrots, sweet potatoes, and winter squash  · Eat a variety of fruits every day  Choose fresh or canned fruit (canned in its own juice or light syrup) instead of juice  Fruit juice has very little or no fiber  · Eat low-fat dairy foods  Drink fat-free (skim) milk or 1% milk  Eat fat-free yogurt and low-fat cottage cheese  Try low-fat cheeses such as mozzarella and other reduced-fat cheeses  · Choose meat and other protein foods that are low in fat  Choose beans or other legumes such as split peas or lentils  Choose fish, skinless poultry (chicken or turkey), or lean cuts of red meat (beef or pork)  Before you cook meat or poultry, cut off any visible fat  · Use less fat and oil  Try baking foods instead of frying them   Add less fat, such as margarine, sour cream, regular salad dressing and mayonnaise to foods  Eat fewer high-fat foods  Some examples of high-fat foods include french fries, doughnuts, ice cream, and cakes  · Eat fewer sweets  Limit foods and drinks that are high in sugar  This includes candy, cookies, regular soda, and sweetened drinks  Exercise:  Exercise at least 30 minutes per day on most days of the week  Some examples of exercise include walking, biking, dancing, and swimming  You can also fit in more physical activity by taking the stairs instead of the elevator or parking farther away from stores  Ask your healthcare provider about the best exercise plan for you  © Copyright [employer] 2018 Information is for End User's use only and may not be sold, redistributed or otherwise used for commercial purposes   All illustrations and images included in CareNotes® are the copyrighted property of A D A M , Inc  or [address] [place] [address]

## 2022-03-15 NOTE — PROGRESS NOTES
Assessment and Plan:     Problem List Items Addressed This Visit        Endocrine    Hypothyroidism    Relevant Medications    levothyroxine (Synthroid) 100 mcg tablet    Other Relevant Orders    TSH, 3rd generation       Cardiovascular and Mediastinum    Essential hypertension - Primary    PAD (peripheral artery disease) (HCC)       Musculoskeletal and Integument    Osteoarthritis       Other    Morbid obesity ([facility] )    Lymphoma ([facility] )    Recurrent major depressive disorder, in partial remission ([facility] )      Other Visit Diagnoses     Encounter for long-term (current) use of medications        on many meds and all reviewed  Encounter for Medicare annual wellness exam        Polypharmacy        see med list - revieewed  Chronic kidney disease, stage 3a ([facility] )        gfr is 50 on March 11, 2022 and that is running normal for her  Advised no NSAIDs    Right-sided epistaxis        only recent past 3 wks - PE neg - driness:  use Ponaris Emollient oil and sample given            Preventive health issues were discussed with patient, and age appropriate screening tests were ordered as noted in patient's After Visit Summary  Personalized health advice and appropriate referrals for health education or preventive services given if needed, as noted in patient's After Visit Summary       History of Present Illness:     Patient presents for Medicare Annual Wellness visit    Patient Care Team:  Felecia Jerome DO as PCP - General (Family Medicine)     Problem List:     Patient Active Problem List   Diagnosis    Vitamin D deficiency    Psoriasis    Pre-diabetes    Osteoarthritis    NSAID long-term use    Morbid obesity ([facility] )    Hypothyroidism    Essential hypertension    Elevated blood sugar    Stage 3a chronic kidney disease ([facility] )    Chronic depression    Chronic anxiety    Ambulatory dysfunction    Class 3 severe obesity with serious comorbidity and body mass index (BMI) of 45 0 to 49 9 in adult 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)    Lymphoma (HCC)    Rec[facility] major depressive disorder[facility]      Past Medical and Surgical History:     Past Medical History:   Diagnosis Date    Anxiety     on med     Arthritis     Back problem     4 Bulging Disks    Fibrocystic disease of both breasts     Follicular lymphoma grade 3a (HCC)     Hip pain     Left    History of staph infection     Hypertension     Knee pain     Right    Spinal stenosis     Thyroid disease      Past Surgical History:   Procedure Laterality Date    CATARACT EXTRACTION  2017    CHOLECYSTECTOMY      MAMMO (HISTORICAL)  2019    OTHER SURGICAL HISTORY      Warts removal    REFRACTIVE SURGERY Bilateral     ROTATOR CUFF REPAIR Right       Family History:     Family History   Problem Relation Age of Onset    Lung cancer Father     Breast cancer Other       Social History:     Social History     Socioeconomic History    Marital status: /Civil Union     Spouse name: None    Number of children: 1    Years of education: 12    Highest education level: 12th grade   Occupational History    Occupation: reitred    Tobacco Use    Smoking status: Former Smoker     Years: 14 00    Smokeless tobacco: Never Used   Vaping Use    Vaping Use: Never used   Substance and Sexual Activity    Alcohol use: Not Currently     Comment: Occasional     Drug use: Not Currently    Sexual activity: Yes     Partners: Male     Birth control/protection: Post-menopausal   Other Topics Concern    None   Social History Narrative    · Most recent tobacco use screenin2019      · Do you currently or have you served in the [employer]:   No      · Were you activated, into active duty, as a member of the [employer] or as a Reservist:   No · Caffeine intake:   Occasional      · Guns present in home:   No      · Seat belts used routinely:   Yes      · Sunscreen used routinely:   Yes      · Smoke alarm in home:    Yes      · Advance directive:   Yes      Social Determinants of Health     Financial Resource Strain: Not on file   Food Insecurity: Not on file   Transportation Needs: Not on file   Physical Activity: Not on file   Stress: Not on file   Social Connections: Not on file   Intimate Partner Violence: Not on file   Housing Stability: Not on file      Medications and Allergies:     Current Outpatient Medications   Medication Sig Dispense Refill    acetaminophen (TYLENOL) 500 mg tablet Take 500 mg by mouth every 4 (four) hours      amLODIPine (NORVASC) 5 mg tablet Take 1 tablet (5 mg total) by mouth daily 90 tablet 1    amoxicillin (AMOXIL) 500 MG tablet       benazepril (LOTENSIN) 40 MG tablet Take 1 tablet (40 mg total) by mouth daily 90 tablet 1    betamethasone dipropionate (DIPROSONE) 0 05 % cream Apply topically 2 (two) times a day ADD:  To the SCALP and rub in well 30 g 0    Cholecalciferol 1 25 MG (82427 UT) TABS Take 1 25 mg by mouth daily      citalopram (CeleXA) 10 mg tablet Take 1 tablet (10 mg total) by mouth daily 90 tablet 1    docusate calcium (Surfak) 240 mg capsule Take 240 mg by mouth 2 (two) times a day      fexofenadine (ALLEGRA) 180 MG tablet Take 180 mg by mouth daily      ketoconazole (NIZORAL) 2 % cream Apply topically daily ADD:  To the face, nose, eyebrows 30 g 1    levothyroxine (Synthroid) 100 mcg tablet Take 1 tablet (100 mcg total) by mouth daily 60 tablet 1    linaCLOtide (Linzess) 145 MCG CAPS Take 1 capsule (145 mcg total) by mouth daily 30 capsule 3    multivitamin-iron-minerals-folic acid (CENTRUM) chewable tablet Chew 1 tablet daily      Omega 3 1200 MG CAPS Take by mouth      pantoprazole (PROTONIX) 40 mg tablet Take 1 tablet (40 mg total) by mouth daily 90 tablet 3    triamcinolone (KENALOG) 0 1 % cream Apply topically 2 (two) times a day ADD to the Sig:  Apply behind the ears 30 g 0     No current facility-administered medications for this visit  Allergies   Allergen Reactions    Diphenhydramine Other (See Comments)     Per patient and daughter have been told this is not an allergy, patient has had chest pressure/pain with benadryl once in the past     Erythromycin Other (See Comments)    Tetanus Toxoids Other (See Comments)    Tetracycline Other (See Comments)      Immunizations:     Immunization History   Administered Date(s) Administered    COVID-19 PFIZER VACCINE 0 3 ML IM 03/16/2021, 04/09/2021, 10/09/2021    INFLUENZA 12/05/2014, 11/09/2015, 11/07/2017, 09/20/2018    Influenza, high dose seasonal 0 7 mL 10/06/2020, 10/15/2021    Pneumococcal Conjugate 13-Valent 01/27/2015    Pneumococcal Polysaccharide PPV23 01/01/2002, 01/01/2009, 03/10/2009    Td (adult), adsorbed 01/01/1996, 01/01/2006      Health Maintenance:         Topic Date Due    Breast Cancer Screening: Mammogram  12/13/2020    Colorectal Cancer Screening  07/30/2030    Hepatitis C Screening  Completed         Topic Date Due    Pneumococcal Vaccine: 65+ Years (4 of 4 - PPSV23) 01/27/2016    COVID-19 Vaccine (4 - Booster for [employer] series) 03/09/2022      Medicare Health Risk Assessment:     /82   Pulse 79   Temp 98 7 °F (37 1 °C)   Resp 18   Ht 5' 2" (1 575 m)   Wt 110 kg (242 lb)   SpO2 98%   BMI 44 26 kg/m²      Sena Obando is here for her Subsequent Wellness visit  Health Risk Assessment:   Patient rates overall health as fair  Patient feels that their physical health rating is same  Patient is satisfied with their life  Eyesight was rated as same  Hearing was rated as same  Patient feels that their emotional and mental health rating is same  Patients states they are never, rarely angry  Patient states they are never, rarely unusually tired/fatigued  Pain experienced in the last 7 days has been none  Patient states that she has experienced no weight loss or gain in last 6 months  Depression Screening:   PHQ-9 Score: 0      Fall Risk Screening: In the past year, patient has experienced: no history of falling in past year      Urinary Incontinence Screening:   Patient has not leaked urine accidently in the last six months  Home Safety:  Patient does not have trouble with stairs inside or outside of their home  Patient has working smoke alarms and has working carbon monoxide detector  Home safety hazards include: none  Nutrition:   Current diet is Low Carb and Regular  Top wt was 268  lb and now 242 lbs  Had L THR in Oct 2020    Medications:   Patient is currently taking over-the-counter supplements  OTC medications include: see medication list  Patient is able to manage medications  Activities of Daily Living (ADLs)/Instrumental Activities of Daily Living (IADLs):   Walk and transfer into and out of bed and chair?: Yes  Dress and groom yourself?: Yes    Bathe or shower yourself?: Yes    Feed yourself? Yes  Do your laundry/housekeeping?: Yes  Manage your money, pay your bills and track your expenses?: Yes  Make your own meals?: Yes    Do your own shopping?: Yes    Previous Hospitalizations:   Any hospitalizations or ED visits within the last 12 months?: No      Advance Care Planning:   Living will: Yes    Durable POA for healthcare:  Yes    Advanced directive: Yes    Advanced directive counseling given: Yes    Five wishes given: Yes    Patient declined ACP directive: No    End of Life Decisions reviewed with patient: Yes    Provider agrees with end of life decisions: Yes      Comments: Cyn avila is a  and he helped few yrs ago    Cognitive Screening:   Provider or family/friend/caregiver concerned regarding cognition?: No    PREVENTIVE SCREENINGS      Cardiovascular Screening:    General: Screening Current and Risks and Benefits Discussed      Diabetes Screening:     General: Screening Current and Risks and Benefits Discussed      Colorectal Cancer Screening:     General: Screening Current      Breast Cancer Screening:     General: Risks and Benefits Discussed      Cervical Cancer Screening:    General: Screening Not Indicated and Risks and Benefits Discussed      Osteoporosis Screening:    General: Risks and Benefits Discussed      Lung Cancer Screening:     General: Screening Not Indicated and Risks and Benefits Discussed      Hepatitis C Screening:    General: Screening Current      Preventive Screening Comments: Dr Justine Leal did mammo in Jan 2022 and all ok  Had at the [facility]-- Jan 23, 2022    Screening, Brief Intervention, and Referral to Treatment (SBIRT)    Screening  Typical number of drinks in a day: 0  Typical number of drinks in a week: 0  Interpretation: Low risk drinking behavior  Single Item Drug Screening:  How often have you used an illegal drug (including marijuana) or a prescription medication for non-medical reasons in the past year? never    Single Item Drug Screen Score: 0  Interpretation: Negative screen for possible drug use disorder    Brief Intervention  Alcohol & drug use screenings were reviewed  No concerns regarding substance use disorder identified  Healthy alcohol use/limits discussed  Other Counseling Topics:   Car/seat belt/driving safety, skin self-exam, sunscreen and calcium and vitamin D intake and regular weightbearing exercise   Just saw ivet Rich,

## 2022-03-15 NOTE — PROGRESS NOTES
Assessment/Plan:    BMI Counseling: Body mass index is 44 26 kg/m²  The BMI is above normal  Nutrition recommendations include decreasing portion sizes, encouraging healthy choices of fruits and vegetables, decreasing fast food intake, consuming healthier snacks, limiting drinks that contain sugar, moderation in carbohydrate intake, increasing intake of lean protein and reducing intake of saturated and trans fat  Exercise recommendations include moderate physical activity 150 minutes/week and exercising 3-5 times per week  No pharmacotherapy was ordered  Rationale for BMI follow-up plan is due to patient being overweight or obese  1  Acquired hypothyroidism  Comments:  TSH has been falling the last 3 times, and now is 0 2, so reduce the synthroid from 125 to 100 and ck q8w  Orders:  -     TSH, 3rd generation; Standing  -     TSH, 3rd generation  -     levothyroxine (Synthroid) 100 mcg tablet; Take 1 tablet (100 mcg total) by mouth daily    2  Essential hypertension  Comments:  on 2 agents and BP_ is good  3  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 ([facility] )  Comments:  Dx in Spring, 2021, chemo started in June and ended in Dec, 2021  Good now- full remission    4  Encounter for long-term (current) use of medications  Comments:  on many meds and all reviewed  5  Encounter for Medicare annual wellness exam    6  Polypharmacy  Comments:  see med list - revieewed  7  Recurrent major depressive disorder, in partial remission (HCC)  Comments:  on Celexa 10 mg OD and doing ok  Covid was an issue  8  PAD (peripheral artery disease) ([facility] )    9  Morbid [facility] ([facility] )  Comments:  wt is 242; normal wt 268 before L hip replaced in Oct 2020  And doing fine now re the L hip, but the R knee now is in need of replacing  Keep losing wt     10  Chronic kidney disease, stage 3a ([facility] )  Comments:  gfr is 52 on March 11, 2022 and that is running normal for her  Advised no NSAIDs    11   Primary osteoarthritis of right knee  Comments:  Had one injection in R knee in Nov and very helpful - Dr Janet Santana (he did the L hip total replacement)    12  Right-sided epistaxis  Comments:  only recent past 3 wks - PE neg - driness:  use Ponaris Emollient oil and sample given           Subjective:      Patient ID: Imelda Salazar is a 74 y o  female  HPI    The following portions of the patient's history were reviewed and updated as appropriate: She  has a past medical history of Anxiety, Arthritis, Back problem, Fibrocystic disease of both breasts, Follicular lymphoma grade [facility]a ([facility] ), Hip pain, History of staph infection, Hypertension, Knee pain, Spinal stenosis, and Thyroid disease  She   Patient Active Problem List    Diagnosis Date Noted    Recurrent major depressive disorder, in partial remission ([facility] ) 03/15/2022    Lymphoma ([facility] ) 06/21/2021    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 ([facility] ) 06/09/2021    PAD (peripheral artery disease) ([facility] ) 04/07/2021    Class 3 severe obesity with serious comorbidity and body mass index (BMI) of 45 0 to 49 9 in adult [facility]) 03/02/2021    Primary osteoarthritis of right hip 01/20/2021    Lumbar pain 12/09/2020    History of total left hip replacement 12/01/2020    Chronic anxiety 11/01/2019    Vitamin D deficiency 09/09/2019    Psoriasis 09/09/2019    Pre-diabetes 09/09/2019    Osteoarthritis 09/09/2019    NSAID long-term use 09/09/2019    Morbid obesity ([facility] ) 09/09/2019    Hypothyroidism 09/09/2019    Essential hypertension 09/09/2019    Elevated blood sugar 09/09/2019    Stage 3a chronic kidney disease ([facility] ) 09/09/2019    Chronic depression 09/09/2019    Ambulatory dysfunction 09/09/2019     She  has a past surgical history that includes Mammo (historical) (12/13/2019); Cataract extraction (01/01/2017); Refractive surgery (Bilateral); Other surgical history; Cholecystectomy; and Rotator cuff repair (Right)    Her family history includes Breast cancer in her other; Lung cancer in her father  She  reports that she has quit smoking  She quit after 14 00 years of use  She has never used smokeless tobacco  She reports previous alcohol use  She reports previous drug use  Current Outpatient Medications   Medication Sig Dispense Refill    acetaminophen (TYLENOL) 500 mg tablet Take 500 mg by mouth every 4 (four) hours      amLODIPine (NORVASC) 5 mg tablet Take 1 tablet (5 mg total) by mouth daily 90 tablet 1    amoxicillin (AMOXIL) 500 MG tablet       benazepril (LOTENSIN) 40 MG tablet Take 1 tablet (40 mg total) by mouth daily 90 tablet 1    betamethasone dipropionate (DIPROSONE) 0 05 % cream Apply topically 2 (two) times a day ADD:  To the SCALP and rub in well 30 g 0    Cholecalciferol 1 25 MG (62057 UT) TABS Take 1 25 mg by mouth daily      citalopram (CeleXA) 10 mg tablet Take 1 tablet (10 mg total) by mouth daily 90 tablet 1    docusate calcium (Surfak) 240 mg capsule Take 240 mg by mouth 2 (two) times a day      fexofenadine (ALLEGRA) 180 MG tablet Take 180 mg by mouth daily      ketoconazole (NIZORAL) 2 % cream Apply topically daily ADD:  To the face, nose, eyebrows 30 g 1    levothyroxine (Synthroid) 100 mcg tablet Take 1 tablet (100 mcg total) by mouth daily 60 tablet 1    linaCLOtide (Linzess) 145 MCG CAPS Take 1 capsule (145 mcg total) by mouth daily 30 capsule 3    multivitamin-iron-minerals-folic acid (CENTRUM) chewable tablet Chew 1 tablet daily      Omega 3 1200 MG CAPS Take by mouth      pantoprazole (PROTONIX) 40 mg tablet Take 1 tablet (40 mg total) by mouth daily 90 tablet 3    triamcinolone (KENALOG) 0 1 % cream Apply topically 2 (two) times a day ADD to the Sig:  Apply behind the ears 30 g 0     No current facility-administered medications for this visit       Current Outpatient Medications on File Prior to Visit   Medication Sig    acetaminophen (TYLENOL) 500 mg tablet Take 500 mg by mouth every 4 (four) hours    amLODIPine (NORVASC) 5 mg tablet Take 1 tablet (5 mg total) by mouth daily    amoxicillin (AMOXIL) 500 MG tablet     benazepril (LOTENSIN) 40 MG tablet Take 1 tablet (40 mg total) by mouth daily    betamethasone dipropionate (DIPROSONE) 0 05 % cream Apply topically 2 (two) times a day ADD:  To the SCALP and rub in well    Cholecalciferol 1 25 MG (19483 UT) TABS Take 1 25 mg by mouth daily    citalopram (CeleXA) 10 mg tablet Take 1 tablet (10 mg total) by mouth daily    docusate calcium (Surfak) 240 mg capsule Take 240 mg by mouth 2 (two) times a day    fexofenadine (ALLEGRA) 180 MG tablet Take 180 mg by mouth daily    ketoconazole (NIZORAL) 2 % cream Apply topically daily ADD:  To the face, nose, eyebrows    linaCLOtide (Linzess) 145 MCG CAPS Take 1 capsule (145 mcg total) by mouth daily    multivitamin-iron-minerals-folic acid (CENTRUM) chewable tablet Chew 1 tablet daily    Omega 3 1200 MG CAPS Take by mouth    pantoprazole (PROTONIX) 40 mg tablet Take 1 tablet (40 mg total) by mouth daily    triamcinolone (KENALOG) 0 1 % cream Apply topically 2 (two) times a day ADD to the Sig:  Apply behind the ears    [DISCONTINUED] levothyroxine 125 mcg tablet Take 1 tablet (125 mcg total) by mouth daily    [DISCONTINUED] meloxicam (MOBIC) 15 mg tablet TAKE 1 TABLET DAILY     No current facility-administered medications on file prior to visit  She is allergic to diphenhydramine, erythromycin, tetanus toxoids, and tetracycline       Review of Systems   Constitutional: Negative for activity change, appetite change, chills, diaphoresis, fatigue and fever  HENT: Negative for congestion, ear discharge, ear pain, facial swelling, nosebleeds, sore throat, tinnitus, trouble swallowing and voice change  Eyes: Negative for photophobia, pain, discharge, redness, itching and visual disturbance  Respiratory: Negative for apnea, cough, choking, chest tightness and shortness of breath           Denies any and all respiratory issues - SOB, orthopnea, etc    Cardiovascular: Negative for chest pain, palpitations and leg swelling  Denies any and all cardiac symptoms  HTN stable    Gastrointestinal: Negative for abdominal distention, abdominal pain, blood in stool, constipation, diarrhea, nausea and vomiting  Endocrine: Negative for cold intolerance, heat intolerance, polydipsia, polyphagia and polyuria  Genitourinary: Negative for decreased urine volume, difficulty urinating, dysuria, enuresis, frequency, hematuria, pelvic pain, urgency and vaginal bleeding  Musculoskeletal: Positive for arthralgias, back pain (due to spinal stenosis and 4 bulging disc), gait problem (walking with cane, due to R knee pain) and myalgias  Negative for joint swelling, neck pain and neck stiffness  Oct, 2020, Dr Maite Johansen replaced the L hip and no prob  He did the side incision and delayed recovery (due to large pineculus)  Did PT well, etc    This shifted the wt to the R knee and that is the prob now  Pain and some swelling  Burning sensation  Hurts  Will f/u wht Dr Taveras [facility]   Skin: Negative for color change, pallor and rash  Psoriasis behind ears, seb dermatitis of nose, face, eyebrows, and  Eczema    Incision from biopsy sub parotid gland on left noted and healed  This was done about June 3rd, 2021   Allergic/Immunologic: Negative for immunocompromised state  Neurological: Negative for dizziness, seizures, facial asymmetry, light-headedness, numbness and headaches  Hematological: Negative for adenopathy  Currently undergoing treatment for lymphoma    Psychiatric/Behavioral: Negative for agitation, behavioral problems, confusion, decreased concentration, dysphoric mood, hallucinations and suicidal ideas  The patient is nervous/anxious (stable on Celexa )            Objective:      /82   Pulse 79   Temp 98 7 °F (37 1 °C)   Resp 18   Ht 5' 2" (1 575 m)   Wt 110 kg (242 lb)   SpO2 98%   BMI 44 26 kg/m² Physical Exam  Vitals and nursing note reviewed  Constitutional:       General: She is not in acute distress  Appearance: She is well-developed  She is obese  She is not ill-appearing, toxic-appearing or diaphoretic  Comments: BMI 45 18   HENT:      Head: Normocephalic and atraumatic  Eyes:      General: No scleral icterus  Conjunctiva/sclera: Conjunctivae normal       Pupils: Pupils are equal, round, and reactive to light  Neck:      Thyroid: No thyromegaly  Trachea: No tracheal deviation  Cardiovascular:      Rate and Rhythm: Normal rate and regular rhythm  Heart sounds: Normal heart sounds  Pulmonary:      Effort: No respiratory distress  Breath sounds: Normal breath sounds  No wheezing or rales  Chest:      Chest wall: No tenderness  Musculoskeletal:         General: No tenderness or deformity  Normal range of motion  Cervical back: Normal range of motion and neck supple  Comments: Will see Dr Rosamaria Benton in 2 days for eval of hip (L) repllacement  Skin:     General: Skin is warm and dry  Coloration: Skin is not pale  Findings: No erythema or rash  Comments: R pedro- / Honorio surg  5 days ago -- looks good   Neurological:      General: No focal deficit present  Mental Status: She is alert and oriented to person, place, and time  Cranial Nerves: No cranial nerve deficit  Psychiatric:         Mood and Affect: Mood normal          Behavior: Behavior normal          Thought Content: Thought content normal          Judgment: Judgment normal          45-50 min with pt and  and long, long talk on hypothyroid and generic meds vs brand, etc     NOTE: all of the above issues discussed include chronic illness(es)  with severe exacerbations OR pose threats to life or body function if not managed/monitored effectively  I am as concerned about the long term effects of these disease states, as much as the short term effects   I spent considerable time assuring that my patient  understands  I reviewed prior internal and external notes,  consults,  hospital discharges,  and any other appropriate documents  I  have discussed the management and interpretation of them as well  Again, patient expresses understanding

## 2022-03-21 NOTE — PROGRESS NOTES
Elizabeth Pastrana had walk-in EKG completed on 06/01/21 at 5:19 PM by Bal Gibson LPN  numerical 0-10

## 2022-04-13 ENCOUNTER — TELEPHONE (OUTPATIENT)
Dept: HEMATOLOGY ONCOLOGY | Facility: CLINIC | Age: 72
End: 2022-04-13

## 2022-05-16 DIAGNOSIS — E03.9 ACQUIRED HYPOTHYROIDISM: ICD-10-CM

## 2022-05-16 RX ORDER — LEVOTHYROXINE SODIUM 0.1 MG/1
100 TABLET ORAL DAILY
Qty: 90 TABLET | Refills: 1 | Status: SHIPPED | OUTPATIENT
Start: 2022-05-16

## 2022-05-16 NOTE — TELEPHONE ENCOUNTER
Patient called refill line requesting a refill on her synthroid 100 mcg - brand name only  Medication sent to the pharmacy at this time  Patient would like it sent to [facility]

## 2022-05-17 ENCOUNTER — TELEPHONE (OUTPATIENT)
Dept: FAMILY MEDICINE CLINIC | Facility: CLINIC | Age: 72
End: 2022-05-17

## 2022-05-17 ENCOUNTER — APPOINTMENT (OUTPATIENT)
Dept: LAB | Facility: CLINIC | Age: 72
End: 2022-05-17
Payer: COMMERCIAL

## 2022-05-17 LAB — TSH SERPL DL<=0.05 MIU/L-ACNC: 1.47 UIU/ML (ref 0.45–4.5)

## 2022-05-17 PROCEDURE — 84443 ASSAY THYROID STIM HORMONE: CPT | Performed by: FAMILY MEDICINE

## 2022-05-17 PROCEDURE — 36415 COLL VENOUS BLD VENIPUNCTURE: CPT | Performed by: FAMILY MEDICINE

## 2022-05-17 NOTE — TELEPHONE ENCOUNTER
Patient stopped in she needs synthroid 100 one daily   She is upset she needs namebrand only not generic  It should be in her chart?   [facility]

## 2022-05-17 NOTE — TELEPHONE ENCOUNTER
Patient only has synthroid 125 at home does she continue to take it till the 100's come in  Please call patient and advise thank you

## 2022-05-23 ENCOUNTER — TELEPHONE (OUTPATIENT)
Dept: HEMATOLOGY ONCOLOGY | Facility: CLINIC | Age: 72
End: 2022-05-23

## 2022-05-23 DIAGNOSIS — C82.31 GRADE 3A FOLLICULAR LYMPHOMA OF LYMPH NODES OF NECK (HCC): Primary | ICD-10-CM

## 2022-05-23 DIAGNOSIS — R14.0 BLOATING: ICD-10-CM

## 2022-05-23 NOTE — TELEPHONE ENCOUNTER
CALL RETURN FORM   Reason for patient call? Patient is calling in requesting a call back  She indicates that she is suffering from bloating    Patient's primary oncologist?  Coleman Quevedo    Name of person the patient was calling for? Krys    Any additional information to add, if applicable? n/a   Informed patient that the message will be forwarded to the team and someone will get back to them as soon as possible    Did you relay this information to the patient?   Patient can be reached back at [phone]

## 2022-05-23 NOTE — TELEPHONE ENCOUNTER
Please set up patient for CT CAP, with oral contrast  This needs to be done prior to her next appointment on 6/22 with Dr Aarti Bland  Spoke with patient to make her aware  She verbalized understanding and agreed to plan

## 2022-06-15 ENCOUNTER — HOSPITAL ENCOUNTER (OUTPATIENT)
Dept: RADIOLOGY | Age: 72
Discharge: HOME | End: 2022-06-15
Payer: COMMERCIAL

## 2022-06-15 DIAGNOSIS — C82.31 GRADE 3A FOLLICULAR LYMPHOMA OF LYMPH NODES OF NECK (HCC): ICD-10-CM

## 2022-06-15 DIAGNOSIS — R14.0 BLOATING: ICD-10-CM

## 2022-06-15 PROCEDURE — 71250 CT THORAX DX C-: CPT

## 2022-06-15 PROCEDURE — 74176 CT ABD & PELVIS W/O CONTRAST: CPT

## 2022-06-21 ENCOUNTER — APPOINTMENT (OUTPATIENT)
Dept: LAB | Facility: AMBULARY SURGERY CENTER | Age: 72
End: 2022-06-21
Payer: COMMERCIAL

## 2022-06-22 ENCOUNTER — OFFICE VISIT (OUTPATIENT)
Dept: HEMATOLOGY ONCOLOGY | Facility: CLINIC | Age: 72
End: 2022-06-22
Payer: COMMERCIAL

## 2022-06-22 VITALS
TEMPERATURE: 97.7 F | SYSTOLIC BLOOD PRESSURE: 124 MMHG | HEART RATE: 82 BPM | BODY MASS INDEX: 46.01 KG/M2 | RESPIRATION RATE: 18 BRPM | HEIGHT: 62 IN | DIASTOLIC BLOOD PRESSURE: 80 MMHG | OXYGEN SATURATION: 98 % | WEIGHT: 250 LBS

## 2022-06-22 DIAGNOSIS — C82.31 GRADE 3A FOLLICULAR LYMPHOMA OF LYMPH NODES OF NECK (HCC): Primary | ICD-10-CM

## 2022-06-22 PROCEDURE — 1036F TOBACCO NON-USER: CPT | Performed by: INTERNAL MEDICINE

## 2022-06-22 PROCEDURE — 3074F SYST BP LT 130 MM HG: CPT | Performed by: INTERNAL MEDICINE

## 2022-06-22 PROCEDURE — 99214 OFFICE O/P EST MOD 30 MIN: CPT | Performed by: INTERNAL MEDICINE

## 2022-06-22 PROCEDURE — 3079F DIAST BP 80-89 MM HG: CPT | Performed by: INTERNAL MEDICINE

## 2022-06-22 PROCEDURE — 1160F RVW MEDS BY RX/DR IN RCRD: CPT | Performed by: INTERNAL MEDICINE

## 2022-06-22 PROCEDURE — 3008F BODY MASS INDEX DOCD: CPT | Performed by: INTERNAL MEDICINE

## 2022-06-23 ENCOUNTER — RA CDI HCC (OUTPATIENT)
Dept: OTHER | Facility: HOSPITAL | Age: 72
End: 2022-06-23

## 2022-06-23 NOTE — PROGRESS NOTES
[facility]  coding opportunities       Chart reviewed, no opportunity found:   [address]        Patients Insurance     Medicare Insurance: [employer]

## 2022-07-05 ENCOUNTER — HOSPITAL ENCOUNTER (EMERGENCY)
Facility: HOSPITAL | Age: 72
Discharge: HOME | End: 2022-07-05
Attending: EMERGENCY MEDICINE | Admitting: EMERGENCY MEDICINE
Payer: COMMERCIAL

## 2022-07-05 VITALS
OXYGEN SATURATION: 100 % | RESPIRATION RATE: 20 BRPM | HEART RATE: 68 BPM | TEMPERATURE: 98.1 F | DIASTOLIC BLOOD PRESSURE: 68 MMHG | SYSTOLIC BLOOD PRESSURE: 151 MMHG

## 2022-07-05 DIAGNOSIS — R20.2 TINGLING OF LEFT UPPER EXTREMITY: Primary | ICD-10-CM

## 2022-07-05 DIAGNOSIS — R73.9 ELEVATED BLOOD SUGAR: ICD-10-CM

## 2022-07-05 DIAGNOSIS — R07.89 DISCOMFORT IN CHEST: ICD-10-CM

## 2022-07-05 LAB
2HR DELTA HS TROPONIN: 1 NG/L
ALBUMIN SERPL BCP-MCNC: 4.3 G/DL (ref 3.5–5)
ALP SERPL-CCNC: 86 U/L (ref 34–104)
ALT SERPL W P-5'-P-CCNC: 29 U/L (ref 7–52)
ANION GAP SERPL CALCULATED.3IONS-SCNC: 5 MMOL/L (ref 4–13)
AST SERPL W P-5'-P-CCNC: 19 U/L (ref 13–39)
ATRIAL RATE: 67 BPM
ATRIAL RATE: 76 BPM
BASOPHILS # BLD AUTO: 0.04 THOUSANDS/ΜL (ref 0–0.1)
BASOPHILS NFR BLD AUTO: 1 % (ref 0–1)
BILIRUB SERPL-MCNC: 0.59 MG/DL (ref 0.2–1)
BUN SERPL-MCNC: 32 MG/DL (ref 5–25)
CALCIUM SERPL-MCNC: 9.8 MG/DL (ref 8.4–10.2)
CARDIAC TROPONIN I PNL SERPL HS: 7 NG/L
CARDIAC TROPONIN I PNL SERPL HS: 8 NG/L
CHLORIDE SERPL-SCNC: 106 MMOL/L (ref 96–108)
CO2 SERPL-SCNC: 27 MMOL/L (ref 21–32)
CREAT SERPL-MCNC: 0.93 MG/DL (ref 0.6–1.3)
EOSINOPHIL # BLD AUTO: 0.25 THOUSAND/ΜL (ref 0–0.61)
EOSINOPHIL NFR BLD AUTO: 4 % (ref 0–6)
ERYTHROCYTE [DISTWIDTH] IN BLOOD BY AUTOMATED COUNT: 13.4 % (ref 11.6–15.1)
GFR SERPL CREATININE-BSD FRML MDRD: 60 ML/MIN/1.73SQ M
GLUCOSE SERPL-MCNC: 141 MG/DL (ref 65–140)
HCT VFR BLD AUTO: 41.4 % (ref 34.8–46.1)
HGB BLD-MCNC: 13.4 G/DL (ref 11.5–15.4)
IMM GRANULOCYTES # BLD AUTO: 0.06 THOUSAND/UL (ref 0–0.2)
IMM GRANULOCYTES NFR BLD AUTO: 1 % (ref 0–2)
LYMPHOCYTES # BLD AUTO: 0.86 THOUSANDS/ΜL (ref 0.6–4.47)
LYMPHOCYTES NFR BLD AUTO: 14 % (ref 14–44)
MCH RBC QN AUTO: 33 PG (ref 26.8–34.3)
MCHC RBC AUTO-ENTMCNC: 32.4 G/DL (ref 31.4–37.4)
MCV RBC AUTO: 102 FL (ref 82–98)
MONOCYTES # BLD AUTO: 0.87 THOUSAND/ΜL (ref 0.17–1.22)
MONOCYTES NFR BLD AUTO: 15 % (ref 4–12)
NEUTROPHILS # BLD AUTO: 3.93 THOUSANDS/ΜL (ref 1.85–7.62)
NEUTS SEG NFR BLD AUTO: 65 % (ref 43–75)
NRBC BLD AUTO-RTO: 0 /100 WBCS
P AXIS: 57 DEGREES
P AXIS: 60 DEGREES
PLATELET # BLD AUTO: 221 THOUSANDS/UL (ref 149–390)
PMV BLD AUTO: 9.7 FL (ref 8.9–12.7)
POTASSIUM SERPL-SCNC: 4.3 MMOL/L (ref 3.5–5.3)
PR INTERVAL: 148 MS
PR INTERVAL: 154 MS
PROT SERPL-MCNC: 7.2 G/DL (ref 6.4–8.4)
QRS AXIS: -18 DEGREES
QRS AXIS: -19 DEGREES
QRSD INTERVAL: 92 MS
QRSD INTERVAL: 92 MS
QT INTERVAL: 372 MS
QT INTERVAL: 398 MS
QTC INTERVAL: 418 MS
QTC INTERVAL: 420 MS
RBC # BLD AUTO: 4.06 MILLION/UL (ref 3.81–5.12)
SODIUM SERPL-SCNC: 138 MMOL/L (ref 135–147)
T WAVE AXIS: 23 DEGREES
T WAVE AXIS: 42 DEGREES
VENTRICULAR RATE: 67 BPM
VENTRICULAR RATE: 76 BPM
WBC # BLD AUTO: 6.01 THOUSAND/UL (ref 4.31–10.16)

## 2022-07-05 PROCEDURE — 99285 EMERGENCY DEPT VISIT HI MDM: CPT

## 2022-07-05 PROCEDURE — 99285 EMERGENCY DEPT VISIT HI MDM: CPT | Performed by: EMERGENCY MEDICINE

## 2022-07-05 PROCEDURE — 93010 ELECTROCARDIOGRAM REPORT: CPT | Performed by: INTERNAL MEDICINE

## 2022-07-05 PROCEDURE — 85025 COMPLETE CBC W/AUTO DIFF WBC: CPT

## 2022-07-05 PROCEDURE — 84484 ASSAY OF TROPONIN QUANT: CPT

## 2022-07-05 PROCEDURE — 93005 ELECTROCARDIOGRAM TRACING: CPT

## 2022-07-05 PROCEDURE — 80053 COMPREHEN METABOLIC PANEL: CPT

## 2022-07-05 PROCEDURE — 36415 COLL VENOUS BLD VENIPUNCTURE: CPT

## 2022-07-05 NOTE — ED PROVIDER NOTES
History  Chief Complaint   Patient presents with    Chest Pain     Cp WHILE SITTING IN CHAIR GOT BETTER NOW ONLY PINS AND NEEDLES IN LEFT ARM     Larry Kaur is a 74year old female presenting for evaluation of a tingling" sensation in her chest that radiated down her left arm and to her left hand  The patient was sitting in her chair at rest when the symptoms began almost 3 hours ago  She endorses some mild shortness of breath  She denied any chest pain  She denies any recent cough, congestion, upper respiratory infection  She denies any significant cardiac history  She denies any history of DVT or PE  She denies any recent travel, new/worsening lower extremity edema or tenderness  Patient has a smoking history, she has not smoked though for over 40 years  Patient was recently treated for lymphoma, she bleed her treatment in December, CT of her chest, abdomen, pelvis worse after recheck 1 month ago was negative  Patient's main concern today was that her symptoms were secondary to her heart  She was also concerned that she might have a nerve impingement, she endorses lumbar spinal stenosis  History provided by:  Patient   used: No    Chest Pain  Associated symptoms: shortness of breath    Associated symptoms: no abdominal pain, no back pain, no cough, no fever, no palpitations and not vomiting        Prior to Admission Medications   Prescriptions Last Dose Informant Patient Reported? Taking?    Cholecalciferol 1 25 MG (04198 UT) TABS  Self Yes No   Sig: Take 1 25 mg by mouth daily   Omega 3 1200 MG CAPS  Self Yes No   Sig: Take by mouth   acetaminophen (TYLENOL) 500 mg tablet  Self Yes No   Sig: Take 500 mg by mouth every 4 (four) hours   amLODIPine (NORVASC) 5 mg tablet   No No   Sig: Take 1 tablet (5 mg total) by mouth daily   amoxicillin (AMOXIL) 500 MG tablet  Self Yes No   benazepril (LOTENSIN) 40 MG tablet   No No   Sig: Take 1 tablet (40 mg total) by mouth daily   betamethasone dipropionate (DIPROSONE) 0 05 % cream  Self No No   Sig: Apply topically 2 (two) times a day ADD:  To the SCALP and rub in well   citalopram (CeleXA) 10 mg tablet   No No   Sig: Take 1 tablet (10 mg total) by mouth daily   docusate calcium (SURFAK) 240 mg capsule  Self Yes No   Sig: Take 240 mg by mouth 2 (two) times a day   fexofenadine (ALLEGRA) 180 MG tablet  Self Yes No   Sig: Take 180 mg by mouth daily   ketoconazole (NIZORAL) 2 % cream  Self No No   Sig: Apply topically daily ADD:  To the face, nose, eyebrows   levothyroxine (Synthroid) 100 mcg tablet   No No   Sig: Take 1 tablet (100 mcg total) by mouth in the morning  linaCLOtide (Linzess) 145 MCG CAPS  Self No No   Sig: Take 1 capsule (145 mcg total) by mouth daily   multivitamin-iron-minerals-folic acid (CENTRUM) chewable tablet  Self Yes No   Sig: Chew 1 tablet daily   pantoprazole (PROTONIX) 40 mg tablet   No No   Sig: Take 1 tablet (40 mg total) by mouth daily   triamcinolone (KENALOG) 0 1 % cream  Self No No   Sig: Apply topically 2 (two) times a day ADD to the Sig:  Apply behind the ears      Facility-Administered Medications: None       Past Medical History:   Diagnosis Date    Anxiety     on med     Arthritis     Back problem     4 Bulging Disks    Fibrocystic disease of both breasts     Follicular lymphoma grade 3a (HCC)     Hip pain     Left    History of staph infection     Hypertension     Knee pain     Right    Spinal stenosis     Thyroid disease        Past Surgical History:   Procedure Laterality Date    CATARACT EXTRACTION  01/01/2017    CHOLECYSTECTOMY      MAMMO (HISTORICAL)  12/13/2019    OTHER SURGICAL HISTORY      Warts removal    REFRACTIVE SURGERY Bilateral     ROTATOR CUFF REPAIR Right        Family History   Problem Relation Age of Onset    Lung cancer Father     Breast cancer Other      I have reviewed and agree with the history as documented      E-Cigarette/Vaping    E-Cigarette Use Never User E-Cigarette/Vaping Substances    Nicotine No     THC No     CBD No     Flavoring No     Other No     Unknown No      Social History     Tobacco Use    Smoking status: Former Smoker     Years: 14 00    Smokeless tobacco: Never Used   Vaping Use    Vaping Use: Never used   Substance Use Topics    Alcohol use: Not Currently     Comment: Occasional     Drug use: Not Currently        Review of Systems   Constitutional: Negative for chills and fever  HENT: Negative for ear pain and sore throat  Eyes: Negative for pain and visual disturbance  Respiratory: Positive for shortness of breath  Negative for cough  Cardiovascular: Negative for palpitations  Chest tingling radiating into the left arm   Gastrointestinal: Negative for abdominal pain and vomiting  Genitourinary: Negative for dysuria and hematuria  Musculoskeletal: Negative for arthralgias and back pain  Skin: Negative for color change and rash  Neurological: Negative for seizures and syncope  All other systems reviewed and are negative  Physical Exam  ED Triage Vitals [07/05/22 1036]   Temperature Pulse Respirations Blood Pressure SpO2   98 1 °F (36 7 °C) 97 20 (!) 195/89 98 %      Temp Source Heart Rate Source Patient Position - Orthostatic VS BP Location FiO2 (%)   Oral Monitor Sitting Left arm --      Pain Score       4             Orthostatic Vital Signs  Vitals:    07/05/22 1100 07/05/22 1130 07/05/22 1230 07/05/22 1400   BP: 158/67 126/79 135/63 151/68   Pulse: 72 70 68 68   Patient Position - Orthostatic VS: Sitting Sitting Sitting Sitting       Physical Exam  Vitals and nursing note reviewed  Constitutional:       Appearance: Normal appearance  She is not ill-appearing or diaphoretic  HENT:      Head: Normocephalic and atraumatic  Mouth/Throat:      Mouth: Mucous membranes are moist       Pharynx: Oropharynx is clear  Eyes:      General: No scleral icterus       Conjunctiva/sclera: Conjunctivae normal  Cardiovascular:      Rate and Rhythm: Normal rate and regular rhythm  Heart sounds: Normal heart sounds  Pulmonary:      Effort: Pulmonary effort is normal  No tachypnea, accessory muscle usage or respiratory distress  Breath sounds: Normal breath sounds  No wheezing, rhonchi or rales  Abdominal:      General: Abdomen is flat  There is no distension  Tenderness: There is no abdominal tenderness  Musculoskeletal:         General: No tenderness or signs of injury  Right shoulder: Normal  Normal strength  Left shoulder: Normal  No tenderness  Normal range of motion  Normal strength  Normal pulse  Right upper arm: Normal       Left upper arm: Normal       Right hand: Normal       Left hand: No tenderness  Normal range of motion  Normal strength  Cervical back: Neck supple  No rigidity  Right lower leg: No tenderness  No edema  Left lower leg: No tenderness  No edema  Skin:     General: Skin is warm  Coloration: Skin is not jaundiced  Findings: No erythema or rash  Neurological:      General: No focal deficit present  Mental Status: She is alert  Mental status is at baseline     Psychiatric:         Mood and Affect: Mood normal          Behavior: Behavior normal          ED Medications  Medications - No data to display    Diagnostic Studies  Results Reviewed     Procedure Component Value Units Date/Time    HS Troponin I 2hr [[id number]]  (Normal) Collected: 07/05/22 1326    Lab Status: Final result Specimen: Blood from Line, Venous Updated: 07/05/22 1410     hs TnI 2hr 8 ng/L      Delta 2hr hsTnI 1 ng/L     HS Troponin 0hr (reflex protocol) [[id number]]  (Normal) Collected: 07/05/22 1124    Lab Status: Final result Specimen: Blood from Line, Venous Updated: 07/05/22 1203     hs TnI 0hr 7 ng/L     Comprehensive metabolic panel [[id number]]  (Abnormal) Collected: 07/05/22 1124    Lab Status: Final result Specimen: Blood from Line, Venous Updated: 07/05/22 1154     Sodium 138 mmol/L      Potassium 4 3 mmol/L      Chloride 106 mmol/L      CO2 27 mmol/L      ANION GAP 5 mmol/L      BUN 32 mg/dL      Creatinine 0 93 mg/dL      Glucose 141 mg/dL      Calcium 9 8 mg/dL      AST 19 U/L      ALT 29 U/L      Alkaline Phosphatase 86 U/L      Total Protein 7 2 g/dL      Albumin 4 3 g/dL      Total Bilirubin 0 59 mg/dL      eGFR 60 ml/min/1 73sq m     Narrative:      National Kid[employer] Disease Foundation guidelines for Chronic Kidney Disease (CKD):     Stage 1 with normal or high GFR (GFR > 90 mL/min/1 73 square meters)    Stage 2 Mild CKD (GFR = 60-89 mL/min/1 73 square meters)    Stage 3A Moderate CKD (GFR = 45-59 mL/min/1 73 square meters)    Stage 3B Moderate CKD (GFR = 30-44 mL/min/1 73 square meters)    Stage 4 Severe CKD (GFR = 15-29 mL/min/1 73 square meters)    Stage 5 End Stage CKD (GFR <15 mL/min/1 73 square meters)  Note: GFR calculation is accurate only with a steady state creatinine    CBC and differential [[id number]]  (Abnormal) Collected: 07/05/22 1124    Lab Status: Final result Specimen: Blood from Line, Venous Updated: 07/05/22 1134     WBC 6 01 Thousand/uL      RBC 4 06 Million/uL      Hemoglobin 13 4 g/dL      Hematocrit 41 4 %       fL      MCH 33 0 pg      MCHC 32 4 g/dL      RDW 13 4 %      MPV 9 7 fL      Platelets 840 Thousands/uL      nRBC 0 /100 WBCs      Neutrophils Relative 65 %      Immat GRANS % 1 %      Lymphocytes Relative 14 %      Monocytes Relative 15 %      Eosinophils Relative 4 %      Basophils Relative 1 %      Neutrophils Absolute 3 93 Thousands/µL      Immature Grans Absolute 0 06 Thousand/uL      Lymphocytes Absolute 0 86 Thousands/µL      Monocytes Absolute 0 87 Thousand/µL      Eosinophils Absolute 0 25 Thousand/µL      Basophils Absolute 0 04 Thousands/µL                  No orders to display         Procedures  ECG 12 Lead Documentation Only    Date/Time: 7/5/2022 11:27 AM  Performed by: Joseph Fuentes   Authorized by: Ethan Johnson DO     Indications / Diagnosis:  Chest tingling  ECG reviewed by me, the ED Provider: yes    Patient location:  ED  Previous ECG:     Previous ECG:  Compared to current    Similarity:  No change  Interpretation:     Interpretation: normal    Rate:     ECG rate:  84    ECG rate assessment: normal    Rhythm:     Rhythm: sinus rhythm    Ectopy:     Ectopy: none    QRS:     QRS axis:  Left    QRS intervals:  Normal  Conduction:     Conduction: normal    ST segments:     ST segments:  Normal  T waves:     T waves: normal    Comments:      Normal sinus rhythm unchanged from previous    ECG 12 Lead Documentation Only    Date/Time: 7/5/2022 1:27 PM  Performed by: Ethan Johnson DO  Authorized by: Ethan Johnson DO     Indications / Diagnosis:  Chest tingling  Patient location:  ED  Previous ECG:     Previous ECG:  Compared to current    Similarity:  No change  Interpretation:     Interpretation: normal    Rate:     ECG rate:  67    ECG rate assessment: normal    Rhythm:     Rhythm: sinus rhythm    Ectopy:     Ectopy: none    QRS:     QRS axis:  Normal    QRS intervals:  Normal  Conduction:     Conduction: normal    ST segments:     ST segments:  Normal  T waves:     T waves: normal    Comments:      Normal sinus rhythm unchanged from previous          ED Course                             SBIRT 22yo+    Flowsheet Row Most Recent Value   SBIRT (25 yo +)    In order to provide better care to our patients, we are screening all of our patients for alcohol and drug use  Would it be okay to ask you these screening questions? Yes Filed at: 07/05/2022 1125   Initial Alcohol Screen: US AUDIT-C     1  How often do you have a drink containing alcohol? 0 Filed at: 07/05/2022 1125   2  How many drinks containing alcohol do you have on a typical day you are drinking? 0 Filed at: 07/05/2022 1125   3a  Male UNDER 65:  How often do you have five or more drinks on one occasion? 0 Filed at: 07/05/2022 1125   3b  FEMALE Any Age, or MALE 65+: How often do you have 4 or more drinks on one occassion? 0 Filed at: 07/05/2022 1125   Audit-C Score 0 Filed at: 07/05/2022 1125   JITENDRA: How many times in the past year have you    Used an illegal drug or used a prescription medication for non-medical reasons? Never Filed at: 07/05/2022 1125                [facility]  Number of Diagnoses or Management Options  Discomfort in chest: new and requires workup  Elevated blood sugar: new and requires workup  Tingling of left upper extremity: new and requires workup  Risk of Complications, Morbidity, and/or Mortality  General comments: Erick Goodson is a 74year old female presenting for evaluation of a "tingling" sensation in her chest that radiated down her left arm and to her left hand  She denies any chest pain, she had mild shortness of breath  She was concerned about cardiac etiology  CBC, CMP unremarkable  Delta troponin also within normal limits  ECGs unremarkable  Patient was stable for discharge  I advised that she call and schedule follow-up appointment with your primary care doctor, gave strict return precautions, she was agreeable to the plan  Patient Progress  Patient progress: stable      Disposition  Final diagnoses:   Tingling of left upper extremity   Discomfort in chest   Elevated blood sugar     Time reflects when diagnosis was documented in both MDM as applicable and the Disposition within this note     Time User Action Codes Description Comment    7/5/2022  2:22 PM Imtiaz Palomares Add [R20 2] Tingling of left upper extremity     7/5/2022  2:23 PM Imtiaz Palomares Add [R07 89] Discomfort in chest     7/5/2022  2:31 PM Vivi Weiss Add [R73 9] Elevated blood sugar       ED Disposition     ED Disposition   Discharge    Condition   Stable    Date/Time   Tue Jul 5, 2022  2:22 PM    Finesse Mace discharge to home/self care                 Follow-up Information Follow up With Specialties Details Why [address], DO Family Medicine Schedule an appointment as soon as possible for a visit   [address]  [phone]            Discharge Medication List as of 7/5/2022  2:24 PM      CONTINUE these medications which have NOT CHANGED    Details   acetaminophen (TYLENOL) 500 mg tablet Take 500 mg by mouth every 4 (four) hours, Historical Med      amLODIPine (NORVASC) 5 mg tablet Take 1 tablet (5 mg total) by mouth daily, Starting Thu 1/27/2022, Until Wed 4/27/2022, Normal      amoxicillin (AMOXIL) 500 MG tablet Starting Mon 5/17/2021, Historical Med      benazepril (LOTENSIN) 40 MG tablet Take 1 tablet (40 mg total) by mouth daily, Starting Thu 1/27/2022, Until Wed 4/27/2022, Normal      betamethasone dipropionate (DIPROSONE) 0 05 % cream Apply topically 2 (two) times a day ADD:  To the SCALP and rub in well, Starting Mon 9/28/2020, Normal      Cholecalciferol 1 25 MG (23953 UT) TABS Take 1 25 mg by mouth daily, Historical Med      citalopram (CeleXA) 10 mg tablet Take 1 tablet (10 mg total) by mouth daily, Starting Thu 1/27/2022, Until Wed 4/27/2022, Normal      docusate calcium (SURFAK) 240 mg capsule Take 240 mg by mouth 2 (two) times a day, Historical Med      fexofenadine (ALLEGRA) 180 MG tablet Take 180 mg by mouth daily, Historical Med      ketoconazole (NIZORAL) 2 % cream Apply topically daily ADD:  To the face, nose, eyebrows, Starting Mon 9/28/2020, Normal      levothyroxine (Synthroid) 100 mcg tablet Take 1 tablet (100 mcg total) by mouth in the morning , Starting Mon 5/16/2022, Normal      linaCLOtide (Linzess) 145 MCG CAPS Take 1 capsule (145 mcg total) by mouth daily, Starting Tue 7/14/2020, Normal      multivitamin-iron-minerals-folic acid (CENTRUM) chewable tablet Chew 1 tablet daily, Historical Med      Omega 3 1200 MG CAPS Take by mouth, Historical Med      pantoprazole (PROTONIX) 40 mg tablet Take 1 tablet (40 mg total) by mouth daily, Starting Tue 12/14/2021, Normal      triamcinolone (KENALOG) 0 1 % cream Apply topically 2 (two) times a day ADD to the Sig:  Apply behind the ears, Starting Mon 9/28/2020, Normal           No discharge procedures on file  PDMP Review     None           ED Provider  Attending physically available and evaluated Jaxon Lyn I managed the patient along with the ED Attending      Electronically Signed by         Lanette Godfrey DO  07/05/22 0117

## 2022-07-05 NOTE — DISCHARGE INSTRUCTIONS
Call and schedule a follow-up appointment with your primary care doctor  Please return to the emergency department should you develop any chest pain, difficulty breathing, or any other concerning symptoms

## 2022-07-11 NOTE — ED ATTENDING ATTESTATION
7/5/2022  I, Teodora Stevenson MD, saw and evaluated the patient  I have discussed the patient with the resident/non-physician practitioner and agree with the resident's/non-physician practitioner's findings, Plan of Care, and MDM as documented in the resident's/non-physician practitioner's note, except where noted  All available labs and Radiology studies were reviewed  I was present for key portions of any procedure(s) performed by the resident/non-physician practitioner and I was immediately available to provide assistance  At this point I agree with the current assessment done in the Emergency Department    I have conducted an independent evaluation of this patient a history and physical is as follows:see h and p abvoe agree with resident tx plan/ dispo    ED Course         Critical Care Time  Procedures

## 2022-07-18 ENCOUNTER — APPOINTMENT (OUTPATIENT)
Dept: LAB | Facility: AMBULARY SURGERY CENTER | Age: 72
End: 2022-07-18
Payer: COMMERCIAL

## 2022-07-19 ENCOUNTER — OFFICE VISIT (OUTPATIENT)
Dept: FAMILY MEDICINE CLINIC | Facility: CLINIC | Age: 72
End: 2022-07-19
Payer: COMMERCIAL

## 2022-07-19 VITALS
HEART RATE: 52 BPM | TEMPERATURE: 97.2 F | WEIGHT: 259 LBS | DIASTOLIC BLOOD PRESSURE: 68 MMHG | RESPIRATION RATE: 20 BRPM | BODY MASS INDEX: 47.66 KG/M2 | HEIGHT: 62 IN | SYSTOLIC BLOOD PRESSURE: 146 MMHG | OXYGEN SATURATION: 95 %

## 2022-07-19 DIAGNOSIS — Z79.899 ENCOUNTER FOR LONG-TERM CURRENT USE OF HIGH RISK MEDICATION: ICD-10-CM

## 2022-07-19 DIAGNOSIS — E66.01 MORBID OBESITY (HCC): ICD-10-CM

## 2022-07-19 DIAGNOSIS — E03.9 ACQUIRED HYPOTHYROIDISM: Primary | ICD-10-CM
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
## 2022-07-19 DIAGNOSIS — F33.41 RECURRENT MAJOR DEPRESSIVE DISORDER, IN PARTIAL REMISSION (HCC): ICD-10-CM

## 2022-07-19 DIAGNOSIS — Z79.899 ENCOUNTER FOR LONG-TERM (CURRENT) USE OF MEDICATIONS: ICD-10-CM

## 2022-07-19 DIAGNOSIS — Z79.899 POLYPHARMACY: ICD-10-CM

## 2022-07-19 DIAGNOSIS — C85.90 LYMPHOMA, UNSPECIFIED BODY REGION, UNSPECIFIED LYMPHOMA TYPE (HCC): ICD-10-CM

## 2022-07-19 DIAGNOSIS — I10 ESSENTIAL HYPERTENSION: ICD-10-CM

## 2022-07-19 DIAGNOSIS — L40.9 PSORIASIS: ICD-10-CM

## 2022-07-19 PROCEDURE — 3077F SYST BP >= 140 MM HG: CPT | Performed by: FAMILY MEDICINE

## 2022-07-19 PROCEDURE — 1160F RVW MEDS BY RX/DR IN RCRD: CPT | Performed by: FAMILY MEDICINE

## 2022-07-19 PROCEDURE — 3078F DIAST BP <80 MM HG: CPT | Performed by: FAMILY MEDICINE

## 2022-07-19 PROCEDURE — 99214 OFFICE O/P EST MOD 30 MIN: CPT | Performed by: FAMILY MEDICINE

## 2022-07-19 RX ORDER — TRIAMCINOLONE ACETONIDE 1 MG/G
CREAM TOPICAL 2 TIMES DAILY
Qty: 30 G | Refills: 0 | Status: SHIPPED | OUTPATIENT
Start: 2022-07-19

## 2022-07-19 NOTE — PROGRESS NOTES
Assessment/Plan:  74 y o female, seen with , and is in remission with follicular lymphoma, SIKTS 2P, GC least stage IIIA disease with diffuse hypermetabolic lymphadenopathy in the chest axillary, abdominal as well as pelvis  Last chemotx was Dec   CTScan in June was good also  1  Acquired hypothyroidism  Comments:  on Synthroid 100 mcg OD  Orders:  -     TSH, 3rd generation; Standing  -     TSH, 3rd generation    2  Essential hypertension    3  Encounter for long-term (current) use of medications    4  Polypharmacy  Comments:  all meds reviewed    5  Recurrent major depressive disorder, in partial remission ([facility] )    6  Morbid obesity ([facility] )  Comments:  Consider M[place] wk'ly once  when it is available for weight loss    7  Encounter for long-term current use of high risk medication    8  Lymphoma, unspecified body region, unspecified lymphoma type ([facility] )  Comments:  with follicular lymphoma, JIXMB 6I, CU least stage IIIA disease with diffuse hypermetabolic lymphadenopathy in the chest axillary, abdominal as well as pelvis  9  Mosquito bite, initial encounter  Comments:  Occurred on or about July16th    10  Psoriasis  -     triamcinolone (KENALOG) 0 1 % cream; Apply topically 2 (two) times a day          Subjective:      Patient ID: Brittany Mace is a 74 y o  female  HPI    The following portions of the patient's history were reviewed and updated as appropriate: She  has a past medical history of Anxiety, Arthritis, Back problem, Fibrocystic disease of both breasts, Follicular lymphoma grade 3a ([facility] ), Hip pain, History of staph infection, Hypertension, Knee pain, Spinal stenosis, and Thyroid disease    She   Patient Active Problem List    Diagnosis Date Noted    Recurrent major depressive disorder, in partial remission ([facility] ) 03/15/2022    Lymphoma ([facility] ) 06/21/2021    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 ([facility] ) 06/09/2021    PAD (peripheral artery disease) ([facility] ) 04/07/2021    Class 3 severe obesity with serious comorbidity and body mass index (BMI) of 45 0 to 49 9 in adult [facility]) 03/02/2021    Primary osteoarthritis of right hip 01/20/2021    Lumbar pain 12/09/2020    History of total left hip replacement 12/01/2020    Chronic anxiety 11/01/2019    Vitamin D deficiency 09/09/2019    Psoriasis 09/09/2019    Pre-diabetes 09/09/2019    Osteoarthritis 09/09/2019    NSAID long-term use 09/09/2019    Morbid obesity ([facility] ) 09/09/2019    Hypothyroidism 09/09/2019    Essential hypertension 09/09/2019    Elevated blood sugar 09/09/2019    Stage [facility]a [facility] ) 09/09/2019    Chronic depression 09/09/2019    Ambulatory dysfunction 09/09/2019     She  has a past surgical history that includes Mammo (historical) (12/13/2019); Cataract extraction (01/01/2017); Refractive surgery (Bilateral); Other surgical history; Cholecystectomy; and Rotator cuff repair (Right)  Her family history includes Breast cancer in her other; Lung cancer in her father  She  reports that she has quit smoking  She quit after 14 00 years of use  She has never used smokeless tobacco  She reports previous alcohol use  She reports previous drug use    Current Outpatient Medications   Medication Sig Dispense Refill    triamcinolone (KENALOG) 0 1 % cream Apply topically 2 (two) times a day 30 g 0    acetaminophen (TYLENOL) 500 mg tablet Take 500 mg by mouth every 4 (four) hours      amLODIPine (NORVASC) 5 mg tablet TAKE 1 TABLET DAILY 90 tablet 3    amoxicillin (AMOXIL) 500 MG tablet       benazepril (LOTENSIN) 40 MG tablet TAKE 1 TABLET DAILY 90 tablet 3    betamethasone dipropionate (DIPROSONE) 0 05 % cream Apply topically 2 (two) times a day ADD:  To the SCALP and rub in well 30 g 0    Cholecalciferol 1 25 MG (01084 UT) TABS Take 1 25 mg by mouth daily      citalopram (CeleXA) 10 mg tablet TAKE 1 TABLET DAILY 90 tablet 3    docusate calcium (SURFAK) 240 mg capsule Take 240 mg by mouth 2 (two) times a day      fexofenadine (ALLEGRA) 180 MG tablet Take 180 mg by mouth daily      ketoconazole (NIZORAL) 2 % cream Apply topically daily ADD:  To the face, nose, eyebrows 30 g 1    levothyroxine (Synthroid) 100 mcg tablet Take 1 tablet (100 mcg total) by mouth in the morning  90 tablet 1    linaCLOtide (Linzess) 145 MCG CAPS Take 1 capsule (145 mcg total) by mouth daily 30 capsule 3    multivitamin-iron-minerals-folic acid (CENTRUM) chewable tablet Chew 1 tablet daily      Omega 3 1200 MG CAPS Take by mouth      pantoprazole (PROTONIX) 40 mg tablet Take 1 tablet (40 mg total) by mouth daily 90 tablet 3     No current facility-administered medications for this visit  Current Outpatient Medications on File Prior to Visit   Medication Sig    acetaminophen (TYLENOL) 500 mg tablet Take 500 mg by mouth every 4 (four) hours    amoxicillin (AMOXIL) 500 MG tablet     betamethasone dipropionate (DIPROSONE) 0 05 % cream Apply topically 2 (two) times a day ADD:  To the SCALP and rub in well    Cholecalciferol 1 25 MG (11360 UT) TABS Take 1 25 mg by mouth daily    docusate calcium (SURFAK) 240 mg capsule Take 240 mg by mouth 2 (two) times a day    fexofenadine (ALLEGRA) 180 MG tablet Take 180 mg by mouth daily    ketoconazole (NIZORAL) 2 % cream Apply topically daily ADD:  To the face, nose, eyebrows    levothyroxine (Synthroid) 100 mcg tablet Take 1 tablet (100 mcg total) by mouth in the morning   linaCLOtide (Linzess) 145 MCG CAPS Take 1 capsule (145 mcg total) by mouth daily    multivitamin-iron-minerals-folic acid (CENTRUM) chewable tablet Chew 1 tablet daily    Omega 3 1200 MG CAPS Take by mouth    pantoprazole (PROTONIX) 40 mg tablet Take 1 tablet (40 mg total) by mouth daily     No current facility-administered medications on file prior to visit  She is allergic to diphenhydramine, erythromycin, tetanus toxoids, and tetracycline       Review of Systems   Constitutional: Negative for activity change, appetite change, chills, diaphoresis, fatigue and fever  HENT: Negative for congestion, ear discharge, ear pain, facial swelling, nosebleeds, sore throat, tinnitus, trouble swallowing and voice change  Eyes: Negative for photophobia, pain, discharge, redness, itching and visual disturbance  Respiratory: Negative for apnea, cough, choking, chest tightness and shortness of breath  Denies any and all respiratory issues - SOB, orthopnea, etc    Cardiovascular: Negative for chest pain, palpitations and leg swelling  Denies any and all cardiac symptoms  HTN stable    Gastrointestinal: Negative for abdominal distention, abdominal pain, blood in stool, constipation, diarrhea, nausea and vomiting  Endocrine: Negative for cold intolerance, heat intolerance, polydipsia, polyphagia and polyuria  Genitourinary: Negative for decreased urine volume, difficulty urinating, dysuria, enuresis, frequency, hematuria, pelvic pain, urgency and vaginal bleeding  Musculoskeletal: Positive for arthralgias, back pain (due to spinal stenosis and 4 bulging disc), gait problem (walking with cane, due to R knee pain) and myalgias  Negative for joint swelling, neck pain and neck stiffness  Oct, 2020, Dr Fahad Montanez replaced the L hip and no prob  He did the side incision and delayed recovery (due to large pineculus)  Did PT well, etc    This shifted the wt to the R knee and that is the prob now  Pain and some swelling  Burning sensation  Hurts  Will f/u wht Dr Sheela Velázquez   Skin: Negative for color change, pallor and rash  Psoriasis behind ears, seb dermatitis of nose, face, eyebrows, and  Eczema    Incision from biopsy sub parotid gland on left noted and healed  This was done about June 3rd, 2021   Allergic/Immunologic: Negative for immunocompromised state  Neurological: Negative for dizziness, seizures, facial asymmetry, light-headedness, numbness and headaches     Hematological: Negative for adenopathy  Currently undergoing treatment for lymphoma    Psychiatric/Behavioral: Negative for agitation, behavioral problems, confusion, decreased concentration, dysphoric mood, hallucinations and suicidal ideas  The patient is nervous/anxious (stable on Celexa )  Objective:      /68 (BP Location: Left arm, Patient Position: Sitting, Cuff Size: Standard)   Pulse (!) 52   Temp (!) 97 2 °F (36 2 °C) (Temporal)   Resp 20   Ht 5' 2" (1 575 m)   Wt 117 kg (259 lb)   SpO2 95%   BMI 47 37 kg/m²          Physical Exam  Vitals and nursing note reviewed  Constitutional:       General: She is not in acute distress  Appearance: She is well-developed  She is obese  She is not ill-appearing, toxic-appearing or diaphoretic  Comments: BMI 45 18   HENT:      Head: Normocephalic and atraumatic  Eyes:      General: No scleral icterus  Conjunctiva/sclera: Conjunctivae normal       Pupils: Pupils are equal, round, and reactive to light  Neck:      Thyroid: No thyromegaly  Trachea: No tracheal deviation  Cardiovascular:      Rate and Rhythm: Normal rate and regular rhythm  Heart sounds: Normal heart sounds  Pulmonary:      Effort: No respiratory distress  Breath sounds: Normal breath sounds  No wheezing or rales  Chest:      Chest wall: No tenderness  Musculoskeletal:         General: No tenderness or deformity  Normal range of motion  Cervical back: Normal range of motion and neck supple  Comments: Will see Dr Babatunde Carty in 2 days for eval of hip (L) repllacement  Skin:     General: Skin is warm and dry  Coloration: Skin is not pale  Findings: No erythema or rash  Comments: R pedro- / Honorio surg  5 days ago -- looks good   Neurological:      General: No focal deficit present  Mental Status: She is alert and oriented to person, place, and time  Cranial Nerves: No cranial nerve deficit     Psychiatric: Mood and Affect: Mood normal          Behavior: Behavior normal          Thought Content: Thought content normal          Judgment: Judgment normal              This time was spent reviewing previous records, reviewing previous laboratory and other tests, taking history from patient, examination of patient, discussion of prognosis and treatment, ordering laboratory tests, ordering medications, and completion of the medical record

## 2022-07-21 DIAGNOSIS — F32.A CHRONIC DEPRESSION: ICD-10-CM

## 2022-07-21 DIAGNOSIS — I10 ESSENTIAL HYPERTENSION: ICD-10-CM

## 2022-07-21 RX ORDER — BENAZEPRIL HYDROCHLORIDE 40 MG/1
TABLET, FILM COATED ORAL
Qty: 90 TABLET | Refills: 3 | Status: SHIPPED | OUTPATIENT
Start: 2022-07-21

## 2022-07-21 RX ORDER — CITALOPRAM 10 MG/1
TABLET ORAL
Qty: 90 TABLET | Refills: 3 | Status: SHIPPED | OUTPATIENT
Start: 2022-07-21

## 2022-07-21 RX ORDER — AMLODIPINE BESYLATE 5 MG/1
TABLET ORAL
Qty: 90 TABLET | Refills: 3 | Status: SHIPPED | OUTPATIENT
Start: 2022-07-21

## 2022-09-12 ENCOUNTER — OFFICE VISIT (OUTPATIENT)
Dept: FAMILY MEDICINE CLINIC | Facility: CLINIC | Age: 72
End: 2022-09-12
Payer: COMMERCIAL

## 2022-09-12 VITALS
DIASTOLIC BLOOD PRESSURE: 80 MMHG | WEIGHT: 252.4 LBS | BODY MASS INDEX: 46.45 KG/M2 | SYSTOLIC BLOOD PRESSURE: 140 MMHG | TEMPERATURE: 97.8 F | OXYGEN SATURATION: 98 % | HEART RATE: 75 BPM | RESPIRATION RATE: 16 BRPM | HEIGHT: 62 IN

## 2022-09-12 DIAGNOSIS — L03.115 CELLULITIS OF RIGHT LOWER EXTREMITY: Primary | ICD-10-CM

## 2022-09-12 PROCEDURE — 99213 OFFICE O/P EST LOW 20 MIN: CPT | Performed by: FAMILY MEDICINE

## 2022-09-12 RX ORDER — CEPHALEXIN 500 MG/1
500 CAPSULE ORAL EVERY 6 HOURS SCHEDULED
Qty: 40 CAPSULE | Refills: 0 | Status: SHIPPED | OUTPATIENT
Start: 2022-09-12 | End: 2022-09-22

## 2022-09-12 NOTE — PROGRESS NOTES
Assessment/Plan:  73 yo seen urgently with  with acute onset of cellulitis of both LE's, seems to be insect related with cellulitis  Had MASA about 20 yrs ago  I think this is related to insects  Will begin Keflex 500 mg q i d  times 10 days and warm soaks 5 minutes 3 4 times per day and elevation of lower extremities          1  Cellulitis of right lower extremity  -     cephalexin (KEFLEX) 500 mg capsule; Take 1 capsule (500 mg total) by mouth every 6 (six) hours for 10 days        Subjective:      Patient ID: Misael Clarke is a 74 y o  female  HPI    The following portions of the patient's history were reviewed and updated as appropriate: She  has a past medical history of Anxiety, Arthritis, Back problem, Fibrocystic disease of both breasts, Follicular lymphoma grade 3a ([facility] ), Hip pain, History of staph infection, Hypertension, Knee pain, Spinal stenosis, and Thyroid disease    She   Patient Active Problem List    Diagnosis Date Noted    Recurrent major depressive disorder, in partial remission ([facility] ) 03/15/2022    Lymphoma ([facility] ) 06/21/2021    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 ([facility] ) 06/09/2021    PAD (peripheral artery disease) ([facility] ) 04/07/2021    Class 3 severe obesity with serious comorbidity and body mass index (BMI) of 45 0 to 49 9 in adult [facility]) 03/02/2021    Primary osteoarthritis of right hip 01/20/2021    Lumbar pain 12/09/2020    History of total left hip replacement 12/01/2020    Chronic anxiety 11/01/2019    Vitamin D deficiency 09/09/2019    Psoriasis 09/09/2019    Pre-diabetes 09/09/2019    Osteoarthritis 09/09/2019    NSAID long-term use 09/09/2019    Morbid obesity ([address] ) 09/09/2019    Hypothyroidism 09/09/2019    Essential hypertension 09/09/2019    Elevated blood sugar 09/09/2019    Stage 3a chronic kidney disease ([facility] ) 09/09/2019    Chronic depression 09/09/2019    Ambulatory dysfunction 09/09/2019     She  has a past surgical history that includes Mammo (historical) (12/13/2019); Cataract extraction (01/01/2017); Refractive surgery (Bilateral); Other surgical history; Cholecystectomy; and Rotator cuff repair (Right)  Her family history includes Breast cancer in her other; Lung cancer in her father  She  reports that she has quit smoking  She quit after 14 00 years of use  She has never used smokeless tobacco  She reports previous alcohol use  She reports previous drug use  Current Outpatient Medications   Medication Sig Dispense Refill    acetaminophen (TYLENOL) 500 mg tablet Take 500 mg by mouth every 4 (four) hours      amLODIPine (NORVASC) 5 mg tablet TAKE 1 TABLET DAILY 90 tablet 3    benazepril (LOTENSIN) 40 MG tablet TAKE 1 TABLET DAILY 90 tablet 3    cephalexin (KEFLEX) 500 mg capsule Take 1 capsule (500 mg total) by mouth every 6 (six) hours for 10 days 40 capsule 0    Cholecalciferol 1 25 MG (73412 UT) TABS Take 1 25 mg by mouth daily      citalopram (CeleXA) 10 mg tablet TAKE 1 TABLET DAILY 90 tablet 3    fexofenadine (ALLEGRA) 180 MG tablet Take 180 mg by mouth daily      levothyroxine (Synthroid) 100 mcg tablet Take 1 tablet (100 mcg total) by mouth in the morning   90 tablet 1    multivitamin-iron-minerals-folic acid (CENTRUM) chewable tablet Chew 1 tablet daily      Omega 3 1200 MG CAPS Take by mouth      triamcinolone (KENALOG) 0 1 % cream Apply topically 2 (two) times a day 30 g 0    betamethasone dipropionate (DIPROSONE) 0 05 % cream Apply topically 2 (two) times a day ADD:  To the SCALP and rub in well (Patient not taking: Reported on 9/12/2022) 30 g 0    docusate calcium (SURFAK) 240 mg capsule Take 240 mg by mouth 2 (two) times a day (Patient not taking: Reported on 9/12/2022)      ketoconazole (NIZORAL) 2 % cream Apply topically daily ADD:  To the face, nose, eyebrows (Patient not taking: Reported on 9/12/2022) 30 g 1    linaCLOtide (Linzess) 145 MCG CAPS Take 1 capsule (145 mcg total) by mouth daily (Patient not taking: Reported on 9/12/2022) 30 capsule 3    pantoprazole (PROTONIX) 40 mg tablet Take 1 tablet (40 mg total) by mouth daily (Patient not taking: Reported on 9/12/2022) 90 tablet 3     No current facility-administered medications for this visit  Current Outpatient Medications on File Prior to Visit   Medication Sig    acetaminophen (TYLENOL) 500 mg tablet Take 500 mg by mouth every 4 (four) hours    amLODIPine (NORVASC) 5 mg tablet TAKE 1 TABLET DAILY    benazepril (LOTENSIN) 40 MG tablet TAKE 1 TABLET DAILY    Cholecalciferol 1 25 MG (80983 UT) TABS Take 1 25 mg by mouth daily    citalopram (CeleXA) 10 mg tablet TAKE 1 TABLET DAILY    fexofenadine (ALLEGRA) 180 MG tablet Take 180 mg by mouth daily    levothyroxine (Synthroid) 100 mcg tablet Take 1 tablet (100 mcg total) by mouth in the morning   multivitamin-iron-minerals-folic acid (CENTRUM) chewable tablet Chew 1 tablet daily    Omega 3 1200 MG CAPS Take by mouth    triamcinolone (KENALOG) 0 1 % cream Apply topically 2 (two) times a day    betamethasone dipropionate (DIPROSONE) 0 05 % cream Apply topically 2 (two) times a day ADD:  To the SCALP and rub in well (Patient not taking: Reported on 9/12/2022)    docusate calcium (SURFAK) 240 mg capsule Take 240 mg by mouth 2 (two) times a day (Patient not taking: Reported on 9/12/2022)    ketoconazole (NIZORAL) 2 % cream Apply topically daily ADD:  To the face, nose, eyebrows (Patient not taking: Reported on 9/12/2022)    linaCLOtide (Linzess) 145 MCG CAPS Take 1 capsule (145 mcg total) by mouth daily (Patient not taking: Reported on 9/12/2022)    pantoprazole (PROTONIX) 40 mg tablet Take 1 tablet (40 mg total) by mouth daily (Patient not taking: Reported on 9/12/2022)    [DISCONTINUED] amoxicillin (AMOXIL) 500 MG tablet      No current facility-administered medications on file prior to visit  She is allergic to diphenhydramine, erythromycin, tetanus toxoids, and tetracycline       Review of Systems   Respiratory: Negative  Cardiovascular: Negative  Gastrointestinal: Negative  Genitourinary: Negative  Skin: Positive for color change and rash  Right lower extremity worse than left with near blistering with appears to be some sort of insect bite lateral aspect custodial between knee and ankle on the right more posteriorly less inflamed but still cellulitic region will begin Keflex right away   Psychiatric/Behavioral: Negative  Objective:      /80 (BP Location: Left arm, Patient Position: Sitting, Cuff Size: Standard)   Pulse 75   Temp 97 8 °F (36 6 °C) (Temporal)   Resp 16   Ht 5' 2" (1 575 m)   Wt 114 kg (252 lb 6 4 oz)   SpO2 98%   BMI 46 16 kg/m²          Physical Exam  Vitals and nursing note reviewed  Constitutional:       General: She is not in acute distress  Appearance: She is well-developed  She is obese  She is not ill-appearing, toxic-appearing or diaphoretic  Comments: BMI 46 16   HENT:      Head: Normocephalic and atraumatic  Eyes:      General: No scleral icterus  Conjunctiva/sclera: Conjunctivae normal       Pupils: Pupils are equal, round, and reactive to light  Neck:      Thyroid: No thyromegaly  Trachea: No tracheal deviation  Cardiovascular:      Rate and Rhythm: Normal rate and regular rhythm  Heart sounds: Normal heart sounds  Pulmonary:      Effort: No respiratory distress  Breath sounds: Normal breath sounds  No wheezing or rales  Chest:      Chest wall: No tenderness  Musculoskeletal:         General: No tenderness or deformity  Normal range of motion  Cervical back: Normal range of motion and neck supple  Comments: Will see Dr Subhash Gottlieb in 2 days for eval of hip (L) repllacement  Skin:     General: Skin is warm and dry  Coloration: Skin is not pale  Findings: Erythema and rash present        Comments: WEI vasquez- / Honorio surg  5 days ago -- looks good   Neurological: General: No focal deficit present  Mental Status: She is alert and oriented to person, place, and time  Cranial Nerves: No cranial nerve deficit  Psychiatric:         Mood and Affect: Mood normal          Behavior: Behavior normal          Thought Content: Thought content normal          Judgment: Judgment normal              This time was spent reviewing previous records, reviewing previous laboratory and other tests, taking history from patient, examination of patient, discussion of prognosis and treatment, ordering laboratory tests, ordering medications, and completion of the medical record

## 2022-09-15 ENCOUNTER — TELEPHONE (OUTPATIENT)
Dept: FAMILY MEDICINE CLINIC | Facility: CLINIC | Age: 72
End: 2022-09-15

## 2022-09-15 NOTE — TELEPHONE ENCOUNTER
Pt called and stated that she was told to call and let Dr Porter Ann know how she is doing ,, she stated that she is getting better , she stated it has only been 3 days she stated that she knows it takes a long time

## 2022-09-20 ENCOUNTER — RA CDI HCC (OUTPATIENT)
Dept: OTHER | Facility: HOSPITAL | Age: 72
End: 2022-09-20

## 2022-09-20 NOTE — PROGRESS NOTES
Previous suggestion depression used  [facility]  coding opportunities       Chart reviewed, no opportunity found:   Millie Arias        Patients Insurance     Medicare Insurance: [facility]

## 2022-09-27 ENCOUNTER — OFFICE VISIT (OUTPATIENT)
Dept: FAMILY MEDICINE CLINIC | Facility: CLINIC | Age: 72
End: 2022-09-27
Payer: COMMERCIAL

## 2022-09-27 VITALS
HEART RATE: 79 BPM | OXYGEN SATURATION: 96 % | BODY MASS INDEX: 46.41 KG/M2 | WEIGHT: 252.2 LBS | HEIGHT: 62 IN | SYSTOLIC BLOOD PRESSURE: 130 MMHG | DIASTOLIC BLOOD PRESSURE: 80 MMHG | TEMPERATURE: 97.2 F

## 2022-09-27 DIAGNOSIS — R63.8 INCREASED BMI: ICD-10-CM

## 2022-09-27 DIAGNOSIS — E66.01 MORBID OBESITY (HCC): ICD-10-CM

## 2022-09-27 DIAGNOSIS — L03.115 CELLULITIS OF RIGHT LOWER EXTREMITY: Primary | ICD-10-CM

## 2022-09-27 DIAGNOSIS — Z79.899 ENCOUNTER FOR LONG-TERM (CURRENT) USE OF MEDICATIONS: ICD-10-CM

## 2022-09-27 DIAGNOSIS — I10 ESSENTIAL HYPERTENSION: ICD-10-CM

## 2022-09-27 PROCEDURE — 3079F DIAST BP 80-89 MM HG: CPT | Performed by: FAMILY MEDICINE

## 2022-09-27 PROCEDURE — 99213 OFFICE O/P EST LOW 20 MIN: CPT | Performed by: FAMILY MEDICINE

## 2022-09-27 PROCEDURE — 1160F RVW MEDS BY RX/DR IN RCRD: CPT | Performed by: FAMILY MEDICINE

## 2022-09-27 PROCEDURE — 3075F SYST BP GE 130 - 139MM HG: CPT | Performed by: FAMILY MEDICINE

## 2022-09-27 NOTE — PROGRESS NOTES
Gordon Park was seen today for follow-up  Diagnoses and all orders for this visit:    Cellulitis of right lower extremity  Comments:  Bilateral lower extremities remarkably better no evidence of cellulitis totally 100%--Keflex taken times 10 days    Essential hypertension  Comments:  Blood pressure 130/80 of nicely on amlodipine 5 Lotensin 40    Encounter for long-term (current) use of medications    Morbid obesity ([facility] )  Comments:  Weight 252, with normal wt has been 241  Hi'st wt 12  Rought summer -hot, Covid infect, etc     Increased BMI       1 minutes spent on chart prep, 15 minutes spent with patient counseling/educating on their diagnoses, tests completed and any new tests ordered, any referrals placed, treatment options, and documentation of above today  In prescribing new medications, or changing doses, we reviewed the risks and benefits and side effects of these medications along with other treatment options if appropriate             Subjective:   Gordon Park is a 74 y o  female well-known to me for many years presents for f/u and evaluation of the following medical issues:       Patient Active Problem List   Diagnosis    Vitamin D deficiency    Psoriasis    Pre-diabetes    Osteoarthritis    NSAID long-term use    Morbid obesity ([facility] )    Hypothyroidism    Essential hypertension    Elevated blood sugar    Stage 3a chronic kidney disease ([facility] )    Chronic depression    Chronic anxiety    Ambulatory dysfunction    Class 3 severe obesity with serious comorbidity and body mass index (BMI) of 45 0 to 49 9 in adult [facility])    Primary osteoarthritis of right hip    Lumbar pain    History of total left hip replacement    PAD (peripheral artery disease) (HCC)    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 (HCC)    Lymphoma (HCC)    Recurrent major depressive disorder, in partial remission [facility])       Patient Care Team:  Levester Severin, DO as PCP - General (Family Medicine)    Current Medications:  Current Outpatient Medications   Medication Sig Dispense Refill    acetaminophen (TYLENOL) 500 mg tablet Take 500 mg by mouth every 4 (four) hours      amLODIPine (NORVASC) 5 mg tablet TAKE 1 TABLET DAILY 90 tablet 3    benazepril (LOTENSIN) 40 MG tablet TAKE 1 TABLET DAILY 90 tablet 3    Cholecalciferol 1 25 MG (89576 UT) TABS Take 1 25 mg by mouth daily      citalopram (CeleXA) 10 mg tablet TAKE 1 TABLET DAILY 90 tablet 3    fexofenadine (ALLEGRA) 180 MG tablet Take 180 mg by mouth daily      multivitamin-iron-minerals-folic acid (CENTRUM) chewable tablet Chew 1 tablet daily      Omega 3 1200 MG CAPS Take by mouth      triamcinolone (KENALOG) 0 1 % cream Apply topically 2 (two) times a day 30 g 0    betamethasone dipropionate (DIPROSONE) 0 05 % cream Apply topically 2 (two) times a day ADD:  To the SCALP and rub in well (Patient not taking: Reported on 9/12/2022) 30 g 0    docusate calcium (SURFAK) 240 mg capsule Take 240 mg by mouth 2 (two) times a day (Patient not taking: Reported on 9/12/2022)      ketoconazole (NIZORAL) 2 % cream Apply topically daily ADD:  To the face, nose, eyebrows (Patient not taking: Reported on 9/12/2022) 30 g 1    levothyroxine (Synthroid) 100 mcg tablet Take 1 tablet (100 mcg total) by mouth in the morning  90 tablet 1    linaCLOtide (Linzess) 145 MCG CAPS Take 1 capsule (145 mcg total) by mouth daily (Patient not taking: Reported on 9/12/2022) 30 capsule 3    pantoprazole (PROTONIX) 40 mg tablet Take 1 tablet (40 mg total) by mouth daily (Patient not taking: Reported on 9/12/2022) 90 tablet 3     No current facility-administered medications for this visit  HPI:  Chief Complaint   Patient presents with    Follow-up     Recheck of both legs     -- Above per clinical staff and reviewed  --    PHQ-2/9 Depression Screening            47 yo to Ed  One daughter  Worked [employer] 15 yrs  Today:   We are evaluating the course of Rx with abx for the bilat cellulitis both LE  There is no redness, edema, scalling, pain, edema or seeping anywhere  Both LE's are normal and cellulitis is fully healed  She was advised to lose wt and use support hose and elevate LE's wherever possible  The following portions of the patient's history were reviewed and updated as appropriate: allergies, current medications, past family history, past medical history, past social history, past surgical history and problem list     Objective:  Vitals:  /80 (BP Location: Left arm, Patient Position: Sitting, Cuff Size: Standard)   Pulse 79   Temp (!) 97 2 °F (36 2 °C) (Temporal)   Ht 5' 2" (1 575 m)   Wt 114 kg (252 lb 3 2 oz)   SpO2 96%   BMI 46 13 kg/m²    Wt Readings from Last 3 Encounters:   09/27/22 114 kg (252 lb 3 2 oz)   09/12/22 114 kg (252 lb 6 4 oz)   07/19/22 117 kg (259 lb)      BP Readings from Last 3 Encounters:   09/27/22 130/80   09/12/22 140/80   07/19/22 146/68        Review of Systems   Respiratory: Negative  Cardiovascular: Negative  Gastrointestinal: Negative  Genitourinary: Negative  Skin: Negative for color change and rash  Both LE's are totally cleared    Psychiatric/Behavioral: Negative  Physical Exam  Vitals and nursing note reviewed  Constitutional:       General: She is not in acute distress  Appearance: She is well-developed  She is obese  She is not ill-appearing, toxic-appearing or diaphoretic  Comments: BMI 46 16   HENT:      Head: Normocephalic and atraumatic  Eyes:      General: No scleral icterus  Conjunctiva/sclera: Conjunctivae normal       Pupils: Pupils are equal, round, and reactive to light  Neck:      Thyroid: No thyromegaly  Trachea: No tracheal deviation  Cardiovascular:      Rate and Rhythm: Normal rate and regular rhythm  Heart sounds: Normal heart sounds  Pulmonary:      Effort: No respiratory distress  Breath sounds: Normal breath sounds   No wheezing or rales    Chest:      Chest wall: No tenderness  Musculoskeletal:         General: No tenderness or deformity  Normal range of motion  Cervical back: Normal range of motion and neck supple  Comments: Will see Dr Adeline Veronica in 2 days for eval of hip (L) repllacement  Skin:     General: Skin is warm and dry  Coloration: Skin is not pale  Findings: No erythema, lesion or rash  Comments: R pedro- / Honorio surg  5 days ago -- looks good   Neurological:      General: No focal deficit present  Mental Status: She is alert and oriented to person, place, and time  Cranial Nerves: No cranial nerve deficit  Psychiatric:         Mood and Affect: Mood normal          Behavior: Behavior normal          Thought Content:  Thought content normal          Judgment: Judgment normal

## 2022-10-17 ENCOUNTER — TELEPHONE (OUTPATIENT)
Dept: FAMILY MEDICINE CLINIC | Facility: CLINIC | Age: 72
End: 2022-10-17

## 2022-10-17 NOTE — TELEPHONE ENCOUNTER
Patient called she was in a couple weeks ago   She was given an antibiotic for the bites on her legs   ---she developed buring with urination and pressure   She drank cranberry juice for 5 days   Burning went away but she still has pressure   ---please advise thank you

## 2022-10-18 ENCOUNTER — CLINICAL SUPPORT (OUTPATIENT)
Dept: FAMILY MEDICINE CLINIC | Facility: CLINIC | Age: 72
End: 2022-10-18

## 2022-10-18 DIAGNOSIS — R39.9 URINARY SYMPTOM OR SIGN: Primary | ICD-10-CM

## 2022-10-19 ENCOUNTER — CLINICAL SUPPORT (OUTPATIENT)
Dept: FAMILY MEDICINE CLINIC | Facility: CLINIC | Age: 72
End: 2022-10-19
Payer: COMMERCIAL

## 2022-10-19 DIAGNOSIS — R39.9 URINARY TRACT INFECTION SYMPTOMS: Primary | ICD-10-CM

## 2022-10-19 LAB
SL AMB  POCT GLUCOSE, UA: NEGATIVE
SL AMB LEUKOCYTE ESTERASE,UA: ABNORMAL
SL AMB POCT BILIRUBIN,UA: ABNORMAL
SL AMB POCT BLOOD,UA: NEGATIVE
SL AMB POCT CLARITY,UA: ABNORMAL
SL AMB POCT COLOR,UA: YELLOW
SL AMB POCT KETONES,UA: ABNORMAL
SL AMB POCT NITRITE,UA: NEGATIVE
SL AMB POCT PH,UA: 5
SL AMB POCT SPECIFIC GRAVITY,UA: 1.01
SL AMB POCT URINE PROTEIN: NEGATIVE
SL AMB POCT UROBILINOGEN: NORMAL

## 2022-10-19 PROCEDURE — 87086 URINE CULTURE/COLONY COUNT: CPT | Performed by: FAMILY MEDICINE

## 2022-10-19 PROCEDURE — 87186 SC STD MICRODIL/AGAR DIL: CPT | Performed by: FAMILY MEDICINE

## 2022-10-19 PROCEDURE — 87077 CULTURE AEROBIC IDENTIFY: CPT | Performed by: FAMILY MEDICINE

## 2022-10-19 PROCEDURE — 81002 URINALYSIS NONAUTO W/O SCOPE: CPT

## 2022-10-20 ENCOUNTER — TELEPHONE (OUTPATIENT)
Dept: FAMILY MEDICINE CLINIC | Facility: CLINIC | Age: 72
End: 2022-10-20

## 2022-10-20 NOTE — TELEPHONE ENCOUNTER
Below 100,00 cfu/ml  Does not require antibitoics    Contains abnormal data Urine culture  Order: [id number]  Status: Preliminary result    Visible to patient: No (not released)    Next appt: 10/24/2022 at 10:30 AM in [facility] ([facility] Nurse)    Dx: Urinary tract infection symptoms    Specimen Information: Urine, Other        0 Result Notes    Urine Culture 80,000-89,000 cfu/ml Gram Negative Tayo Enteric Like Abnormal                   Specimen Collected: 10/19/22 12:10 PM

## 2022-10-21 ENCOUNTER — TELEPHONE (OUTPATIENT)
Dept: FAMILY MEDICINE CLINIC | Facility: CLINIC | Age: 72
End: 2022-10-21

## 2022-10-21 LAB — BACTERIA UR CULT: ABNORMAL

## 2022-10-24 ENCOUNTER — CLINICAL SUPPORT (OUTPATIENT)
Dept: FAMILY MEDICINE CLINIC | Facility: CLINIC | Age: 72
End: 2022-10-24
Payer: COMMERCIAL

## 2022-10-24 ENCOUNTER — TELEPHONE (OUTPATIENT)
Dept: HEMATOLOGY ONCOLOGY | Facility: CLINIC | Age: 72
End: 2022-10-24

## 2022-10-24 DIAGNOSIS — Z23 ENCOUNTER FOR IMMUNIZATION: Primary | ICD-10-CM

## 2022-10-24 DIAGNOSIS — R39.9 UTI SYMPTOMS: Primary | ICD-10-CM

## 2022-10-24 DIAGNOSIS — E03.9 ACQUIRED HYPOTHYROIDISM: ICD-10-CM

## 2022-10-24 PROCEDURE — G0008 ADMIN INFLUENZA VIRUS VAC: HCPCS

## 2022-10-24 PROCEDURE — 90662 IIV NO PRSV INCREASED AG IM: CPT

## 2022-10-24 RX ORDER — CIPROFLOXACIN 500 MG/1
500 TABLET, FILM COATED ORAL EVERY 12 HOURS SCHEDULED
Qty: 10 TABLET | Refills: 0 | Status: SHIPPED | OUTPATIENT
Start: 2022-10-24 | End: 2022-10-29

## 2022-10-24 RX ORDER — LEVOTHYROXINE SODIUM 0.1 MG/1
100 TABLET ORAL DAILY
Qty: 90 TABLET | Refills: 0 | Status: SHIPPED | OUTPATIENT
Start: 2022-10-24

## 2022-10-24 NOTE — TELEPHONE ENCOUNTER
There is no sooner availability  Can you see if patient willing to go to the [facility] office?

## 2022-10-24 NOTE — TELEPHONE ENCOUNTER
CALL RETURN FORM   Reason for patient call? Patient wants an earlier appointment  She stating she thinks her Lymphoma is back   Patient's primary oncologist? Dr Jens Vásquez    Name of person the patient was calling for? Krys    Any additional information to add, if applicable? N/A   Informed patient that the message will be forwarded to the team and someone will get back to them as soon as possible    Did you relay this information to the patient?  Yes

## 2022-10-26 ENCOUNTER — OFFICE VISIT (OUTPATIENT)
Dept: HEMATOLOGY ONCOLOGY | Facility: CLINIC | Age: 72
End: 2022-10-26
Payer: COMMERCIAL

## 2022-10-26 VITALS
TEMPERATURE: 99.3 F | RESPIRATION RATE: 18 BRPM | SYSTOLIC BLOOD PRESSURE: 112 MMHG | WEIGHT: 252 LBS | DIASTOLIC BLOOD PRESSURE: 68 MMHG | BODY MASS INDEX: 46.38 KG/M2 | HEART RATE: 88 BPM | OXYGEN SATURATION: 96 % | HEIGHT: 62 IN

## 2022-10-26 DIAGNOSIS — C82.31 GRADE 3A FOLLICULAR LYMPHOMA OF LYMPH NODES OF NECK (HCC): Primary | ICD-10-CM

## 2022-10-26 PROCEDURE — 99214 OFFICE O/P EST MOD 30 MIN: CPT | Performed by: INTERNAL MEDICINE

## 2022-10-26 NOTE — PROGRESS NOTES
Hematology / Oncology Outpatient Follow Up Note    Rand Hernandez 74 y o  female AGS:[id number] EJT:[id number]         Date:  10/26/2022    Assessment / Plan:    A 49-ODZP-YGF female with follicular lymphoma, FXZAN 8H, MH least stage IIIA disease with diffuse hypermetabolic lymphadenopathy in the chest axillary, abdominal as well as pelvis   SUV based on PET-CT scan was up to 15  She was treated with bendamustine rituximab x6 cycles, resulting in complete remission  She is currently on observation  She presents today because she has noticed some swelling of her left parotid gland  Based on examination, left parotid gland is minimally enlarged  I recommended her to have CT scan of neck, chest, abdomen and pelvis as well as CBC and CMP  Once obtain the scan result, I will contact her  She will keep appointment in January 2023  If actionable change exist, she may see me sooner  She and her family are in agreement with my recommendations         Subjective:      HPI:    A 52-year-old female who had [address] vaccine in March 16, 2021  2 weeks after the 1st vaccine, she noticed the swelling in her left upper neck close to the ER  Ana Macias brought this to medical attention   She had injection of vaccine in the left deltoid   She underwent CT scan of the neck which showed multiple enlarged level 1 and level 2 lymphadenopathy as well as swelling of the parotid gland   She was seen by Dr Gina Aguilar who recommended excisional lymph node biopsy  Ana Macias is scheduled to have this procedure in [place] 3, 2021  She presents today with her daughter and son-in-law to discuss further evaluation   She feels well  Ana Macias has no fever, chills or no or night sweats   She has no recent weight loss   She denied any respiratory symptoms   She has hypertension as well as arthritis   She is status post left hip replacement last year  Meredith Yarbrough, she has mild ambulatory dysfunction   She quit smoking 40 years ago  Ana Macias does not drink alcohol   She is in usual state of health   Her performance status is 0 to 1/4 on the ECOG scale            Interval History:  A 27-year-old female with left parotid gland enlargement as well as multiple lymphadenopathy of the neck    Therefore, she underwent left neck lymph node biopsy by Dr Luanne Landeros which showed follicular lymphoma, grade 3A   She subsequent underwent PET-CT scan which showed diffuse hypermetabolic lymphadenopathy in the chest, axillary, abdominal and pelvis   SUV or is up to 15  She underwent systemic chemotherapy with bendamustine and rituximab x6 cycles with excellent tolerance  She completed chemotherapy in December 2021, resulting in complete remission  She is currently on observation  She presents today because she noticed some swelling in her left parotid gland  She has no complaint of pain  She denied fever, chills or night sweats  Her weight is stable  She has normal performance status         Objective:      Primary Diagnosis:        Follicular lymphoma, grade 3A   Diagnosed in June 2021      Cancer Staging:  Cancer Staging  No matching staging information was found for the patient         Previous Hematologic/ Oncologic Treatment:       Bendamustine and rituximab x6 cycles completed in December 2021      Current Hematologic/ Oncologic Treatment:       Observation      Disease Status:       complete remission      Test Results:     Pathology:       left neck lymph node biopsy showed follicular lymphoma, grade 3A      Radiology:     CT scan of chest abdomen and pelvis in June 2022 showed no evidence of lymphadenopathy      Laboratory:       See below      Physical Exam:        General Appearance:    Alert, oriented          Eyes:    PERRL   Ears:    Normal external ear canals, both ears       Nose:   Nares normal, septum midline   Throat:   Mucosa moist  Pharynx without injection      Neck:   Supple         Lungs:     Clear to auscultation bilaterally   Chest Wall:    No tenderness or deformity    Heart:    Regular rate and rhythm         Abdomen:     Soft, non-tender, bowel sounds +, no organomegaly               Extremities:   Extremities no cyanosis or edema         Skin:   no rash or icterus  Lymph nodes:   previous bilateral neck adenopathy is no longer palpable   No other palpable superficial adenopathy  Neurologic:   CNII-XII intact, normal strength, sensation and reflexes     Throughout             Breast exam:   NA           ROS: Review of Systems   All other systems reviewed and are negative  Imaging: No results found  Labs:   Lab Results   Component Value Date    WBC 6 01 07/05/2022    HGB 13 4 07/05/2022    HCT 41 4 07/05/2022     (H) 07/05/2022     07/05/2022     Lab Results   Component Value Date    K 4 3 07/05/2022     07/05/2022    CO2 27 07/05/2022    BUN 32 (H) 07/05/2022    CREATININE 0 93 07/05/2022    GLUF 103 (H) 03/11/2022    CALCIUM 9 8 07/05/2022    CORRECTEDCA 10 6 (H) 06/21/2022    AST 19 07/05/2022    ALT 29 07/05/2022    ALKPHOS 86 07/05/2022    EGFR 60 07/05/2022         Lab Results   Component Value Date     (H) 06/23/2021         Current Medications: Reviewed  Allergies: Reviewed  PMH/FH/SH:  Reviewed      Vital Sign:    Body surface area is 2 11 meters squared      Wt Readings from Last 3 Encounters:   10/26/22 114 kg (252 lb)   09/27/22 114 kg (252 lb 3 2 oz)   09/12/22 114 kg (252 lb 6 4 oz)        Temp Readings from Last 3 Encounters:   10/26/22 99 3 °F (37 4 °C) (Tympanic)   09/27/22 (!) 97 2 °F (36 2 °C) (Temporal)   09/12/22 97 8 °F (36 6 °C) (Temporal)        BP Readings from Last 3 Encounters:   10/26/22 112/68   09/27/22 130/80   09/12/22 140/80         Pulse Readings from Last 3 Encounters:   10/26/22 88   09/27/22 79   09/12/22 75     @LASTSAO2(3)@

## 2022-10-31 ENCOUNTER — APPOINTMENT (OUTPATIENT)
Dept: LAB | Facility: AMBULARY SURGERY CENTER | Age: 72
End: 2022-10-31

## 2022-10-31 DIAGNOSIS — C82.31 GRADE 3A FOLLICULAR LYMPHOMA OF LYMPH NODES OF NECK (HCC): ICD-10-CM

## 2022-10-31 LAB
ALBUMIN SERPL BCP-MCNC: 3.6 G/DL (ref 3.5–5)
ALP SERPL-CCNC: 81 U/L (ref 46–116)
ALT SERPL W P-5'-P-CCNC: 41 U/L (ref 12–78)
ANION GAP SERPL CALCULATED.3IONS-SCNC: 5 MMOL/L (ref 4–13)
AST SERPL W P-5'-P-CCNC: 21 U/L (ref 5–45)
BASOPHILS # BLD AUTO: 0.05 THOUSANDS/ÂΜL (ref 0–0.1)
BASOPHILS NFR BLD AUTO: 1 % (ref 0–1)
BILIRUB SERPL-MCNC: 0.68 MG/DL (ref 0.2–1)
BUN SERPL-MCNC: 26 MG/DL (ref 5–25)
CALCIUM SERPL-MCNC: 10.1 MG/DL (ref 8.3–10.1)
CHLORIDE SERPL-SCNC: 105 MMOL/L (ref 96–108)
CO2 SERPL-SCNC: 26 MMOL/L (ref 21–32)
CREAT SERPL-MCNC: 1.3 MG/DL (ref 0.6–1.3)
EOSINOPHIL # BLD AUTO: 0.24 THOUSAND/ÂΜL (ref 0–0.61)
EOSINOPHIL NFR BLD AUTO: 4 % (ref 0–6)
ERYTHROCYTE [DISTWIDTH] IN BLOOD BY AUTOMATED COUNT: 13 % (ref 11.6–15.1)
GFR SERPL CREATININE-BSD FRML MDRD: 40 ML/MIN/1.73SQ M
GLUCOSE P FAST SERPL-MCNC: 123 MG/DL (ref 65–99)
HCT VFR BLD AUTO: 41.3 % (ref 34.8–46.1)
HGB BLD-MCNC: 13.3 G/DL (ref 11.5–15.4)
IMM GRANULOCYTES # BLD AUTO: 0.04 THOUSAND/UL (ref 0–0.2)
IMM GRANULOCYTES NFR BLD AUTO: 1 % (ref 0–2)
LDH SERPL-CCNC: 189 U/L (ref 81–234)
LYMPHOCYTES # BLD AUTO: 1.3 THOUSANDS/ÂΜL (ref 0.6–4.47)
LYMPHOCYTES NFR BLD AUTO: 20 % (ref 14–44)
MCH RBC QN AUTO: 32.6 PG (ref 26.8–34.3)
MCHC RBC AUTO-ENTMCNC: 32.2 G/DL (ref 31.4–37.4)
MCV RBC AUTO: 101 FL (ref 82–98)
MONOCYTES # BLD AUTO: 0.94 THOUSAND/ÂΜL (ref 0.17–1.22)
MONOCYTES NFR BLD AUTO: 15 % (ref 4–12)
NEUTROPHILS # BLD AUTO: 3.92 THOUSANDS/ÂΜL (ref 1.85–7.62)
NEUTS SEG NFR BLD AUTO: 59 % (ref 43–75)
NRBC BLD AUTO-RTO: 0 /100 WBCS
PLATELET # BLD AUTO: 232 THOUSANDS/UL (ref 149–390)
PMV BLD AUTO: 9.7 FL (ref 8.9–12.7)
POTASSIUM SERPL-SCNC: 4.8 MMOL/L (ref 3.5–5.3)
PROT SERPL-MCNC: 7.7 G/DL (ref 6.4–8.4)
RBC # BLD AUTO: 4.08 MILLION/UL (ref 3.81–5.12)
SODIUM SERPL-SCNC: 136 MMOL/L (ref 135–147)
WBC # BLD AUTO: 6.49 THOUSAND/UL (ref 4.31–10.16)

## 2022-11-02 ENCOUNTER — HOSPITAL ENCOUNTER (OUTPATIENT)
Dept: RADIOLOGY | Age: 72
Discharge: HOME | End: 2022-11-02

## 2022-11-02 DIAGNOSIS — C82.31 GRADE 3A FOLLICULAR LYMPHOMA OF LYMPH NODES OF NECK (HCC): ICD-10-CM

## 2022-11-02 RX ADMIN — IOHEXOL 100 ML: 350 INJECTION, SOLUTION INTRAVENOUS at 11:29

## 2022-11-04 ENCOUNTER — TELEPHONE (OUTPATIENT)
Dept: HEMATOLOGY ONCOLOGY | Facility: CLINIC | Age: 72
End: 2022-11-04

## 2022-11-04 NOTE — TELEPHONE ENCOUNTER
----- Message from Rashmi Mcdaniels MD sent at 11/4/2022  3:51 PM EDT -----  CT of neck, chest abdomen and pelvis showed no lymphadenopathy  No actions needed  Keep existing appointment

## 2022-11-07 ENCOUNTER — TELEPHONE (OUTPATIENT)
Dept: FAMILY MEDICINE CLINIC | Facility: CLINIC | Age: 72
End: 2022-11-07

## 2022-11-07 DIAGNOSIS — R39.9 UTI SYMPTOMS: Primary | ICD-10-CM

## 2022-11-07 NOTE — TELEPHONE ENCOUNTER
Patient called she is still feeling pressure   ----sometimes maricel   ------what do you suggest   ---finished antibiotics over a week ago     You told her to let you know how it is going

## 2022-11-09 ENCOUNTER — APPOINTMENT (OUTPATIENT)
Dept: LAB | Facility: AMBULARY SURGERY CENTER | Age: 72
End: 2022-11-09

## 2022-11-09 DIAGNOSIS — R39.9 UTI SYMPTOMS: ICD-10-CM

## 2022-11-10 ENCOUNTER — ANNUAL EXAM (OUTPATIENT)
Dept: OBGYN CLINIC | Facility: CLINIC | Age: 72
End: 2022-11-10

## 2022-11-10 VITALS
HEIGHT: 62 IN | BODY MASS INDEX: 46.3 KG/M2 | SYSTOLIC BLOOD PRESSURE: 118 MMHG | DIASTOLIC BLOOD PRESSURE: 82 MMHG | WEIGHT: 251.6 LBS

## 2022-11-10 DIAGNOSIS — Z01.419 ENCOUNTER FOR ROUTINE GYNECOLOGIC EXAMINATION IN MEDICARE PATIENT: Primary | ICD-10-CM

## 2022-11-10 DIAGNOSIS — Z13.820 SCREENING FOR OSTEOPOROSIS: ICD-10-CM

## 2022-11-10 DIAGNOSIS — Z78.0 POSTMENOPAUSAL STATE: ICD-10-CM

## 2022-11-10 DIAGNOSIS — N90.89 VULVAR IRRITATION: ICD-10-CM

## 2022-11-10 DIAGNOSIS — N76.0 ACUTE VULVOVAGINITIS: ICD-10-CM

## 2022-11-10 LAB — BACTERIA UR CULT: NORMAL

## 2022-11-10 RX ORDER — COVID-19 ANTIGEN TEST
KIT MISCELLANEOUS
COMMUNITY

## 2022-11-10 RX ORDER — MELOXICAM 15 MG/1
15 TABLET ORAL DAILY
COMMUNITY

## 2022-11-10 RX ORDER — CLOTRIMAZOLE 1 %
CREAM (GRAM) TOPICAL 2 TIMES DAILY
Qty: 60 G | Refills: 0 | Status: SHIPPED | OUTPATIENT
Start: 2022-11-10

## 2022-11-10 NOTE — PROGRESS NOTES
ASSESSMENT & PLAN: Nikole Higgins is a 74 y o  Luisa Jason with normal gynecologic exam     1   Routine well woman exam done today  2  Current ASCCP Guidelines reviewed  PAP no longer indictaed  3  Mammogram managed by breast specialist, mammogram is UTD in care everywhere and was reminded due again jan 2023 - pt has scheduled [facility]  4  Colonoscopy UTD 2020 - pt states was told 5 yrs  5   DEXA - ordered  Recommended calcium 1200mg/day and vit D 1000-2000IU/day, weight bearing exercise as tolerated  6  The following were reviewed in today's visit: adequate intake of calcium and vitamin D, exercise, healthy diet and recommendations for screenings  Pt noting recent use of abx multiple times for "bites" on legs and cellulitis, then started with UTI sxs in October, tx with cipro, urine culture w/ bacterial growth  States sxs didn't go away, urine culture sent out again by primary and results still pending  Pt noting primarily urine sxs and difficult to tell if persist urinary infection going on vs vulvovaginal problem  She is not having any chronic vulvovaginal issues such as irritation, itching or dryness  AFFIRM to be sent out today  Clotrimazole for external vulvar use, possible candida/yeast, as she also has skin infection under abominal pannus consistent w/ candida  She can apply cream to vulvar areas and under abdominal fold 2 x daily and advised keeping areas open and dry  Will treat further based on results and response to tx  For now, RTO in 2 yrs for medicare annual, sooner if needed    CC:  Annual Gynecologic Examination    HPI: Nikole Higgins is a 74 y o  Luisaanna marie Gomez who presents for annual gynecologic examination  She has the following concerns:  Hank Smith her daughter accompanies her today  States she has problem when she has to urinate she gets a lot of pressure, burning and discomfort down there    States she has had urine testing done a few weeks ago and grew ecoli and was tx with cipro abx   States problem not going away, repeat urine culture still pending  States had been on abx prior to UTI, multiple times due to "bug bites" prescribed by urgent care and reports turning into cellulitis  That has since cleared    States occasional itching in genital area after urination once in a great while but it has not been a major issue until now  Denies vulvar irritation/itching being a chronic problem  She is not sexually active  She is not aware of any preexistent vulvovaginal itching or irritation  Denies known discharge in underwear, no odor  Also notes red, itchy, irritative rash under abdominal fold too  Tried OTC corn starch powder  No LMP recorded (lmp unknown)  Patient is postmenopausal     Post-menopausal bleeding: denies      Health Maintenance:      Pt has hx of breast cysts for years, following with breast specialist [facility] for mammogram screenings and breast exams  She has no breast concerns today  She feels safe at home  Last PAP & HPV: uncertain  Denies known hx of abnormal PAP  Last mammogram: Jan 2022 - [facility], care everywhere  Has scheduled again jan 2023  Seeing breast specialist for checks, due to hx of breast cysts  benign  Last colonoscopy: 2020  Last DEXA: 2018 - care everywhere   Normal      Past Medical History:   Diagnosis Date   • Anxiety     on med    • Arthritis    • Back problem     4 Bulging Disks   • Fibrocystic disease of both breasts    • Follicular lymphoma grade 3a (HCC)    • Hip pain     Left   • History of staph infection    • Hypertension    • Knee pain     Right   • Lymphoma ([facility] [address] )    • Spinal stenosis    • Thyroid disease        Past Surgical History:   Procedure Laterality Date   • CATARACT EXTRACTION  01/01/2017   • CHOLECYSTECTOMY     • MAMMO (HISTORICAL)  12/13/2019   • OTHER SURGICAL HISTORY      Warts removal   • REFRACTIVE SURGERY Bilateral    • REVISION TOTAL HIP ARTHROPLASTY     • ROTATOR CUFF REPAIR Right        Past OB/Gyn History:  OB History        1    Para   1    Term   1            AB   0    Living   1       SAB   0    IAB   0    Ectopic   0    Multiple        Live Births   1           Obstetric Comments   · If post menopausal, age at menopause:   44                  Family History   Problem Relation Age of Onset   • Other Mother         Non Hogkins   • Heart failure Mother    • Lung cancer Father    • Breast cancer Other        Family History of Breast/Uterine/Ovarian/Colon:  Distant relative breast CA  Social History:  Social History     Socioeconomic History   • Marital status: /Civil Union     Spouse name: Not on file   • Number of children: 1   • Years of education: 12   • Highest education level: 12th grade   Occupational History   • Occupation: reitred    Tobacco Use   • Smoking status: Former Smoker     Years:    • Smokeless tobacco: Never Used   Vaping Use   • Vaping Use: Never used   Substance and Sexual Activity   • Alcohol use: Yes     Comment: Occasional    • Drug use: Not Currently   • Sexual activity: Not Currently     Partners: Male     Birth control/protection: Post-menopausal   Other Topics Concern   • Not on file   Social History Narrative    · Most recent tobacco use screenin2019      · Do you currently or have you served in [email or web address]:   No      · Were you activated, into active duty, as a member of the [employer] or as a Reservist:   No      · Caffeine intake:   Occasional      · Guns present in home:   No      · Seat belts used routinely:   Yes      · Sunscreen used routinely:   Yes      · Smoke alarm in home:    Yes      · Advance directive:   Yes      Social Determinants of Health     Financial Resource Strain: Not on file   Food Insecurity: Not on file   Transportation Needs: Not on file   Physical Activity: Not on file   Stress: Not on file   Social Connections: Not on file   Intimate Partner Violence: Not on file   Housing Stability: Not on file Allergies   Allergen Reactions   • Diphenhydramine Other (See Comments)     Per patient and daughter have been told this is not an allergy, patient has had chest pressure/pain with benadryl once in the past    • Erythromycin Other (See Comments)   • Tetanus Toxoids Other (See Comments)   • Tetracycline Other (See Comments)         Current Outpatient Medications:   •  acetaminophen (TYLENOL) 500 mg tablet, Take 500 mg by mouth every 4 (four) hours, Disp: , Rfl:   •  amLODIPine (NORVASC) 5 mg tablet, TAKE 1 TABLET DAILY, Disp: 90 tablet, Rfl: 3  •  benazepril (LOTENSIN) 40 MG tablet, TAKE 1 TABLET DAILY, Disp: 90 tablet, Rfl: 3  •  Cholecalciferol 1 25 MG (23048 UT) TABS, Take 1 25 mg by mouth daily, Disp: , Rfl:   •  citalopram (CeleXA) 10 mg tablet, TAKE 1 TABLET DAILY, Disp: 90 tablet, Rfl: 3  •  clotrimazole (LOTRIMIN) 1 % cream, Apply topically 2 (two) times a day To vulvar area and vaginal opening x 7-10 days, Disp: 60 g, Rfl: 0  •  docusate calcium (SURFAK) 240 mg capsule, Take 240 mg by mouth 2 (two) times a day, Disp: , Rfl:   •  fexofenadine (ALLEGRA) 180 MG tablet, Take 180 mg by mouth daily, Disp: , Rfl:   •  levothyroxine (Synthroid) 100 mcg tablet, Take 1 tablet (100 mcg total) by mouth daily (Patient taking differently: Take 125 mcg by mouth daily), Disp: 90 tablet, Rfl: 0  •  meloxicam (MOBIC) 15 mg tablet, Take 15 mg by mouth daily, Disp: , Rfl:   •  multivitamin-iron-minerals-folic acid (CENTRUM) chewable tablet, Chew 1 tablet daily, Disp: , Rfl:   •  Naproxen Sodium (Aleve) 220 MG CAPS, Take by mouth, Disp: , Rfl:   •  Omega 3 1200 MG CAPS, Take by mouth, Disp: , Rfl:   •  pantoprazole (PROTONIX) 40 mg tablet, Take 1 tablet (40 mg total) by mouth daily, Disp: 90 tablet, Rfl: 3  •  patient supplied medication, 240 each Surfax capsule 240 mg 1 cap per dad at HS, Disp: , Rfl:   •  betamethasone dipropionate (DIPROSONE) 0 05 % cream, Apply topically 2 (two) times a day ADD:  To the SCALP and rub in well (Patient not taking: Reported on 11/10/2022), Disp: 30 g, Rfl: 0  •  ketoconazole (NIZORAL) 2 % cream, Apply topically daily ADD:  To the face, nose, eyebrows (Patient not taking: Reported on 11/10/2022), Disp: 30 g, Rfl: 1  •  linaCLOtide (Linzess) 145 MCG CAPS, Take 1 capsule (145 mcg total) by mouth daily (Patient not taking: Reported on 11/10/2022), Disp: 30 capsule, Rfl: 3  •  triamcinolone (KENALOG) 0 1 % cream, Apply topically 2 (two) times a day (Patient not taking: Reported on 11/10/2022), Disp: 30 g, Rfl: 0    Review of Systems  Constitutional :no fever, feels well, no tiredness, no recent weight gain or loss  ENT: no ear ache, no loss of hearing, no nosebleeds or nasal discharge, no sore throat or hoarseness  Cardiovascular: no complaints of slow or fast heart beat, no chest pain, no palpitations, no leg claudication or lower extremity edema  Respiratory: no complaints of shortness of shortness of breath, no HECK  Breasts:no complaints of breast pain, breast lump, or nipple discharge  Gastrointestinal: no complaints of abdominal pain, constipation, nausea, vomiting, or diarrhea or bloody stools  Genitourinary : as in HPI; vaginal discharge or abnormal vaginal bleeding Musculoskeletal: no complaints of arthralgia, no myalgia, no joint swelling or stiffness, no limb pain or swelling  Integumentary: no complaints of skin rash or lesion, itching or dry skin  Neurological: no complaints of headache, no confusion, no numbness or tingling, no dizziness or fainting  MH: denies worsening/unstable anxiety/depression    Objective      /82 (BP Location: Left arm, Patient Position: Sitting, Cuff Size: Large)   Ht 5' 2" (1 575 m)   Wt 114 kg (251 lb 9 6 oz)   LMP  (LMP Unknown)   BMI 46 02 kg/m²   General:   appears stated age, cooperative, alert normal mood and affect   BMI 46 02   Neck: normal, supple,trachea midline, no masses   Heart: regular rate and rhythm, S1, S2 normal, no murmur, click, rub or gallop   Lungs: clear to auscultation bilaterally   Breasts: normal appearance, no masses or tenderness, No nipple retraction or dimpling, No nipple discharge or bleeding, No axillary or supraclavicular adenopathy, Normal to palpation without dominant masses   Abdomen: Moisture/erythema rash noted under abdominal pannus  soft, non-tender, without masses or organomegaly   Vulva: Atrophy, no concerning lesions   Vagina: normal vagina, no discharge, exudate, lesion, or erythema atrophy   Urethra: normal   Cervix: Normal, no discharge  Nontender  Uterus: nontender  exam difficult to due body habitus   Adnexa: nontender  exam difficult  due to body habitus   Lymphatic palpation of lymph nodes in neck, axilla, groin and/or other locations: no lymphadenopathy or masses noted   Skin normal skin turgor and no rashes     Psychiatric orientation to person, place, and time: normal  mood and affect: normal

## 2022-11-11 DIAGNOSIS — R10.2 PELVIC PRESSURE IN FEMALE: Primary | ICD-10-CM

## 2022-11-11 DIAGNOSIS — D25.9 UTERINE LEIOMYOMA, UNSPECIFIED LOCATION: ICD-10-CM

## 2022-11-11 LAB
CANDIDA RRNA VAG QL PROBE: NEGATIVE
G VAGINALIS RRNA GENITAL QL PROBE: NEGATIVE
T VAGINALIS RRNA GENITAL QL PROBE: NEGATIVE

## 2022-11-14 ENCOUNTER — RA CDI HCC (OUTPATIENT)
Dept: OTHER | Facility: HOSPITAL | Age: 72
End: 2022-11-14

## 2022-11-14 NOTE — PROGRESS NOTES
[facility]  coding opportunities       Chart reviewed, no opportunity found:   [address]        Patients Insurance     Medicare Insurance: [employer]

## 2022-11-16 ENCOUNTER — TELEPHONE (OUTPATIENT)
Dept: FAMILY MEDICINE CLINIC | Facility: CLINIC | Age: 72
End: 2022-11-16

## 2022-11-16 NOTE — TELEPHONE ENCOUNTER
----- Message from Jan Mejia DO sent at 11/13/2022  1:54 PM EST -----  Kota Ackerman -- This represents a very , very low level amt of bacteria in your bladder  May stay that way forever  So, drink like a fish (but fishes don't drink!), but you know what I mean, and PEE A LOT  Do you do that? Must empty out the bladder, flush it out  So GO GO GO! Any questions, let me know!

## 2022-11-17 ENCOUNTER — HOSPITAL ENCOUNTER (OUTPATIENT)
Dept: ULTRASOUND IMAGING | Facility: HOSPITAL | Age: 72
Discharge: HOME | End: 2022-11-17

## 2022-11-17 DIAGNOSIS — D25.9 UTERINE LEIOMYOMA, UNSPECIFIED LOCATION: ICD-10-CM

## 2022-11-17 DIAGNOSIS — R10.2 PELVIC PRESSURE IN FEMALE: ICD-10-CM

## 2022-11-21 ENCOUNTER — OFFICE VISIT (OUTPATIENT)
Dept: FAMILY MEDICINE CLINIC | Facility: CLINIC | Age: 72
End: 2022-11-21

## 2022-11-21 VITALS
RESPIRATION RATE: 16 BRPM | SYSTOLIC BLOOD PRESSURE: 130 MMHG | TEMPERATURE: 98.7 F | WEIGHT: 250.2 LBS | DIASTOLIC BLOOD PRESSURE: 60 MMHG | HEIGHT: 62 IN | HEART RATE: 82 BPM | OXYGEN SATURATION: 98 % | BODY MASS INDEX: 46.04 KG/M2

## 2022-11-21 DIAGNOSIS — Z23 NEED FOR VACCINATION: ICD-10-CM

## 2022-11-21 DIAGNOSIS — N32.89 BLADDER SPASM: Primary | ICD-10-CM

## 2022-11-21 DIAGNOSIS — E55.9 VITAMIN D INSUFFICIENCY: ICD-10-CM

## 2022-11-21 DIAGNOSIS — E66.01 MORBID OBESITY (HCC): ICD-10-CM

## 2022-11-21 DIAGNOSIS — R73.9 ELEVATED BLOOD SUGAR: ICD-10-CM

## 2022-11-21 RX ORDER — MIRABEGRON 25 MG/1
25 TABLET, FILM COATED, EXTENDED RELEASE ORAL DAILY
Qty: 7 TABLET | Refills: 0
Start: 2022-11-21

## 2022-11-21 NOTE — PROGRESS NOTES
Assessment/Plan:  74 y o female, in NAD, and is well-known to me for many years presents for f/u and evaluation of the following medical issues:           1  Bladder spasm  -     Mirabegron ER (Myrbetriq) 25 MG TB24; Take 25 mg by mouth daily    2  Vitamin D insufficiency  -     Vitamin D 25 hydroxy; Future; Expected date: 11/22/2022    3  Elevated blood sugar  -     Hemoglobin A1C; Standing  -     Hemoglobin A1C    4  Morbid obesity ([address] )    5  Need for vaccination  Comments:  discussed Zoster vac - after Jan 1 and Prevnar-20 in Dec - will come in        Subjective:      Patient ID: Jeanine Winston is a 74 y o  female  HPI    The following portions of the patient's history were reviewed and updated as appropriate: She  has a past medical history of Anxiety, Arthritis, Back problem, Fibrocystic disease of both breasts, Follicular lymphoma grade 3a ([address] ), Hip pain, History of staph infection, Hypertension, Knee pain, Lymphoma ([facility] [address] ), Spinal stenosis, and Thyroid disease    She   Patient Active Problem List    Diagnosis Date Noted   • Recurrent major depressive disorder, in partial remission ([facility] [address] ) 03/15/2022   • Lymphoma ([facility] [address] ) 06/21/2021   • 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 ([facility] [address] ) 06/09/2021   • PAD (peripheral artery disease) ([facility] [address] ) 04/07/2021   • Class 3 severe obesity with serious comorbidity and body mass index (BMI) of 45 0 to 49 9 in adult [facility]) 03/02/2021   • Primary osteoarthritis of right hip 01/20/2021   • Lumbar pain 12/09/2020   • History of total left hip replacement 12/01/2020   • Chronic anxiety 11/01/2019   • Vitamin D deficiency 09/09/2019   • Psoriasis 09/09/2019   • Pre-diabetes 09/09/2019   • Osteoarthritis 09/09/2019   • NSAID long-term use 09/09/2019   • Morbid obesity ([address] ) 09/09/2019   • Hypothyroidism 09/09/2019   • Essential hypertension 09/09/2019   • Elevated blood sugar 09/09/2019   • Stage 3a chronic kidney disease ([address] ) 09/09/2019   • Chronic depression 09/09/2019   • Ambulatory dysfunction 09/09/2019     She  has a past surgical history that includes Mammo (historical) (12/13/2019); Cataract extraction (01/01/2017); Refractive surgery (Bilateral); Other surgical history; Cholecystectomy; Rotator cuff repair (Right); and Revision total hip arthroplasty  Her family history includes Breast cancer in her other; Heart failure in her mother; Lung cancer in her father; Other in her mother  She  reports that she has quit smoking  She has never used smokeless tobacco  She reports current alcohol use  She reports that she does not currently use drugs  Current Outpatient Medications   Medication Sig Dispense Refill   • acetaminophen (TYLENOL) 500 mg tablet Take 500 mg by mouth every 4 (four) hours     • amLODIPine (NORVASC) 5 mg tablet TAKE 1 TABLET DAILY 90 tablet 3   • benazepril (LOTENSIN) 40 MG tablet TAKE 1 TABLET DAILY 90 tablet 3   • Cholecalciferol 1 25 MG (33038 UT) TABS Take 1 25 mg by mouth daily     • citalopram (CeleXA) 10 mg tablet TAKE 1 TABLET DAILY 90 tablet 3   • clotrimazole (LOTRIMIN) 1 % cream Apply topically 2 (two) times a day To vulvar area and vaginal opening x 7-10 days 60 g 0   • fexofenadine (ALLEGRA) 180 MG tablet Take 180 mg by mouth daily     • levothyroxine (Synthroid) 100 mcg tablet Take 1 tablet (100 mcg total) by mouth daily 90 tablet 0   • Mirabegron ER (Myrbetriq) 25 MG TB24 Take 25 mg by mouth daily 7 tablet 0   • multivitamin-iron-minerals-folic acid (CENTRUM) chewable tablet Chew 1 tablet daily     • Naproxen Sodium 220 MG CAPS Take by mouth     • Omega 3 1200 MG CAPS Take by mouth     • meloxicam (MOBIC) 15 mg tablet Take 15 mg by mouth daily (Patient not taking: Reported on 11/21/2022)     • triamcinolone (KENALOG) 0 1 % cream Apply topically 2 (two) times a day (Patient not taking: Reported on 11/10/2022) 30 g 0     No current facility-administered medications for this visit       Current Outpatient Medications on File Prior to Visit Medication Sig   • acetaminophen (TYLENOL) 500 mg tablet Take 500 mg by mouth every 4 (four) hours   • amLODIPine (NORVASC) 5 mg tablet TAKE 1 TABLET DAILY   • benazepril (LOTENSIN) 40 MG tablet TAKE 1 TABLET DAILY   • Cholecalciferol 1 25 MG (14486 UT) TABS Take 1 25 mg by mouth daily   • citalopram (CeleXA) 10 mg tablet TAKE 1 TABLET DAILY   • clotrimazole (LOTRIMIN) 1 % cream Apply topically 2 (two) times a day To vulvar area and vaginal opening x 7-10 days   • fexofenadine (ALLEGRA) 180 MG tablet Take 180 mg by mouth daily   • levothyroxine (Synthroid) 100 mcg tablet Take 1 tablet (100 mcg total) by mouth daily   • multivitamin-iron-minerals-folic acid (CENTRUM) chewable tablet Chew 1 tablet daily   • Naproxen Sodium 220 MG CAPS Take by mouth   • Omega 3 1200 MG CAPS Take by mouth   • meloxicam (MOBIC) 15 mg tablet Take 15 mg by mouth daily (Patient not taking: Reported on 11/21/2022)   • triamcinolone (KENALOG) 0 1 % cream Apply topically 2 (two) times a day (Patient not taking: Reported on 11/10/2022)   • [DISCONTINUED] betamethasone dipropionate (DIPROSONE) 0 05 % cream Apply topically 2 (two) times a day ADD:  To the SCALP and rub in well (Patient not taking: Reported on 11/10/2022)   • [DISCONTINUED] docusate calcium (SURFAK) 240 mg capsule Take 240 mg by mouth 2 (two) times a day   • [DISCONTINUED] ketoconazole (NIZORAL) 2 % cream Apply topically daily ADD:  To the face, nose, eyebrows (Patient not taking: Reported on 11/10/2022)   • [DISCONTINUED] linaCLOtide (Linzess) 145 MCG CAPS Take 1 capsule (145 mcg total) by mouth daily (Patient not taking: Reported on 11/10/2022)   • [DISCONTINUED] pantoprazole (PROTONIX) 40 mg tablet Take 1 tablet (40 mg total) by mouth daily   • [DISCONTINUED] patient supplied medication 240 each Surfax capsule 240 mg 1 cap per dad at      No current facility-administered medications on file prior to visit       She is allergic to diphenhydramine, erythromycin, tetanus toxoids, and tetracycline       Review of Systems   Constitutional: Negative  HENT: Negative  Eyes: Negative  Respiratory: Negative  Cardiovascular: Negative  Gastrointestinal: Negative  Genitourinary: Negative  Skin: Negative for color change and rash  Both LE's are totally cleared    Psychiatric/Behavioral: Negative  Objective:      /60 (BP Location: Right arm, Patient Position: Sitting, Cuff Size: Large)   Pulse 82   Temp 98 7 °F (37 1 °C) (Temporal)   Resp 16   Ht 5' 2" (1 575 m)   Wt 113 kg (250 lb 3 2 oz)   LMP  (LMP Unknown)   SpO2 98%   BMI 45 76 kg/m²          Physical Exam  Vitals and nursing note reviewed  Constitutional:       General: She is not in acute distress  Appearance: She is well-developed  She is obese  She is not ill-appearing, toxic-appearing or diaphoretic  Comments: BMI 46 16   HENT:      Head: Normocephalic and atraumatic  Eyes:      General: No scleral icterus  Conjunctiva/sclera: Conjunctivae normal       Pupils: Pupils are equal, round, and reactive to light  Neck:      Thyroid: No thyromegaly  Trachea: No tracheal deviation  Cardiovascular:      Rate and Rhythm: Normal rate and regular rhythm  Heart sounds: Normal heart sounds  Pulmonary:      Effort: No respiratory distress  Breath sounds: Normal breath sounds  No wheezing or rales  Chest:      Chest wall: No tenderness  Musculoskeletal:         General: No tenderness or deformity  Normal range of motion  Cervical back: Normal range of motion and neck supple  Comments: Will see Dr Samuel Mcnally in 2 days for eval of hip (L) repllacement  Skin:     General: Skin is warm and dry  Coloration: Skin is not pale  Findings: No erythema, lesion or rash  Comments: WEI vasquez- / [facility]  5 days ago -- looks good   Neurological:      General: No focal deficit present        Mental Status: She is alert and oriented to person, place, and time  Cranial Nerves: No cranial nerve deficit  Psychiatric:         Mood and Affect: Mood normal          Behavior: Behavior normal          Thought Content: Thought content normal          Judgment: Judgment normal          35 min with pt and     This time was spent reviewing previous records, reviewing previous laboratory and other tests, taking history from patient, examination of patient, discussion of prognosis and treatment, ordering laboratory tests, ordering medications, and completion of the medical record

## 2022-11-22 ENCOUNTER — TELEPHONE (OUTPATIENT)
Dept: OBGYN CLINIC | Facility: CLINIC | Age: 72
End: 2022-11-22

## 2022-11-22 NOTE — TELEPHONE ENCOUNTER
I called and spoke to pt regarding pelvic US results  Calcified fibroid seen approx 4cm in size  However endometrium is also thickened 9mm  After discussing with pt further, she reports seeing Dr Rahat Andersen and having EMBx and D&C procedure for same years back  She denies any vaginal bleeding  She saw PCP yesterday who started her on myrbetriq for bladder spasm  Cream for vulva significantly improved sxs and only needed for 2-3 days  She is hesitant to consider EMBx since she has similar before, noted very painful which is why D&C was done  She we have no records of this, I will ask staff to obtain records of same from [facility] and [place] so I can review and determine if further testing is need  Will ask for dr Anisa Nicole input as well once records obtained  Pt agreeable to plan and will reach out again once I have recommendation    STAFF: please look in [facility] and [place] records for dr Megan Ng documentation regarding D&C, EMBx, fibroids, abnormal pelvic US, thickened endometrium, etc  Ill be more than happy to review it with you to see what I need and can be helpful if that helps  Thanks!

## 2022-11-28 ENCOUNTER — TELEMEDICINE (OUTPATIENT)
Dept: FAMILY MEDICINE CLINIC | Facility: CLINIC | Age: 72
End: 2022-11-28

## 2022-11-28 DIAGNOSIS — U07.1 COVID-19: Primary | ICD-10-CM

## 2022-11-28 NOTE — PROGRESS NOTES
Assessment/Plan:  75 yo female, recently went to [facility], hoping to get Paxlovid, but wasn't given that "bec of kidneys"  Onset of Covid symptoms was Sat a m  (today is Monday)  Pt is not very ill - studdy head and congestion, not bad  No fever  Some myalgia  Sl cough, dry  This is a VIRTUAL VISIT over phone  [place]  I in my office with door closed and alone  Discssed with pt, her , and daughter, Katerine Jackson  All the options were discussed and reviewed, bertrand the matter of CKD  Her GFR was recently done 1 month ago and found to be 40  It had been 60 several months before that but the 5 or so  prior to that were in the mid 40 range  Thus, her average would be about GFR 45  We talked about that--that is the reason patient 1st did not want to give her the medication PACs loaded--because her GFR was low--and I presume because she is not that ill  At any rate, all options were discussed with her adequate hydration rest sleep gargling Tylenol/Motrin etc  etc  She will think things over throughout tonight and let me know if she wants to go on Paxil bid  I told her I was not wildly in favor of it as she is not ill and also, remember, in the trials only 2% of patients in the trials were over 74  She will be in touch tomorrow after she makes her decision       Problem List Items Addressed This Visit    None  Visit Diagnoses     COVID-19    -  Primary              Subjective:      Patient ID: Pierre Hanley is a 74 y o  female      HPI    The following portions of the patient's history were reviewed and updated as appropriate:   Past Medical History:  She has a past medical history of Anxiety, Arthritis, Back problem, Fibrocystic disease of both breasts, Follicular lymphoma grade 3a ([address] ), Hip pain, History of staph infection, Hypertension, Knee pain, Lymphoma ([address] ), Spinal stenosis, and Thyroid disease ,  _______________________________________________________________________  Medical Problems:  does not have any pertinent problems on file ,  _______________________________________________________________________  Past Surgical History:   has a past surgical history that includes Mammo (historical) (12/13/2019); Cataract extraction (01/01/2017); Refractive surgery (Bilateral); Other surgical history; Cholecystectomy; Rotator cuff repair (Right); and Revision total hip arthroplasty  ,  _______________________________________________________________________  Family History:  family history includes Breast cancer in her other; Heart failure in her mother; Lung cancer in her father; Other in her mother ,  _______________________________________________________________________  Social History:   reports that she has quit smoking  She has never used smokeless tobacco  She reports current alcohol use  She reports that she does not currently use drugs  ,  _______________________________________________________________________  Allergies:  is allergic to diphenhydramine, erythromycin, tetanus toxoids, and tetracycline     _______________________________________________________________________  Current Outpatient Medications   Medication Sig Dispense Refill   • acetaminophen (TYLENOL) 500 mg tablet Take 500 mg by mouth every 4 (four) hours     • amLODIPine (NORVASC) 5 mg tablet TAKE 1 TABLET DAILY 90 tablet 3   • benazepril (LOTENSIN) 40 MG tablet TAKE 1 TABLET DAILY 90 tablet 3   • Cholecalciferol 1 25 MG (32280 UT) TABS Take 1 25 mg by mouth daily     • citalopram (CeleXA) 10 mg tablet TAKE 1 TABLET DAILY 90 tablet 3   • clotrimazole (LOTRIMIN) 1 % cream Apply topically 2 (two) times a day To vulvar area and vaginal opening x 7-10 days 60 g 0   • fexofenadine (ALLEGRA) 180 MG tablet Take 180 mg by mouth daily     • levothyroxine (Synthroid) 100 mcg tablet Take 1 tablet (100 mcg total) by mouth daily 90 tablet 0   • Mirabegron ER (Myrbetriq) 25 MG TB24 Take 25 mg by mouth daily 7 tablet 0   • multivitamin-iron-minerals-folic acid (CENTRUM) chewable tablet Chew 1 tablet daily     • Naproxen Sodium 220 MG CAPS Take by mouth     • Omega 3 1200 MG CAPS Take by mouth       No current facility-administered medications for this visit      _______________________________________________________________________  Review of Systems  see above    Objective: There were no vitals filed for this visit  There is no height or weight on file to calculate BMI       Physical Exam  -this was VV of 25 min

## 2022-12-05 ENCOUNTER — TELEPHONE (OUTPATIENT)
Dept: FAMILY MEDICINE CLINIC | Facility: CLINIC | Age: 72
End: 2022-12-05

## 2022-12-05 NOTE — TELEPHONE ENCOUNTER
Preston Tobar called the patient is still covid positive    ---she has a slight fever  Nausea diarrhea but she took imodium   Cough clear phlem   Head congestion clear phlem   Chills   Sometimes she gets very cold hard to warm her up   ----she is taking tylenol   Not helping any other suggestions

## 2022-12-07 NOTE — TELEPHONE ENCOUNTER
I spoke with patients daughter, she stated Andi Mehta is doing better now is just the diarrhea  They do no want any meds at this time she is going to try to give Andi Mehta imodium to help with the diarrhea

## 2022-12-07 NOTE — TELEPHONE ENCOUNTER
Dr Smith patient has an allergy to Erythromycin  I called to confirm if she has ever had a Marlou Manus she said No and she does not want to try it  Is there another antibiotic she can take?

## 2022-12-09 ENCOUNTER — APPOINTMENT (EMERGENCY)
Dept: CT IMAGING | Facility: HOSPITAL | Age: 72
End: 2022-12-09

## 2022-12-09 ENCOUNTER — APPOINTMENT (EMERGENCY)
Dept: RADIOLOGY | Facility: HOSPITAL | Age: 72
End: 2022-12-09

## 2022-12-09 ENCOUNTER — HOSPITAL ENCOUNTER (EMERGENCY)
Facility: HOSPITAL | Age: 72
Discharge: HOME | End: 2022-12-09
Attending: EMERGENCY MEDICINE

## 2022-12-09 VITALS
BODY MASS INDEX: 45.52 KG/M2 | SYSTOLIC BLOOD PRESSURE: 121 MMHG | DIASTOLIC BLOOD PRESSURE: 55 MMHG | RESPIRATION RATE: 20 BRPM | WEIGHT: 247.36 LBS | HEIGHT: 62 IN | HEART RATE: 76 BPM | OXYGEN SATURATION: 94 % | TEMPERATURE: 98.3 F

## 2022-12-09 DIAGNOSIS — R07.89 ATYPICAL CHEST PAIN: ICD-10-CM

## 2022-12-09 DIAGNOSIS — Z79.1 NSAID LONG-TERM USE: ICD-10-CM

## 2022-12-09 DIAGNOSIS — U07.1 COVID-19: Primary | ICD-10-CM

## 2022-12-09 LAB
2HR DELTA HS TROPONIN: 1 NG/L
ANION GAP SERPL CALCULATED.3IONS-SCNC: 7 MMOL/L (ref 4–13)
ATRIAL RATE: 70 BPM
BACTERIA UR QL AUTO: ABNORMAL /HPF
BASOPHILS # BLD AUTO: 0.01 THOUSANDS/ÂΜL (ref 0–0.1)
BASOPHILS NFR BLD AUTO: 0 % (ref 0–1)
BILIRUB UR QL STRIP: NEGATIVE
BUN SERPL-MCNC: 22 MG/DL (ref 5–25)
CALCIUM SERPL-MCNC: 8.6 MG/DL (ref 8.4–10.2)
CARDIAC TROPONIN I PNL SERPL HS: 13 NG/L
CARDIAC TROPONIN I PNL SERPL HS: 14 NG/L
CHLORIDE SERPL-SCNC: 102 MMOL/L (ref 96–108)
CLARITY UR: CLEAR
CO2 SERPL-SCNC: 22 MMOL/L (ref 21–32)
COLOR UR: ABNORMAL
CREAT SERPL-MCNC: 1.14 MG/DL (ref 0.6–1.3)
EOSINOPHIL # BLD AUTO: 0.11 THOUSAND/ÂΜL (ref 0–0.61)
EOSINOPHIL NFR BLD AUTO: 3 % (ref 0–6)
ERYTHROCYTE [DISTWIDTH] IN BLOOD BY AUTOMATED COUNT: 13.2 % (ref 11.6–15.1)
GFR SERPL CREATININE-BSD FRML MDRD: 47 ML/MIN/1.73SQ M
GLUCOSE SERPL-MCNC: 105 MG/DL (ref 65–140)
GLUCOSE UR STRIP-MCNC: NEGATIVE MG/DL
HCT VFR BLD AUTO: 37.2 % (ref 34.8–46.1)
HGB BLD-MCNC: 12.4 G/DL (ref 11.5–15.4)
HGB UR QL STRIP.AUTO: NEGATIVE
IMM GRANULOCYTES # BLD AUTO: 0.03 THOUSAND/UL (ref 0–0.2)
IMM GRANULOCYTES NFR BLD AUTO: 1 % (ref 0–2)
KETONES UR STRIP-MCNC: ABNORMAL MG/DL
LEUKOCYTE ESTERASE UR QL STRIP: NEGATIVE
LYMPHOCYTES # BLD AUTO: 0.63 THOUSANDS/ÂΜL (ref 0.6–4.47)
LYMPHOCYTES NFR BLD AUTO: 16 % (ref 14–44)
MCH RBC QN AUTO: 32.3 PG (ref 26.8–34.3)
MCHC RBC AUTO-ENTMCNC: 33.3 G/DL (ref 31.4–37.4)
MCV RBC AUTO: 97 FL (ref 82–98)
MONOCYTES # BLD AUTO: 0.68 THOUSAND/ÂΜL (ref 0.17–1.22)
MONOCYTES NFR BLD AUTO: 18 % (ref 4–12)
NEUTROPHILS # BLD AUTO: 2.38 THOUSANDS/ÂΜL (ref 1.85–7.62)
NEUTS SEG NFR BLD AUTO: 62 % (ref 43–75)
NITRITE UR QL STRIP: NEGATIVE
NON-SQ EPI CELLS URNS QL MICRO: ABNORMAL /HPF
NRBC BLD AUTO-RTO: 0 /100 WBCS
P AXIS: 53 DEGREES
PH UR STRIP.AUTO: 6 [PH]
PLATELET # BLD AUTO: 136 THOUSANDS/UL (ref 149–390)
PMV BLD AUTO: 9.8 FL (ref 8.9–12.7)
POTASSIUM SERPL-SCNC: 4 MMOL/L (ref 3.5–5.3)
PR INTERVAL: 158 MS
PROT UR STRIP-MCNC: ABNORMAL MG/DL
QRS AXIS: -27 DEGREES
QRSD INTERVAL: 94 MS
QT INTERVAL: 396 MS
QTC INTERVAL: 427 MS
RBC # BLD AUTO: 3.84 MILLION/UL (ref 3.81–5.12)
RBC #/AREA URNS AUTO: ABNORMAL /HPF
SODIUM SERPL-SCNC: 131 MMOL/L (ref 135–147)
SP GR UR STRIP.AUTO: 1.05 (ref 1–1.03)
T WAVE AXIS: 9 DEGREES
UROBILINOGEN UR STRIP-ACNC: <2 MG/DL
VENTRICULAR RATE: 70 BPM
WBC # BLD AUTO: 3.84 THOUSAND/UL (ref 4.31–10.16)
WBC #/AREA URNS AUTO: ABNORMAL /HPF

## 2022-12-09 RX ORDER — FAMOTIDINE 20 MG/1
20 TABLET, FILM COATED ORAL ONCE
Status: COMPLETED | OUTPATIENT
Start: 2022-12-09 | End: 2022-12-09

## 2022-12-09 RX ORDER — FENTANYL CITRATE 50 UG/ML
25 INJECTION, SOLUTION INTRAMUSCULAR; INTRAVENOUS ONCE
Status: COMPLETED | OUTPATIENT
Start: 2022-12-09 | End: 2022-12-09

## 2022-12-09 RX ORDER — LIDOCAINE HYDROCHLORIDE 20 MG/ML
15 SOLUTION OROPHARYNGEAL ONCE
Status: COMPLETED | OUTPATIENT
Start: 2022-12-09 | End: 2022-12-09

## 2022-12-09 RX ORDER — FAMOTIDINE 20 MG/1
20 TABLET, FILM COATED ORAL DAILY
Qty: 30 TABLET | Refills: 0 | Status: SHIPPED | OUTPATIENT
Start: 2022-12-09

## 2022-12-09 RX ORDER — MORPHINE SULFATE 4 MG/ML
4 INJECTION, SOLUTION INTRAMUSCULAR; INTRAVENOUS ONCE
Status: COMPLETED | OUTPATIENT
Start: 2022-12-09 | End: 2022-12-09

## 2022-12-09 RX ORDER — MAGNESIUM HYDROXIDE/ALUMINUM HYDROXICE/SIMETHICONE 120; 1200; 1200 MG/30ML; MG/30ML; MG/30ML
30 SUSPENSION ORAL ONCE
Status: COMPLETED | OUTPATIENT
Start: 2022-12-09 | End: 2022-12-09

## 2022-12-09 RX ADMIN — MORPHINE SULFATE 4 MG: 4 INJECTION INTRAVENOUS at 15:22

## 2022-12-09 RX ADMIN — LIDOCAINE HYDROCHLORIDE 15 ML: 20 SOLUTION ORAL; TOPICAL at 14:14

## 2022-12-09 RX ADMIN — SODIUM CHLORIDE 1000 ML: 0.9 INJECTION, SOLUTION INTRAVENOUS at 15:25

## 2022-12-09 RX ADMIN — FENTANYL CITRATE 25 MCG: 50 INJECTION INTRAMUSCULAR; INTRAVENOUS at 16:36

## 2022-12-09 RX ADMIN — ALUMINUM HYDROXIDE, MAGNESIUM HYDROXIDE, AND SIMETHICONE 30 ML: 200; 200; 20 SUSPENSION ORAL at 14:14

## 2022-12-09 RX ADMIN — FAMOTIDINE 20 MG: 20 TABLET, FILM COATED ORAL at 14:13

## 2022-12-09 RX ADMIN — IOHEXOL 100 ML: 350 INJECTION, SOLUTION INTRAVENOUS at 15:52

## 2022-12-09 NOTE — ED PROVIDER NOTES
History  Chief Complaint   Patient presents with   • Chest Pain     Pt states started with CP today     47-year-old female presenting with chest pain started today  Patient states that she typically has chest pain from her hiatal hernia and GERD but this 1 was not going away with her other medications  Patient denies any nausea or vomiting and describes the pain as more in the right side upper abdominal area  Patient denies any diarrhea constipation upon further questioning she does have upper abdominal pain  Denies any fevers or shortness of breath  Oral intake has been decreased over the last couple days  Prior to Admission Medications   Prescriptions Last Dose Informant Patient Reported? Taking?    Cholecalciferol 1 25 MG (21541 UT) TABS   Yes No   Sig: Take 1 25 mg by mouth daily   Mirabegron ER (Myrbetriq) 25 MG TB24   No No   Sig: Take 25 mg by mouth daily   Naproxen Sodium 220 MG CAPS   Yes No   Sig: Take by mouth   Omega 3 1200 MG CAPS   Yes No   Sig: Take by mouth   acetaminophen (TYLENOL) 500 mg tablet   Yes No   Sig: Take 500 mg by mouth every 4 (four) hours   amLODIPine (NORVASC) 5 mg tablet   No No   Sig: TAKE 1 TABLET DAILY   benazepril (LOTENSIN) 40 MG tablet   No No   Sig: TAKE 1 TABLET DAILY   citalopram (CeleXA) 10 mg tablet   No No   Sig: TAKE 1 TABLET DAILY   clotrimazole (LOTRIMIN) 1 % cream   No No   Sig: Apply topically 2 (two) times a day To vulvar area and vaginal opening x 7-10 days   fexofenadine (ALLEGRA) 180 MG tablet   Yes No   Sig: Take 180 mg by mouth daily   levothyroxine (Synthroid) 100 mcg tablet   No No   Sig: Take 1 tablet (100 mcg total) by mouth daily   multivitamin-iron-minerals-folic acid (CENTRUM) chewable tablet   Yes No   Sig: Chew 1 tablet daily      Facility-Administered Medications: None       Past Medical History:   Diagnosis Date   • Anxiety     on med    • Arthritis    • Back problem     4 Bulging Disks   • Fibrocystic disease of both breasts    • Follicular lymphoma grade 3a ([facility])    • Hip pain     Left   • History of staph infection    • Hypertension    • Knee pain     Right   • [address] [address] )    • Spinal stenosis    • Thyroid disease        Past Surgical History:   Procedure Laterality Date   • CATARACT EXTRACTION  01/01/2017   • CHOLECYSTECTOMY     • MAMMO (HISTORICAL)  12/13/2019   • OTHER SURGICAL HISTORY      Warts removal   • REFRACTIVE SURGERY Bilateral    • REVISION TOTAL HIP ARTHROPLASTY     • ROTATOR CUFF REPAIR Right        Family History   Problem Relation Age of Onset   • Other Mother         Non Hogkins   • Heart failure Mother    • Lung cancer Father    • Breast cancer Other      I have reviewed and agree with the history as documented  E-Cigarette/Vaping   • E-Cigarette Use Never User      E-Cigarette/Vaping Substances   • Nicotine No    • THC No    • CBD No    • Flavoring No    • Other No    • Unknown No      Social History     Tobacco Use   • Smoking status: Former     Years: 14 00     Types: Cigarettes   • Smokeless tobacco: Never   Vaping Use   • Vaping Use: Never used   Substance Use Topics   • Alcohol use: Yes     Comment: Occasional    • Drug use: Not Currently       Review of Systems   Constitutional: Negative for chills and fever  HENT: Negative for ear pain and sore throat  Eyes: Negative for pain and visual disturbance  Respiratory: Negative for cough, choking, shortness of breath, wheezing and stridor  Cardiovascular: Positive for chest pain  Negative for palpitations and leg swelling  Gastrointestinal: Positive for abdominal pain  Negative for constipation, diarrhea, nausea and vomiting  Genitourinary: Negative for dysuria and hematuria  Musculoskeletal: Negative for arthralgias and back pain  Skin: Negative for color change and rash  Neurological: Negative for dizziness, seizures, syncope, weakness and headaches  All other systems reviewed and are negative        Physical Exam  Physical Exam  Vitals and nursing note reviewed  Constitutional:       General: She is not in acute distress  Appearance: She is well-developed  She is not ill-appearing  HENT:      Head: Normocephalic and atraumatic  Eyes:      Conjunctiva/sclera: Conjunctivae normal    Cardiovascular:      Rate and Rhythm: Normal rate and regular rhythm  Heart sounds: Normal heart sounds  Heart sounds not distant  No murmur heard  No systolic murmur is present  No diastolic murmur is present  Pulmonary:      Effort: Pulmonary effort is normal  No tachypnea, accessory muscle usage or respiratory distress  Breath sounds: Normal breath sounds  No stridor  Abdominal:      General: There is no abdominal bruit  Palpations: Abdomen is soft  There is no fluid wave, hepatomegaly, splenomegaly or mass  Tenderness: There is abdominal tenderness (Epigastric)  There is no guarding or rebound  Musculoskeletal:         General: No swelling  Cervical back: Neck supple  Right lower leg: No tenderness  No edema  Left lower leg: No tenderness  No edema  Skin:     General: Skin is warm and dry  Capillary Refill: Capillary refill takes less than 2 seconds  Coloration: Skin is not cyanotic or pale  Findings: No ecchymosis, erythema or rash  Nails: There is no clubbing  Neurological:      Mental Status: She is alert     Psychiatric:         Mood and Affect: Mood normal          Vital Signs  ED Triage Vitals   Temperature Pulse Respirations Blood Pressure SpO2   12/09/22 1332 12/09/22 1332 12/09/22 1332 12/09/22 1332 12/09/22 1332   98 3 °F (36 8 °C) 77 20 139/63 96 %      Temp Source Heart Rate Source Patient Position - Orthostatic VS BP Location FiO2 (%)   12/09/22 1332 12/09/22 1417 12/09/22 1417 12/09/22 1417 --   Oral Monitor Sitting Right arm       Pain Score       12/09/22 1417       8           Vitals:    12/09/22 1332 12/09/22 1417 12/09/22 1618 12/09/22 1630   BP: 139/63 115/57 121/55 121/55 Pulse: 77 74 74 76   Patient Position - Orthostatic VS:  Sitting Lying          Visual Acuity      ED Medications  Medications   aluminum-magnesium hydroxide-simethicone (MYLANTA) oral suspension 30 mL (30 mL Oral Given 12/9/22 1414)   famotidine (PEPCID) tablet 20 mg (20 mg Oral Given 12/9/22 1413)   Lidocaine Viscous HCl (XYLOCAINE) 2 % mucosal solution 15 mL (15 mL Swish & Spit Given 12/9/22 1414)   sodium chloride 0 9 % bolus 1,000 mL (0 mL Intravenous Stopped 12/9/22 1818)   morphine injection 4 mg (4 mg Intravenous Given 12/9/22 1522)   iohexol (OMNIPAQUE) 350 MG/ML injection (SINGLE-DOSE) 100 mL (100 mL Intravenous Given 12/9/22 1552)   fentanyl citrate (PF) 100 MCG/2ML 25 mcg (25 mcg Intravenous Given 12/9/22 1636)       Diagnostic Studies  Results Reviewed     Procedure Component Value Units Date/Time    Urine Microscopic [[id number]]  (Abnormal) Collected: 12/09/22 1819    Lab Status: Final result Specimen: Urine, Clean Catch Updated: 12/09/22 1834     RBC, UA 1-2 /hpf      WBC, UA 2-4 /hpf      Epithelial Cells Occasional /hpf      Bacteria, UA None Seen /hpf     UA w Reflex to Microscopic w Reflex to Culture [[id number]]  (Abnormal) Collected: 12/09/22 1819    Lab Status: Final result Specimen: Urine, Clean Catch Updated: 12/09/22 1833     Color, UA Light Yellow     Clarity, UA Clear     Specific Gravity, UA 1 049     pH, UA 6 0     Leukocytes, UA Negative     Nitrite, UA Negative     Protein, UA Trace mg/dl      Glucose, UA Negative mg/dl      Ketones, UA 10 (1+) mg/dl      Urobilinogen, UA <2 0 mg/dl      Bilirubin, UA Negative     Occult Blood, UA Negative    HS Troponin I 2hr [[id number]]  (Normal) Collected: 12/09/22 1617    Lab Status: Final result Specimen: Blood from Arm, Left Updated: 12/09/22 1648     hs TnI 2hr 14 ng/L      Delta 2hr hsTnI 1 ng/L     HS Troponin 0hr (reflex protocol) [[id number]]  (Normal) Collected: 12/09/22 1410    Lab Status: Final result Specimen: Blood from Arm, Left Updated: 12/09/22 1500     hs TnI 0hr 13 ng/L     Basic metabolic panel [[id number]]  (Abnormal) Collected: 12/09/22 1410    Lab Status: Final result Specimen: Blood from Arm, Left Updated: 12/09/22 1449     Sodium 131 mmol/L      Potassium 4 0 mmol/L      Chloride 102 mmol/L      CO2 22 mmol/L      ANION GAP 7 mmol/L      BUN 22 mg/dL      Creatinine 1 14 mg/dL      Glucose 105 mg/dL      Calcium 8 6 mg/dL      eGFR 47 ml/min/1 73sq m     Narrative:      [place] guidelines for Chronic Kidney Disease (CKD):   •  Stage 1 with normal or high GFR (GFR > 90 mL/min/1 73 square meters)  •  Stage 2 Mild CKD (GFR = 60-89 mL/min/1 73 square meters)  •  Stage 3A Moderate CKD (GFR = 45-59 mL/min/1 73 square meters)  •  Stage 3B Moderate CKD (GFR = 30-44 mL/min/1 73 square meters)  •  Stage 4 Severe CKD (GFR = 15-29 mL/min/1 73 square meters)  •  Stage 5 End Stage CKD (GFR <15 mL/min/1 73 square meters)  Note: GFR calculation is accurate only with a steady state creatinine    CBC and differential [[id number]]  (Abnormal) Collected: 12/09/22 1410    Lab Status: Final result Specimen: Blood from Arm, Left Updated: 12/09/22 1426     WBC 3 84 Thousand/uL      RBC 3 84 Million/uL      Hemoglobin 12 4 g/dL      Hematocrit 37 2 %      MCV 97 fL      MCH 32 3 pg      MCHC 33 3 g/dL      RDW 13 2 %      MPV 9 8 fL      Platelets 286 Thousands/uL      nRBC 0 /100 WBCs      Neutrophils Relative 62 %      Immat GRANS % 1 %      Lymphocytes Relative 16 %      Monocytes Relative 18 %      Eosinophils Relative 3 %      Basophils Relative 0 %      Neutrophils Absolute 2 38 Thousands/µL      Immature Grans Absolute 0 03 Thousand/uL      Lymphocytes Absolute 0 63 Thousands/µL      Monocytes Absolute 0 68 Thousand/µL      Eosinophils Absolute 0 11 Thousand/µL      Basophils Absolute 0 01 Thousands/µL                  CT abdomen pelvis with contrast   Final Result by Arron Owens MD (12/09 1727)   Multifocal areas of peripheral and subpleural groundglass density at the both lung bases, evaluate for pneumonia/Covid  Follow-up chest CT at 3 months to demonstrate resolution      No bowel obstruction      Prominent fluid-filled small bowel loops without evidence of bowel obstruction   The study was marked in EPIC for immediate notification  Workstation performed: [id number]         X-ray chest 1 view portable   Final Result by Roxana Hanson MD (12/10 5370)      Low lung volumes producing vascular crowding  Workstation performed: [id number]                    Procedures  ECG 12 Lead Documentation Only    Date/Time: 12/14/2022 11:00 AM  Performed by: Howard Manuel PA-C  Authorized by: Howard Manuel PA-C     Indications / Diagnosis:  Chest pain  ECG reviewed by me, the ED Provider: yes    Patient location:  ED  Previous ECG:     Previous ECG:  Compared to current    Similarity:  No change    Comparison to cardiac monitor: Yes    Interpretation:     Interpretation: normal    Rate:     ECG rate:  70    ECG rate assessment: normal    Rhythm:     Rhythm: sinus rhythm    Ectopy:     Ectopy: none    QRS:     QRS axis:  Normal    QRS intervals:  Normal  Conduction:     Conduction: normal    ST segments:     ST segments:  Normal  T waves:     T waves: normal               ED Course             HEART Risk Score    Flowsheet Row Most Recent Value   Heart Score Risk Calculator    History 0 Filed at: 12/14/2022 1058   ECG 0 Filed at: 12/14/2022 1058   Age 2 Filed at: 12/14/2022 1058   Risk Factors 1 Filed at: 12/14/2022 1058   Troponin 1 Filed at: 12/14/2022 1058   HEART Score 4 Filed at: 12/14/2022 1058                        SBIRT 22yo+    Flowsheet Row Most Recent Value   SBIRT (23 yo +)    In order to provide better care to our patients, we are screening all of our patients for alcohol and drug use  Would it be okay to ask you these screening questions?  Yes Filed at: 12/09/2022 1621   Initial Alcohol Screen: US AUDIT-C     1  How often do you have a drink containing alcohol? 0 Filed at: 12/09/2022 1621   2  How many drinks containing alcohol do you have on a typical day you are drinking? 0 Filed at: 12/09/2022 1621   3b  FEMALE Any Age, or MALE 65+: How often do you have 4 or more drinks on one occassion? 0 Filed at: 12/09/2022 1621   Audit-C Score 0 Filed at: 12/09/2022 1621   JITENDRA: How many times in the past year have you    Used an illegal drug or used a prescription medication for non-medical reasons? Never Filed at: 12/09/2022 1621                    MDM  Number of Diagnoses or Management Options  Atypical chest pain  COVID-19  NSAID long-term use  Diagnosis management comments: 74year old female presenting with chest pain and upper abdominal pain  Patient is COVID positive from a few days prior  Decreased oral intake over the last few days + weakness    Abdominal labs and cardiac workup -- came back benign but patient shown to be dehydrated  Troponin trended downward  Patient's heart score 4  Has cardiology follow up per patient's family  CT abd/pelv due to patient's abdominal pain -- showed possible pneumonia/covid in the bottom lung areas -- patient is currently COVID positive,  GI cocktail improved symptoms although they did not go away completely  -- will prescribe pepcid for patient as she had run out and has not been taking any of her GERD medications   Mild relief with Tums prior to arrival although she does not like taking these         Disposition  Final diagnoses:   COVID-19   Atypical chest pain   NSAID long-term use     Time reflects when diagnosis was documented in both MDM as applicable and the Disposition within this note     Time User Action Codes Description Comment    12/9/2022  5:47 PM Zahraa Dolphin Add [U07 1] COVID-19     12/9/2022  5:47 PM Zahraa Dolphin Add [R07 89] Atypical chest pain     12/9/2022  5:47 PM Zahraa Dolphin Add [Z79 1] NSAID long-term use       ED Disposition ED Disposition   Discharge    Condition   Stable    Date/Time   Fri Dec 9, 2022  5:46 PM    Comment   Errol Fitzgerald discharge to home/self care                 Follow-up Information     Follow up With Specialties Details Why Contact Info Additional [address] Emergency Department Emergency Medicine Go to  If symptoms worsen [address] [facility] [address] [facility] Emergency Department, [address], [place], [address] [facility],  Family Medicine Schedule an appointment as soon as possible for a visit  As needed [address]  [place] [address] Jeffrey Zee  [phone]             Discharge Medication List as of 12/9/2022  6:07 PM      START taking these medications    Details   famotidine (PEPCID) 20 mg tablet Take 1 tablet (20 mg total) by mouth daily, Starting Fri 12/9/2022, Normal         CONTINUE these medications which have NOT CHANGED    Details   acetaminophen (TYLENOL) 500 mg tablet Take 500 mg by mouth every 4 (four) hours, Historical Med      amLODIPine (NORVASC) 5 mg tablet TAKE 1 TABLET DAILY, Normal      benazepril (LOTENSIN) 40 MG tablet TAKE 1 TABLET DAILY, Normal      Cholecalciferol 1 25 MG (72787 UT) TABS Take 1 25 mg by mouth daily, Historical Med      citalopram (CeleXA) 10 mg tablet TAKE 1 TABLET DAILY, Normal      clotrimazole (LOTRIMIN) 1 % cream Apply topically 2 (two) times a day To vulvar area and vaginal opening x 7-10 days, Starting Thu 11/10/2022, Normal      fexofenadine (ALLEGRA) 180 MG tablet Take 180 mg by mouth daily, Historical Med      levothyroxine (Synthroid) 100 mcg tablet Take 1 tablet (100 mcg total) by mouth daily, Starting Mon 10/24/2022, Normal      Mirabegron ER (Myrbetriq) 25 MG TB24 Take 25 mg by mouth daily, Starting Mon 11/21/2022, No Print      multivitamin-iron-minerals-folic acid (CENTRUM) chewable tablet Chew 1 tablet daily, Historical Med Naproxen Sodium 220 MG CAPS Take by mouth, Historical Med      Omega 3 1200 MG CAPS Take by mouth, Historical Med             No discharge procedures on file      PDMP Review     None          ED Provider  Electronically Signed by           Sejal Piedra PA-C  12/14/22 [id number]

## 2023-01-23 DIAGNOSIS — E03.9 ACQUIRED HYPOTHYROIDISM: ICD-10-CM

## 2023-01-23 RX ORDER — LEVOTHYROXINE SODIUM 0.1 MG/1
100 TABLET ORAL DAILY
Qty: 90 TABLET | Refills: 0 | Status: SHIPPED | OUTPATIENT
Start: 2023-01-23

## 2023-01-27 ENCOUNTER — ESTABLISHED COMPREHENSIVE EXAM (OUTPATIENT)
Dept: URBAN - METROPOLITAN AREA CLINIC 6 | Facility: CLINIC | Age: 73
End: 2023-01-27

## 2023-01-27 DIAGNOSIS — H40.023: ICD-10-CM

## 2023-01-27 DIAGNOSIS — Z96.1: ICD-10-CM

## 2023-01-27 DIAGNOSIS — H35.363: ICD-10-CM

## 2023-01-27 PROCEDURE — 92014 COMPRE OPH EXAM EST PT 1/>: CPT

## 2023-01-27 PROCEDURE — 92133 CPTRZD OPH DX IMG PST SGM ON: CPT

## 2023-01-27 ASSESSMENT — VISUAL ACUITY
OD_CC: 20/25
OS_CC: 20/30+2

## 2023-01-27 ASSESSMENT — TONOMETRY
OS_IOP_MMHG: 15
OD_IOP_MMHG: 14

## 2023-02-06 ENCOUNTER — APPOINTMENT (OUTPATIENT)
Dept: LAB | Facility: AMBULARY SURGERY CENTER | Age: 73
End: 2023-02-06

## 2023-02-06 DIAGNOSIS — E55.9 VITAMIN D INSUFFICIENCY: ICD-10-CM

## 2023-02-06 DIAGNOSIS — C82.31 GRADE 3A FOLLICULAR LYMPHOMA OF LYMPH NODES OF NECK (HCC): ICD-10-CM

## 2023-02-06 LAB
ALBUMIN SERPL BCP-MCNC: 3.7 G/DL (ref 3.5–5)
ALP SERPL-CCNC: 81 U/L (ref 46–116)
ALT SERPL W P-5'-P-CCNC: 45 U/L (ref 12–78)
ANION GAP SERPL CALCULATED.3IONS-SCNC: 4 MMOL/L (ref 4–13)
AST SERPL W P-5'-P-CCNC: 23 U/L (ref 5–45)
BASOPHILS # BLD AUTO: 0.04 THOUSANDS/ÂΜL (ref 0–0.1)
BASOPHILS NFR BLD AUTO: 1 % (ref 0–1)
BILIRUB SERPL-MCNC: 0.55 MG/DL (ref 0.2–1)
BUN SERPL-MCNC: 30 MG/DL (ref 5–25)
CALCIUM SERPL-MCNC: 10 MG/DL (ref 8.3–10.1)
CHLORIDE SERPL-SCNC: 107 MMOL/L (ref 96–108)
CO2 SERPL-SCNC: 26 MMOL/L (ref 21–32)
CREAT SERPL-MCNC: 1.16 MG/DL (ref 0.6–1.3)
EOSINOPHIL # BLD AUTO: 0.19 THOUSAND/ÂΜL (ref 0–0.61)
EOSINOPHIL NFR BLD AUTO: 3 % (ref 0–6)
ERYTHROCYTE [DISTWIDTH] IN BLOOD BY AUTOMATED COUNT: 14.1 % (ref 11.6–15.1)
GFR SERPL CREATININE-BSD FRML MDRD: 46 ML/MIN/1.73SQ M
GLUCOSE P FAST SERPL-MCNC: 101 MG/DL (ref 65–99)
HCT VFR BLD AUTO: 41.6 % (ref 34.8–46.1)
HGB BLD-MCNC: 13 G/DL (ref 11.5–15.4)
IMM GRANULOCYTES # BLD AUTO: 0.02 THOUSAND/UL (ref 0–0.2)
IMM GRANULOCYTES NFR BLD AUTO: 0 % (ref 0–2)
LYMPHOCYTES # BLD AUTO: 1.28 THOUSANDS/ÂΜL (ref 0.6–4.47)
LYMPHOCYTES NFR BLD AUTO: 20 % (ref 14–44)
MCH RBC QN AUTO: 32.3 PG (ref 26.8–34.3)
MCHC RBC AUTO-ENTMCNC: 31.3 G/DL (ref 31.4–37.4)
MCV RBC AUTO: 103 FL (ref 82–98)
MONOCYTES # BLD AUTO: 0.96 THOUSAND/ÂΜL (ref 0.17–1.22)
MONOCYTES NFR BLD AUTO: 15 % (ref 4–12)
NEUTROPHILS # BLD AUTO: 3.95 THOUSANDS/ÂΜL (ref 1.85–7.62)
NEUTS SEG NFR BLD AUTO: 61 % (ref 43–75)
NRBC BLD AUTO-RTO: 0 /100 WBCS
PLATELET # BLD AUTO: 239 THOUSANDS/UL (ref 149–390)
PMV BLD AUTO: 9.9 FL (ref 8.9–12.7)
POTASSIUM SERPL-SCNC: 4.3 MMOL/L (ref 3.5–5.3)
PROT SERPL-MCNC: 7.1 G/DL (ref 6.4–8.4)
RBC # BLD AUTO: 4.03 MILLION/UL (ref 3.81–5.12)
SODIUM SERPL-SCNC: 137 MMOL/L (ref 135–147)
WBC # BLD AUTO: 6.44 THOUSAND/UL (ref 4.31–10.16)

## 2023-02-07 ENCOUNTER — PROCEDURE VISIT (OUTPATIENT)
Dept: OBGYN CLINIC | Facility: CLINIC | Age: 73
End: 2023-02-07

## 2023-02-07 ENCOUNTER — OFFICE VISIT (OUTPATIENT)
Dept: HEMATOLOGY ONCOLOGY | Facility: CLINIC | Age: 73
End: 2023-02-07

## 2023-02-07 VITALS
BODY MASS INDEX: 46.89 KG/M2 | HEIGHT: 62 IN | WEIGHT: 254.8 LBS | DIASTOLIC BLOOD PRESSURE: 72 MMHG | SYSTOLIC BLOOD PRESSURE: 138 MMHG

## 2023-02-07 VITALS
OXYGEN SATURATION: 98 % | SYSTOLIC BLOOD PRESSURE: 142 MMHG | HEART RATE: 94 BPM | TEMPERATURE: 98 F | DIASTOLIC BLOOD PRESSURE: 80 MMHG | WEIGHT: 254 LBS | HEIGHT: 62 IN | BODY MASS INDEX: 46.74 KG/M2 | RESPIRATION RATE: 16 BRPM

## 2023-02-07 DIAGNOSIS — C82.31 GRADE 3A FOLLICULAR LYMPHOMA OF LYMPH NODES OF NECK (HCC): Primary | ICD-10-CM

## 2023-02-07 DIAGNOSIS — R93.89 ABNORMAL PELVIC ULTRASOUND: Primary | ICD-10-CM

## 2023-02-07 NOTE — PROGRESS NOTES
Hematology / Oncology Outpatient Follow Up Note    Kita Vickers 74 y o  female PBF:[birth year] BNP:[id number]         Date:  2/7/2023    Assessment / Plan:    A 27-LVDF-GQA female with follicular lymphoma, VVYOB 7B, BW least stage IIIA disease with diffuse hypermetabolic lymphadenopathy in the chest axillary, abdominal as well as pelvis   SUV based on PET-CT scan was up to 15  She was treated with bendamustine rituximab x6 cycles, resulting in complete remission  She is currently on observation  Clinically as well as radiographically, she has no evidence of recurrent lymphoma  I recommended her to continue surveillance  I will see her again in 6 months with CT scan of chest abdomen pelvis, CBC and CMP  She is in agreement with my recommendations       Subjective:      HPI:    A 75-year-old female who had [address] vaccine in March 16, 2021  2 weeks after the 1st vaccine, she noticed the swelling in her left upper neck close to the ER  José Miguel Ugalde brought this to medical attention   She had injection of vaccine in the left deltoid   She underwent CT scan of the neck which showed multiple enlarged level 1 and level 2 lymphadenopathy as well as swelling of the parotid gland   She was seen by Dr Elias Toney who recommended excisional lymph node biopsy  José Miguel Ugalde is scheduled to have this procedure in [place] 3, 2021   She presents today with her daughter and son-in-law to discuss further evaluation   She feels well  José Miguel Ugalde has no fever, chills or no or night sweats   She has no recent weight loss   She denied any respiratory symptoms   She has hypertension as well as arthritis   She is status post left hip replacement last year  Sonya Tello, she has mild ambulatory dysfunction   She quit smoking 40 years ago  José Miguel Ugalde does not drink alcohol   She is in usual state of health   Her performance status is 0 to 1/4 on the ECOG scale            Interval History:  A 26-year-old female with left parotid gland enlargement as well as multiple lymphadenopathy of the neck    Therefore, she underwent left neck lymph node biopsy by Dr Rafaela Donahue which showed follicular lymphoma, grade 3A   She subsequent underwent PET-CT scan which showed diffuse hypermetabolic lymphadenopathy in the chest, axillary, abdominal and pelvis   SUV or is up to 15  She underwent systemic chemotherapy with bendamustine and rituximab x6 cycles with excellent tolerance  She completed chemotherapy in December 2021, resulting in complete remission  She is currently on observation  She presents today for routine follow-up  She feels well with no new complaints  She has no fever, chills or night sweats  Her weight is stable  No respiratory symptoms  Her performance status is normal       Objective:      Primary Diagnosis:        Follicular lymphoma, grade 3A   Diagnosed in June 2021      Cancer Staging:  Cancer Staging  No matching staging information was found for the patient         Previous Hematologic/ Oncologic Treatment:       Bendamustine and rituximab x6 cycles completed in December 2021      Current Hematologic/ Oncologic Treatment:       Observation      Disease Status:       complete remission      Test Results:     Pathology:       left neck lymph node biopsy showed follicular lymphoma, grade 3A      Radiology:     CT scan of neck, chest, abdomen and pelvis in November 2022 showed no evidence of lymphadenopathy      Laboratory:       See below      Physical Exam:        General Appearance:    Alert, oriented          Eyes:    PERRL   Ears:    Normal external ear canals, both ears       Nose:   Nares normal, septum midline   Throat:   Mucosa moist  Pharynx without injection      Neck:   Supple         Lungs:     Clear to auscultation bilaterally   Chest Wall:    No tenderness or deformity    Heart:    Regular rate and rhythm         Abdomen:     Soft, non-tender, bowel sounds +, no organomegaly               Extremities:   Extremities no cyanosis or edema         Skin:   no rash or icterus  Lymph nodes:   previous bilateral neck adenopathy is no longer palpable   No other palpable superficial adenopathy  Neurologic:   CNII-XII intact, normal strength, sensation and reflexes     Throughout             Breast exam:   NA           ROS: Review of Systems   All other systems reviewed and are negative  Imaging: No results found  Labs:   Lab Results   Component Value Date    WBC 6 44 02/06/2023    HGB 13 0 02/06/2023    HCT 41 6 02/06/2023     (H) 02/06/2023     02/06/2023     Lab Results   Component Value Date    K 4 3 02/06/2023     02/06/2023    CO2 26 02/06/2023    BUN 30 (H) 02/06/2023    CREATININE 1 16 02/06/2023    GLUF 101 (H) 02/06/2023    CALCIUM 10 0 02/06/2023    CORRECTEDCA 10 6 (H) 06/21/2022    AST 23 02/06/2023    ALT 45 02/06/2023    ALKPHOS 81 02/06/2023    EGFR 46 02/06/2023         Lab Results   Component Value Date     10/31/2022         Current Medications: Reviewed  Allergies: Reviewed  PMH/FH/SH:  Reviewed      Vital Sign:    Body surface area is 2 11 meters squared      Wt Readings from Last 3 Encounters:   02/07/23 115 kg (254 lb)   12/09/22 112 kg (247 lb 5 7 oz)   11/21/22 113 kg (250 lb 3 2 oz)        Temp Readings from Last 3 Encounters:   02/07/23 98 °F (36 7 °C)   12/09/22 98 3 °F (36 8 °C) (Oral)   11/21/22 98 7 °F (37 1 °C) (Temporal)        BP Readings from Last 3 Encounters:   02/07/23 142/80   12/09/22 121/55   11/21/22 130/60         Pulse Readings from Last 3 Encounters:   02/07/23 94   12/09/22 76   11/21/22 82     @LASTSAO2(3)@

## 2023-02-07 NOTE — PROGRESS NOTES
Assessment/Plan:     Diagnoses and all orders for this visit:    Postmenopausal bleeding  -     Tissue Exam          40-year-old female  Post menopause  Abnormal ultrasound with elevated endometrial thickness  Plan  Endometrial biopsy done today  We will call patient with      Subjective:      Patient ID: Antonio Castro is a 74 y o  female  HPI  Patient seen evaluated present to the office today for endometrial biopsy secondary to abnormal ultrasound with elevated endometrial thickness  Procedure explained and discussed with patient ultrasound results reviewed and discussed with patient  The following portions of the patient's history were reviewed and updated as appropriate: allergies, current medications, past family history, past medical history, past social history, past surgical history and problem list     Review of Systems      Objective:      /72 (BP Location: Left arm, Patient Position: Sitting, Cuff Size: Adult)   Ht 5' 2" (1 575 m)   Wt 116 kg (254 lb 12 8 oz)   LMP  (LMP Unknown)   BMI 46 60 kg/m²        UTERUS:  The uterus is anteverted in position, measuring 7 8 x 5 0 x 4 7 cm  The uterus has a normal contour and echotexture  Fibroid 1: 4 1 x 4 0 x 3 8 cm  Intramural location  Posterior uterine body location  Calcified fibroid  The cervix appears within normal limits      ENDOMETRIUM:    The endometrial echo complex has an AP caliber of 9 0 mm  Heterogeneous and multicystic appearance        OVARIES/ADNEXA:  Right ovary:  1 5 x 1 8 x 2 0 cm  3 0 mL  No suspicious right ovarian abnormality  Doppler flow within normal limits      Left ovary not visualized      No suspicious adnexal mass or loculated collections  There is no free fluid      IMPRESSION:     Thickened endometrial complex  In a postmenopausal patient, diagnostic considerations include endometrial hyperplasia, endometrial polyp, endometrial carcinoma  Large calcified uterine fibroid      Left ovary not visualized  Physical Exam      Endometrial biopsy    Date/Time: 2/7/2023 6:27 PM  Performed by: Hiro Rodriguez MD  Authorized by: Hiro Rodriguez MD   Universal Protocol:  Consent: Verbal consent obtained  Risks and benefits: risks, benefits and alternatives were discussed  Consent given by: patient  Time out: Immediately prior to procedure a "time out" was called to verify the correct patient, procedure, equipment, support staff and site/side marked as required  Patient understanding: patient states understanding of the procedure being performed  Patient consent: the patient's understanding of the procedure matches consent given  Procedure consent: procedure consent matches procedure scheduled  Relevant documents: relevant documents present and verified  Patient identity confirmed: verbally with patient      Indication:     Indications:  Other disorder of menstruation and other abnormal bleeding from female genital tract    Procedure:     Procedure: endometrial biopsy with Pipelle      A bivalve speculum was placed in the vagina: yes      Cervix cleaned and prepped: yes      The cervix was dilated: no      Uterus sounded: no      Specimen collected: specimen collected and sent to pathology      Patient tolerated procedure well with no complications: yes      Unable to perform due to: pain    Findings:     Cervix: normal      Adnexa: normal

## 2023-02-13 ENCOUNTER — RA CDI HCC (OUTPATIENT)
Dept: OTHER | Facility: HOSPITAL | Age: 73
End: 2023-02-13

## 2023-02-13 DIAGNOSIS — R93.89 INCREASED ENDOMETRIAL STRIPE THICKNESS: Primary | ICD-10-CM

## 2023-02-13 NOTE — PROGRESS NOTES
[facility]  coding opportunities       Chart reviewed, no opportunity found:   [address]        Patients Insurance     Medicare Insurance: [employer]

## 2023-02-20 ENCOUNTER — APPOINTMENT (OUTPATIENT)
Dept: LAB | Facility: AMBULARY SURGERY CENTER | Age: 73
End: 2023-02-20

## 2023-02-20 LAB — 25(OH)D3 SERPL-MCNC: 51.2 NG/ML (ref 30–100)

## 2023-02-21 ENCOUNTER — OFFICE VISIT (OUTPATIENT)
Dept: FAMILY MEDICINE CLINIC | Facility: CLINIC | Age: 73
End: 2023-02-21

## 2023-02-21 VITALS
SYSTOLIC BLOOD PRESSURE: 124 MMHG | TEMPERATURE: 98.1 F | HEART RATE: 96 BPM | WEIGHT: 254.4 LBS | BODY MASS INDEX: 46.81 KG/M2 | DIASTOLIC BLOOD PRESSURE: 66 MMHG | HEIGHT: 62 IN | OXYGEN SATURATION: 95 %

## 2023-02-21 DIAGNOSIS — M65.312 TRIGGER FINGER OF LEFT THUMB: ICD-10-CM

## 2023-02-21 DIAGNOSIS — N18.31 CHRONIC KIDNEY DISEASE, STAGE 3A (HCC): ICD-10-CM

## 2023-02-21 DIAGNOSIS — Z23 ENCOUNTER FOR IMMUNIZATION: Primary | ICD-10-CM

## 2023-02-21 DIAGNOSIS — I73.9 PAD (PERIPHERAL ARTERY DISEASE) (HCC): ICD-10-CM

## 2023-02-21 DIAGNOSIS — E03.9 ACQUIRED HYPOTHYROIDISM: ICD-10-CM

## 2023-02-21 DIAGNOSIS — E66.01 MORBID OBESITY (HCC): ICD-10-CM

## 2023-02-21 DIAGNOSIS — F33.41 RECURRENT MAJOR DEPRESSIVE DISORDER, IN PARTIAL REMISSION (HCC): ICD-10-CM

## 2023-02-21 DIAGNOSIS — M17.11 PRIMARY OSTEOARTHRITIS OF RIGHT KNEE: ICD-10-CM

## 2023-02-21 DIAGNOSIS — R53.83 FATIGUE, UNSPECIFIED TYPE: ICD-10-CM

## 2023-02-21 DIAGNOSIS — C85.90 LYMPHOMA, UNSPECIFIED BODY REGION, UNSPECIFIED LYMPHOMA TYPE (HCC): ICD-10-CM

## 2023-02-21 DIAGNOSIS — I10 ESSENTIAL HYPERTENSION: ICD-10-CM

## 2023-02-21 RX ORDER — AMOXICILLIN 500 MG/1
4 TABLET, FILM COATED ORAL DAILY
COMMUNITY
Start: 2023-01-20

## 2023-02-21 NOTE — PROGRESS NOTES
Assessment/Plan: 75 yo female, in NAD, seen with , here to eval thyroid--last TSH yesterday was 4 750 while taking Synthroid (brand) 100 mcg OD  So, will take an extra 1/2 tab once a wk and ck TSH q8w  BMI Counseling: Body mass index is 46 53 kg/m²  The BMI is above normal  Nutrition recommendations include decreasing portion sizes, encouraging healthy choices of fruits and vegetables, decreasing fast food intake, consuming healthier snacks, limiting drinks that contain sugar, moderation in carbohydrate intake, increasing intake of lean protein and reducing intake of saturated and trans fat  Exercise recommendations include moderate physical activity 150 minutes/week and exercising 3-5 times per week  No pharmacotherapy was ordered  Rationale for BMI follow-up plan is due to patient being overweight or obese  1  Encounter for immunization  -     Pneumococcal Conjugate Vaccine 20-valent (PCV20)    2  Acquired hypothyroidism  Comments:  TSH is slightly elevated  3  Essential hypertension  Comments:  BP is controlled on Norvasc and Lotensin    4  Fatigue, unspecified type    5  Morbid obesity ([facility] )  Comments: Wt 254 lbs and hoping to lose so can do TKA R    6  Lymphoma, unspecified body region, unspecified lymphoma type ([facility] )    7  Chronic kidney disease, stage 3a (HCC)  Comments:  gfr is 55 and that is CKD 3A - discussed    8  Primary osteoarthritis of right knee  Comments:  Awaiting right knee replacement but pending weight loss  Sees Dr Goss Mis    9  Trigger finger of left thumb  Comments:  not a serious issue, but noted  Subjective:      Patient ID: Christian Mcfadden is a 74 y o  female      HPI    The following portions of the patient's history were reviewed and updated as appropriate: She  has a past medical history of Anxiety, Arthritis, Back problem, Fibrocystic disease of both breasts, Follicular lymphoma grade 3a ([facility] ), Hip pain, History of staph infection, Hypertension, Knee pain, Lymphoma ([place] [address] ), Spinal stenosis, and Thyroid disease  She   Patient Active Problem List    Diagnosis Date Noted   • Recurrent major depressive disorder, in partial remission ([place] [address] ) 03/15/2022   • Lymphoma ([place] [address] ) 06/21/2021   • 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 ([place] [address] ) 06/09/2021   • PAD (peripheral artery disease) ([place] [address] ) 04/07/2021   • Class 3 severe obesity with serious comorbidity and body mass index (BMI) of 45 0 to 49 9 in adult [facility]) 03/02/2021   • Primary osteoarthritis of right hip 01/20/2021   • Lumbar pain 12/09/2020   • History of total left hip replacement 12/01/2020   • Chronic anxiety 11/01/2019   • Vitamin D deficiency 09/09/2019   • Psoriasis 09/09/2019   • Pre-diabetes 09/09/2019   • Osteoarthritis 09/09/2019   • NSAID long-term use 09/09/2019   • Morbid obesity ([place] [address] ) 09/09/2019   • Hypothyroidism 09/09/2019   • Essential hypertension 09/09/2019   • Elevated blood sugar 09/09/2019   • Stage 3a chronic kidney disease ([place] [address] ) 09/09/2019   • Chronic depression 09/09/2019   • Ambulatory dysfunction 09/09/2019     She  has a past surgical history that includes Mammo (historical) (12/13/2019); Cataract extraction (01/01/2017); Refractive surgery (Bilateral); Other surgical history; Cholecystectomy; Rotator cuff repair (Right); and Revision total hip arthroplasty  Her family history includes Breast cancer in her other; Heart failure in her mother; Lung cancer in her father; Other in her mother  She  reports that she has quit smoking  Her smoking use included cigarettes  She has never used smokeless tobacco  She reports current alcohol use  She reports that she does not currently use drugs    Current Outpatient Medications   Medication Sig Dispense Refill   • acetaminophen (TYLENOL) 500 mg tablet Take 500 mg by mouth every 4 (four) hours     • amLODIPine (NORVASC) 5 mg tablet TAKE 1 TABLET DAILY 90 tablet 3   • amoxicillin (AMOXIL) 500 MG tablet Take 4 tablets by mouth in the morning For dental procedures     • benazepril (LOTENSIN) 40 MG tablet TAKE 1 TABLET DAILY 90 tablet 3   • Cholecalciferol 1 25 MG (06338 UT) TABS Take 1 25 mg by mouth daily     • citalopram (CeleXA) 10 mg tablet TAKE 1 TABLET DAILY 90 tablet 3   • famotidine (PEPCID) 20 mg tablet Take 1 tablet (20 mg total) by mouth daily 30 tablet 0   • fexofenadine (ALLEGRA) 180 MG tablet Take 180 mg by mouth daily     • levothyroxine (Synthroid) 100 mcg tablet Take 1 tablet (100 mcg total) by mouth daily 90 tablet 0   • multivitamin-iron-minerals-folic acid (CENTRUM) chewable tablet Chew 1 tablet daily     • Naproxen Sodium 220 MG CAPS Take by mouth     • Omega 3 1200 MG CAPS Take by mouth       No current facility-administered medications for this visit       Current Outpatient Medications on File Prior to Visit   Medication Sig   • acetaminophen (TYLENOL) 500 mg tablet Take 500 mg by mouth every 4 (four) hours   • amLODIPine (NORVASC) 5 mg tablet TAKE 1 TABLET DAILY   • amoxicillin (AMOXIL) 500 MG tablet Take 4 tablets by mouth in the morning For dental procedures   • benazepril (LOTENSIN) 40 MG tablet TAKE 1 TABLET DAILY   • Cholecalciferol 1 25 MG (16140 UT) TABS Take 1 25 mg by mouth daily   • citalopram (CeleXA) 10 mg tablet TAKE 1 TABLET DAILY   • famotidine (PEPCID) 20 mg tablet Take 1 tablet (20 mg total) by mouth daily   • fexofenadine (ALLEGRA) 180 MG tablet Take 180 mg by mouth daily   • levothyroxine (Synthroid) 100 mcg tablet Take 1 tablet (100 mcg total) by mouth daily   • multivitamin-iron-minerals-folic acid (CENTRUM) chewable tablet Chew 1 tablet daily   • Naproxen Sodium 220 MG CAPS Take by mouth   • Omega 3 1200 MG CAPS Take by mouth   • [DISCONTINUED] clotrimazole (LOTRIMIN) 1 % cream Apply topically 2 (two) times a day To vulvar area and vaginal opening x 7-10 days (Patient not taking: Reported on 2/7/2023)   • [DISCONTINUED] Mirabegron ER (Myrbetriq) 25 MG TB24 Take 25 mg by mouth daily (Patient not taking: Reported on 2/7/2023)     No current facility-administered medications on file prior to visit  She is allergic to diphenhydramine, erythromycin, tetanus toxoids, and tetracycline       Review of Systems   Constitutional: Negative  HENT: Negative  Eyes: Negative  Respiratory: Negative  Cardiovascular: Negative  Gastrointestinal: Negative  Genitourinary: Negative  Skin: Negative for color change and rash  Both LE's are totally cleared    Psychiatric/Behavioral: Negative  Objective:      /66 (BP Location: Left arm, Patient Position: Sitting, Cuff Size: Standard)   Pulse 96   Temp 98 1 °F (36 7 °C) (Temporal)   Ht 5' 2" (1 575 m)   Wt 115 kg (254 lb 6 4 oz)   LMP  (LMP Unknown)   SpO2 95%   BMI 46 53 kg/m²          Physical Exam  Vitals and nursing note reviewed  Constitutional:       General: She is not in acute distress  Appearance: She is well-developed  She is obese  She is not ill-appearing, toxic-appearing or diaphoretic  Comments: BMI 46 16   HENT:      Head: Normocephalic and atraumatic  Eyes:      General: No scleral icterus  Conjunctiva/sclera: Conjunctivae normal       Pupils: Pupils are equal, round, and reactive to light  Neck:      Thyroid: No thyromegaly  Trachea: No tracheal deviation  Cardiovascular:      Rate and Rhythm: Normal rate and regular rhythm  Heart sounds: Normal heart sounds  Pulmonary:      Effort: No respiratory distress  Breath sounds: Normal breath sounds  No wheezing or rales  Chest:      Chest wall: No tenderness  Musculoskeletal:         General: No tenderness or deformity  Normal range of motion  Cervical back: Normal range of motion and neck supple  Comments: Will see Dr Jared Dominguez in 2 days for eval of hip (L) repllacement  Skin:     General: Skin is warm and dry  Coloration: Skin is not pale  Findings: No erythema, lesion or rash        Comments: WEI boswell Dr/ [facility] surg  5 days ago -- looks good   Neurological:      General: No focal deficit present  Mental Status: She is alert and oriented to person, place, and time  Cranial Nerves: No cranial nerve deficit  Psychiatric:         Mood and Affect: Mood normal          Behavior: Behavior normal          Thought Content: Thought content normal          Judgment: Judgment normal          Pt seen from 10:56 a m to 11:53 a m  This time was spent reviewing previous records, reviewing previous laboratory and other tests, taking history from patient, examination of patient, discussion of prognosis and treatment, ordering laboratory tests, ordering medications, and completion of the medical record

## 2023-04-23 DIAGNOSIS — E03.9 ACQUIRED HYPOTHYROIDISM: ICD-10-CM

## 2023-04-24 RX ORDER — LEVOTHYROXINE SODIUM 100 MCG
TABLET ORAL
Qty: 90 TABLET | Refills: 3 | Status: SHIPPED | OUTPATIENT
Start: 2023-04-24

## 2023-04-26 ENCOUNTER — TELEPHONE (OUTPATIENT)
Dept: FAMILY MEDICINE CLINIC | Facility: CLINIC | Age: 73
End: 2023-04-26

## 2023-04-26 NOTE — TELEPHONE ENCOUNTER
Hi, this is [employer], pharmacy technician here at [facility]  Calling regarding Synthroid  One hundred and one hundred micrograms  The doctor had indicated that we cannot dispense generic on this medication  But here at [address] we only carry brands  So we dispense that as our generic levothyroxine  So the reason for the call is to see if the doctor would like to waive the dispenses written from the prescription so that we can give patient brand Synthroid for the generic cost of zero dollars instead of one hundred and fifteen dollars for a ninety day supply And they'll still be getting brand  They'll actually get brand  They'll get brand even if the doctor says no to waiving the PIPER  They'll just pay the higher copay for it, which will be a hundred and fifteen dollars instead of zero dollars  But they're getting brand regardless  Please return our call as soon as possible  The callback number is [phone], [phone]  Fifty two  Please use reference number [phone], seven three [phone] three one, two  Our hours of operation are from nine to five thirty [phone] [facility] Time  Thanks   And your prompt attention to this matter is greatly appreciated

## 2023-04-27 ENCOUNTER — TELEPHONE (OUTPATIENT)
Dept: FAMILY MEDICINE CLINIC | Facility: CLINIC | Age: 73
End: 2023-04-27

## 2023-04-27 NOTE — TELEPHONE ENCOUNTER
Hi my name is Thaddeus Postal a representative with the [address] with a mail order for Thais Bolus  and birthday is 1950   Barbaraann Hashimoto  So it is doing a follow up call in the patients medication Synthroid the 100 MCG  So brand name medication and we just call about the generic the levothyroxine  It not does state that the doctor didn't want to   use the generic equivalent but with the [facility] we actually use that brand Synthroid for our generic  So that just means 1) we're authorized to waive PIPER patient will still get the brand Synthroid but for the generic price of no copay versus a hundred and fifteen dollars  2) our phone number is [phone] two  And then the reference number for the member is zero [phone] three one two  And it is time sensitive and it does time out today  So if you escribe in the generic levothyroxine patient gets the brand Synthroid and says a hundred and fifteen dollars and thank you and have a good day

## 2023-04-28 NOTE — TELEPHONE ENCOUNTER
Called [facility] and the tech stated prescription was already sent a couple of days ago -- tech also stated that if pt were to switch to generic as opposed to PIPER they would have $0 copay  Advised tech this will not be changed at this time

## 2023-05-30 ENCOUNTER — OFFICE VISIT (OUTPATIENT)
Dept: FAMILY MEDICINE CLINIC | Facility: CLINIC | Age: 73
End: 2023-05-30

## 2023-05-30 VITALS
TEMPERATURE: 97.9 F | OXYGEN SATURATION: 98 % | HEART RATE: 74 BPM | WEIGHT: 256.2 LBS | DIASTOLIC BLOOD PRESSURE: 74 MMHG | HEIGHT: 62 IN | BODY MASS INDEX: 47.15 KG/M2 | SYSTOLIC BLOOD PRESSURE: 132 MMHG

## 2023-05-30 DIAGNOSIS — R53.83 FATIGUE, UNSPECIFIED TYPE: ICD-10-CM

## 2023-05-30 DIAGNOSIS — E55.9 VITAMIN D INSUFFICIENCY: ICD-10-CM

## 2023-05-30 DIAGNOSIS — E03.9 ACQUIRED HYPOTHYROIDISM: ICD-10-CM

## 2023-05-30 DIAGNOSIS — Z13.9 ENCOUNTER FOR SCREENING INVOLVING SOCIAL DETERMINANTS OF HEALTH (SDOH): ICD-10-CM

## 2023-05-30 DIAGNOSIS — K59.1 FUNCTIONAL DIARRHEA: ICD-10-CM

## 2023-05-30 DIAGNOSIS — R73.9 ELEVATED BLOOD SUGAR: ICD-10-CM

## 2023-05-30 DIAGNOSIS — Z79.899 ENCOUNTER FOR LONG-TERM (CURRENT) USE OF MEDICATIONS: ICD-10-CM

## 2023-05-30 DIAGNOSIS — Z00.00 MEDICARE ANNUAL WELLNESS VISIT, SUBSEQUENT: Primary | ICD-10-CM

## 2023-05-30 NOTE — PROGRESS NOTES
Name: Selina Gannon      : 1950      MRN: [id number]  Encounter Provider: Disha Trujillo DO  Encounter Date: 2023   Encounter department: [address] [place] [address]     1  Medicare annual wellness visit, subsequent    2  Acquired hypothyroidism  -     TSH, 3rd generation; Future    3  Vitamin D insufficiency  -     Vitamin D 25 hydroxy; Future    4  Fatigue, unspecified type  -     CBC; Future  -     Comprehensive metabolic panel; Future    5  Encounter for screening involving social determinants of health (SDoH)    6  Encounter for long-term (current) use of medications  -     CBC; Future  -     UA w Reflex to Microscopic w Reflex to Culture  -     Comprehensive metabolic panel; Future  -     Lipid panel; Future  -     TSH, 3rd generation; Future  -     Vitamin D 25 hydroxy; Future  -     Hemoglobin A1C; Future  -     Albumin / creatinine urine ratio    7  Elevated blood sugar  -     Hemoglobin A1C; Future    8  Functional diarrhea  Comments:  onset years, no etio   Last colonoscopy was 3 yrs ago and will do in 5 yr  Will Rx Imodium 2 caps tid prn  Sister with same problem - 81 yo        Subjective      HPI - 256 lbs , 75 yo female, needs R  TKR, but wt is the issue  Review of Systems   Constitutional: Negative  HENT: Negative  Eyes: Negative  Respiratory: Negative  Cardiovascular: Negative  Gastrointestinal: Negative  Genitourinary: Negative  Skin: Negative for color change and rash  Both LE's are totally cleared    Psychiatric/Behavioral: Negative          Current Outpatient Medications on File Prior to Visit   Medication Sig   • acetaminophen (TYLENOL) 500 mg tablet Take 500 mg by mouth every 4 (four) hours   • amLODIPine (NORVASC) 5 mg tablet TAKE 1 TABLET DAILY   • amoxicillin (AMOXIL) 500 MG tablet Take 4 tablets by mouth in the morning For dental procedures   • benazepril (LOTENSIN) 40 MG tablet TAKE 1 TABLET DAILY   • Cholecalciferol "1 25 MG (91486 UT) TABS Take 1 25 mg by mouth daily   • citalopram (CeleXA) 10 mg tablet TAKE 1 TABLET DAILY   • famotidine (PEPCID) 20 mg tablet Take 1 tablet (20 mg total) by mouth daily   • fexofenadine (ALLEGRA) 180 MG tablet Take 180 mg by mouth daily   • multivitamin-iron-minerals-folic acid (CENTRUM) chewable tablet Chew 1 tablet daily   • Naproxen Sodium 220 MG CAPS Take by mouth   • Omega 3 1200 MG CAPS Take by mouth   • Synthroid 100 MCG tablet TAKE 1 TABLET DAILY       Objective     /74 (BP Location: Left arm, Patient Position: Sitting, Cuff Size: Standard)   Pulse 74   Temp 97 9 °F (36 6 °C) (Temporal)   Ht 5' 2\" (1 575 m)   Wt 116 kg (256 lb 3 2 oz)   LMP  (LMP Unknown)   SpO2 98%   BMI 46 86 kg/m²     Physical Exam  Vitals and nursing note reviewed  Constitutional:       General: She is not in acute distress  Appearance: She is well-developed  She is obese  She is not ill-appearing, toxic-appearing or diaphoretic  Comments: BMI 46 16   HENT:      Head: Normocephalic and atraumatic  Eyes:      General: No scleral icterus  Conjunctiva/sclera: Conjunctivae normal       Pupils: Pupils are equal, round, and reactive to light  Neck:      Thyroid: No thyromegaly  Trachea: No tracheal deviation  Cardiovascular:      Rate and Rhythm: Normal rate and regular rhythm  Heart sounds: Normal heart sounds  Pulmonary:      Effort: No respiratory distress  Breath sounds: Normal breath sounds  No wheezing or rales  Chest:      Chest wall: No tenderness  Musculoskeletal:         General: No tenderness or deformity  Normal range of motion  Cervical back: Normal range of motion and neck supple  Comments: Will see Dr Mirian Carpenter in 2 days for eval of hip (L) repllacement  Skin:     General: Skin is warm and dry  Coloration: Skin is not pale  Findings: No erythema, lesion or rash        Comments: WEI vasquez- / Honorio garcia  5 days ago -- looks " good   Neurological:      General: No focal deficit present  Mental Status: She is alert and oriented to person, place, and time  Cranial Nerves: No cranial nerve deficit  Psychiatric:         Mood and Affect: Mood normal          Behavior: Behavior normal          Thought Content: Thought content normal          Judgment: Judgment normal               I personally reviewed the recent (and prior)  lab results, the image studies, pathology, other specialty/physicians consult notes and recommendations, and outside medical records from other institutions, as appropriate  I had a lengthy discussion with the patient and shared the work-up findings  We discussed the diagnosis and management plan  I spent  35  minutes reviewing the records (labs, clinician notes, outside records, medical history, ordering medicine/tests/procedures, interpreting the imaging/labs previously done) and coordination of care as well as direct time with the patient today, of which greater than 50% of the time was spent in counseling and coordination of care with the patient/family        Todd Souza DO

## 2023-05-30 NOTE — PATIENT INSTRUCTIONS
Medicare Preventive Visit Patient Instructions  Thank you for completing your Welcome to Medicare Visit or Medicare Annual Wellness Visit today  Your next wellness visit will be due in one year (5/30/2024)  The screening/preventive services that you may require over the next 5-10 years are detailed below  Some tests may not apply to you based off risk factors and/or age  Screening tests ordered at today's visit but not completed yet may show as past due  Also, please note that scanned in results may not display below  Preventive Screenings:  Service Recommendations Previous Testing/Comments   Colorectal Cancer Screening  * Colonoscopy    * Fecal Occult Blood Test (FOBT)/Fecal Immunochemical Test (FIT)  * Fecal DNA/Cologuard Test  * Flexible Sigmoidoscopy Age: 39-70 years old   Colonoscopy: every 10 years (may be performed more frequently if at higher risk)  OR  FOBT/FIT: every 1 year  OR  Cologuard: every 3 years  OR  Sigmoidoscopy: every 5 years  Screening may be recommended earlier than age 39 if at higher risk for colorectal cancer  Also, an individualized decision between you and your healthcare provider will decide whether screening between the ages of 74-80 would be appropriate  Colonoscopy: 07/30/2020  FOBT/FIT: Not on file  Cologuard: Not on file  Sigmoidoscopy: Not on file    Screening Current     Breast Cancer Screening Age: 36 years old  Frequency: every 1-2 years  Not required if history of left and right mastectomy Mammogram: 12/13/2019        Cervical Cancer Screening Between the ages of 21-29, pap smear recommended once every 3 years  Between the ages of 33-67, can perform pap smear with HPV co-testing every 5 years     Recommendations may differ for women with a history of total hysterectomy, cervical cancer, or abnormal pap smears in past  Pap Smear: 11/10/2022    Screening Not Indicated   Hepatitis C Screening Once for adults born between 1945 and 1965  More frequently in patients at high risk for Hepatitis C Hep C Antibody: 05/04/2021    Screening Current   Diabetes Screening 1-2 times per year if you're at risk for diabetes or have pre-diabetes Fasting glucose: 101 mg/dL (2/6/2023)  A1C: 6 1 % (3/11/2022)  Screening Current   Cholesterol Screening Once every 5 years if you don't have a lipid disorder  May order more often based on risk factors  Lipid panel: 03/11/2022    Screening Current     Other Preventive Screenings Covered by Medicare:  1  Abdominal Aortic Aneurysm (AAA) Screening: covered once if your at risk  You're considered to be at risk if you have a family history of AAA  2  Lung Cancer Screening: covers low dose CT scan once per year if you meet all of the following conditions: (1) Age 50-69; (2) No signs or symptoms of lung cancer; (3) Current smoker or have quit smoking within the last 15 years; (4) You have a tobacco smoking history of at least 20 pack years (packs per day multiplied by number of years you smoked); (5) You get a written order from a healthcare provider  3  Glaucoma Screening: covered annually if you're considered high risk: (1) You have diabetes OR (2) Family history of glaucoma OR (3)  aged 48 and older OR (3)  American aged 72 and older  3  Osteoporosis Screening: covered every 2 years if you meet one of the following conditions: (1) You're estrogen deficient and at risk for osteoporosis based off medical history and other findings; (2) Have a vertebral abnormality; (3) On glucocorticoid therapy for more than 3 months; (4) Have primary hyperparathyroidism; (5) On osteoporosis medications and need to assess response to drug therapy  · Last bone density test (DXA Scan): Not on file  5  HIV Screening: covered annually if you're between the age of 12-76  Also covered annually if you are younger than 13 and older than 72 with risk factors for HIV infection   For pregnant patients, it is covered up to 3 times per pregnancy  Immunizations:  Immunization Recommendations   Influenza Vaccine Annual influenza vaccination during flu season is recommended for all persons aged >= 6 months who do not have contraindications   Pneumococcal Vaccine   * Pneumococcal conjugate vaccine = PCV13 (Prevnar 13), PCV15 (Vaxneuvance), PCV20 (Prevnar 20)  * Pneumococcal polysaccharide vaccine = PPSV23 (Pneumovax) Adults 25-60 years old: 1-3 doses may be recommended based on certain risk factors  Adults 72 years old: 1-2 doses may be recommended based off what pneumonia vaccine you previously received   Hepatitis B Vaccine 3 dose series if at intermediate or high risk (ex: diabetes, end stage renal disease, liver disease)   Tetanus (Td) Vaccine - COST NOT COVERED BY MEDICARE PART B Following completion of primary series, a booster dose should be given every 10 years to maintain immunity against tetanus  Td may also be given as tetanus wound prophylaxis  Tdap Vaccine - COST NOT COVERED BY MEDICARE PART B Recommended at least once for all adults  For pregnant patients, recommended with each pregnancy  Shingles Vaccine (Shingrix) - COST NOT COVERED BY MEDICARE PART B  2 shot series recommended in those aged 48 and above     Health Maintenance Due:      Topic Date Due   • Breast Cancer Screening: Mammogram  12/13/2020   • Colorectal Cancer Screening  07/30/2030   • Hepatitis C Screening  Completed     Immunizations Due:      Topic Date Due   • COVID-19 Vaccine (5 - Booster for [place] Peter series) 07/22/2022     Advance Directives   What are advance directives? Advance directives are legal documents that state your wishes and plans for medical care  These plans are made ahead of time in case you lose your ability to make decisions for yourself  Advance directives can apply to any medical decision, such as the treatments you want, and if you want to donate organs  What are the types of advance directives?   There are many types of advance directives, and each state has rules about how to use them  You may choose a combination of any of the following:  · Living will: This is a written record of the treatment you want  You can also choose which treatments you do not want, which to limit, and which to stop at a certain time  This includes surgery, medicine, IV fluid, and tube feedings  · Durable power of  for healthcare [facility]): This is a written record that states who you want to make healthcare choices for you when you are unable to make them for yourself  This person, called a proxy, is usually a family member or a friend  You may choose more than 1 proxy  · Do not resuscitate (DNR) order:  A DNR order is used in case your heart stops beating or you stop breathing  It is a request not to have certain forms of treatment, such as CPR  A DNR order may be included in other types of advance directives  · Medical directive: This covers the care that you want if you are in a coma, near death, or unable to make decisions for yourself  You can list the treatments you want for each condition  Treatment may include pain medicine, surgery, blood transfusions, dialysis, IV or tube feedings, and a ventilator (breathing machine)  · Values history: This document has questions about your views, beliefs, and how you feel and think about life  This information can help others choose the care that you would choose  Why are advance directives important? An advance directive helps you control your care  Although spoken wishes may be used, it is better to have your wishes written down  Spoken wishes can be misunderstood, or not followed  Treatments may be given even if you do not want them  An advance directive may make it easier for your family to make difficult choices about your care     Weight Management   Why it is important to manage your weight:  Being overweight increases your risk of health conditions such as heart disease, high blood pressure, type 2 diabetes, and certain types of cancer  It can also increase your risk for osteoarthritis, sleep apnea, and other respiratory problems  Aim for a slow, steady weight loss  Even a small amount of weight loss can lower your risk of health problems  How to lose weight safely:  A safe and healthy way to lose weight is to eat fewer calories and get regular exercise  You can lose up about 1 pound a week by decreasing the number of calories you eat by 500 calories each day  Healthy meal plan for weight management:  A healthy meal plan includes a variety of foods, contains fewer calories, and helps you stay healthy  A healthy meal plan includes the following:  · Eat whole-grain foods more often  A healthy meal plan should contain fiber  Fiber is the part of grains, fruits, and vegetables that is not broken down by your body  Whole-grain foods are healthy and provide extra fiber in your diet  Some examples of whole-grain foods are whole-wheat breads and pastas, oatmeal, brown rice, and bulgur  · Eat a variety of vegetables every day  Include dark, leafy greens such as spinach, kale, rex greens, and mustard greens  Eat yellow and orange vegetables such as carrots, sweet potatoes, and winter squash  · Eat a variety of fruits every day  Choose fresh or canned fruit (canned in its own juice or light syrup) instead of juice  Fruit juice has very little or no fiber  · Eat low-fat dairy foods  Drink fat-free (skim) milk or 1% milk  Eat fat-free yogurt and low-fat cottage cheese  Try low-fat cheeses such as mozzarella and other reduced-fat cheeses  · Choose meat and other protein foods that are low in fat  Choose beans or other legumes such as split peas or lentils  Choose fish, skinless poultry (chicken or turkey), or lean cuts of red meat (beef or pork)  Before you cook meat or poultry, cut off any visible fat  · Use less fat and oil  Try baking foods instead of frying them   Add less fat, such as margarine, sour cream, regular salad dressing and mayonnaise to foods  Eat fewer high-fat foods  Some examples of high-fat foods include french fries, doughnuts, ice cream, and cakes  · Eat fewer sweets  Limit foods and drinks that are high in sugar  This includes candy, cookies, regular soda, and sweetened drinks  Exercise:  Exercise at least 30 minutes per day on most days of the week  Some examples of exercise include walking, biking, dancing, and swimming  You can also fit in more physical activity by taking the stairs instead of the elevator or parking farther away from stores  Ask your healthcare provider about the best exercise plan for you  © Copyright [employer] 2018 Information is for End User's use only and may not be sold, redistributed or otherwise used for commercial purposes   All illustrations and images included in CareNotes® are the copyrighted property of [employer]  or [address] [place] [address]

## 2023-05-30 NOTE — PROGRESS NOTES
Assessment and Plan:     Problem List Items Addressed This Visit    None       Preventive health issues were discussed with patient, and age appropriate screening tests were ordered as noted in patient's After Visit Summary  Personalized health advice and appropriate referrals for health education or preventive services given if needed, as noted in patient's After Visit Summary       History of Present Illness:     Patient presents for a Medicare Wellness Visit    HPI   Patient Care Team:  Dannie León DO as PCP - General (Family Medicine)     Review of Systems:     Review of Systems     Problem List:     Patient Active Problem List   Diagnosis   • Vitamin D deficiency   • Psoriasis   • Pre-diabetes   • Osteoarthritis   • NSAID long-term use   • Morbid obesity ([facility] [address] )   • Hypothyroidism   • Essential hypertension   • Elevated blood sugar   • Stage 3a chronic kidney disease ([facility] [address] )   • Chronic depression   • Chronic anxiety   • Ambulatory dysfunction   • Class 3 severe obesity with serious comorbidity and body mass index (BMI) of 45 0 to 49 9 in [facility])   • Primary osteoarthritis of right hip   • Lumbar pain   • History of total left hip replacement   • PAD (peripheral artery disease) (HCC)   • 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 (HCC)   • Lymphoma (HCC)   • Recurrent major depressive disorder, in partial remission ([facility] [address] )      Past Medical and Surgical History:     Past Medical History:   Diagnosis Date   • Anxiety     on med    • Arthritis    • Back problem     4 Bulging Disks   • Fibrocystic disease of both breasts    • Follicular lymphoma grade 3a ([address] )    • Hip pain     Left   • History of staph infection    • Hypertension    • Knee pain     Right   • Lymphoma ([address] )    • Spinal stenosis    • Thyroid disease      Past Surgical History:   Procedure Laterality Date   • CATARACT EXTRACTION  01/01/2017   • CHOLECYSTECTOMY     • MAMMO (HISTORICAL)  12/13/2019   • OTHER SURGICAL HISTORY      Warts removal   • REFRACTIVE SURGERY Bilateral    • REVISION TOTAL HIP ARTHROPLASTY     • ROTATOR CUFF REPAIR Right       Family History:     Family History   Problem Relation Age of Onset   • Other Mother         Non Hogkins   • Heart failure Mother    • Lung cancer Father    • Breast cancer Other       Social History:     Social History     Socioeconomic History   • Marital status: /Civil Union     Spouse name: None   • Number of children: 1   • Years of education: 12   • Highest education level: 12th grade   Occupational History   • Occupation: reitred    Tobacco Use   • Smoking status: Former     Years:      Types: Cigarettes   • Smokeless tobacco: Never   Vaping Use   • Vaping Use: Never used   Substance and Sexual Activity   • Alcohol use: Yes     Comment: Occasional    • Drug use: Not Currently   • Sexual activity: Not Currently     Partners: Male     Birth control/protection: Post-menopausal   Other Topics Concern   • None   Social History Narrative    · Most recent tobacco use screenin2019      · Do you currently or have you served in the [employer]:   No      · Were you activated, into active duty, as a member of the [employer] or as a Reservist:   No      · Caffeine intake:   Occasional      · Guns present in home:   No      · Seat belts used routinely:   Yes      · Sunscreen used routinely:   Yes      · Smoke alarm in home:    Yes      · Advance directive:   Yes      Social Determinants of Health     Financial Resource Strain: Not on file   Food Insecurity: Not on file   Transportation Needs: Not on file   Physical Activity: Insufficiently Active (2020)    Exercise Vital Sign    • Days of Exercise per Week: 2 days    • Minutes of Exercise per Session: 30 min   Stress: Not on file   Social Connections: Not on file   Intimate Partner Violence: Not on file   Housing Stability: Not on file      Medications and Allergies:     Current Outpatient Medications   Medication Sig Dispense Refill   • acetaminophen (TYLENOL) 500 mg tablet Take 500 mg by mouth every 4 (four) hours     • amLODIPine (NORVASC) 5 mg tablet TAKE 1 TABLET DAILY 90 tablet 3   • amoxicillin (AMOXIL) 500 MG tablet Take 4 tablets by mouth in the morning For dental procedures     • benazepril (LOTENSIN) 40 MG tablet TAKE 1 TABLET DAILY 90 tablet 3   • Cholecalciferol 1 25 MG (37952 UT) TABS Take 1 25 mg by mouth daily     • citalopram (CeleXA) 10 mg tablet TAKE 1 TABLET DAILY 90 tablet 3   • famotidine (PEPCID) 20 mg tablet Take 1 tablet (20 mg total) by mouth daily 30 tablet 0   • fexofenadine (ALLEGRA) 180 MG tablet Take 180 mg by mouth daily     • multivitamin-iron-minerals-folic acid (CENTRUM) chewable tablet Chew 1 tablet daily     • Naproxen Sodium 220 MG CAPS Take by mouth     • Omega 3 1200 MG CAPS Take by mouth     • Synthroid 100 MCG tablet TAKE 1 TABLET DAILY 90 tablet 3     No current facility-administered medications for this visit       Allergies   Allergen Reactions   • Diphenhydramine Other (See Comments)     Per patient and daughter have been told this is not an allergy, patient has had chest pressure/pain with benadryl once in the past    • Erythromycin Other (See Comments)   • Tetanus Toxoids Other (See Comments)   • Tetracycline Other (See Comments)      Immunizations:     Immunization History   Administered Date(s) Administered   • COVID-19 PFIZER VACCINE 0 3 ML IM 03/16/2021, 04/09/2021, 10/09/2021, 05/27/2022   • INFLUENZA 12/05/2014, 11/09/2015, 11/07/2017, 09/20/2018   • Influenza, high dose seasonal 0 7 mL 10/06/2020, 10/15/2021, 10/24/2022   • Pneumococcal Conjugate 13-Valent 01/27/2015   • Pneumococcal Conjugate Vaccine 20-valent (Pcv20), Polysace 02/21/2023   • Pneumococcal Polysaccharide PPV23 01/01/2002, 01/01/2009, 03/10/2009   • Td (adult), adsorbed 01/01/1996, 01/01/2006      Health Maintenance:         Topic Date Due   • Breast Cancer Screening: Mammogram  12/13/2020   • Colorectal Cancer Screening  07/30/2030   • Hepatitis C Screening  Completed         Topic Date Due   • COVID-19 Vaccine (5 - Booster for Pfizer series) 07/22/2022      Medicare Screening Tests and Risk Assessments:     Maria Luz Mccollum is here for her Subsequent Wellness visit  Last Medicare Wellness visit information reviewed, patient interviewed and updates made to the record today  Health Risk Assessment:   Patient rates overall health as good  Patient feels that their physical health rating is slightly worse  Patient is very satisfied with their life  Eyesight was rated as same  Hearing was rated as same  Patient feels that their emotional and mental health rating is same  Patients states they are never, rarely angry  Patient states they are often unusually tired/fatigued  Pain experienced in the last 7 days has been some  Patient's pain rating has been 8/10  Patient states that she has experienced no weight loss or gain in last 6 months  Chronic R knee pain that comes and goes -- Tylenol Helps     Depression Screening:   PHQ-9 Score: 4      Fall Risk Screening: In the past year, patient has experienced: no history of falling in past year      Urinary Incontinence Screening:   Patient has not leaked urine accidently in the last six months  Home Safety:  Patient has trouble with stairs inside or outside of their home  Patient has working smoke alarms and has working carbon monoxide detector  Home safety hazards include: none  Nutrition:   Current diet is Regular, Limited junk food and No Added Salt  Pt eats small/med portion sized meals - - regular intake of protein, some vegetables and some fruits     Medications:   Patient is currently taking over-the-counter supplements  OTC medications include: see medication list  Patient is able to manage medications       Activities of Daily Living (ADLs)/Instrumental Activities of Daily Living (IADLs):   Walk and transfer into and out of bed and chair?: Yes  Dress and groom yourself?: Yes    Bathe or shower yourself?: Yes    Feed yourself? Yes  Do your laundry/housekeeping?: Yes  Manage your money, pay your bills and track your expenses?: Yes  Make your own meals?: Yes    Do your own shopping?: Yes    Previous Hospitalizations:   Any hospitalizations or ED visits within the last 12 months?: Yes    How many hospitalizations have you had in the last year?: 1-2    Hospitalization Comments: Nov of 2022 pt had COVID -- not an overnight stay  Advance Care Planning:     Advanced directive counseling given: Yes    Five wishes given: Yes    Patient declined ACP directive: No    End of Life Decisions reviewed with patient: Yes    Provider agrees with end of life decisions: Yes      Comments: Discussed living will w pt and pt will bring in original living will -- was also given copy of 5 wishes and discussed importance of scanning into chart    Cognitive Screening:   Provider or family/friend/caregiver concerned regarding cognition?: No    PREVENTIVE SCREENINGS      Cardiovascular Screening:    General: Screening Current and Risks and Benefits Discussed      Diabetes Screening:     General: Screening Current and Risks and Benefits Discussed      Colorectal Cancer Screening:     General: Screening Current      Breast Cancer Screening:     General: Risks and Benefits Discussed    Due for: Mammogram        Cervical Cancer Screening:    General: Screening Not Indicated and Risks and Benefits Discussed      Osteoporosis Screening:    General: Risks and Benefits Discussed      Lung Cancer Screening:     General: Screening Not Indicated      Hepatitis C Screening:    General: Screening Current    Screening, Brief Intervention, and Referral to Treatment (SBIRT)    Screening  Typical number of drinks in a day: 0  Typical number of drinks in a week: 0  Interpretation: Low risk drinking behavior      Single Item Drug Screening:  How often have you used an illegal drug (including marijuana) or a "prescription medication for non-medical reasons in the past year? never    Single Item Drug Screen Score: 0  Interpretation: Negative screen for possible drug use disorder    Brief Intervention  Alcohol & drug use screenings were reviewed  No concerns regarding substance use disorder identified  Healthy alcohol use/limits discussed  Other Counseling Topics:   Car/seat belt/driving safety, skin self-exam, sunscreen and calcium and vitamin D intake and regular weightbearing exercise  No results found       Physical Exam:     /74 (BP Location: Left arm, Patient Position: Sitting, Cuff Size: Standard)   Pulse 74   Temp 97 9 °F (36 6 °C) (Temporal)   Ht 5' 2\" (1 575 m)   Wt 116 kg (256 lb 3 2 oz)   LMP  (LMP Unknown)   SpO2 98%   BMI 46 86 kg/m²     Physical Exam     Laurie Villarreal,   "

## 2023-06-08 ENCOUNTER — TELEPHONE (OUTPATIENT)
Dept: GASTROENTEROLOGY | Facility: CLINIC | Age: 73
End: 2023-06-08

## 2023-06-16 ENCOUNTER — APPOINTMENT (OUTPATIENT)
Dept: LAB | Facility: AMBULARY SURGERY CENTER | Age: 73
End: 2023-06-16
Payer: COMMERCIAL

## 2023-06-16 DIAGNOSIS — E55.9 VITAMIN D INSUFFICIENCY: ICD-10-CM

## 2023-06-16 DIAGNOSIS — R53.83 FATIGUE, UNSPECIFIED TYPE: ICD-10-CM

## 2023-06-16 DIAGNOSIS — E03.9 ACQUIRED HYPOTHYROIDISM: ICD-10-CM

## 2023-06-16 DIAGNOSIS — Z79.899 ENCOUNTER FOR LONG-TERM (CURRENT) USE OF MEDICATIONS: ICD-10-CM

## 2023-06-16 DIAGNOSIS — R73.9 ELEVATED BLOOD SUGAR: ICD-10-CM

## 2023-06-16 LAB
25(OH)D3 SERPL-MCNC: 88.3 NG/ML (ref 30–100)
ALBUMIN SERPL BCP-MCNC: 4.1 G/DL (ref 3.5–5)
ALP SERPL-CCNC: 80 U/L (ref 46–116)
ALT SERPL W P-5'-P-CCNC: 50 U/L (ref 12–78)
ANION GAP SERPL CALCULATED.3IONS-SCNC: 4 MMOL/L (ref 4–13)
AST SERPL W P-5'-P-CCNC: 28 U/L (ref 5–45)
BACTERIA UR QL AUTO: ABNORMAL /HPF
BILIRUB SERPL-MCNC: 0.58 MG/DL (ref 0.2–1)
BILIRUB UR QL STRIP: NEGATIVE
BUN SERPL-MCNC: 24 MG/DL (ref 5–25)
CALCIUM SERPL-MCNC: 10.6 MG/DL (ref 8.3–10.1)
CHLORIDE SERPL-SCNC: 104 MMOL/L (ref 96–108)
CHOLEST SERPL-MCNC: 179 MG/DL
CLARITY UR: CLEAR
CO2 SERPL-SCNC: 28 MMOL/L (ref 21–32)
COLOR UR: COLORLESS
CREAT SERPL-MCNC: 1.28 MG/DL (ref 0.6–1.3)
CREAT UR-MCNC: 25.1 MG/DL
ERYTHROCYTE [DISTWIDTH] IN BLOOD BY AUTOMATED COUNT: 13.5 % (ref 11.6–15.1)
GFR SERPL CREATININE-BSD FRML MDRD: 40 ML/MIN/1.73SQ M
GLUCOSE SERPL-MCNC: 93 MG/DL (ref 65–140)
GLUCOSE UR STRIP-MCNC: NEGATIVE MG/DL
HCT VFR BLD AUTO: 42.4 % (ref 34.8–46.1)
HDLC SERPL-MCNC: 61 MG/DL
HGB BLD-MCNC: 14 G/DL (ref 11.5–15.4)
HGB UR QL STRIP.AUTO: NEGATIVE
KETONES UR STRIP-MCNC: NEGATIVE MG/DL
LDLC SERPL CALC-MCNC: 98 MG/DL (ref 0–100)
LEUKOCYTE ESTERASE UR QL STRIP: ABNORMAL
MCH RBC QN AUTO: 33.2 PG (ref 26.8–34.3)
MCHC RBC AUTO-ENTMCNC: 33 G/DL (ref 31.4–37.4)
MCV RBC AUTO: 101 FL (ref 82–98)
MICROALBUMIN UR-MCNC: 23 MG/L (ref 0–20)
MICROALBUMIN/CREAT 24H UR: 92 MG/G CREATININE (ref 0–30)
NITRITE UR QL STRIP: NEGATIVE
NON-SQ EPI CELLS URNS QL MICRO: ABNORMAL /HPF
NONHDLC SERPL-MCNC: 118 MG/DL
PH UR STRIP.AUTO: 6.5 [PH]
PLATELET # BLD AUTO: 241 THOUSANDS/UL (ref 149–390)
PMV BLD AUTO: 10 FL (ref 8.9–12.7)
POTASSIUM SERPL-SCNC: 4.2 MMOL/L (ref 3.5–5.3)
PROT SERPL-MCNC: 7.9 G/DL (ref 6.4–8.4)
PROT UR STRIP-MCNC: NEGATIVE MG/DL
RBC # BLD AUTO: 4.22 MILLION/UL (ref 3.81–5.12)
RBC #/AREA URNS AUTO: ABNORMAL /HPF
SODIUM SERPL-SCNC: 136 MMOL/L (ref 135–147)
SP GR UR STRIP.AUTO: 1.01 (ref 1–1.03)
TRIGL SERPL-MCNC: 102 MG/DL
TSH SERPL DL<=0.05 MIU/L-ACNC: 1.84 UIU/ML (ref 0.45–4.5)
UROBILINOGEN UR STRIP-ACNC: <2 MG/DL
WBC # BLD AUTO: 8.15 THOUSAND/UL (ref 4.31–10.16)
WBC #/AREA URNS AUTO: ABNORMAL /HPF

## 2023-06-16 PROCEDURE — 82306 VITAMIN D 25 HYDROXY: CPT

## 2023-06-16 PROCEDURE — 83036 HEMOGLOBIN GLYCOSYLATED A1C: CPT

## 2023-06-16 PROCEDURE — 85027 COMPLETE CBC AUTOMATED: CPT

## 2023-06-16 PROCEDURE — 84443 ASSAY THYROID STIM HORMONE: CPT

## 2023-06-16 PROCEDURE — 36415 COLL VENOUS BLD VENIPUNCTURE: CPT

## 2023-06-16 PROCEDURE — 80053 COMPREHEN METABOLIC PANEL: CPT

## 2023-06-16 PROCEDURE — 80061 LIPID PANEL: CPT

## 2023-06-17 LAB
EST. AVERAGE GLUCOSE BLD GHB EST-MCNC: 137 MG/DL
HBA1C MFR BLD: 6.4 %

## 2023-06-18 LAB — BACTERIA UR CULT: ABNORMAL

## 2023-06-20 ENCOUNTER — TELEPHONE (OUTPATIENT)
Dept: FAMILY MEDICINE CLINIC | Facility: CLINIC | Age: 73
End: 2023-06-20

## 2023-06-20 DIAGNOSIS — R39.9 URINARY SYMPTOM OR SIGN: Primary | ICD-10-CM

## 2023-06-20 RX ORDER — NITROFURANTOIN 25; 75 MG/1; MG/1
100 CAPSULE ORAL 2 TIMES DAILY
Qty: 10 CAPSULE | Refills: 0 | Status: SHIPPED | OUTPATIENT
Start: 2023-06-20 | End: 2023-06-25

## 2023-06-20 NOTE — TELEPHONE ENCOUNTER
Hi, this is Tangela Summer calling on behalf of my mother, Carolynne Heimlich     Her YOB: 1950  Doctor Aliya Morillo had left a message on her my chart that she has a smoldering bladder infection  He wrote in here that he could call in an antibiotic A macrobid times 5 days Like to see if he called it in or if he can please authorize it  She uses the [facility] [address]   Can reach me at [phone]  If I don't answer you can leave me a message  Thank you very much  Have a great day

## 2023-06-29 ENCOUNTER — TELEPHONE (OUTPATIENT)
Dept: FAMILY MEDICINE CLINIC | Facility: CLINIC | Age: 73
End: 2023-06-29

## 2023-06-29 NOTE — TELEPHONE ENCOUNTER
Spoke with justina from [facility] stating there was no order or any message stating from [address] for patient to repeat urine culture order   Patient told justina she received a urine culture order in the mail for STAT but the order is duplicate to the previous urine culture she already gotten done from June 16

## 2023-06-29 NOTE — TELEPHONE ENCOUNTER
Hi there this is Da calling from the [facility] lab  I just had a patient of Doctor Reina Summers  It was Boom Certain [birth year] and she had just came in with some paperwork that was mailed to her for urine culture  that needed to be done stat  She came in here with the paperwork that was mailed to her with the dates of June 16th  However, there are no orders in the system for me to pull and send with her specimen, so if someone could let me know if that was a mistake or if they should be put in shortly that would be great  I'm going to hold on to her specimen until and I'll just keep checking her chart then   Thank you

## 2023-06-30 ENCOUNTER — PATIENT MESSAGE (OUTPATIENT)
Dept: FAMILY MEDICINE CLINIC | Facility: CLINIC | Age: 73
End: 2023-06-30

## 2023-06-30 DIAGNOSIS — R39.9 URINARY SYMPTOM OR SIGN: Primary | ICD-10-CM

## 2023-07-07 ENCOUNTER — APPOINTMENT (OUTPATIENT)
Dept: LAB | Facility: AMBULARY SURGERY CENTER | Age: 73
End: 2023-07-07
Payer: COMMERCIAL

## 2023-07-07 DIAGNOSIS — R39.9 URINARY SYMPTOM OR SIGN: ICD-10-CM

## 2023-07-07 LAB
BILIRUB UR QL STRIP: NEGATIVE
CLARITY UR: CLEAR
COLOR UR: COLORLESS
GLUCOSE UR STRIP-MCNC: NEGATIVE MG/DL
HGB UR QL STRIP.AUTO: NEGATIVE
KETONES UR STRIP-MCNC: NEGATIVE MG/DL
LEUKOCYTE ESTERASE UR QL STRIP: NEGATIVE
NITRITE UR QL STRIP: NEGATIVE
PH UR STRIP.AUTO: 6 [PH]
PROT UR STRIP-MCNC: NEGATIVE MG/DL
SP GR UR STRIP.AUTO: 1.01 (ref 1–1.03)
UROBILINOGEN UR STRIP-ACNC: <2 MG/DL

## 2023-07-07 PROCEDURE — 81003 URINALYSIS AUTO W/O SCOPE: CPT

## 2023-07-14 ENCOUNTER — TELEPHONE (OUTPATIENT)
Dept: HEMATOLOGY ONCOLOGY | Facility: CLINIC | Age: 73
End: 2023-07-14

## 2023-07-17 ENCOUNTER — TELEPHONE (OUTPATIENT)
Dept: HEMATOLOGY ONCOLOGY | Facility: CLINIC | Age: 73
End: 2023-07-17

## 2023-07-17 ENCOUNTER — TELEPHONE (OUTPATIENT)
Dept: FAMILY MEDICINE CLINIC | Facility: CLINIC | Age: 73
End: 2023-07-17

## 2023-07-17 NOTE — TELEPHONE ENCOUNTER
Called and made pt  aware that they can come into 's office to  the barium whenever. Mentioned I would put in this note incase someone questions patient and or spouse.

## 2023-07-17 NOTE — TELEPHONE ENCOUNTER
I'm a patient of Doctor Amber Finn. I need to find out about a urine problem. I've been having a pressure problem. I haven't heard back from Doctor Amber Finn and I can't really tell by my chart as to what's going on. So if somebody could please give me a call back, I would appreciate it. Thank you and have a good day.

## 2023-07-17 NOTE — TELEPHONE ENCOUNTER
Patient Call    Who are you speaking with? Patient    If it is not the patient, are they listed on an active communication consent form? N/A   What is the reason for this call? Patients oral contrast for her upcoming CT has been left out in high temps. She would like to know if she is able to  two new bottles. Does this require a call back? Yes   If a call back is required, please list best call back number [phone]   If a call back is required, advise that a message will be forwarded to their care team and someone will return their call as soon as possible. Did you relay this information to the patient?  Yes

## 2023-07-17 NOTE — TELEPHONE ENCOUNTER
Appointment Change  Cancel, Reschedule, Change to Virtual      Who are you speaking with? Patient   If it is not the patient, are they listed on an active communication consent form? N/A   Which provider is the appointment scheduled with? Dr. Rex Oliver   When is the appointment scheduled? Please list date and time 08/15/23 10:40AM   At which location is the appointment scheduled to take place? [facility]   Was the appointment rescheduled or changed from an in person visit to a virtual visit? If so, please list the details of the change. 09/05/23 10:20AM   What is the reason for the appointment change? Provider   Was [facility] transport scheduled for this visit? No   Does ST[facility] transport need to be scheduled for the new visit (if applicable) N/A   Does the patient need an infusion appointment rescheduled? No   Does the patient have an infusion appointment scheduled? If so, when? No   Is the patient undergoing chemotherapy? No   Was the no-show policy reviewed for appointments being changed with less then 24 hours of notice?  N/A

## 2023-07-18 NOTE — TELEPHONE ENCOUNTER
I left the patient a message to call she can either have a nurse visit for a urine or dr baca does have an opening this afternoon or she could see millicent or jenna

## 2023-07-19 ENCOUNTER — TELEPHONE (OUTPATIENT)
Dept: FAMILY MEDICINE CLINIC | Facility: CLINIC | Age: 73
End: 2023-07-19

## 2023-07-19 DIAGNOSIS — R39.9 URINARY SYMPTOM OR SIGN: Primary | ICD-10-CM

## 2023-07-19 NOTE — TELEPHONE ENCOUNTER
Spoke with patient and offered her a nurse visit to test her urine. Pt was upset that nobody called her for her results on her recent urine test on July 7. Pt currently has pressure on her pelvic area.

## 2023-07-19 NOTE — TELEPHONE ENCOUNTER
Yes, this is Jetty Silver calling. I had a phone call yesterday about an appointment and I'm sorry but I didn't get home till after five. My phone number here is [phone]. My date of birth is [birth year] and it's in reference to a bladder problem that I've been having for and I'm a patient of doctor Ced Thornton. So if somebody, somebody could get back to me again, I definitely would appreciate it Thank you and have a good day.

## 2023-07-21 ENCOUNTER — APPOINTMENT (OUTPATIENT)
Dept: LAB | Facility: AMBULARY SURGERY CENTER | Age: 73
End: 2023-07-21
Payer: COMMERCIAL

## 2023-07-21 DIAGNOSIS — R39.9 URINARY SYMPTOM OR SIGN: ICD-10-CM

## 2023-07-21 LAB
BACTERIA UR QL AUTO: ABNORMAL /HPF
BILIRUB UR QL STRIP: NEGATIVE
CLARITY UR: CLEAR
COLOR UR: ABNORMAL
GLUCOSE UR STRIP-MCNC: NEGATIVE MG/DL
HGB UR QL STRIP.AUTO: NEGATIVE
KETONES UR STRIP-MCNC: NEGATIVE MG/DL
LEUKOCYTE ESTERASE UR QL STRIP: ABNORMAL
NITRITE UR QL STRIP: NEGATIVE
NON-SQ EPI CELLS URNS QL MICRO: ABNORMAL /HPF
PH UR STRIP.AUTO: 6.5 [PH]
PROT UR STRIP-MCNC: NEGATIVE MG/DL
RBC #/AREA URNS AUTO: ABNORMAL /HPF
SP GR UR STRIP.AUTO: 1.01 (ref 1–1.03)
UROBILINOGEN UR STRIP-ACNC: <2 MG/DL
WBC #/AREA URNS AUTO: ABNORMAL /HPF

## 2023-07-21 PROCEDURE — 81001 URINALYSIS AUTO W/SCOPE: CPT

## 2023-07-23 DIAGNOSIS — I10 ESSENTIAL HYPERTENSION: ICD-10-CM

## 2023-07-24 RX ORDER — BENAZEPRIL HYDROCHLORIDE 40 MG/1
TABLET, FILM COATED ORAL
Qty: 90 TABLET | Refills: 3 | Status: SHIPPED | OUTPATIENT
Start: 2023-07-24

## 2023-07-24 RX ORDER — AMLODIPINE BESYLATE 5 MG/1
TABLET ORAL
Qty: 90 TABLET | Refills: 3 | Status: SHIPPED | OUTPATIENT
Start: 2023-07-24

## 2023-07-26 ENCOUNTER — TELEPHONE (OUTPATIENT)
Dept: FAMILY MEDICINE CLINIC | Facility: CLINIC | Age: 73
End: 2023-07-26

## 2023-07-26 ENCOUNTER — PATIENT MESSAGE (OUTPATIENT)
Dept: FAMILY MEDICINE CLINIC | Facility: CLINIC | Age: 73
End: 2023-07-26

## 2023-07-26 DIAGNOSIS — N39.0 FREQUENT UTI: Primary | ICD-10-CM

## 2023-07-26 RX ORDER — CIPROFLOXACIN 500 MG/1
500 TABLET, FILM COATED ORAL EVERY 12 HOURS SCHEDULED
Qty: 10 TABLET | Refills: 0 | Status: SHIPPED | OUTPATIENT
Start: 2023-07-26 | End: 2023-07-31

## 2023-07-26 NOTE — TELEPHONE ENCOUNTER
Pt contacted through [facility] as well as phone call -- was Rx medications (abx) for recurrent UTI as well as given amb ref to uro for consult re issue. Pt acknowledged.

## 2023-07-26 NOTE — TELEPHONE ENCOUNTER
[facility] called they need to verify information about a medication?    Phone [phone]  Reference number    [id number]

## 2023-07-26 NOTE — TELEPHONE ENCOUNTER
Called and spoke to Bisi-Pharmacist at [facility] clarified her question in regards to Levothyroxine

## 2023-07-26 NOTE — PROGRESS NOTES
Symptoms currently ordered for Cipro 500 mg p.o. twice daily for 5 days. Urine analysis evaluated indicates moderate leukocytes. Patient also provided consultation for urology.

## 2023-08-07 ENCOUNTER — HOSPITAL ENCOUNTER (OUTPATIENT)
Dept: RADIOLOGY | Age: 73
Discharge: HOME | End: 2023-08-07
Payer: COMMERCIAL

## 2023-08-07 DIAGNOSIS — C82.31 GRADE 3A FOLLICULAR LYMPHOMA OF LYMPH NODES OF NECK (HCC): ICD-10-CM

## 2023-08-07 PROCEDURE — 74177 CT ABD & PELVIS W/CONTRAST: CPT

## 2023-08-07 PROCEDURE — 71260 CT THORAX DX C+: CPT

## 2023-08-07 RX ADMIN — IOHEXOL 100 ML: 350 INJECTION, SOLUTION INTRAVENOUS at 10:51

## 2023-08-11 ENCOUNTER — TELEPHONE (OUTPATIENT)
Dept: UROLOGY | Facility: AMBULATORY SURGERY CENTER | Age: 73
End: 2023-08-11

## 2023-08-11 NOTE — TELEPHONE ENCOUNTER
New Patient    What is the reason for the patient’s appointment?: recurring UTI    Patient was treated twice for an infection in a month. Per Oncologist he would like pt seen in office. Was offered soonest available but per patients  oncologist would like patient seen sooner than apt offered.      What office location does the patient prefer?: [facility]     Have patient records been requested?:  If No, are the records showing in Epic: records in epic       HISTORY:   Has the patient had any previous Urologist(s)?: no    Was the patient seen in the ED?: no    Has the patient had any outside testing done?: no    Does the patient have a personal history of cancer?: no    : [phone]

## 2023-08-14 NOTE — PROGRESS NOTES
Referring Physician: Esperanza Bermudez DO  A copy of this note was sent to the referring physician. Diagnoses and all orders for this visit:    Atrophic vaginitis  -     estrogens, conjugated (Premarin) vaginal cream; Insert 0.5 g into the vagina 3 (three) times a week            Assessment and plan:       1. Recurrent urinary tract infection  -Klebsiella pneumoniae noted on recent urine culture    #2 Recent CT with mild right ureterectasis, likely nonclinically significant  -Symmetric renogram, no hydronephrosis likely nonobstructive    2. Medical comorbidities: Stage III chronic kidney disease, hypothyroidism, peripheral arterial disease, psoriasis, class III obesity with a BMI of 46, history of lymphoma      Today we discussed the nature of recurrent urinary tract infections in postmenopausal women. We discussed that these are subdivided into cystitis, which include only lower urinary tract symptoms , versus pyelonephritis which includes flank pain and fever. We discussed that pyelonephritis poses a significant risk of renal insufficiency if it is recurrent. Cystitis symptoms, while certainly bothersome, do not carry this risk. I discussed my typical algorithm for management and prevention of recurrent UTIs. This includes beginning with topical estrogen cream to stimulate regrowth of the urethral tissue. We also discussed a workup for anatomic abnormalities including cystoscopy, and imaging of the upper tracts with a renal ultrasound and a KUB. We also discussed the indications and contraindications to using a suppressive antibiotic. Topical Premarin cream prescribed. Patient's daughter states that she will apply. Dietary and supplementary probiotics discussed.   Follow-up annually with advanced practitioner team.    Leland Spears MD      Chief Complaint     Consultation for recurrent UTI      History of Present Illness     María Edwards is a 74 y.o. referred in consultation for recurrent UTI    Detailed Urologic History     - please refer to HPI    Review of Systems     Review of Systems   Constitutional: Negative for activity change and fatigue. HENT: Negative for congestion. Eyes: Negative for visual disturbance. Respiratory: Negative for shortness of breath and wheezing. Cardiovascular: Negative for chest pain and leg swelling. Gastrointestinal: Negative for abdominal pain. Endocrine: Negative for polyuria. Genitourinary: Negative for dysuria, flank pain, hematuria and urgency. Musculoskeletal: Negative for back pain. Allergic/Immunologic: Negative for immunocompromised state. Neurological: Negative for dizziness and numbness. Psychiatric/Behavioral: Negative for dysphoric mood. All other systems reviewed and are negative. Allergies     Allergies   Allergen Reactions   • Diphenhydramine Other (See Comments)     Per patient and daughter have been told this is not an allergy, patient has had chest pressure/pain with benadryl once in the past.   • Erythromycin Other (See Comments)   • Tetanus Toxoids Other (See Comments)   • Tetracycline Other (See Comments)       Physical Exam       Physical Exam  Constitutional:       General: He is not in acute distress. Appearance: He is well-developed. HENT:      Head: Normocephalic and atraumatic. Cardiovascular:      Comments: Negative lower extremity edema  Pulmonary:      Effort: Pulmonary effort is normal.      Breath sounds: Normal breath sounds. Abdominal:      Palpations: Abdomen is soft. Musculoskeletal:         General: Normal range of motion. Cervical back: Normal range of motion. Skin:     General: Skin is warm. Neurological:      Mental Status: He is alert and oriented to person, place, and time. Psychiatric:         Behavior: Behavior normal.           Vital Signs  There were no vitals filed for this visit.       Current Medications       Current Outpatient Medications:   •  acetaminophen (TYLENOL) 500 mg tablet, Take 500 mg by mouth every 4 (four) hours, Disp: , Rfl:   •  amLODIPine (NORVASC) 5 mg tablet, TAKE 1 TABLET DAILY, Disp: 90 tablet, Rfl: 3  •  amoxicillin (AMOXIL) 500 MG tablet, Take 4 tablets by mouth in the morning For dental procedures, Disp: , Rfl:   •  benazepril (LOTENSIN) 40 MG tablet, TAKE 1 TABLET DAILY, Disp: 90 tablet, Rfl: 3  •  Cholecalciferol 1.25 MG (15697 UT) TABS, Take 1.25 mg by mouth daily, Disp: , Rfl:   •  citalopram (CeleXA) 10 mg tablet, TAKE 1 TABLET DAILY, Disp: 90 tablet, Rfl: 3  •  famotidine (PEPCID) 20 mg tablet, Take 1 tablet (20 mg total) by mouth daily, Disp: 30 tablet, Rfl: 0  •  fexofenadine (ALLEGRA) 180 MG tablet, Take 180 mg by mouth daily, Disp: , Rfl:   •  multivitamin-iron-minerals-folic acid (CENTRUM) chewable tablet, Chew 1 tablet daily, Disp: , Rfl:   •  Naproxen Sodium 220 MG CAPS, Take by mouth, Disp: , Rfl:   •  Omega 3 1200 MG CAPS, Take by mouth, Disp: , Rfl:   •  Synthroid 100 MCG tablet, TAKE 1 TABLET DAILY, Disp: 90 tablet, Rfl: 3      Active Problems     Patient Active Problem List   Diagnosis   • Vitamin D deficiency   • Psoriasis   • Pre-diabetes   • Osteoarthritis   • NSAID long-term use   • Morbid obesity (HCC)   • Hypothyroidism   • Essential hypertension   • Elevated blood sugar   • Stage 3a chronic kidney disease (HCC)   • Chronic depression   • Chronic anxiety   • Ambulatory dysfunction   • Class 3 severe obesity with serious comorbidity and body mass index (BMI) of 45.0 to 49.9 in adult [facility])   • Primary osteoarthritis of right hip   • Lumbar pain   • History of total left hip replacement   • PAD (peripheral artery disease) (HCC)   • 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 (HCC)   • Lymphoma (HCC)   • Recurrent major depressive disorder, in partial remission (HCC)         Past Medical History     Past Medical History:   Diagnosis Date   • Anxiety     on med    • Arthritis    • Back problem     4 Bulging Disks   • Fibrocystic disease of both breasts    • Follicular lymphoma grade 3a ([address])    • Hip pain     Left   • History of staph infection    • Hypertension    • Knee pain     Right   • Lymphoma ([address])    • Spinal stenosis    • Thyroid disease          Surgical History     Past Surgical History:   Procedure Laterality Date   • CATARACT EXTRACTION  01/01/2017   • CHOLECYSTECTOMY     • MAMMO (HISTORICAL)  12/13/2019   • OTHER SURGICAL HISTORY      Warts removal   • REFRACTIVE SURGERY Bilateral    • REVISION TOTAL HIP ARTHROPLASTY     • ROTATOR CUFF REPAIR Right          Family History     Family History   Problem Relation Age of Onset   • Other Mother         Non Hogkins   • Heart failure Mother    • Lung cancer Father    • Breast cancer Other          Social History     Social History     Social History     Tobacco Use   Smoking Status Former   • Years: 14.00   • Types: Cigarettes   Smokeless Tobacco Never         Pertinent Lab Values     Lab Results   Component Value Date    CREATININE 1.28 06/16/2023       No results found for: "PSA"    @RESULTRCNT(1H])@      Pertinent Imaging       CT chest abdomen pelvis w contrast    Result Date: 8/7/2023  Narrative: CT CHEST, ABDOMEN AND PELVIS WITH IV CONTRAST INDICATION:   Y16.09: Follicular lymphoma grade iiia, lymph nodes of head, face, and neck. Complaining cough and shortness of breath. Complaining of lower abdominal pressure. Currently being treated for UTI. Cholecystectomy. Hip arthroplasty. COMPARISON: CT chest abdomen pelvis with contrast 11/2/2022. CT abdomen pelvis with contrast 12/9/2022. TECHNIQUE: CT examination of the chest, abdomen and pelvis was performed. Multiplanar 2D reformatted images were created from the source data. This examination, like all CT scans performed in the [facility], was performed utilizing techniques to minimize radiation dose exposure, including the use of iterative reconstruction and automated exposure control.  Radiation dose length product (DLP) for this visit:  1704 mGy-cm IV Contrast:  100 mL of iohexol (OMNIPAQUE) Enteric Contrast: Enteric contrast was administered. FINDINGS: CHEST LUNGS: Sub-6 mm pulmonary nodules bilaterally, nodules marked with arrows on series, 3 in the right lung ; image 24, 54,62, 85, 101, 112, 104, 114, 118 stable. Motion artifact limits assessment for additional small nodules. Similarly in the left lung images 73, 103, 112, stable. Calcified granulomas. Subpleural reticulation in the peripheral left lower lobe and lingula stable from prior. PLEURA:  Unremarkable. HEART/GREAT VESSELS: Thoracic aortic, annular, and coronary artery calcification. No thoracic aortic aneurysm. MEDIASTINUM AND ANGELIQUE:  Unremarkable. CHEST WALL AND LOWER NECK:  Unremarkable. ABDOMEN LIVER/BILIARY TREE:  Liver is enlarged. No focal hepatic lesions are noted. Hepatic contours are within normal limits mild intra and extrahepatic biliary ductal dilation likely related to postcholecystectomy state. Large periampullary duodenal diverticulum may be contributory causing localized mass effect on the distal common bile duct, this is not significantly changed compared to prior. Vivica Lento GALLBLADDER: Gallbladder is surgically absent. SPLEEN:  Unremarkable. PANCREAS:  Unremarkable. ADRENAL GLANDS: Stable left adrenal nodule measuring 15 mm. Similar appearance of the right adrenal gland. Vivica Lento KIDNEYS/URETERS: Right renal cyst. Mild right hydroureteronephrosis and mildly asymmetric increased enhancement of the right ureter compared to the left. No ureteral calculus identified. Distal right ureter partially obscured by left hip orthopedic hardware. STOMACH AND BOWEL: Large periampullary duodenal diverticulum. . Colonic diverticulosis without findings for diverticulitis. APPENDIX:  No findings to suggest appendicitis. ABDOMINOPELVIC CAVITY:  No ascites. No pneumoperitoneum. No lymphadenopathy. Similar-appearing subcentimeter retroperitoneal lymph nodes.  No overt lymphadenopathy identified VESSELS: Atherosclerotic changes are present. No evidence of aneurysm. PELVIS: Images obscured by orthopedic hardware. REPRODUCTIVE ORGANS: Calcified uterine fibroid. Jose Piety URINARY BLADDER: Mild circumferential urinary bladder wall thickening. ABDOMINAL WALL/INGUINAL REGIONS:  There is a small fat-containing umbilical hernia. OSSEOUS STRUCTURES:  No acute fracture or destructive osseous lesion. Degenerative changes. Left hip arthroplasty. Mild anterolisthesis at L4-5. Probable DISH. Impression: Bilateral sub-6 mm pulmonary nodules redemonstrated, representative nodules are stable from prior. No pulmonary consolidation. Mild circumferential urinary bladder wall thickening likely due to cystitis in this patient being treated for UTI. There is also mild right hydroureteronephrosis with mild asymmetric increased enhancement of the right ureter compared to the left concerning for ureteritis and ascending infection. Additional findings as above. This study demonstrates an immediate finding and was documented as such in [facility] for liaison and referring practitioner notification. Workstation performed: [id number]         Portions of the record may have been created with voice recognition software. Occasional wrong word or "sound a like" substitutions may have occurred due to the inherent limitations of voice recognition software. In addition some of the content generated from this outpatient encounter includes information designed for patient education and/or communication back to the referring provider. Read the chart carefully and recognize, using context, where substitutions have occurred.

## 2023-08-16 ENCOUNTER — CONSULT (OUTPATIENT)
Dept: UROLOGY | Facility: CLINIC | Age: 73
End: 2023-08-16
Payer: COMMERCIAL

## 2023-08-16 VITALS
OXYGEN SATURATION: 97 % | HEIGHT: 62 IN | BODY MASS INDEX: 47.11 KG/M2 | WEIGHT: 256 LBS | DIASTOLIC BLOOD PRESSURE: 80 MMHG | RESPIRATION RATE: 18 BRPM | SYSTOLIC BLOOD PRESSURE: 132 MMHG | HEART RATE: 72 BPM

## 2023-08-16 DIAGNOSIS — N95.2 ATROPHIC VAGINITIS: Primary | ICD-10-CM

## 2023-08-16 PROCEDURE — 99204 OFFICE O/P NEW MOD 45 MIN: CPT | Performed by: UROLOGY

## 2023-08-16 RX ORDER — CONJUGATED ESTROGENS 0.62 MG/G
0.5 CREAM VAGINAL 3 TIMES WEEKLY
Qty: 30 G | Refills: 6 | Status: SHIPPED | OUTPATIENT
Start: 2023-08-16

## 2023-09-05 ENCOUNTER — APPOINTMENT (OUTPATIENT)
Dept: LAB | Facility: AMBULARY SURGERY CENTER | Age: 73
End: 2023-09-05
Payer: COMMERCIAL

## 2023-09-05 ENCOUNTER — OFFICE VISIT (OUTPATIENT)
Dept: HEMATOLOGY ONCOLOGY | Facility: CLINIC | Age: 73
End: 2023-09-05
Payer: COMMERCIAL

## 2023-09-05 VITALS
HEIGHT: 62 IN | RESPIRATION RATE: 16 BRPM | WEIGHT: 257 LBS | TEMPERATURE: 97.4 F | BODY MASS INDEX: 47.29 KG/M2 | HEART RATE: 75 BPM | DIASTOLIC BLOOD PRESSURE: 70 MMHG | OXYGEN SATURATION: 94 % | SYSTOLIC BLOOD PRESSURE: 138 MMHG

## 2023-09-05 DIAGNOSIS — C82.31 GRADE 3A FOLLICULAR LYMPHOMA OF LYMPH NODES OF NECK (HCC): Primary | ICD-10-CM

## 2023-09-05 DIAGNOSIS — C82.31 GRADE 3A FOLLICULAR LYMPHOMA OF LYMPH NODES OF NECK (HCC): ICD-10-CM

## 2023-09-05 LAB
BASOPHILS # BLD AUTO: 0.06 THOUSANDS/ÂΜL (ref 0–0.1)
BASOPHILS NFR BLD AUTO: 1 % (ref 0–1)
EOSINOPHIL # BLD AUTO: 0.23 THOUSAND/ÂΜL (ref 0–0.61)
EOSINOPHIL NFR BLD AUTO: 3 % (ref 0–6)
ERYTHROCYTE [DISTWIDTH] IN BLOOD BY AUTOMATED COUNT: 13.3 % (ref 11.6–15.1)
HCT VFR BLD AUTO: 40.6 % (ref 34.8–46.1)
HGB BLD-MCNC: 13.3 G/DL (ref 11.5–15.4)
IMM GRANULOCYTES # BLD AUTO: 0.06 THOUSAND/UL (ref 0–0.2)
IMM GRANULOCYTES NFR BLD AUTO: 1 % (ref 0–2)
LYMPHOCYTES # BLD AUTO: 1.24 THOUSANDS/ÂΜL (ref 0.6–4.47)
LYMPHOCYTES NFR BLD AUTO: 17 % (ref 14–44)
MCH RBC QN AUTO: 32.9 PG (ref 26.8–34.3)
MCHC RBC AUTO-ENTMCNC: 32.8 G/DL (ref 31.4–37.4)
MCV RBC AUTO: 101 FL (ref 82–98)
MONOCYTES # BLD AUTO: 1 THOUSAND/ÂΜL (ref 0.17–1.22)
MONOCYTES NFR BLD AUTO: 14 % (ref 4–12)
NEUTROPHILS # BLD AUTO: 4.72 THOUSANDS/ÂΜL (ref 1.85–7.62)
NEUTS SEG NFR BLD AUTO: 64 % (ref 43–75)
NRBC BLD AUTO-RTO: 0 /100 WBCS
PLATELET # BLD AUTO: 246 THOUSANDS/UL (ref 149–390)
PMV BLD AUTO: 10 FL (ref 8.9–12.7)
RBC # BLD AUTO: 4.04 MILLION/UL (ref 3.81–5.12)
WBC # BLD AUTO: 7.31 THOUSAND/UL (ref 4.31–10.16)

## 2023-09-05 PROCEDURE — 85025 COMPLETE CBC W/AUTO DIFF WBC: CPT

## 2023-09-05 PROCEDURE — 99214 OFFICE O/P EST MOD 30 MIN: CPT | Performed by: INTERNAL MEDICINE

## 2023-09-05 PROCEDURE — 36415 COLL VENOUS BLD VENIPUNCTURE: CPT

## 2023-09-05 NOTE — PROGRESS NOTES
Hematology / Oncology Outpatient Follow Up Note    Ole Soriano 74 y.o. female BRP:[id number] [facility]:[id number]         Date:  9/5/2023    Assessment / Plan:    A 31-JLOH-UVT female with follicular lymphoma, KPCNA 1P, YZ least stage IIIA disease with diffuse hypermetabolic lymphadenopathy in the chest axillary, abdominal as well as pelvis.  SUV based on PET-CT scan was up to 15. She was treated with bendamustine rituximab x6 cycles, resulting in complete remission. She is currently on observation. Clinically as well as radiographically, she remains in complete remission. I recommended her to continue surveillance. She will come back in 6 months with CT scan of chest abdomen pelvis, CBC and CMP. She is in agreement with my recommendations. Subjective:      HPI:    A 49-year-old female who had [place] vaccine in March 16, 2021. 2 weeks after the 1st vaccine, she noticed the swelling in her left upper neck close to the ER. Gadiel Garrett brought this to medical attention.  She had injection of vaccine in the left deltoid.  She underwent CT scan of the neck which showed multiple enlarged level 1 and level 2 lymphadenopathy as well as swelling of the parotid gland.  She was seen by Dr. Kaylin Soni who recommended excisional lymph node biopsy. Gadiel Garrett is scheduled to have this procedure in Far[place] 3, 2021.  She presents today with her daughter and son-in-law to discuss further evaluation.  She feels well. Gadiel Garrett has no fever, chills or no or night sweats.  She has no recent weight loss.  She denied any respiratory symptoms.  She has hypertension as well as arthritis.  She is status post left hip replacement last year. Arin Nguyen, she has mild ambulatory dysfunction.  She quit smoking 40 years ago. Gadiel Garrett does not drink alcohol.  She is in usual state of health.  Her performance status is 0 to 1/4 on the ECOG scale.           Interval History:  A 49-year-old female with left parotid gland enlargement as well as multiple lymphadenopathy of the neck.   Therefore, she underwent left neck lymph node biopsy by Dr. Geneva Graham which showed follicular lymphoma, grade 3A.  She subsequent underwent PET-CT scan which showed diffuse hypermetabolic lymphadenopathy in the chest, axillary, abdominal and pelvis.  SUV or is up to 15. She underwent systemic chemotherapy with bendamustine and rituximab x6 cycles with excellent tolerance. She completed chemotherapy in December 2021, resulting in complete remission. She is currently on observation. She presents today for routine follow-up. She has frequent bladder infection for which she was evaluated by urologist.  Recent CT scan showed thickening of bladder wall but no lymphadenopathy. She feels well. She has no fever, chills or night sweats. Her weight is stable. Has no respiratory symptoms. Her performance status is normal.              Objective:      Primary Diagnosis:        Follicular lymphoma, grade 3A.  Diagnosed in June 2021.     Cancer Staging:  Cancer Staging  No matching staging information was found for the patient.        Previous Hematologic/ Oncologic Treatment:       Bendamustine and rituximab x6 cycles completed in December 2021.     Current Hematologic/ Oncologic Treatment:       Observation.     Disease Status:       complete remission.     Test Results:     Pathology:       left neck lymph node biopsy showed follicular lymphoma, grade 3A.     Radiology:     CT scan of neck, chest, abdomen and pelvis in August 2023 showed no evidence of lymphadenopathy.     Laboratory:       See below.     Physical Exam:        General Appearance:    Alert, oriented          Eyes:    PERRL   Ears:    Normal external ear canals, both ears.      Nose:   Nares normal, septum midline   Throat:   Mucosa moist. Pharynx without injection.     Neck:   Supple         Lungs:     Clear to auscultation bilaterally   Chest Wall:    No tenderness or deformity    Heart:    Regular rate and rhythm       Abdomen:     Soft, non-tender, bowel sounds +, no organomegaly               Extremities:   Extremities no cyanosis or edema         Skin:   no rash or icterus. Lymph nodes:   previous bilateral neck adenopathy is no longer palpable.  No other palpable superficial adenopathy. Neurologic:   CNII-XII intact, normal strength, sensation and reflexes     Throughout             Breast exam:   NA           ROS: Review of Systems   All other systems reviewed and are negative. Imaging: No results found. Labs:   Lab Results   Component Value Date    WBC 8.15 06/16/2023    HGB 14.0 06/16/2023    HCT 42.4 06/16/2023     (H) 06/16/2023     06/16/2023     Lab Results   Component Value Date    K 4.2 06/16/2023     06/16/2023    CO2 28 06/16/2023    BUN 24 06/16/2023    CREATININE 1.28 06/16/2023    GLUF 101 (H) 02/06/2023    CALCIUM 10.6 (H) 06/16/2023    CORRECTEDCA 10.6 (H) 06/21/2022    AST 28 06/16/2023    ALT 50 06/16/2023    ALKPHOS 80 06/16/2023    EGFR 40 06/16/2023         Lab Results   Component Value Date     10/31/2022         Current Medications: Reviewed  Allergies: Reviewed  PMH/FH/SH:  Reviewed      Vital Sign:    Body surface area is 2.13 meters squared.     Wt Readings from Last 3 Encounters:   09/05/23 117 kg (257 lb)   08/16/23 116 kg (256 lb)   05/30/23 116 kg (256 lb 3.2 oz)        Temp Readings from Last 3 Encounters:   09/05/23 (!) 97.4 °F (36.3 °C) (Temporal)   05/30/23 97.9 °F (36.6 °C) (Temporal)   02/21/23 98.1 °F (36.7 °C) (Temporal)        BP Readings from Last 3 Encounters:   09/05/23 138/70   08/16/23 132/80   05/30/23 132/74         Pulse Readings from Last 3 Encounters:   09/05/23 75   08/16/23 72   05/30/23 74     @LASTSAO2(3)@

## 2023-09-17 ENCOUNTER — RA CDI HCC (OUTPATIENT)
Dept: OTHER | Facility: HOSPITAL | Age: 73
End: 2023-09-17

## 2023-09-17 NOTE — PROGRESS NOTES
[address] [facility] coding opportunities       Chart reviewed, no opportunity found: [address]        Patients Insurance     Medicare Insurance: [employer]

## 2023-09-20 ENCOUNTER — VBI (OUTPATIENT)
Dept: ADMINISTRATIVE | Facility: OTHER | Age: 73
End: 2023-09-20

## 2023-09-20 NOTE — TELEPHONE ENCOUNTER
09/20/23 3:36 PM    Patient contacted (left message) to bring Advance Directive, POLST, or Living Will document to next scheduled pcp visit. Thank you.   Beverley Childs PG [facility]IR

## 2023-09-22 RX ORDER — AZITHROMYCIN 250 MG/1
TABLET, FILM COATED ORAL
COMMUNITY
Start: 2023-08-21

## 2023-09-25 ENCOUNTER — OFFICE VISIT (OUTPATIENT)
Dept: FAMILY MEDICINE CLINIC | Facility: CLINIC | Age: 73
End: 2023-09-25
Payer: COMMERCIAL

## 2023-09-25 VITALS
DIASTOLIC BLOOD PRESSURE: 78 MMHG | TEMPERATURE: 97.5 F | HEIGHT: 62 IN | WEIGHT: 256 LBS | SYSTOLIC BLOOD PRESSURE: 138 MMHG | OXYGEN SATURATION: 96 % | HEART RATE: 86 BPM | RESPIRATION RATE: 18 BRPM | BODY MASS INDEX: 47.11 KG/M2

## 2023-09-25 DIAGNOSIS — I10 ESSENTIAL HYPERTENSION: Primary | ICD-10-CM

## 2023-09-25 DIAGNOSIS — Z71.2 ENCOUNTER TO DISCUSS TEST RESULTS: ICD-10-CM

## 2023-09-25 DIAGNOSIS — Z79.899 MEDICATION MANAGEMENT: ICD-10-CM

## 2023-09-25 DIAGNOSIS — N18.31 CHRONIC KIDNEY DISEASE, STAGE 3A (HCC): ICD-10-CM

## 2023-09-25 DIAGNOSIS — E03.9 ACQUIRED HYPOTHYROIDISM: ICD-10-CM

## 2023-09-25 DIAGNOSIS — N39.0 FREQUENT UTI: ICD-10-CM

## 2023-09-25 DIAGNOSIS — E55.9 VITAMIN D INSUFFICIENCY: ICD-10-CM

## 2023-09-25 DIAGNOSIS — Z79.899 ENCOUNTER FOR LONG-TERM (CURRENT) USE OF MEDICATIONS: ICD-10-CM

## 2023-09-25 DIAGNOSIS — C85.98 NON-HODGKIN LYMPHOMA OF LYMPH NODES OF MULTIPLE REGIONS, UNSPECIFIED NON-HODGKIN LYMPHOMA TYPE (HCC): ICD-10-CM

## 2023-09-25 PROCEDURE — 99215 OFFICE O/P EST HI 40 MIN: CPT | Performed by: FAMILY MEDICINE

## 2023-09-25 NOTE — ASSESSMENT & PLAN NOTE
Discontinued vitamin D approximately 05/2023 as level normalized at 88 ng/mL, previously 51 ng/mL. Will recheck. Since she has been off vitamin D supplement for a few months now, recommend restarting vitamin D3. Finish up 5000 IU three times a week and then switch to 2000 IU once a day.

## 2023-09-25 NOTE — ASSESSMENT & PLAN NOTE
GFR is 40. About the same as 2 years ago, but down from 60 prior. Has seen nephrology once and will continue annually. Stay off NSAIDs. Drink enough water.

## 2023-09-25 NOTE — PROGRESS NOTES
Assessment/Plan:     HPI-as follows--see below and subjective to    1. Essential hypertension  Assessment & Plan:  Blood pressure controlled at 138/78. Taking amlodipine and benazepril. Orders:  -     CBC; Future  -     UA w Reflex to Microscopic w Reflex to Culture  -     Comprehensive metabolic panel; Future  -     Lipid panel; Future  -     TSH, 3rd generation; Future  -     Vitamin D 25 hydroxy; Future  -     Hemoglobin A1C; Future  -     Albumin / creatinine urine ratio    2. Frequent UTI  -     UA w Reflex to Microscopic w Reflex to Culture    3. Vitamin D insufficiency  -     Vitamin D 25 hydroxy; Future    4. Acquired hypothyroidism  Assessment & Plan: On Synthroid 100 mcg. Orders:  -     TSH, 3rd generation; Future    5. Non-Hodgkin lymphoma of lymph nodes of multiple regions, unspecified non-Hodgkin lymphoma type ([address])  -     CBC; Future  -     UA w Reflex to Microscopic w Reflex to Culture  -     Comprehensive metabolic panel; Future  -     Lipid panel; Future  -     TSH, 3rd generation; Future  -     Vitamin D 25 hydroxy; Future  -     Hemoglobin A1C; Future  -     Albumin / creatinine urine ratio    6. Encounter for long-term (current) use of medications  -     CBC; Future  -     UA w Reflex to Microscopic w Reflex to Culture  -     Comprehensive metabolic panel; Future  -     Lipid panel; Future  -     TSH, 3rd generation; Future  -     Vitamin D 25 hydroxy; Future  -     Hemoglobin A1C; Future  -     Albumin / creatinine urine ratio    7. Encounter to discuss test results    8. Medication management    9. Chronic kidney disease, stage 3a ([address])      Frequent UTIs  Urologist has evaluated and seems better. Colon cancer screening  Will be scheduled for a colonoscopy in the near future. Non-Hodgkin lymphoma, unspecified type:  Lymph nodes at multiple stages. Chemotherapist Dr. Alin Fernandes treated and she has been in remission for 2 years. Pelvic pressure:   Improved.  Having several pelvic evaluations. If drinks enough water, no problems. Current long term use of medications. Medication management:   All medications reviewed for safety, efficacy, tolerance and compliance--renal placed on probiotic as well. Routine labs were ordered, to be completed before prior visit. Follow up in 4 months. BMI Counseling: Body mass index is 46.82 kg/m². The BMI is above normal. Nutrition recommendations include decreasing portion sizes, encouraging healthy choices of fruits and vegetables, decreasing fast food intake, consuming healthier snacks, limiting drinks that contain sugar, moderation in carbohydrate intake, increasing intake of lean protein and reducing intake of saturated and trans fat. Exercise recommendations include moderate physical activity 150 minutes/week and exercising 3-5 times per week. No pharmacotherapy was ordered. Rationale for BMI follow-up plan is due to patient being overweight or obese. Subjective:      Patient ID: Michele Baird is a 74 y.o. female well-known to me patient for many years, here with her , presents for follow up and evaluation of the following medical issues:     Has a history of frequent UTIs, which seem improved. She has been evaluated by urologist, Dr. Yoni Hull, for this and per patient, there are no issues. They have theorized UTIs could have been linked to frequent diarrhea onset after COVID-19 infection. She is going to have  a cystoscopy, if not done already. She is still experiencing pelvic pressure, although it is improved. Mentions if she drinks enough water, there is no pelvic pressure. Evaluation for this issue includes uterine and pelvic ultrasound as well as uterine biopsies without conclusive diagnosis. Has had 4 uterine biopsies so far, last one in 06/2023, done by Dr. Lorin Canales. She does follow up with gynecology frequently. Previously, she was seen by Dr. Pratibha Araya, now sees Dr. Lorin Canales once a year.  Denies prior  surgery other than a nonmalignant uterine polypectomy greater than 5 years ago. Will be scheduled for a colonoscopy in the near future. Last one was 4 years ago. Hypertension has been doing well. Blood pressure today is 138/78. She is taking amlodipine and Lotensin 40 mg once daily. Patient stopped taking Vitamin D supplement in 05/2023 as her vitamin levels normalized at that time. She was taking 5000 units three times a week. Not currently on a calcium supplement. She is due for repeat testing for her hypothyroidism. Currently, she is on Synthroid 100 mcg. She was seeing Dr. Duc Cody, hematologist/oncologist, for her non-Hodgkins lymphoma with lymph nodes at multiple stages. Initially, there was swelling in her face, prompting lymph node biopsy from the neck by Dr. Zina Rodriguez, which confirmed the diagnosis. She has been in remission for 2 years. Attributes Dr. Duc Cody is leaving the practice, but she does have follow up with another provider there. She is hard of hearing. Reports chronic myalgias and back pain due to history of spinal stenosis and bulging discs in neck and back. She does have chronic knee pain due to arthritis. They have discussed knee replacement, but it is dependent on weight loss. Patient uses a cane to assist with ambulation. She had hip arthroplasty previously. Patient takes Tylenol as needed for pain but only takes 2-4 pills a day due to hepatomegaly. Expresses difficulty with losing weight as she is unable to exercise due to her knee pain. Currently, she weighs 256 pounds and has a BMI of 46.82. Denies following a specific diet but does mention she does not eat much. Usually has sandwiches and soup. She is seeing a nutritionist.     Attributes dental extraction 2 weeks ago under local anesthesia at [address], referred by her dentist. Full extraction was completed in 1 session and recovery was 2 weeks.  They offered her the option of a root canal, but she reports the last 2 root canals have failed. Her blood sugars have been good at home, usually in the low 60s. Notes the last time there was a fluctuation in blood sugars due to improper diet. She was having a lot of starches after COVID-19 infection. She is seeing nephrology annually for her chronic kidney disease. All current medications were reviewed. Citalopram 10 mg  Synthroid 100 mcg  amlodipine  benazepril 40 mg  Probiotic    She is due for influenza vaccine. Last lab, done 09/05/2023, was a CBC that showed WBC of 7.3 K/uL and hemoglobin of 13.3. UA from 07/21/2023 showed 1-2 RBCs and 4-10 WBCs, which is marked improvement from 06/16/2023 when she had innumerable WBCs. Prior labs done by me on 06/16/2023 showed a hemoglobin A1c of 6.4%. At the time, vitamin D level was 88 ng/mL, previously 51 ng/mL in 02/2023. CMP from 06/16/2023 was normal. Calcium was slightly high at 10.6 mg/dl and blood glucose was 93. GFR was 40, down from 46, but same as it was in 10/2022. The following portions of the patient's history were reviewed and updated as appropriate:     She  has a past medical history of Anxiety, Arthritis, Back problem, Fibrocystic disease of both breasts, Follicular lymphoma grade 3a ([address]), Hip pain, History of staph infection, Hypertension, Knee pain, Lymphoma ([address]), Spinal stenosis, and Thyroid disease.   She   Patient Active Problem List    Diagnosis Date Noted   • Recurrent major depressive disorder, in partial remission ([address]) 03/15/2022   • Lymphoma ([address]) 06/21/2021   • 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 ([address]) 06/09/2021   • PAD (peripheral artery disease) ([address]) 04/07/2021   • Class 3 severe obesity with serious comorbidity and body mass index (BMI) of 45.0 to 49.9 in adult [facility]) 03/02/2021   • Primary osteoarthritis of right hip 01/20/2021   • Lumbar pain 12/09/2020   • History of total left hip replacement 12/01/2020   • Chronic anxiety 11/01/2019   • Vitamin D deficiency 09/09/2019   • Psoriasis 09/09/2019   • Pre-diabetes 09/09/2019   • Osteoarthritis 09/09/2019   • NSAID long-term use 09/09/2019   • Morbid obesity ([facility]) 09/09/2019   • Hypothyroidism 09/09/2019   • Essential hypertension 09/09/2019   • Elevated blood sugar 09/09/2019   • Stage 3a chronic kidney disease ([facility]) 09/09/2019   • Chronic depression 09/09/2019   • Ambulatory dysfunction 09/09/2019     She  has a past surgical history that includes Mammo (historical) (12/13/2019); Cataract extraction (01/01/2017); Refractive surgery (Bilateral); Other surgical history; Cholecystectomy; Rotator cuff repair (Right); and Revision total hip arthroplasty. Her family history includes Breast cancer in her other; Heart failure in her mother; Lung cancer in her father; Other in her mother. She  reports that she has quit smoking. Her smoking use included cigarettes. She has never used smokeless tobacco. She reports current alcohol use. She reports that she does not currently use drugs.   Current Outpatient Medications   Medication Sig Dispense Refill   • acetaminophen (TYLENOL) 500 mg tablet Take 500 mg by mouth every 4 (four) hours     • amLODIPine (NORVASC) 5 mg tablet TAKE 1 TABLET DAILY 90 tablet 3   • amoxicillin (AMOXIL) 500 MG tablet Take 4 tablets by mouth in the morning For dental procedures     • azithromycin (ZITHROMAX) 250 mg tablet Take as directed     • benazepril (LOTENSIN) 40 MG tablet TAKE 1 TABLET DAILY 90 tablet 3   • citalopram (CeleXA) 10 mg tablet TAKE 1 TABLET DAILY 90 tablet 3   • estrogens, conjugated (Premarin) vaginal cream Insert 0.5 g into the vagina 3 (three) times a week 30 g 6   • famotidine (PEPCID) 20 mg tablet Take 1 tablet (20 mg total) by mouth daily 30 tablet 0   • fexofenadine (ALLEGRA) 180 MG tablet Take 180 mg by mouth daily     • multivitamin-iron-minerals-folic acid (CENTRUM) chewable tablet Chew 1 tablet daily     • Naproxen Sodium 220 MG CAPS Take by mouth     • Omega 3 1200 MG CAPS Take by mouth     • Synthroid 100 MCG tablet TAKE 1 TABLET DAILY 90 tablet 3   • Cholecalciferol 1.25 MG (19170 UT) TABS Take 1.25 mg by mouth daily (Patient not taking: Reported on 9/25/2023)       No current facility-administered medications for this visit. Current Outpatient Medications on File Prior to Visit   Medication Sig   • acetaminophen (TYLENOL) 500 mg tablet Take 500 mg by mouth every 4 (four) hours   • amLODIPine (NORVASC) 5 mg tablet TAKE 1 TABLET DAILY   • amoxicillin (AMOXIL) 500 MG tablet Take 4 tablets by mouth in the morning For dental procedures   • azithromycin (ZITHROMAX) 250 mg tablet Take as directed   • benazepril (LOTENSIN) 40 MG tablet TAKE 1 TABLET DAILY   • citalopram (CeleXA) 10 mg tablet TAKE 1 TABLET DAILY   • estrogens, conjugated (Premarin) vaginal cream Insert 0.5 g into the vagina 3 (three) times a week   • famotidine (PEPCID) 20 mg tablet Take 1 tablet (20 mg total) by mouth daily   • fexofenadine (ALLEGRA) 180 MG tablet Take 180 mg by mouth daily   • multivitamin-iron-minerals-folic acid (CENTRUM) chewable tablet Chew 1 tablet daily   • Naproxen Sodium 220 MG CAPS Take by mouth   • Omega 3 1200 MG CAPS Take by mouth   • Synthroid 100 MCG tablet TAKE 1 TABLET DAILY   • Cholecalciferol 1.25 MG (44904 UT) TABS Take 1.25 mg by mouth daily (Patient not taking: Reported on 9/25/2023)     No current facility-administered medications on file prior to visit. She is allergic to diphenhydramine, erythromycin, tetanus toxoids, and tetracycline. .    Review of Systems   Constitutional: Negative. HENT: Negative. Eyes: Negative. Respiratory: Negative. Cardiovascular: Negative. Gastrointestinal: Negative. Genitourinary: Negative. Musculoskeletal: Positive for arthralgias, gait problem and myalgias. Skin: Negative for color change and rash. Both LE's are totally cleared    Psychiatric/Behavioral: Negative.     Desires to lose weight, is currently 256 pounds with BMI 46.82.       Objective:  /78 (BP Location: Left arm, Patient Position: Sitting, Cuff Size: Large)   Pulse 86   Temp 97.5 °F (36.4 °C) (Temporal)   Resp 18   Ht 5' 2"   Wt 116 kg (256 lb)   LMP  (LMP Unknown)   SpO2 96%   BMI 46.82 kg/m²          Physical Exam  Vitals and nursing note reviewed. Constitutional:       General: She is not in acute distress. Appearance: She is well-developed. She is obese. She is not ill-appearing, toxic-appearing or diaphoretic. Comments: BMI 46.82   HENT:      Head: Normocephalic and atraumatic. Eyes:      General: No scleral icterus. Conjunctiva/sclera: Conjunctivae normal.      Pupils: Pupils are equal, round, and reactive to light. Neck:      Thyroid: No thyromegaly. Trachea: No tracheal deviation. Cardiovascular:      Rate and Rhythm: Normal rate and regular rhythm. Heart sounds: Normal heart sounds. Pulmonary:      Effort: No respiratory distress. Breath sounds: Normal breath sounds. No wheezing or rales. Chest:      Chest wall: No tenderness. Musculoskeletal:         General: No tenderness or deformity. Normal range of motion. Cervical back: Normal range of motion and neck supple. Comments: Follows with Dr. Rennis Mortimer re L THA - doing very well there   Skin:     General: Skin is warm and dry. Coloration: Skin is not pale. Findings: No erythema, lesion or rash. Neurological:      General: No focal deficit present. Mental Status: She is alert and oriented to person, place, and time. Cranial Nerves: No cranial nerve deficit. Psychiatric:         Mood and Affect: Mood normal.         Behavior: Behavior normal.         Thought Content:  Thought content normal.         Judgment: Judgment normal.           I personally reviewed the recent (and prior)  lab results, the image studies, pathology, other specialty/physicians consult notes and recommendations, and outside medical records from other institutions, as appropriate. I had a lengthy discussion with the patient and shared the work-up findings. We discussed the diagnosis and management plan. I spent  40  minutes reviewing the records (labs, clinician notes, outside records, medical history, ordering medicine/tests/procedures, interpreting the imaging/labs previously done) and coordination of care as well as direct time with the patient today, of which greater than 50% of the time was spent in counseling and coordination of care with the patient/family. Transcribed for Eliot Drummond DO, by Rolo Newton on 09/26/23 at 12:21 PM. Powered by [employer].

## 2023-10-18 ENCOUNTER — IMMUNIZATIONS (OUTPATIENT)
Dept: FAMILY MEDICINE CLINIC | Facility: CLINIC | Age: 73
End: 2023-10-18
Payer: COMMERCIAL

## 2023-10-18 DIAGNOSIS — Z23 ENCOUNTER FOR IMMUNIZATION: Primary | ICD-10-CM

## 2023-10-18 PROCEDURE — G0008 ADMIN INFLUENZA VIRUS VAC: HCPCS

## 2023-10-18 PROCEDURE — 90662 IIV NO PRSV INCREASED AG IM: CPT

## 2023-10-24 ENCOUNTER — OFFICE VISIT (OUTPATIENT)
Dept: OBGYN CLINIC | Facility: CLINIC | Age: 73
End: 2023-10-24
Payer: COMMERCIAL

## 2023-10-24 VITALS
HEIGHT: 62 IN | SYSTOLIC BLOOD PRESSURE: 130 MMHG | DIASTOLIC BLOOD PRESSURE: 82 MMHG | BODY MASS INDEX: 46.74 KG/M2 | WEIGHT: 254 LBS

## 2023-10-24 DIAGNOSIS — R93.89 ABNORMAL PELVIC ULTRASOUND: Primary | ICD-10-CM

## 2023-10-24 PROCEDURE — 99213 OFFICE O/P EST LOW 20 MIN: CPT | Performed by: OBSTETRICS & GYNECOLOGY

## 2023-10-24 NOTE — PROGRESS NOTES
Assessment/Plan:     Diagnoses and all orders for this visit:    Abnormal pelvic ultrasound           58-year-old female  Post menopause  Abnormal ultrasound with elevated endometrial thickness  Had left hip replacement and right knee pain  Hypothyroid  Arthritis  Anxiety  Hypertension  Plan  Endometrial biopsy scant tissue but benign  Repeat ultrasound showed elevated endometrial thickness more than the prior ultrasound  Recommend to proceed with hysteroscopy D&C evaluation of the endometrium cavity       Subjective:      Patient ID: Radha Hernandez is a 74 y.o. female. HPI  Patient seen evaluated presents to the office today with her daughter   To discuss result of ultrasound      Ultrasound result reviewed and discussed with patient and worsening in the endometrial thickness elevation noted endometrial thickness 8.6 mm previously was 5.1 mm  uterus 4.9 x 5.9 x 6.8 cm left fibroid 2.6 x 4.3 x 4.  3 cm  fibroid 1.5 x 1.6 x 2.2 cm    based on the finding I recommend to proceed with hysteroscopy D&C evaluation of the endometrium cavity risk of the procedure include bleeding, infection, blood transfusion, risk of perforation, risk of anesthesia, risk of nerve entering from positioning, all explained and discussed with patient and family in details risk of needing another surgery explained all patient questions answered in details and patient was satisfied    The following portions of the patient's history were reviewed and updated as appropriate: allergies, current medications, past family history, past medical history, past social history, past surgical history and problem list.    Review of Systems      Objective:      /82 (BP Location: Left arm, Patient Position: Sitting, Cuff Size: Adult)   Ht 5' 2" (1.575 m)   Wt 115 kg (254 lb)   LMP  (LMP Unknown)   BMI 46.46 kg/m²          Physical Exam  Constitutional:       Appearance: She is well-developed.    Cardiovascular:      Rate and Rhythm: Normal rate and regular rhythm. Heart sounds: Normal heart sounds. Pulmonary:      Effort: Pulmonary effort is normal.      Breath sounds: Normal breath sounds. Abdominal:      General: There is no distension. Palpations: Abdomen is soft. Tenderness: There is no abdominal tenderness. Musculoskeletal:      Right lower leg: No edema. Left lower leg: No edema. Neurological:      Mental Status: She is alert and oriented to person, place, and time.    Psychiatric:         Behavior: Behavior normal.

## 2024-01-08 ENCOUNTER — APPOINTMENT (OUTPATIENT)
Dept: LAB | Facility: AMBULARY SURGERY CENTER | Age: 74
End: 2024-01-08
Payer: COMMERCIAL

## 2024-01-08 ENCOUNTER — CONSULT (OUTPATIENT)
Dept: FAMILY MEDICINE CLINIC | Facility: CLINIC | Age: 74
End: 2024-01-08
Payer: COMMERCIAL

## 2024-01-08 VITALS
HEIGHT: 62 IN | OXYGEN SATURATION: 98 % | TEMPERATURE: 97.9 F | WEIGHT: 252.6 LBS | BODY MASS INDEX: 46.48 KG/M2 | HEART RATE: 73 BPM | SYSTOLIC BLOOD PRESSURE: 138 MMHG | DIASTOLIC BLOOD PRESSURE: 74 MMHG

## 2024-01-08 DIAGNOSIS — Z79.899 ENCOUNTER FOR LONG-TERM (CURRENT) USE OF MEDICATIONS: ICD-10-CM

## 2024-01-08 DIAGNOSIS — E03.9 ACQUIRED HYPOTHYROIDISM: ICD-10-CM

## 2024-01-08 DIAGNOSIS — I10 ESSENTIAL HYPERTENSION: ICD-10-CM

## 2024-01-08 DIAGNOSIS — E55.9 VITAMIN D INSUFFICIENCY: ICD-10-CM

## 2024-01-08 DIAGNOSIS — C85.98 NON-HODGKIN LYMPHOMA OF LYMPH NODES OF MULTIPLE REGIONS, UNSPECIFIED NON-HODGKIN LYMPHOMA TYPE (HCC): ICD-10-CM

## 2024-01-08 DIAGNOSIS — Z01.818 PRE-OP EVALUATION: Primary | ICD-10-CM

## 2024-01-08 DIAGNOSIS — Z79.899 MEDICATION MANAGEMENT: ICD-10-CM

## 2024-01-08 DIAGNOSIS — Z71.2 ENCOUNTER TO DISCUSS TEST RESULTS: ICD-10-CM

## 2024-01-08 LAB
25(OH)D3 SERPL-MCNC: 71.9 NG/ML (ref 30–100)
ALBUMIN SERPL BCP-MCNC: 4.4 G/DL (ref 3.5–5)
ALP SERPL-CCNC: 76 U/L (ref 34–104)
ALT SERPL W P-5'-P-CCNC: 33 U/L (ref 7–52)
ANION GAP SERPL CALCULATED.3IONS-SCNC: 8 MMOL/L
AST SERPL W P-5'-P-CCNC: 20 U/L (ref 13–39)
BACTERIA UR QL AUTO: ABNORMAL /HPF
BILIRUB SERPL-MCNC: 0.59 MG/DL (ref 0.2–1)
BILIRUB UR QL STRIP: NEGATIVE
BUN SERPL-MCNC: 22 MG/DL (ref 5–25)
CALCIUM SERPL-MCNC: 9.8 MG/DL (ref 8.4–10.2)
CHLORIDE SERPL-SCNC: 103 MMOL/L (ref 96–108)
CHOLEST SERPL-MCNC: 161 MG/DL
CLARITY UR: ABNORMAL
CO2 SERPL-SCNC: 28 MMOL/L (ref 21–32)
COLOR UR: ABNORMAL
CREAT SERPL-MCNC: 1.06 MG/DL (ref 0.6–1.3)
CREAT UR-MCNC: 42.3 MG/DL
ERYTHROCYTE [DISTWIDTH] IN BLOOD BY AUTOMATED COUNT: 13.3 % (ref 11.6–15.1)
EST. AVERAGE GLUCOSE BLD GHB EST-MCNC: 151 MG/DL
GFR SERPL CREATININE-BSD FRML MDRD: 51 ML/MIN/1.73SQ M
GLUCOSE P FAST SERPL-MCNC: 119 MG/DL (ref 65–99)
GLUCOSE UR STRIP-MCNC: NEGATIVE MG/DL
HBA1C MFR BLD: 6.9 %
HCT VFR BLD AUTO: 41.3 % (ref 34.8–46.1)
HDLC SERPL-MCNC: 58 MG/DL
HGB BLD-MCNC: 13.3 G/DL (ref 11.5–15.4)
HGB UR QL STRIP.AUTO: NEGATIVE
KETONES UR STRIP-MCNC: NEGATIVE MG/DL
LDLC SERPL CALC-MCNC: 83 MG/DL (ref 0–100)
LEUKOCYTE ESTERASE UR QL STRIP: ABNORMAL
MCH RBC QN AUTO: 32.4 PG (ref 26.8–34.3)
MCHC RBC AUTO-ENTMCNC: 32.2 G/DL (ref 31.4–37.4)
MCV RBC AUTO: 101 FL (ref 82–98)
MICROALBUMIN UR-MCNC: 51.8 MG/L
MICROALBUMIN/CREAT 24H UR: 122 MG/G CREATININE (ref 0–30)
NITRITE UR QL STRIP: NEGATIVE
NON-SQ EPI CELLS URNS QL MICRO: ABNORMAL /HPF
NONHDLC SERPL-MCNC: 103 MG/DL
PH UR STRIP.AUTO: 6 [PH]
PLATELET # BLD AUTO: 248 THOUSANDS/UL (ref 149–390)
PMV BLD AUTO: 10.1 FL (ref 8.9–12.7)
POTASSIUM SERPL-SCNC: 4.1 MMOL/L (ref 3.5–5.3)
PROT SERPL-MCNC: 7.4 G/DL (ref 6.4–8.4)
PROT UR STRIP-MCNC: ABNORMAL MG/DL
RBC # BLD AUTO: 4.1 MILLION/UL (ref 3.81–5.12)
RBC #/AREA URNS AUTO: ABNORMAL /HPF
SODIUM SERPL-SCNC: 139 MMOL/L (ref 135–147)
SP GR UR STRIP.AUTO: 1.01 (ref 1–1.03)
TRIGL SERPL-MCNC: 98 MG/DL
TSH SERPL DL<=0.05 MIU/L-ACNC: 2.03 UIU/ML (ref 0.45–4.5)
UROBILINOGEN UR STRIP-ACNC: <2 MG/DL
WBC # BLD AUTO: 6.48 THOUSAND/UL (ref 4.31–10.16)
WBC #/AREA URNS AUTO: ABNORMAL /HPF

## 2024-01-08 PROCEDURE — 83036 HEMOGLOBIN GLYCOSYLATED A1C: CPT

## 2024-01-08 PROCEDURE — 80053 COMPREHEN METABOLIC PANEL: CPT

## 2024-01-08 PROCEDURE — 85027 COMPLETE CBC AUTOMATED: CPT

## 2024-01-08 PROCEDURE — 82306 VITAMIN D 25 HYDROXY: CPT

## 2024-01-08 PROCEDURE — 84443 ASSAY THYROID STIM HORMONE: CPT

## 2024-01-08 PROCEDURE — 36415 COLL VENOUS BLD VENIPUNCTURE: CPT

## 2024-01-08 PROCEDURE — 80061 LIPID PANEL: CPT

## 2024-01-08 PROCEDURE — 99214 OFFICE O/P EST MOD 30 MIN: CPT | Performed by: FAMILY MEDICINE

## 2024-01-08 NOTE — PROGRESS NOTES
Assessment/Plan:          1. Pre-op evaluation  Comments:  Having MARCOS on Friday, January 12 by Dr. Forrset at [facility]    2. Encounter to discuss test results  Assessment & Plan:  Lab results reviewed today.      3. Encounter for long-term (current) use of medications    4. Medication management  Comments:  All medications reviewed for safety efficacy and tolerance          Prediabetes.  Comments: Her A1c was 6.9% today. She was advised not to use regular sugar. She can go back using Splenda. Discuss options for medications and introduce Mounjaro to the patient but dislikes having injections.    Ear issues.  Comments: She was advised to get lots of rest and drink lots of water to flush the bladder out.    The patient will follow up in 3 months.        Recommendations to Proceed withSurgery     Patient is considered to be Moderate risk for Moderate risk procedure. Having MARCOS on Jan 12th, 2024 by Dr. Forrest at [facility]     After evaluation and discussion with patient with emphasis that all surgery has some degree of inherent risk it is determined this procedure is of acceptable risk  medically.     Patient may proceed with planned procedure    Med's are reviewed and stable medically.   NOTE:  ch bacturia x many yrs.  She is colonized.   Labs stable. EKG - done, no read, but I'm told stable and unchanged from one prior;             Subjective:       Patient ID: Janette Little is a 73 y.o. female who presents for preoperative evaluation. She is accompanied by an adult male.    The patient is scheduled for a hysterectomy. The thickness of her uterine lining doubled over a period of 7 months. She denies any bleeding. Her obstetrician is Dr. Espinal. She was supposed to do a dilation and curettage procedure but due to her lymphoma, she wanted to consult a gynecologic oncologist, Dr. Forrest at [facility]. She is scheduled for bilateral salpingo-oophorectomy, washings, exam under anesthesia, possible pelvic lymph node sampling on  Friday. She was told she would be in the hospital overnight.    She had blood work done this morning. Her urine microscopic test reveals a high concentration of white blood cells. She has had a urinary tract infection for a while. She has pressure with antibiotics. If she drinks a lot of water, she does not get the pressure. Her gynecologist gave her Premarin.     She has a hernia on the left side.     She had an EKG done, which was normal.     She is having trouble with her ear and left nostril, which she thinks is from the heat in the house. Her ear has always given her problems, but she thinks her nose gets clogged, which is causing the ear problem. She put Vicks on her nose last night, which helped. She had a slight yellow discharge from her nose. She denies any shortness of breath, chest pain, tightness, or heaviness.    She was instructed by her daughter to stop using Splenda and switch to regular sugar instead.     She has not taken fish oil for approximately 1 month. She was taken off of vitamin E and fish oil until after her surgery.  She has a history of lymphoma. She has been in remission for 2 years.     She has a history of hip replacement and knee problems.    The following portions of the patient's history were reviewed and updated as appropriate: allergies, current medications, past family history, past medical history, past social history, past surgical history, and problem list.    Review of systems  Constitutional: Negative.    HENT: Negative.    Eyes: Negative.    Respiratory: Negative.    Cardiovascular: Negative.    Gastrointestinal: Negative.    Genitourinary: Negative.    Musculoskeletal: Positive for arthralgias, gait problem and myalgias.  Skin: Negative for color change and rash.        Both LE's are totally cleared    Psychiatric/Behavioral: Negative.    Desires to lose weight, is currently 256 pounds with BMI 46.82.    HEENT: Unremarkable.  Ears: Complains of left ear dullness but  "physical examination is normal. -- My guess is it is the eustachian tube on the left that is not ventilating the middle ear therefore always feels a little clogged.          Objective:       /74 (BP Location: Left arm, Patient Position: Sitting, Cuff Size: Standard)   Pulse 73   Temp 97.9 °F (36.6 °C) (Temporal)   Ht 5' 2\" (1.575 m)   Wt 115 kg (252 lb 9.6 oz)   LMP  (LMP Unknown)   SpO2 98%   BMI 46.20 kg/m²          Physical Exam  Vitals and nursing note reviewed.   Constitutional:       General: She is not in acute distress.     Appearance: Normal appearance. She is well-developed.   HENT:      Head: Normocephalic and atraumatic.   Eyes:      General:         Right eye: No discharge.         Left eye: No discharge.   Neck:      Thyroid: No thyromegaly.   Cardiovascular:      Rate and Rhythm: Normal rate and regular rhythm. Although tones are somewhat distant.     Pulses: Normal pulses.      Heart sounds: Normal heart sounds. No murmur heard.  Pulmonary:      Effort: Pulmonary effort is normal.      Breath sounds: Normal breath sounds. No wheezing or rhonchi.  Abdomen: Benign, obese, ventral hernia towards the left, otherwise unremarkable. -- We will have abdominal hysterectomy in 4 days.  Musculoskeletal:      Cervical back: Neck supple.      Right lower leg: No edema.      Left lower leg: No edema.   Lymphadenopathy:      Cervical: No cervical adenopathy.   Skin:     General: Skin is warm.      Capillary Refill: Capillary refill takes less than 2 seconds.   Neurological:      General: No focal deficit present.      Mental Status: She is alert and oriented to person, place, and time.   Psychiatric:         Mood and Affect: Mood normal.         Behavior: Behavior normal.         Thought Content: Thought content normal.        I personally reviewed the recent (and prior)  lab results, the image studies, pathology, other specialty/physicians consult notes and recommendations, and outside medical records " from other institutions, as appropriate. I had a lengthy discussion with the patient and shared the work-up findings. We discussed the diagnosis and management plan.  I spent  30  minutes reviewing the records (labs, clinician notes, outside records, medical history, ordering medicine/tests/procedures, interpreting the imaging/labs previously done) and coordination of care as well as direct time with the patient today, of which greater than 50% of the time was spent in counseling and coordination of care with the patient/family.    Transcribed for SHANTELLE Smith DO, by Yara Rich on 01/10/24 at 1:45 PM. Powered by [employer].

## 2024-01-10 LAB — BACTERIA UR CULT: ABNORMAL

## 2024-02-26 ENCOUNTER — TELEPHONE (OUTPATIENT)
Dept: HEMATOLOGY ONCOLOGY | Facility: CLINIC | Age: 74
End: 2024-02-26

## 2024-02-26 NOTE — TELEPHONE ENCOUNTER
FIDELIA  DR inés CONSTANTINO   Who are you speaking with? Patient   If it is not the patient, are they listed on an active communication consent form? Yes   Is this a FIDELIA or DR inés CONSTANTINO FIDELIA   Which provider is patient currently scheduled or established with? Dr. Huertas   What is the original appointment date and time? 3/6/24   At which location is the appointment scheduled to take place? [facility]   Which provider is the patient transitioning care to? Dr Maribel Israel   What is the new appointment date and time? 3/28/24   At which location is the new appointment scheduled to take place? [facility]   What is the reason for this change? Provider

## 2024-02-27 ENCOUNTER — HOSPITAL ENCOUNTER (OUTPATIENT)
Dept: RADIOLOGY | Age: 74
Discharge: HOME | End: 2024-02-27
Payer: COMMERCIAL

## 2024-02-27 DIAGNOSIS — C82.31 GRADE 3A FOLLICULAR LYMPHOMA OF LYMPH NODES OF NECK (HCC): ICD-10-CM

## 2024-02-27 PROCEDURE — 71260 CT THORAX DX C+: CPT

## 2024-02-27 PROCEDURE — 74177 CT ABD & PELVIS W/CONTRAST: CPT

## 2024-02-27 PROCEDURE — G1004 CDSM NDSC: HCPCS

## 2024-02-27 RX ADMIN — IOHEXOL 100 ML: 350 INJECTION, SOLUTION INTRAVENOUS at 10:13

## 2024-03-05 ENCOUNTER — TELEPHONE (OUTPATIENT)
Dept: HEMATOLOGY ONCOLOGY | Facility: CLINIC | Age: 74
End: 2024-03-05

## 2024-03-05 DIAGNOSIS — C82.31 GRADE 3A FOLLICULAR LYMPHOMA OF LYMPH NODES OF NECK (HCC): Primary | ICD-10-CM

## 2024-03-05 NOTE — TELEPHONE ENCOUNTER
Dr. Israel made aware of results of 2/27 CT scan and the new 3 mm lung nodule. Dr. Israel recommending patient be scheduled for a PET scan prior to upcoming appt. Patient called and made aware of CT scan results and that Dr. Israel is recommending a f/u PET scan. Patient agreeable. Will having scheduling reach out to patient to arrange.

## 2024-03-08 ENCOUNTER — TELEPHONE (OUTPATIENT)
Dept: HEMATOLOGY ONCOLOGY | Facility: CLINIC | Age: 74
End: 2024-03-08

## 2024-03-08 NOTE — TELEPHONE ENCOUNTER
Appointment Change  Cancel, Reschedule, Change to Virtual      Who are you speaking with? Spouse- Js   If it is not the patient, is the caller listed on the communication consent form? Yes   Which provider is the appointment scheduled with? Dr Maribel Israel   When was the original appointment scheduled?    Please list date and time 3/25/24 @ 4pm   At which location is the appointment scheduled to take place? [facility]   Was the appointment rescheduled?     Was the appointment changed from an in person visit to a virtual visit?    If so, please list the details of the change. Yes 4/8/24 @ 10:20am   What is the reason for the appointment change? Patient pet scan is scheduled 3/26/24        Was STAR transport scheduled? no   Does STAR transport need to be scheduled for the new visit (if applicable) no   Does the patient need an infusion appointment rescheduled? no   Does the patient have an upcoming infusion appointment scheduled? If so, when? no   Is the patient undergoing chemotherapy? no   For appointments cancelled with less than 24 hours:  Was the no-show policy reviewed? yes

## 2024-03-08 NOTE — TELEPHONE ENCOUNTER
Lvm informing that upcoming appt has been rescheduled to 3/25 at 4:00 p.m in the [facility]. Provided hopeline phone number in case of any questions/concerns.

## 2024-03-15 ENCOUNTER — ESTABLISHED COMPREHENSIVE EXAM (OUTPATIENT)
Dept: URBAN - METROPOLITAN AREA CLINIC 6 | Facility: CLINIC | Age: 74
End: 2024-03-15

## 2024-03-15 DIAGNOSIS — H40.023: ICD-10-CM

## 2024-03-15 DIAGNOSIS — H43.813: ICD-10-CM

## 2024-03-15 DIAGNOSIS — H35.3132: ICD-10-CM

## 2024-03-15 LAB
LEFT EYE DIABETIC RETINOPATHY: POSITIVE
RIGHT EYE DIABETIC RETINOPATHY: POSITIVE

## 2024-03-15 PROCEDURE — 92014 COMPRE OPH EXAM EST PT 1/>: CPT

## 2024-03-15 PROCEDURE — 92133 CPTRZD OPH DX IMG PST SGM ON: CPT | Mod: NC

## 2024-03-15 PROCEDURE — 92134 CPTRZ OPH DX IMG PST SGM RTA: CPT

## 2024-03-15 ASSESSMENT — VISUAL ACUITY
OS_CC: 20/30
OU_CC: J2
OD_CC: 20/25
OU_CC: 20/25-1

## 2024-03-15 ASSESSMENT — TONOMETRY
OD_IOP_MMHG: 11
OS_IOP_MMHG: 12

## 2024-03-20 DIAGNOSIS — F32.A CHRONIC DEPRESSION: ICD-10-CM

## 2024-03-20 RX ORDER — CITALOPRAM HYDROBROMIDE 10 MG/1
TABLET ORAL
Qty: 90 TABLET | Refills: 1 | Status: SHIPPED | OUTPATIENT
Start: 2024-03-20

## 2024-03-26 ENCOUNTER — HOSPITAL ENCOUNTER (OUTPATIENT)
Dept: RADIOLOGY | Age: 74
Discharge: HOME | End: 2024-03-26
Payer: COMMERCIAL

## 2024-03-26 DIAGNOSIS — C82.31 GRADE 3A FOLLICULAR LYMPHOMA OF LYMPH NODES OF NECK (HCC): ICD-10-CM

## 2024-03-26 LAB — GLUCOSE SERPL-MCNC: 105 MG/DL (ref 65–140)

## 2024-03-26 PROCEDURE — 78815 PET IMAGE W/CT SKULL-THIGH: CPT

## 2024-03-26 PROCEDURE — A9552 F18 FDG: HCPCS

## 2024-03-26 PROCEDURE — 82948 REAGENT STRIP/BLOOD GLUCOSE: CPT

## 2024-04-01 ENCOUNTER — TELEPHONE (OUTPATIENT)
Dept: HEMATOLOGY ONCOLOGY | Facility: CLINIC | Age: 74
End: 2024-04-01

## 2024-04-01 DIAGNOSIS — C82.31 GRADE 3A FOLLICULAR LYMPHOMA OF LYMPH NODES OF NECK (HCC): Primary | ICD-10-CM

## 2024-04-01 NOTE — TELEPHONE ENCOUNTER
Call out to patient for lab reminder prior to upcoming visit with Dr. Israel.   Patient agreeable to have labs done  Updated orders placed under Dr. Israel.

## 2024-04-05 ENCOUNTER — APPOINTMENT (OUTPATIENT)
Dept: LAB | Facility: AMBULARY SURGERY CENTER | Age: 74
End: 2024-04-05
Payer: COMMERCIAL

## 2024-04-05 DIAGNOSIS — C82.31 GRADE 3A FOLLICULAR LYMPHOMA OF LYMPH NODES OF NECK (HCC): ICD-10-CM

## 2024-04-05 LAB
ALBUMIN SERPL BCP-MCNC: 4.2 G/DL (ref 3.5–5)
ALP SERPL-CCNC: 68 U/L (ref 34–104)
ALT SERPL W P-5'-P-CCNC: 30 U/L (ref 7–52)
ANION GAP SERPL CALCULATED.3IONS-SCNC: 12 MMOL/L (ref 4–13)
AST SERPL W P-5'-P-CCNC: 25 U/L (ref 13–39)
BASOPHILS # BLD AUTO: 0.04 THOUSANDS/ÂΜL (ref 0–0.1)
BASOPHILS NFR BLD AUTO: 1 % (ref 0–1)
BILIRUB SERPL-MCNC: 0.61 MG/DL (ref 0.2–1)
BUN SERPL-MCNC: 25 MG/DL (ref 5–25)
CALCIUM SERPL-MCNC: 9.8 MG/DL (ref 8.4–10.2)
CHLORIDE SERPL-SCNC: 101 MMOL/L (ref 96–108)
CO2 SERPL-SCNC: 26 MMOL/L (ref 21–32)
CREAT SERPL-MCNC: 1.08 MG/DL (ref 0.6–1.3)
EOSINOPHIL # BLD AUTO: 0.19 THOUSAND/ÂΜL (ref 0–0.61)
EOSINOPHIL NFR BLD AUTO: 3 % (ref 0–6)
ERYTHROCYTE [DISTWIDTH] IN BLOOD BY AUTOMATED COUNT: 13.4 % (ref 11.6–15.1)
GFR SERPL CREATININE-BSD FRML MDRD: 49 ML/MIN/1.73SQ M
GLUCOSE P FAST SERPL-MCNC: 117 MG/DL (ref 65–99)
HCT VFR BLD AUTO: 41.5 % (ref 34.8–46.1)
HGB BLD-MCNC: 13.4 G/DL (ref 11.5–15.4)
IMM GRANULOCYTES # BLD AUTO: 0.04 THOUSAND/UL (ref 0–0.2)
IMM GRANULOCYTES NFR BLD AUTO: 1 % (ref 0–2)
LYMPHOCYTES # BLD AUTO: 1.47 THOUSANDS/ÂΜL (ref 0.6–4.47)
LYMPHOCYTES NFR BLD AUTO: 20 % (ref 14–44)
MCH RBC QN AUTO: 31.9 PG (ref 26.8–34.3)
MCHC RBC AUTO-ENTMCNC: 32.3 G/DL (ref 31.4–37.4)
MCV RBC AUTO: 99 FL (ref 82–98)
MONOCYTES # BLD AUTO: 0.91 THOUSAND/ÂΜL (ref 0.17–1.22)
MONOCYTES NFR BLD AUTO: 12 % (ref 4–12)
NEUTROPHILS # BLD AUTO: 4.89 THOUSANDS/ÂΜL (ref 1.85–7.62)
NEUTS SEG NFR BLD AUTO: 63 % (ref 43–75)
NRBC BLD AUTO-RTO: 0 /100 WBCS
PLATELET # BLD AUTO: 239 THOUSANDS/UL (ref 149–390)
PMV BLD AUTO: 10.1 FL (ref 8.9–12.7)
POTASSIUM SERPL-SCNC: 4.5 MMOL/L (ref 3.5–5.3)
PROT SERPL-MCNC: 6.8 G/DL (ref 6.4–8.4)
RBC # BLD AUTO: 4.2 MILLION/UL (ref 3.81–5.12)
SODIUM SERPL-SCNC: 139 MMOL/L (ref 135–147)
WBC # BLD AUTO: 7.54 THOUSAND/UL (ref 4.31–10.16)

## 2024-04-05 PROCEDURE — 36415 COLL VENOUS BLD VENIPUNCTURE: CPT

## 2024-04-05 PROCEDURE — 85025 COMPLETE CBC W/AUTO DIFF WBC: CPT

## 2024-04-05 PROCEDURE — 80053 COMPREHEN METABOLIC PANEL: CPT

## 2024-04-07 NOTE — PROGRESS NOTES
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 (HCC)   2021 Initial Diagnosis    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 (HCC)     2021 - 2021 Chemotherapy    riTUXimab (RITUXAN) subsequent titrated chemo infusion, 806.2 mg, Intravenous, Once, 5 of 5 cycles  Administration: 800 mg (2021), 800 mg (2021), 800 mg (10/12/2021), 800 mg (2021), 800 mg (2021)  riTUXimab (RITUXAN) first titrated chemo infusion, 806.2 mg, Intravenous, Once, 1 of 1 cycle  Administration: 800 mg (2021)  bendamustine HCL(BENDEKA) IVPB, 70 mg/m2 = 150.5 mg (77.8 % of original dose 90 mg/m2), Intravenous, Once, 6 of 6 cycles  Dose modification: 70 mg/m2 (original dose 90 mg/m2, Cycle 1, Reason: Anticipated Tolerance)  Administration: 150.5 mg (2021), 150.5 mg (2021), 150.5 mg (2021), 150.5 mg (2021), 150.5 mg (2021), 150.5 mg (9/15/2021), 150.5 mg (10/12/2021), 150.5 mg (10/13/2021), 150.5 mg (2021), 150.5 mg (11/10/2021), 150.5 mg (2021), 150.5 mg (2021)         Assessment / Plan:       A 74 year-old female with follicular lymphoma, grade 3A, at least stage IIIA disease with diffuse hypermetabolic lymphadenopathy in the chest axillary, abdominal as well as pelvis.  SUV based on PET-CT scan was up to 15.  She was treated with bendamustine rituximab x6 cycles, resulting in complete remission.      She is currently on observation.    Clinically as well as radiographically, she remains in complete remission.    She had a CT CAP ordered by Dr Cheema with the following in 2024    New small, 3 mm right lower lobe  lung nodule. 3-month follow-up is suggested.     No new adenopathy in the chest abdomen or  pelvis.     Based on the above, she had a CT PET 3/26/2024    CT PET 3/2024 with Deauville 1   IMPRESSION:  1. Stable exam. No new hypermetabolic lesions that are concerning for malignancy/metastases. Deauville score of 1.    Thus, she remains with a durable CR 3 years from definitive treatment        Follow up     6 months    CT Neck/CAP with contrast  Labs    If remains negative at that time, yearly exams       HPI:   Dr Cheema      A 71-year-old female who had 1st Pfizer COVID vaccine in March 16, 2021. 2 weeks after the 1st vaccine, she noticed the swelling in her left upper neck close to the ER.  She brought this to medical attention.  She had injection of vaccine in the left deltoid.  She underwent CT scan of the neck which showed multiple enlarged level 1 and level 2 lymphadenopathy as well as swelling of the parotid gland.  She was seen by Dr. Poon who recommended excisional lymph node biopsy.  She is scheduled to have this procedure in Far[place] 3, 2021. She presents today with her daughter and son-in-law to discuss further evaluation.  She feels well.  She has no fever, chills or no or night sweats.  She has no recent weight loss.  She denied any respiratory symptoms.  She has hypertension as well as arthritis.  She is status post left hip replacement last year.  Therefore, she has mild ambulatory dysfunction.  She quit smoking 40 years ago.  She does not drink alcohol.  She is in usual state of health.  Her performance status is 0 to 1/4 on the ECOG scale.      Interval History:  A 73-year-old female with left parotid gland enlargement as well as multiple lymphadenopathy of the neck.   Therefore, she underwent left neck lymph node biopsy by Dr. Poon which showed follicular lymphoma, grade 3A.  She subsequent underwent PET-CT scan which showed diffuse hypermetabolic lymphadenopathy in the chest, axillary, abdominal and pelvis.  SUV or is up to 15.  She underwent systemic chemotherapy with bendamustine and  rituximab x6 cycles with excellent tolerance.  She completed chemotherapy in December 2021, resulting in complete remission.  She is currently on observation.  She presents today for routine follow-up.  She has frequent bladder infection for which she was evaluated by urologist.  Recent CT scan showed thickening of bladder wall but no lymphadenopathy.  She feels well.  She has no fever, chills or night sweats.  Her weight is stable.  Has no respiratory symptoms.  Her performance status is normal.      Interval Follow up 4/8/2024    Patient complains of intermittent diarrhea.  This is a bowel change for her and will be having a colonoscopy in the coming months.  She has no pain, no urinary symptoms, no B symptoms.   She had her last mammogram in Jan 2023.  She is attempting to loose weight and has issues with her hips, knees and lower back.  She has SOB mostly with stairs.  She has no neurological issues.        Primary Oncologic  Diagnosis:        Follicular lymphoma, grade 3A.  Diagnosed in June 2021.      Cancer Staging:  Cancer Staging    Previous Hematologic/ Oncologic Treatment:      Bendamustine and rituximab x6 cycles completed in December 2021.    Current Hematologic/ Oncologic Treatment:      Observation.    Disease Status:     complete remission.    Test Results:    Pathology:\     left neck lymph node biopsy showed follicular lymphoma, grade 3A.    Radiology:    CT scan of neck, chest, abdomen and pelvis in August 2023 showed no evidence of lymphadenopathy.      CT PET  3/26/2024      IMPRESSION:    . Stable exam. No new hypermetabolic lesions that are concerning for malignancy/metastases. Deauville score of 1.      REVIEW OF SYSTEMS:  Please note that a 14-point review of systems was performed to include Constitutional, HEENT, Respiratory, CVS, GI, , Musculoskeletal, Integumentary, Neurologic, Rheumatologic, Endocrinologic, Psychiatric, Lymphatic, and Hematologic/Oncologic systems were reviewed and are  negative unless otherwise stated in HPI. Positive and negative findings pertinent to this evaluation are incorporated into the history of present illness.    As above     ECOG PS: 1    PROBLEM LIST:  Patient Active Problem List   Diagnosis    Vitamin D deficiency    Psoriasis    Pre-diabetes    Osteoarthritis    NSAID long-term use    Morbid obesity (HCC)    Hypothyroidism    Essential hypertension    Elevated blood sugar    Stage 3a chronic kidney disease (HCC)    Chronic depression    Chronic anxiety    Ambulatory dysfunction    Class 3 severe obesity with serious comorbidity and body mass index (BMI) of 45.0 to 49.9 in adult (HCC)    Primary osteoarthritis of right hip    Lumbar pain    History of total left hip replacement    PAD (peripheral artery disease) ([facility])    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 (HCC)    Lymphoma ([facility])    Recurrent major depressive disorder, in partial remission ([facility])    Pre-op evaluation    Encounter to discuss test results    Encounter for long-term (current) use of medications    Medication management       Past Medical History:   has a past medical history of Anxiety, Arthritis, Back problem, Fibrocystic disease of both breasts, Follicular lymphoma grade 3a (HCC), Hip pain, History of staph infection, Hypertension, Knee pain, Lymphoma (HCC), Spinal stenosis, and Thyroid disease.    PAST SURGICAL HISTORY:   has a past surgical history that includes Cataract extraction (01/01/2017); Refractive surgery (Bilateral); Other surgical history; Cholecystectomy; Rotator cuff repair (Right); and Revision total hip arthroplasty.    CURRENT MEDICATIONS  Current Outpatient Medications   Medication Sig Dispense Refill    acetaminophen (TYLENOL) 500 mg tablet Take 500 mg by mouth every 4 (four) hours      amLODIPine (NORVASC) 5 mg tablet TAKE 1 TABLET DAILY 90 tablet 3    amoxicillin (AMOXIL) 500 MG tablet Take 4 tablets by mouth in the morning For dental procedures      azithromycin  (ZITHROMAX) 250 mg tablet Take as directed      benazepril (LOTENSIN) 40 MG tablet TAKE 1 TABLET DAILY 90 tablet 3    Cholecalciferol 1.25 MG (16294 UT) TABS Take 1.25 mg by mouth daily (Patient not taking: Reported on 9/25/2023)      citalopram (CeleXA) 10 mg tablet TAKE 1 TABLET DAILY 90 tablet 1    estrogens, conjugated (Premarin) vaginal cream Insert 0.5 g into the vagina 3 (three) times a week 30 g 6    famotidine (PEPCID) 20 mg tablet Take 1 tablet (20 mg total) by mouth daily 30 tablet 0    fexofenadine (ALLEGRA) 180 MG tablet Take 180 mg by mouth daily      multivitamin-iron-minerals-folic acid (CENTRUM) chewable tablet Chew 1 tablet daily      Naproxen Sodium 220 MG CAPS Take by mouth      Omega 3 1200 MG CAPS Take by mouth      Synthroid 100 MCG tablet TAKE 1 TABLET DAILY 90 tablet 3     No current facility-administered medications for this visit.     [unfilled]    SOCIAL HISTORY:   reports that she has quit smoking. Her smoking use included cigarettes. She has never used smokeless tobacco. She reports current alcohol use. She reports that she does not currently use drugs.     FAMILY HISTORY:  family history includes Breast cancer in her other; Heart failure in her mother; Lung cancer in her father; Other in her mother.     ALLERGIES:  is allergic to diphenhydramine, erythromycin, tetanus toxoids, and tetracycline.      Physical Exam:  Vital Signs:   Visit Vitals  LMP  (LMP Unknown)   OB Status Postmenopausal   Smoking Status Former     There is no height or weight on file to calculate BMI.  There is no height or weight on file to calculate BSA.    GEN: Alert, awake oriented x3, in no acute distress  HEENT- No pallor, icterus, cyanosis, no oral mucosal lesions,   LAD - no palpable cervical, clavicle, axillary, inguinal LAD  Heart- normal S1 S2, regular rate and rhythm, No murmur, rubs.   Lungs- clear breathing sound bilateral.   Abdomen- soft, Non tender, bowel sounds present  Extremities- No cyanosis,  clubbing, edema  Neuro- No focal neurological deficit    Labs:  Lab Results   Component Value Date    WBC 7.54 04/05/2024    HGB 13.4 04/05/2024    HCT 41.5 04/05/2024    MCV 99 (H) 04/05/2024     04/05/2024     Lab Results   Component Value Date    SODIUM 139 04/05/2024    K 4.5 04/05/2024     04/05/2024    CO2 26 04/05/2024    AGAP 12 04/05/2024    BUN 25 04/05/2024    CREATININE 1.08 04/05/2024    GLUC 113 (H) 12/14/2023    GLUF 117 (H) 04/05/2024    CALCIUM 9.8 04/05/2024    AST 25 04/05/2024    ALT 30 04/05/2024    ALKPHOS 68 04/05/2024    TP 6.8 04/05/2024    TBILI 0.61 04/05/2024    EGFR 49 04/05/2024       COMPARISON: CT 2/27/2024 and priors, including PET/CT 10/25/2021     CELL TYPE: Follicular lymphoma     TECHNIQUE:   12.4 mCi F-18-FDG administered IV. Multiplanar attenuation corrected and non attenuation corrected PET images are available for interpretation, and contiguous, low dose, axial CT sections were obtained from the skull base through the femurs.   Intravenous contrast material was not utilized. This examination, like all CT scans performed in the [facility], was performed utilizing techniques to minimize radiation dose exposure, including the use of iterative reconstruction and   automated exposure control.     Fasting serum glucose: 105 mg/dl     FINDINGS:  For reference:  Mediastinal blood pool SUV 2.6.  Liver SUV 3.7.     VISUALIZED BRAIN:  No acute abnormalities are seen.     HEAD/NECK:  There is a physiologic distribution of FDG. Left paracervical activity is likely physiologic. No FDG avid cervical adenopathy is seen.  CT images: Unremarkable.     CHEST:  No FDG avid soft tissue lesions are seen.  No hypermetabolic hilar, axillary or mediastinal adenopathy.  CT images: Unremarkable.     ABDOMEN:  No FDG avid soft tissue lesions are seen.  No hypermetabolic adenopathy.  Splenic activity appears homogeneous and unremarkable, SUV 2.8.     CT images:  Cholecystectomy. Duodenal diverticulum. Small fat-containing umbilical hernia. Colon diverticulosis.     PELVIS:  No FDG avid soft tissue lesions are seen.  No hypermetabolic adenopathy.  CT images: Hysterectomy.     OSSEOUS STRUCTURES:  No FDG avid lesions are seen.  CT images: Left hip arthroplasty. Spine degenerative change.     IMPRESSION:  1. Stable exam. No new hypermetabolic lesions that are concerning for malignancy/metastases. Deauville score of 1.    CT CHEST, ABDOMEN AND PELVIS WITH IV CONTRAST     INDICATION: C82.31: Follicular lymphoma grade iiia, lymph nodes of head, face, and neck.     COMPARISON: 8/7/2023; 12/9/2022; 6/15/2022; PET study from 6/25/2021; CT from 5/26/2016     TECHNIQUE: CT examination of the chest, abdomen and pelvis was performed. Multiplanar 2D reformatted images were created from the source data.     This examination, like all CT scans performed in the [facility], was performed utilizing techniques to minimize radiation dose exposure, including the use of iterative reconstruction and automated exposure control. Radiation dose length   product (DLP) for this visit: 1646 mGy-cm     IV Contrast: 100 mL of iohexol (OMNIPAQUE)  Enteric Contrast: Not administered.     FINDINGS:     CHEST     LUNGS: Left apical 2 mm nodule, image 35, series 3, stable..  Right upper lobe 2 mm nodule, image 59, series 3, stable.  Right lower lobe 4 mm nodule, image 131, stable.  Right lower lobe 3 mm nodule, image 134, new.  A few other nodules less than 4 mm in size appear stable  PLEURA: Unremarkable.     HEART/GREAT VESSELS: Heart is unremarkable for patient's age. No thoracic aortic aneurysm.     MEDIASTINUM AND ANGELIQUE: Unremarkable.     CHEST WALL AND LOWER NECK: Unremarkable.     ABDOMEN     LIVER/BILIARY TREE: Unremarkable.     GALLBLADDER: Post cholecystectomy.     SPLEEN: Unremarkable.     PANCREAS: Unremarkable.     ADRENAL GLANDS: 1.4 cm area of left adrenal thickening appears  stable since 2016, image 91. The right adrenal is unremarkable.     KIDNEYS/URETERS: No hydronephrosis. Low-density cyst is in the upper pole right kidney..     STOMACH AND BOWEL: The stomach is decompressed. Large duodenal diverticulum is identified. Left colon diverticulosis without CT evidence of diverticulitis.     APPENDIX: No findings to suggest appendicitis.     ABDOMINOPELVIC CAVITY: No ascites. No pneumoperitoneum. No bulky lymphadenopathy.     Small aortocaval node measures 6 mm, image 135 without change.  5 mm right common iliac node, image 158, stable.  More significant adenopathy was seen on a PET study of June 2021 which has significantly improve on CT.     VESSELS: Unremarkable for patient's age.     PELVIS     REPRODUCTIVE ORGANS: The patient appears to be status post hysterectomy as compared to the examination of August 2023.     URINARY BLADDER: Unremarkable.     ABDOMINAL WALL/INGUINAL REGIONS: Small periumbilical hernia fat is identified..     BONES: No acute fracture or suspicious osseous lesion. The patient is status post left hip replacement which limits assessment of the pelvis.. Degenerative changes of the lumbar spine are noted.     IMPRESSION:     New small, 3 mm right lower lobe  lung nodule. 3-month follow-up is suggested.     No new adenopathy in the chest abdomen or pelvis.     The study was marked in EPIC for significant notification.       I spent 40 minutes on chart review, face to face counseling time, coordination of care and documentation.    Maribel Israel MD PhD

## 2024-04-08 ENCOUNTER — RA CDI HCC (OUTPATIENT)
Dept: OTHER | Facility: HOSPITAL | Age: 74
End: 2024-04-08

## 2024-04-08 ENCOUNTER — OFFICE VISIT (OUTPATIENT)
Dept: HEMATOLOGY ONCOLOGY | Facility: CLINIC | Age: 74
End: 2024-04-08
Payer: COMMERCIAL

## 2024-04-08 VITALS
HEART RATE: 75 BPM | BODY MASS INDEX: 46.19 KG/M2 | SYSTOLIC BLOOD PRESSURE: 124 MMHG | DIASTOLIC BLOOD PRESSURE: 80 MMHG | HEIGHT: 62 IN | RESPIRATION RATE: 19 BRPM | WEIGHT: 251 LBS | OXYGEN SATURATION: 99 % | TEMPERATURE: 97.3 F

## 2024-04-08 DIAGNOSIS — C82.31 GRADE 3A FOLLICULAR LYMPHOMA OF LYMPH NODES OF NECK (HCC): Primary | ICD-10-CM

## 2024-04-08 DIAGNOSIS — E66.01 MORBID OBESITY (HCC): ICD-10-CM

## 2024-04-08 DIAGNOSIS — E11.65 TYPE 2 DIABETES MELLITUS WITH HYPERGLYCEMIA, WITHOUT LONG-TERM CURRENT USE OF INSULIN (HCC): ICD-10-CM

## 2024-04-08 PROCEDURE — 99215 OFFICE O/P EST HI 40 MIN: CPT | Performed by: INTERNAL MEDICINE

## 2024-04-08 RX ORDER — KETOCONAZOLE 20 MG/G
CREAM TOPICAL AS NEEDED
COMMUNITY
Start: 2024-03-13

## 2024-04-08 RX ORDER — DIAPER,BRIEF,INFANT-TODD,DISP
EACH MISCELLANEOUS AS NEEDED
COMMUNITY

## 2024-04-08 NOTE — PATIENT INSTRUCTIONS
CT neck/chest/abdomen/pelvis in 6 months  Labs in 6 months    Follow up after all tests done in 6 months

## 2024-04-08 NOTE — PROGRESS NOTES
HCC coding opportunities          Chart Reviewed number of suggestions sent to Provider: 1   E11.51    Patients Insurance     Medicare Insurance: [employer]

## 2024-04-09 ENCOUNTER — TELEPHONE (OUTPATIENT)
Dept: ADMINISTRATIVE | Facility: OTHER | Age: 74
End: 2024-04-09

## 2024-04-09 NOTE — TELEPHONE ENCOUNTER
04/09/24 4:20 PM    Patient contacted (left message) to bring Advance Directive, POLST, or Living Will document to next scheduled pcp visit.    Thank you.  Osmel Macario  [facility]

## 2024-04-11 ENCOUNTER — TELEPHONE (OUTPATIENT)
Dept: ADMINISTRATIVE | Facility: OTHER | Age: 74
End: 2024-04-11

## 2024-04-11 ENCOUNTER — OFFICE VISIT (OUTPATIENT)
Dept: FAMILY MEDICINE CLINIC | Facility: CLINIC | Age: 74
End: 2024-04-11

## 2024-04-11 VITALS
DIASTOLIC BLOOD PRESSURE: 70 MMHG | TEMPERATURE: 98.2 F | SYSTOLIC BLOOD PRESSURE: 132 MMHG | WEIGHT: 255 LBS | OXYGEN SATURATION: 97 % | HEIGHT: 62 IN | BODY MASS INDEX: 46.93 KG/M2 | HEART RATE: 69 BPM

## 2024-04-11 DIAGNOSIS — E11.9 TYPE 2 DIABETES MELLITUS WITHOUT COMPLICATION, WITHOUT LONG-TERM CURRENT USE OF INSULIN (HCC): ICD-10-CM

## 2024-04-11 DIAGNOSIS — C82.51 DIFFUSE FOLLICLE CENTER LYMPHOMA OF LYMPH NODES OF NECK (HCC): ICD-10-CM

## 2024-04-11 DIAGNOSIS — K59.1 FUNCTIONAL DIARRHEA: ICD-10-CM

## 2024-04-11 DIAGNOSIS — I73.9 PAD (PERIPHERAL ARTERY DISEASE) (HCC): ICD-10-CM

## 2024-04-11 DIAGNOSIS — N18.31 TYPE 2 DIABETES MELLITUS WITH STAGE 3A CHRONIC KIDNEY DISEASE, WITHOUT LONG-TERM CURRENT USE OF INSULIN (HCC): ICD-10-CM

## 2024-04-11 DIAGNOSIS — M15.9 PRIMARY OSTEOARTHRITIS INVOLVING MULTIPLE JOINTS: ICD-10-CM

## 2024-04-11 DIAGNOSIS — E66.01 MORBID OBESITY (HCC): Primary | ICD-10-CM

## 2024-04-11 DIAGNOSIS — E11.22 TYPE 2 DIABETES MELLITUS WITH STAGE 3A CHRONIC KIDNEY DISEASE, WITHOUT LONG-TERM CURRENT USE OF INSULIN (HCC): ICD-10-CM

## 2024-04-11 DIAGNOSIS — F33.41 RECURRENT MAJOR DEPRESSIVE DISORDER, IN PARTIAL REMISSION (HCC): ICD-10-CM

## 2024-04-11 LAB — SL AMB POCT HEMOGLOBIN AIC: 6.9 (ref ?–6.5)

## 2024-04-11 RX ORDER — METFORMIN HYDROCHLORIDE 500 MG/1
1000 TABLET, EXTENDED RELEASE ORAL
Qty: 180 TABLET | Refills: 3 | Status: SHIPPED | OUTPATIENT
Start: 2024-04-11

## 2024-04-11 RX ORDER — NYSTATIN 100000 [USP'U]/G
POWDER TOPICAL
COMMUNITY

## 2024-04-11 NOTE — LETTER
Diabetic Eye Exam Form    Date Requested: 24  Patient: Janette Little  Patient : 1950   Referring Provider: SHANTELLE Smith,       DIABETIC Eye Exam Date _______________________________      Type of Exam MUST be documented for Diabetic Eye Exams. Please CHECK ONE.     Retinal Exam       Dilated Retinal Exam       OCT       Optomap-Iris Exam      Fundus Photography       Left Eye - Please check Retinopathy or No Retinopathy        Exam did show retinopathy    Exam did not show retinopathy       Right Eye - Please check Retinopathy or No Retinopathy       Exam did show retinopathy    Exam did not show retinopathy       Comments __________________________________________________________    Practice Providing Exam ______________________________________________    Exam Performed By (print name) _______________________________________      Provider Signature ___________________________________________________      These reports are needed for  compliance.  Please fax this completed form and a copy of the Diabetic Eye Exam report to our office located at [address] [place] [address] as soon as possible via Fax [phone] attention Popeye: Phone [phone]  We thank you for your assistance in treating our mutual patient.

## 2024-04-11 NOTE — TELEPHONE ENCOUNTER
Upon review of the In Basket request and the patient's chart, initial outreach has been made via fax to facility. Please see Contacts section for details.     Thank you  Popeye Lorenzana MA

## 2024-04-11 NOTE — TELEPHONE ENCOUNTER
----- Message from Jose A Gamibno sent at 4/11/2024 10:57 AM EDT -----  Regarding: DM EYE  04/11/24 10:57 AM    Hello, our patient attached above has had Diabetic Eye Exam completed/performed. Please assist in updating the patient chart by making an External outreach to Dr. Johnson facility located in [facility]. The date of service is March 2024.    Thank you,  Jose A Gambino   [facility] [place]

## 2024-04-11 NOTE — PROGRESS NOTES
Name: Janette Little      : 1950      MRN: [id number]  Encounter Provider: SHANTELLE Smith DO  Encounter Date: 2024   Encounter department: [facility] [place]    Assessment & Plan     Assessment & Plan  1. Follicular lymphoma.  The condition is currently in remission.    2. Functional diarrhea.  This condition is intermittent, potentially influenced by her dietary habits. The patient is advised to consume prunes, cereals, fruits, and vegetables.    3. Primary osteoarthritis of multiple joints.  Given her weight and BMI of 46.64, surgical intervention is not advised by her insurance company and physicians. However, a knee replacement is necessary. It is hoped that weight loss will alleviate her condition. She underwent a left hip replacement in .    4. Type 2 diabetes.  No treatment is required until now. The patient will be started on Mounjaro. She will also be started on metformin  mg, taking 2 with supper. The dosage may be increased to 4 per day, but the initial dosage will be at 2.    Follow-up  The patient is scheduled for a follow-up visit in 3 months, during which her A1c will be checked.     No orders of the defined types were placed in this encounter.      Subjective      History of Present Illness  The patient is a 72-year-old female who presents for evaluation of multiple medical concerns. She is accompanied by her .    The patient's  reports that she consulted her oncologist on the previous Monday, during which her blood glucose levels were slightly elevated. Currently, she is not on any medication for her elevated blood glucose levels.    The patient underwent a hysterectomy on 2024, which was well-tolerated, with the exception of constipation. She continues to experience constipation and is scheduled for a colonoscopy. Her constipation alternates between constipation and diarrhea. She has MiraLAX at home, but does not take it. She consumes prunes  to manage her constipation. Her condition improved temporarily, but her constipation recurred. She speculates that her constipation may be related to her dietary habits. Her diet has been high in carbohydrates recently.    The patient's  reports that she is unable to exercise due to her knee and hip pain. She previously attended the gym thrice weekly, but due to the COVID-19 pandemic, she has been unable to attend. She plans to join [employer]. She is currently on a 7.5 mg dose of Mounjaro, which has resulted in significant weight loss.    The patient underwent a left hip replacement in 2020, but continues to experience issues with her arthritis. She was informed that her right knee requires replacement. She experiences pain radiating from her hip to her lower back, with intermittent sharp pains. She has spinal stenosis and 4 bulging discs. She has received an injection for her back pain. 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 She has a lump on the back of her knee, which she injured. Her knee pain is tolerable, but there are days when she experiences a burning or sharp pain.    Since her last visit, the patient underwent a CT scan, which revealed a nodule under her right lung. Her oncologist, Dr. Quezada, left the practice, and she now sees Dr. Israel. A subsequent PET scan on 03/26/2024 showed stable exam with no new hypermetabolic lesions that are concerning for malignancy or metastasis. Her cancer was previously 3A, but is now down to 1. Her lymphoma was initially diagnosed as follicular lymphoma, which affected her chest and abdomen. She was treated with two medications, followed by a 6-month chemotherapy course, which resulted in her being in remission.  Review of Systems      Objective     /70 (BP Location: Left arm, Patient Position: Sitting, Cuff Size: Standard)   Pulse 69   Temp 98.2 °F (36.8 °C) (Temporal)   Ht  "5' 2\" (1.575 m)   Wt 116 kg (255 lb)   LMP  (LMP Unknown)   SpO2 97%   BMI 46.64 kg/m²     Physical Exam    Physical Exam  Vitals and nursing note reviewed.   Constitutional:       General: She is not in acute distress.     Appearance: She is well-developed. She is not diaphoretic.   HENT:      Head: Normocephalic and atraumatic.      Nose: Nose normal.   Eyes:      Conjunctiva/sclera: Conjunctivae normal.      Pupils: Pupils are equal, round, and reactive to light.   Neck:      Thyroid: No thyromegaly.      Trachea: No tracheal deviation.   Cardiovascular:      Rate and Rhythm: Normal rate and regular rhythm.      Heart sounds: Normal heart sounds.   Pulmonary:      Effort: No respiratory distress.      Breath sounds: Normal breath sounds. No wheezing or rales.   Chest:      Chest wall: No tenderness.   Abdominal:      General: Bowel sounds are normal. There is no distension.      Palpations: Abdomen is soft. There is no mass.      Tenderness: There is no abdominal tenderness. There is no guarding or rebound.   Musculoskeletal:         General: No tenderness or deformity. Normal range of motion.      Cervical back: Normal range of motion and neck supple.   Skin:     General: Skin is warm and dry.      Coloration: Skin is not pale.      Findings: No erythema or rash.   Neurological:      General: No focal deficit present.      Mental Status: She is alert and oriented to person, place, and time. Mental status is at baseline.      Cranial Nerves: No cranial nerve deficit.   Psychiatric:         Behavior: Behavior normal.         Thought Content: Thought content normal.         Judgment: Judgment normal.         "

## 2024-04-11 NOTE — LETTER
Diabetic Eye Exam Form    Date Requested: 24  Patient: Janette Little  Patient : 1950   Referring Provider: SHANTELLE Smith,       DIABETIC Eye Exam Date _______________________________      Type of Exam MUST be documented for Diabetic Eye Exams. Please CHECK ONE.     Retinal Exam       Dilated Retinal Exam       OCT       Optomap-Iris Exam      Fundus Photography       Left Eye - Please check Retinopathy or No Retinopathy        Exam did show retinopathy    Exam did not show retinopathy       Right Eye - Please check Retinopathy or No Retinopathy       Exam did show retinopathy    Exam did not show retinopathy       Comments __________________________________________________________    Practice Providing Exam ______________________________________________    Exam Performed By (print name) _______________________________________      Provider Signature ___________________________________________________      These reports are needed for  compliance.  Please fax this completed form and a copy of the Diabetic Eye Exam report to our office located at [address] [place] [address] as soon as possible via Fax [phone] attention [place]: Phone [phone]  We thank you for your assistance in treating our mutual patient.

## 2024-04-23 NOTE — TELEPHONE ENCOUNTER
As a follow-up, a second attempt has been made for outreach via fax to facility. Please see Contacts section for details.    Thank you  Popeye Lorenzana MA

## 2024-06-13 ENCOUNTER — CONSULT (OUTPATIENT)
Dept: GASTROENTEROLOGY | Facility: CLINIC | Age: 74
End: 2024-06-13
Payer: COMMERCIAL

## 2024-06-13 VITALS
HEART RATE: 61 BPM | DIASTOLIC BLOOD PRESSURE: 77 MMHG | HEIGHT: 62 IN | BODY MASS INDEX: 46.38 KG/M2 | WEIGHT: 252 LBS | SYSTOLIC BLOOD PRESSURE: 130 MMHG

## 2024-06-13 DIAGNOSIS — R19.4 CHANGE IN BOWEL HABIT: Primary | ICD-10-CM

## 2024-06-13 DIAGNOSIS — Z86.010 HISTORY OF COLON POLYPS: ICD-10-CM

## 2024-06-13 PROCEDURE — 99204 OFFICE O/P NEW MOD 45 MIN: CPT | Performed by: NURSE PRACTITIONER

## 2024-06-13 NOTE — PROGRESS NOTES
[facility] [place] - Outpatient Consultation  Janette Little 74 y.o. female MRN: [id number]  Encounter: [id number]          ASSESSMENT AND PLAN:      1. Change in bowel habit  Patient reports change in bowel habit with alternating constipation/diarrhea over the last year.  Reports diarrhea and gas approximately once a week which affects her ability to go places, she otherwise struggles to move her bowels although this has improved somewhat with increasing her dietary fiber.  Denies any known dietary triggers including dairy, gluten.  She does note that metformin gave her persistent diarrhea when taking this, and she stopped but symptoms persist.  She denies any rectal bleeding, unintended weight loss.  She does have intermittent right lower quadrant pain, however does have right hip issues as well and unable to differentiate.  Her last colonoscopy was in July 2020 showing sigmoid diverticulosis, long tortuous colon unable to visualize the cecum, and 1 polyp was removed.  Recent CBC, CMP, CT abdomen pelvis fairly unremarkable.    -Recommend starting daily fiber supplement such as Metamucil to help regulate the bowels  -Colonoscopy scheduled for further evaluation.  Prep and procedure explained.  Will use OTC prep per patient preference.  -I obtained informed consent from the patient. The risks/benefits/alternatives of the procedure were discussed with the patient. Risks included, but not limited to, infection, bleeding, perforation, injury to organs in the abdomen, missed lesion and incomplete procedure were discussed. Patient was agreeable and electronic signature was obtained.         2. History of colon polyps  -     Colonoscopy; Future; Expected date: 06/13/2024      ______________________________________________________________________    HPI:  Janette Little is a 74 y.o. female with history of hearing loss, hypothyroidism, HTN, spinal stenosis, follicular lymphoma in remission, and  cholecystectomy who presents with her daughter and  for consultation to colonoscopy.  Her last EGD/colonoscopy with Dr. Littlejohn was in 2020 showing a small hiatal hernia, reflux esophagitis, sigmoid diverticulosis, long tortuous colon unable to visualize the cecum, and 1 polyp was removed.  Over the last year she reports a change in her bowel habits with irregular bowel movements.  She goes back and forth between difficulty moving her bowels and diarrhea.  She has tried to increase her dietary fiber which has helped her move her bowels.  She also notes that she had persistent diarrhea when taking metformin, and is no longer taking this but still having diarrhea approximately once a week and per daughter a lot of gas.  She denies any known dietary triggers.  She denies any unintended weight loss, rectal bleeding.  She does have some right lower quadrant pain but also has issues with her right hip and is unable to differentiate if this is what is causing the pain.  Recent CBC, CMP from April 2024 and last CT chest abdomen pelvis from February 2024 reviewed.  She denies any heartburn, dysphagia, upper abdominal pain, nausea/vomiting.          REVIEW OF SYSTEMS:    CONSTITUTIONAL: Denies any fever, chills, rigors, and weight loss.  HEENT: No earache or tinnitus.  CARDIOVASCULAR: No chest pain or palpitations.   RESPIRATORY: Denies any cough, hemoptysis, shortness of breath or dyspnea on exertion.  GASTROINTESTINAL: As noted in the History of Present Illness.   GENITOURINARY: Denies any hematuria or dysuria.  NEUROLOGIC: No dizziness or vertigo.   MUSCULOSKELETAL: Denies any joint swellings.  SKIN: Denies skin rashes or itching.   ENDOCRINE: Denies excessive thirst. Denies intolerance to heat or cold.  PSYCHOSOCIAL: Denies depression or anxiety. Denies any recent memory loss.       Historical Information   Past Medical History:   Diagnosis Date    Anxiety     on med     Arthritis     Back problem     4 Bulging  Disks    Cancer (HCC)     Depression     Disease of thyroid gland     Diverticulitis of colon     Fibrocystic disease of both breasts     Follicular lymphoma grade 3a (HCC)     Hernia     Hip pain     Left    History of staph infection     HL (hearing loss)     Hypertension     Knee pain     Right    Lymphoma (HCC)     Obesity     Spinal stenosis     Thyroid disease      Past Surgical History:   Procedure Laterality Date    ABDOMINAL SURGERY      Hysterectomy    CATARACT EXTRACTION  01/01/2017    CHOLECYSTECTOMY      HYSTERECTOMY  01/12/2024    JOINT REPLACEMENT      OTHER SURGICAL HISTORY      Warts removal    REFRACTIVE SURGERY Bilateral     REVISION TOTAL HIP ARTHROPLASTY      ROTATOR CUFF REPAIR Right     UPPER GASTROINTESTINAL ENDOSCOPY       Social History   Social History     Substance and Sexual Activity   Alcohol Use Yes    Alcohol/week: 1.0 standard drink of alcohol    Types: 1 Glasses of wine per week    Comment: Occasional      Social History     Substance and Sexual Activity   Drug Use Never     Social History     Tobacco Use   Smoking Status Former    Types: Cigarettes   Smokeless Tobacco Never     Family History   Problem Relation Age of Onset    Other Mother         Non Hogkins    Heart failure Mother     Lung cancer Father     Cancer Father     Breast cancer Other     Arthritis Sister     Diabetes Brother     Vision loss Paternal Aunt        Meds/Allergies       Current Outpatient Medications:     acetaminophen (TYLENOL) 500 mg tablet    amLODIPine (NORVASC) 5 mg tablet    amoxicillin (AMOXIL) 500 MG tablet    benazepril (LOTENSIN) 40 MG tablet    cholecalciferol 1,000 units tablet    citalopram (CeleXA) 10 mg tablet    Cranberry 300 MG tablet    estrogens, conjugated (Premarin) vaginal cream    fexofenadine (ALLEGRA) 180 MG tablet    hydrocortisone 0.5 % cream    ketoconazole (NIZORAL) 2 % cream    LACTOBACILLUS ACID-PECTIN PO    multivitamin-iron-minerals-folic acid (CENTRUM) chewable tablet     "Naproxen Sodium 220 MG CAPS    Omega 3 1200 MG CAPS    Synthroid 100 MCG tablet    Cholecalciferol 1.25 MG (08483 UT) TABS    famotidine (PEPCID) 20 mg tablet    metFORMIN (GLUCOPHAGE-XR) 500 mg 24 hr tablet    nystatin (MYCOSTATIN) powder    Allergies   Allergen Reactions    Diphenhydramine Other (See Comments)     Per patient and daughter have been told this is not an allergy, patient has had chest pressure/pain with benadryl once in the past.    Erythromycin Other (See Comments)    Penicillin G Other (See Comments)     As a child      Patient does okay with amoxicillin.    Tetanus Toxoid Swelling and Other (See Comments)     Only the arm getting the vaccine gets hot and swelled, per patient.    Tetanus Toxoids Other (See Comments)    Tetracycline Other (See Comments)    Medical Tape Rash           Objective     Blood pressure 130/77, pulse 61, height 5' 2\" (1.575 m), weight 114 kg (252 lb). Body mass index is 46.09 kg/m².        PHYSICAL EXAM:      General Appearance:   Alert, cooperative, no distress   HEENT:   Normocephalic, atraumatic, anicteric.     Neck:  Supple, symmetrical, trachea midline   Lungs:   Clear to auscultation bilaterally; no rales, rhonchi or wheezing; respirations unlabored    Heart::   Regular rate and rhythm; no murmur.   Abdomen:   Soft, non-tender, non-distended; normal bowel sounds; no masses, no organomegaly    Genitalia:   Deferred    Rectal:   Deferred    Extremities:  No cyanosis, clubbing or edema    Skin:  No jaundice, rashes, or lesions    Lymph nodes:  No palpable cervical lymphadenopathy        Lab Results:   No visits with results within 1 Day(s) from this visit.   Latest known visit with results is:   Telephone on 04/11/2024   Component Date Value    Right Eye Diabetic Retin* 03/15/2024 Positive     Left Eye Diabetic Retino* 03/15/2024 Positive          Radiology Results:   No results found.  "

## 2024-07-16 ENCOUNTER — TELEPHONE (OUTPATIENT)
Dept: GASTROENTEROLOGY | Facility: CLINIC | Age: 74
End: 2024-07-16

## 2024-07-16 NOTE — TELEPHONE ENCOUNTER
----- Message from Karely TINAJERO sent at 7/16/2024 10:40 AM EDT -----  Regarding: Reschedule Colonoscopy  Good morning,    The above patient had canceled her colonoscopy due to a torn shoulder. Please follow up with patient in time to reschedule with Dr. Littlejohn    Thank you!  
Female

## 2024-07-27 DIAGNOSIS — I10 ESSENTIAL HYPERTENSION: ICD-10-CM

## 2024-07-28 RX ORDER — BENAZEPRIL HYDROCHLORIDE 40 MG/1
TABLET ORAL
Qty: 90 TABLET | Refills: 1 | Status: SHIPPED | OUTPATIENT
Start: 2024-07-28

## 2024-07-28 RX ORDER — AMLODIPINE BESYLATE 5 MG/1
TABLET ORAL
Qty: 90 TABLET | Refills: 1 | Status: SHIPPED | OUTPATIENT
Start: 2024-07-28

## 2024-08-15 ENCOUNTER — APPOINTMENT (EMERGENCY)
Dept: RADIOLOGY | Facility: HOSPITAL | Age: 74
End: 2024-08-15
Payer: COMMERCIAL

## 2024-08-15 ENCOUNTER — HOSPITAL ENCOUNTER (EMERGENCY)
Facility: HOSPITAL | Age: 74
Discharge: HOME | End: 2024-08-15
Attending: EMERGENCY MEDICINE
Payer: COMMERCIAL

## 2024-08-15 VITALS
RESPIRATION RATE: 22 BRPM | OXYGEN SATURATION: 100 % | HEART RATE: 73 BPM | TEMPERATURE: 98 F | DIASTOLIC BLOOD PRESSURE: 54 MMHG | SYSTOLIC BLOOD PRESSURE: 123 MMHG

## 2024-08-15 DIAGNOSIS — R03.0 ELEVATED BLOOD PRESSURE READING: ICD-10-CM

## 2024-08-15 DIAGNOSIS — R20.9 SENSATION DISTURBANCE OF SKIN: Primary | ICD-10-CM

## 2024-08-15 LAB
2HR DELTA HS TROPONIN: 0 NG/L
ALBUMIN SERPL BCG-MCNC: 4.1 G/DL (ref 3.5–5)
ALP SERPL-CCNC: 90 U/L (ref 34–104)
ALT SERPL W P-5'-P-CCNC: 21 U/L (ref 7–52)
ANION GAP SERPL CALCULATED.3IONS-SCNC: 8 MMOL/L (ref 4–13)
AST SERPL W P-5'-P-CCNC: 15 U/L (ref 13–39)
BASOPHILS # BLD AUTO: 0.05 THOUSANDS/ÂΜL (ref 0–0.1)
BASOPHILS NFR BLD AUTO: 0 % (ref 0–1)
BILIRUB SERPL-MCNC: 0.46 MG/DL (ref 0.2–1)
BUN SERPL-MCNC: 25 MG/DL (ref 5–25)
CALCIUM SERPL-MCNC: 9.7 MG/DL (ref 8.4–10.2)
CARDIAC TROPONIN I PNL SERPL HS: 7 NG/L
CARDIAC TROPONIN I PNL SERPL HS: 7 NG/L
CHLORIDE SERPL-SCNC: 106 MMOL/L (ref 96–108)
CO2 SERPL-SCNC: 25 MMOL/L (ref 21–32)
CREAT SERPL-MCNC: 1.1 MG/DL (ref 0.6–1.3)
EOSINOPHIL # BLD AUTO: 0.58 THOUSAND/ÂΜL (ref 0–0.61)
EOSINOPHIL NFR BLD AUTO: 5 % (ref 0–6)
ERYTHROCYTE [DISTWIDTH] IN BLOOD BY AUTOMATED COUNT: 14 % (ref 11.6–15.1)
GFR SERPL CREATININE-BSD FRML MDRD: 48 ML/MIN/1.73SQ M
GLUCOSE SERPL-MCNC: 111 MG/DL (ref 65–140)
GLUCOSE SERPL-MCNC: 114 MG/DL (ref 65–140)
HCT VFR BLD AUTO: 37.3 % (ref 34.8–46.1)
HGB BLD-MCNC: 12.2 G/DL (ref 11.5–15.4)
IMM GRANULOCYTES # BLD AUTO: 0.09 THOUSAND/UL (ref 0–0.2)
IMM GRANULOCYTES NFR BLD AUTO: 1 % (ref 0–2)
LYMPHOCYTES # BLD AUTO: 1.37 THOUSANDS/ÂΜL (ref 0.6–4.47)
LYMPHOCYTES NFR BLD AUTO: 12 % (ref 14–44)
MCH RBC QN AUTO: 32.5 PG (ref 26.8–34.3)
MCHC RBC AUTO-ENTMCNC: 32.7 G/DL (ref 31.4–37.4)
MCV RBC AUTO: 100 FL (ref 82–98)
MONOCYTES # BLD AUTO: 1.52 THOUSAND/ÂΜL (ref 0.17–1.22)
MONOCYTES NFR BLD AUTO: 13 % (ref 4–12)
NEUTROPHILS # BLD AUTO: 7.96 THOUSANDS/ÂΜL (ref 1.85–7.62)
NEUTS SEG NFR BLD AUTO: 69 % (ref 43–75)
NRBC BLD AUTO-RTO: 0 /100 WBCS
PLATELET # BLD AUTO: 277 THOUSANDS/UL (ref 149–390)
PMV BLD AUTO: 9.2 FL (ref 8.9–12.7)
POTASSIUM SERPL-SCNC: 4 MMOL/L (ref 3.5–5.3)
PROT SERPL-MCNC: 7.3 G/DL (ref 6.4–8.4)
RBC # BLD AUTO: 3.75 MILLION/UL (ref 3.81–5.12)
SODIUM SERPL-SCNC: 139 MMOL/L (ref 135–147)
WBC # BLD AUTO: 11.57 THOUSAND/UL (ref 4.31–10.16)

## 2024-08-15 PROCEDURE — 99284 EMERGENCY DEPT VISIT MOD MDM: CPT

## 2024-08-15 PROCEDURE — 71045 X-RAY EXAM CHEST 1 VIEW: CPT

## 2024-08-15 PROCEDURE — 93005 ELECTROCARDIOGRAM TRACING: CPT

## 2024-08-15 PROCEDURE — 82948 REAGENT STRIP/BLOOD GLUCOSE: CPT

## 2024-08-15 PROCEDURE — 99285 EMERGENCY DEPT VISIT HI MDM: CPT | Performed by: EMERGENCY MEDICINE

## 2024-08-15 PROCEDURE — 84484 ASSAY OF TROPONIN QUANT: CPT | Performed by: EMERGENCY MEDICINE

## 2024-08-15 PROCEDURE — 36415 COLL VENOUS BLD VENIPUNCTURE: CPT

## 2024-08-15 PROCEDURE — 85025 COMPLETE CBC W/AUTO DIFF WBC: CPT | Performed by: EMERGENCY MEDICINE

## 2024-08-15 PROCEDURE — 80053 COMPREHEN METABOLIC PANEL: CPT | Performed by: EMERGENCY MEDICINE

## 2024-08-15 NOTE — DISCHARGE INSTRUCTIONS
Return to the emergency department if you have chest pain, shortness of breath, one-sided both sided weakness, occultly speaking, or any other concerning symptoms develop    Please follow-up with your primary care physician for management of your current medications and assessment for anxiety or panic attacks

## 2024-08-15 NOTE — ED PROVIDER NOTES
History  Chief Complaint   Patient presents with    Numbness     Numbness tingling from top of head on left side and down left side of face, neck, and arm since 1030. Also c/o sob       74-year-old female presenting with left-sided facial, shoulder and upper arm tingling that began at approximately 1030 this morning.  Patient states that she was having a terrible morning, her coffee machine broke and her tingling sensation developed right after this time.  Patient states that she noticed this sensation for approximately an hour and a half but it dissipated on its own.  Patient denies any pain, weakness, slurring speech, vision changes, auditory changes, difficulty ambulating, or confusion.  Patient states that she has had this sensation a couple times previously in her life but never sought medical attention for.          Prior to Admission Medications   Prescriptions Last Dose Informant Patient Reported? Taking?   Cholecalciferol 1.25 MG (72616 UT) TABS  Self Yes No   Sig: Take 1.25 mg by mouth daily   Patient not taking: Reported on 4/8/2024   Cranberry 300 MG tablet  Self Yes No   Sig: Take by mouth as needed (FOR UTI)   LACTOBACILLUS ACID-PECTIN PO  Self Yes No   Sig: Take 1 Capful by mouth as needed PROBIOTIC   Naproxen Sodium 220 MG CAPS  Self Yes No   Sig: Take by mouth   Omega 3 1200 MG CAPS  Self Yes No   Sig: Take by mouth   Synthroid 100 MCG tablet  Self No No   Sig: TAKE 1 TABLET DAILY   acetaminophen (TYLENOL) 500 mg tablet  Self Yes No   Sig: Take 500 mg by mouth every 4 (four) hours   amLODIPine (NORVASC) 5 mg tablet   No No   Sig: TAKE 1 TABLET DAILY   amoxicillin (AMOXIL) 500 MG tablet  Self Yes No   Sig: Take 4 tablets by mouth in the morning For dental procedures   benazepril (LOTENSIN) 40 MG tablet   No No   Sig: TAKE 1 TABLET DAILY   cholecalciferol 1,000 units tablet  Self Yes No   Sig: Take 1,000 Units by mouth daily 15,000 units per week. 3 days a week takes per patient.   citalopram (CeleXA)  10 mg tablet  Self No No   Sig: TAKE 1 TABLET DAILY   estrogens, conjugated (Premarin) vaginal cream  Self No No   Sig: Insert 0.5 g into the vagina 3 (three) times a week   Patient taking differently: Insert 0.5 g into the vagina as needed   famotidine (PEPCID) 20 mg tablet  Self No No   Sig: Take 1 tablet (20 mg total) by mouth daily   Patient not taking: Reported on 4/8/2024   fexofenadine (ALLEGRA) 180 MG tablet  Self Yes No   Sig: Take 180 mg by mouth as needed   hydrocortisone 0.5 % cream  Self Yes No   Sig: as needed   ketoconazole (NIZORAL) 2 % cream  Self Yes No   Sig: as needed   metFORMIN (GLUCOPHAGE-XR) 500 mg 24 hr tablet  Self No No   Sig: Take 2 tablets (1,000 mg total) by mouth daily with dinner   Patient not taking: Reported on 6/13/2024   multivitamin-iron-minerals-folic acid (CENTRUM) chewable tablet  Self Yes No   Sig: Chew 1 tablet daily   nystatin (MYCOSTATIN) powder  Self Yes No   Sig: apply topically to the affected area twice daily   Patient not taking: Reported on 6/13/2024      Facility-Administered Medications: None       Past Medical History:   Diagnosis Date    Anxiety     on med     Arthritis     Back problem     4 Bulging Disks    Cancer (HCC)     Depression     Disease of thyroid gland     Diverticulitis of colon     Fibrocystic disease of both breasts     Follicular lymphoma grade 3a (HCC)     Hernia     Hip pain     Left    History of staph infection     HL (hearing loss)     Hypertension     Knee pain     Right    Lymphoma (HCC)     Obesity     Spinal stenosis     Thyroid disease        Past Surgical History:   Procedure Laterality Date    ABDOMINAL SURGERY      Hysterectomy    CATARACT EXTRACTION  01/01/2017    CHOLECYSTECTOMY      FL GUIDED NEEDLE PLAC BX/ASP/INJ  10/22/2018    FL GUIDED NEEDLE PLAC BX/ASP/INJ  5/6/2019    HYSTERECTOMY  01/12/2024    JOINT REPLACEMENT      OTHER SURGICAL HISTORY      Warts removal    REFRACTIVE SURGERY Bilateral     REVISION TOTAL HIP  ARTHROPLASTY      ROTATOR CUFF REPAIR Right     UPPER GASTROINTESTINAL ENDOSCOPY         Family History   Problem Relation Age of Onset    Other Mother         Non Hogkins    Heart failure Mother     Lung cancer Father     Cancer Father     Breast cancer Other     Arthritis Sister     Diabetes Brother     Vision loss Paternal Aunt      I have reviewed and agree with the history as documented.    E-Cigarette/Vaping    E-Cigarette Use Never User      E-Cigarette/Vaping Substances    Nicotine No     THC No     CBD No     Flavoring No     Other No     Unknown No      Social History     Tobacco Use    Smoking status: Former     Types: Cigarettes    Smokeless tobacco: Never   Vaping Use    Vaping status: Never Used   Substance Use Topics    Alcohol use: Yes     Alcohol/week: 1.0 standard drink of alcohol     Types: 1 Glasses of wine per week     Comment: Occasional     Drug use: Never        Review of Systems   Constitutional:  Negative for chills and fever.   HENT:  Negative for drooling, ear pain, facial swelling, hearing loss, sore throat, tinnitus, trouble swallowing and voice change.    Eyes:  Negative for photophobia, pain and visual disturbance.   Respiratory:  Negative for cough and shortness of breath.    Cardiovascular:  Positive for leg swelling. Negative for chest pain and palpitations.   Gastrointestinal:  Negative for abdominal pain and vomiting.   Genitourinary:  Negative for dysuria and hematuria.   Musculoskeletal:  Negative for arthralgias and back pain.   Skin:  Negative for color change and rash.   Neurological:  Negative for seizures and syncope.   Psychiatric/Behavioral:  The patient is nervous/anxious.    All other systems reviewed and are negative.      Physical Exam  ED Triage Vitals   Temperature Pulse Respirations Blood Pressure SpO2   08/15/24 1311 08/15/24 1311 08/15/24 1311 08/15/24 1311 08/15/24 1311   98 °F (36.7 °C) 88 (!) 24 138/70 91 %      Temp Source Heart Rate Source Patient Position  - Orthostatic VS BP Location FiO2 (%)   08/15/24 1311 08/15/24 1311 08/15/24 1447 08/15/24 1311 --   Oral Monitor Sitting Right arm       Pain Score       08/15/24 1311       No Pain             Orthostatic Vital Signs  Vitals:    08/15/24 1311 08/15/24 1447 08/15/24 1528 08/15/24 1601   BP: 138/70 (!) 184/75 141/62 123/54   Pulse: 88 86 83 73   Patient Position - Orthostatic VS:  Sitting         Physical Exam  Vitals and nursing note reviewed.   Constitutional:       General: She is not in acute distress.     Appearance: She is well-developed.   HENT:      Head: Normocephalic and atraumatic.   Eyes:      Conjunctiva/sclera: Conjunctivae normal.   Cardiovascular:      Rate and Rhythm: Normal rate and regular rhythm.      Heart sounds: No murmur heard.  Pulmonary:      Effort: Pulmonary effort is normal. No respiratory distress.      Breath sounds: Normal breath sounds.   Abdominal:      Palpations: Abdomen is soft.      Tenderness: There is no abdominal tenderness.   Musculoskeletal:         General: No swelling.      Cervical back: Neck supple.   Skin:     General: Skin is warm and dry.      Capillary Refill: Capillary refill takes less than 2 seconds.   Neurological:      General: No focal deficit present.      Mental Status: She is alert and oriented to person, place, and time. Mental status is at baseline.      Motor: No weakness.      Coordination: Coordination normal.      Comments: Face symmetric, tongue midline, 5/5 strength in the proximal and distal upper and lower extremities bilaterally with intact sensation to light touch throughout.  CN II-XII intact. normal gait with walker.     Psychiatric:         Mood and Affect: Mood normal.         ED Medications  Medications - No data to display    Diagnostic Studies  Results Reviewed       Procedure Component Value Units Date/Time    HS Troponin I 2hr [[id number]]  (Normal) Collected: 08/15/24 1536    Lab Status: Final result Specimen: Blood from Arm, Left  Updated: 08/15/24 1607     hs TnI 2hr 7 ng/L      Delta 2hr hsTnI 0 ng/L     Trauma tubes on hold [[id number]] Collected: 08/15/24 1318    Lab Status: Final result Specimen: Blood from Arm, Left Updated: 08/15/24 1501    Narrative:      The following orders were created for panel order Trauma tubes on hold.  Procedure                               Abnormality         Status                     ---------                               -----------         ------                     Light Blue Top on hold[[id number]]                           Final result                 Please view results for these tests on the individual orders.    HS Troponin 0hr (reflex protocol) [[id number]]  (Normal) Collected: 08/15/24 1318    Lab Status: Final result Specimen: Blood from Arm, Left Updated: 08/15/24 1351     hs TnI 0hr 7 ng/L     Comprehensive metabolic panel [[id number]] Collected: 08/15/24 1318    Lab Status: Final result Specimen: Blood from Arm, Left Updated: 08/15/24 1344     Sodium 139 mmol/L      Potassium 4.0 mmol/L      Chloride 106 mmol/L      CO2 25 mmol/L      ANION GAP 8 mmol/L      BUN 25 mg/dL      Creatinine 1.10 mg/dL      Glucose 114 mg/dL      Calcium 9.7 mg/dL      AST 15 U/L      ALT 21 U/L      Alkaline Phosphatase 90 U/L      Total Protein 7.3 g/dL      Albumin 4.1 g/dL      Total Bilirubin 0.46 mg/dL      eGFR 48 ml/min/1.73sq m     Narrative:      National Kid[employer] guidelines for Chronic Kidney Disease (CKD):     Stage 1 with normal or high GFR (GFR > 90 mL/min/1.73 square meters)    Stage 2 Mild CKD (GFR = 60-89 mL/min/1.73 square meters)    Stage 3A Moderate CKD (GFR = 45-59 mL/min/1.73 square meters)    Stage 3B Moderate CKD (GFR = 30-44 mL/min/1.73 square meters)    Stage 4 Severe CKD (GFR = 15-29 mL/min/1.73 square meters)    Stage 5 End Stage CKD (GFR <15 mL/min/1.73 square meters)  Note: GFR calculation is accurate only with a steady state creatinine    CBC and differential  [[id number]]  (Abnormal) Collected: 08/15/24 1318    Lab Status: Final result Specimen: Blood from Arm, Left Updated: 08/15/24 1335     WBC 11.57 Thousand/uL      RBC 3.75 Million/uL      Hemoglobin 12.2 g/dL      Hematocrit 37.3 %       fL      MCH 32.5 pg      MCHC 32.7 g/dL      RDW 14.0 %      MPV 9.2 fL      Platelets 277 Thousands/uL      nRBC 0 /100 WBCs      Segmented % 69 %      Immature Grans % 1 %      Lymphocytes % 12 %      Monocytes % 13 %      Eosinophils Relative 5 %      Basophils Relative 0 %      Absolute Neutrophils 7.96 Thousands/µL      Absolute Immature Grans 0.09 Thousand/uL      Absolute Lymphocytes 1.37 Thousands/µL      Absolute Monocytes 1.52 Thousand/µL      Eosinophils Absolute 0.58 Thousand/µL      Basophils Absolute 0.05 Thousands/µL     Fingerstick Glucose (POCT) [[id number]]  (Normal) Collected: 08/15/24 1318    Lab Status: Final result Specimen: Blood Updated: 08/15/24 1319     POC Glucose 111 mg/dl                    XR chest 1 view portable   ED Interpretation by Tripp Elias DO (08/15 1579)   NAD      Final Result by Magdy Eddy MD (08/15 1630)      No acute cardiopulmonary disease.            Workstation performed: [id number]               Procedures  ECG 12 Lead Documentation Only    Date/Time: 8/15/2024 5:18 PM    Performed by: Jude Lucio DO  Authorized by: Jude Lucio DO    Indications / Diagnosis:  Tingling sensation of the arm  ECG reviewed by me, the ED Provider: yes    Patient location:  ED  Previous ECG:     Previous ECG:  Compared to current    Comparison ECG info:  12/9/22    Similarity:  No change  Interpretation:     Interpretation: non-specific    Quality:     Tracing quality:  Limited by artifact  Rate:     ECG rate:  83    ECG rate assessment: normal    Rhythm:     Rhythm: sinus rhythm    QRS:     QRS axis:  Left    QRS intervals:  Normal  Conduction:     Conduction: normal    ST segments:     ST segments:  Normal  T waves:     T waves: normal    Comments:       Normal sinus rhythm with left ventricular hypertrophy.  No ST segment changes indicative of ischemia.  No major changes when compared to previous EKG        ED Course             HEART Risk Score      Flowsheet Row Most Recent Value   Heart Score Risk Calculator    History 0 Filed at: 08/15/2024 1608   ECG 0 Filed at: 08/15/2024 1608   Age 2 Filed at: 08/15/2024 1608   Risk Factors 2 Filed at: 08/15/2024 1608   Troponin 0 Filed at: 08/15/2024 1608   HEART Score 4 Filed at: 08/15/2024 1608                                  Medical Decision Making  74-year-old female presenting with altered sensation of the skin on the left side of her body  At risk for ACS, nerve impingement, anxiety attack, electrolyte abnormality, hypoglycemia, arrhythmia, pneumothorax, tensive urgency etc.  CBC shows mildly elevated leukocytosis otherwise unremarkable unlikely infectious  -Fingerstick glucose is 111-unlikely due to hypoglycemia  CBC is unremarkable and shows no electrolyte abnormality or endorgan damage  Troponin slightly elevated at 7, 2-hour delta 0  EKG shows left ventricular hypertrophy but no ischemic findings  X-ray of chest shows no acute cardiopulmonary process, no pneumothorax  Due to patient history, most likely due to anxiety/panic attack  Patient had 1 elevated high blood pressure reading however last blood pressure reading was normal.  Still possible to have hypertensive urgency  Told patient to follow-up with primary care physician to undergo evaluation for anxiety/panic attack and continue to monitor blood pressure  Patient feels well, no other symptoms  Patient likes plan and is ready to go home      Amount and/or Complexity of Data Reviewed  External Data Reviewed: labs, radiology and ECG.  Labs: ordered.  Radiology: ordered and independent interpretation performed.          Disposition  Final diagnoses:   Sensation disturbance of skin   Elevated blood pressure reading     Time reflects when diagnosis was  documented in both MDM as applicable and the Disposition within this note       Time User Action Codes Description Comment    8/15/2024  4:15 PM Jude Lucio Add [R20.9] Sensation disturbance of skin     8/15/2024  4:16 PM Jude Lucio Add [R03.0] Elevated blood pressure reading           ED Disposition       ED Disposition   Discharge    Condition   Stable    Date/Time   Thu Aug 15, 2024 1611    Comment   Janette Childsdariana discharge to home/self care.                   Follow-up Information       Follow up With Specialties Details Why Contact Brittany Smith DO Family Medicine Schedule an appointment as soon as possible for a visit in 1 day  [address]  [address]  [place] [address]  [phone]              Discharge Medication List as of 8/15/2024  4:16 PM        CONTINUE these medications which have NOT CHANGED    Details   acetaminophen (TYLENOL) 500 mg tablet Take 500 mg by mouth every 4 (four) hours, Historical Med      amLODIPine (NORVASC) 5 mg tablet TAKE 1 TABLET DAILY, Normal      amoxicillin (AMOXIL) 500 MG tablet Take 4 tablets by mouth in the morning For dental procedures, Starting Fri 1/20/2023, Historical Med      benazepril (LOTENSIN) 40 MG tablet TAKE 1 TABLET DAILY, Normal      !! cholecalciferol 1,000 units tablet Take 1,000 Units by mouth daily 15,000 units per week. 3 days a week takes per patient., Historical Med      !! Cholecalciferol 1.25 MG (84868 UT) TABS Take 1.25 mg by mouth daily, Historical Med      citalopram (CeleXA) 10 mg tablet TAKE 1 TABLET DAILY, Normal      Cranberry 300 MG tablet Take by mouth as needed (FOR UTI), Historical Med      estrogens, conjugated (Premarin) vaginal cream Insert 0.5 g into the vagina 3 (three) times a week, Starting Wed 8/16/2023, Normal      famotidine (PEPCID) 20 mg tablet Take 1 tablet (20 mg total) by mouth daily, Starting Fri 12/9/2022, Normal      fexofenadine (ALLEGRA) 180 MG tablet Take 180 mg by mouth as needed, Historical Med       hydrocortisone 0.5 % cream as needed, Historical Med      ketoconazole (NIZORAL) 2 % cream as needed, Starting Wed 3/13/2024, Historical Med      LACTOBACILLUS ACID-PECTIN PO Take 1 Capful by mouth as needed PROBIOTIC, Historical Med      metFORMIN (GLUCOPHAGE-XR) 500 mg 24 hr tablet Take 2 tablets (1,000 mg total) by mouth daily with dinner, Starting Thu 4/11/2024, Normal      multivitamin-iron-minerals-folic acid (CENTRUM) chewable tablet Chew 1 tablet daily, Historical Med      Naproxen Sodium 220 MG CAPS Take by mouth, Historical Med      nystatin (MYCOSTATIN) powder apply topically to the affected area twice daily, Historical Med      Omega 3 1200 MG CAPS Take by mouth, Historical Med      Synthroid 100 MCG tablet TAKE 1 TABLET DAILY, Normal       !! - Potential duplicate medications found. Please discuss with provider.        No discharge procedures on file.    PDMP Review       None             ED Provider  Attending physically available and evaluated Janette Little. I managed the patient along with the ED Attending.    Electronically Signed by           Jude Lucio DO  08/15/24 7251

## 2024-08-16 ENCOUNTER — VBI (OUTPATIENT)
Dept: FAMILY MEDICINE CLINIC | Facility: CLINIC | Age: 74
End: 2024-08-16

## 2024-08-16 NOTE — TELEPHONE ENCOUNTER
08/16/24 9:09 AM    Patient contacted post ED visit, [facility] department spoke with patient/caregiver and outreach was successful.    Thank you.  Michelle Taylor MA  [facility]

## 2024-08-16 NOTE — ED ATTENDING ATTESTATION
8/15/2024  ITripp DO, saw and evaluated the patient. I have discussed the patient with the resident/non-physician practitioner and agree with the resident's/non-physician practitioner's findings, Plan of Care, and MDM as documented in the resident's/non-physician practitioner's note, except where noted. All available labs and Radiology studies were reviewed.  I was present for key portions of any procedure(s) performed by the resident/non-physician practitioner and I was immediately available to provide assistance.       At this point I agree with the current assessment done in the Emergency Department.  I have conducted an independent evaluation of this patient a history and physical is as follows: 74-year-old female, past medical history per resident note, presenting to the emergency department with left upper chest and neck paresthesias that began after she became considerably aggravated due to psychosocial stressors in the home that family numbers improperly washing dishes, a broken coffee machine.  Notes that she is asymptomatic at this time.  Denies any chest pain, shortness of breath, focal neurologic deficit otherwise, change in vision.    Vital sign stable.  Patient resting comfortably.  Lungs clear auscultation.  No increased work of breathing.  Heart regular rhythm.  Abdomen soft nontender.  No change in sensation over the area aforementioned. AAOX4. CN intact. Gross vision intact all 4 quadrants. Cerebellar signs with finger to nose and heel to shin intact. All extremities 5/5 strength. Gross sensation appreciated face and extremities.     74-year-old female with unclear etiology of paresthesias over multiple specific dermatomes of the left upper aspect of the chest.  No signs or symptoms of TIA or CVA.  ACS workup negative.  Chest x-ray without acute process.  Patient states similar processes have occurred in the past secondary to anxiety provoking events.  Okay to follow-up with PCP. Reviewed  all findings both relevant and incidental with the patient at bedside. Pt verbalized understanding of findings, neccesary follow up, return to ED precautions. Pt agreed to review today's findings with their primary care provider. Pt non-toxic appearing upon discharge.       ED Course         Critical Care Time  Procedures

## 2024-08-17 LAB
ATRIAL RATE: 83 BPM
P AXIS: 53 DEGREES
PR INTERVAL: 140 MS
QRS AXIS: -17 DEGREES
QRSD INTERVAL: 92 MS
QT INTERVAL: 354 MS
QTC INTERVAL: 415 MS
T WAVE AXIS: 25 DEGREES
VENTRICULAR RATE: 83 BPM

## 2024-08-17 PROCEDURE — 93010 ELECTROCARDIOGRAM REPORT: CPT | Performed by: INTERNAL MEDICINE

## 2024-08-19 ENCOUNTER — OFFICE VISIT (OUTPATIENT)
Dept: FAMILY MEDICINE CLINIC | Facility: CLINIC | Age: 74
End: 2024-08-19
Payer: COMMERCIAL

## 2024-08-19 ENCOUNTER — TELEPHONE (OUTPATIENT)
Dept: HEMATOLOGY ONCOLOGY | Facility: CLINIC | Age: 74
End: 2024-08-19

## 2024-08-19 VITALS
SYSTOLIC BLOOD PRESSURE: 132 MMHG | OXYGEN SATURATION: 97 % | TEMPERATURE: 97.8 F | BODY MASS INDEX: 46.56 KG/M2 | DIASTOLIC BLOOD PRESSURE: 70 MMHG | HEART RATE: 77 BPM | WEIGHT: 253 LBS | HEIGHT: 62 IN | RESPIRATION RATE: 17 BRPM

## 2024-08-19 DIAGNOSIS — I10 ESSENTIAL HYPERTENSION: Primary | ICD-10-CM

## 2024-08-19 DIAGNOSIS — E53.8 VITAMIN B12 DEFICIENCY: ICD-10-CM

## 2024-08-19 DIAGNOSIS — C82.51 DIFFUSE FOLLICLE CENTER LYMPHOMA OF LYMPH NODES OF NECK (HCC): ICD-10-CM

## 2024-08-19 DIAGNOSIS — E55.9 VITAMIN D DEFICIENCY: ICD-10-CM

## 2024-08-19 DIAGNOSIS — M16.12 PRIMARY OSTEOARTHRITIS OF LEFT HIP: ICD-10-CM

## 2024-08-19 DIAGNOSIS — Z79.899 MEDICATION MANAGEMENT: ICD-10-CM

## 2024-08-19 DIAGNOSIS — N18.31 STAGE 3A CHRONIC KIDNEY DISEASE (HCC): ICD-10-CM

## 2024-08-19 DIAGNOSIS — Z00.00 MEDICARE ANNUAL WELLNESS VISIT, SUBSEQUENT: ICD-10-CM

## 2024-08-19 DIAGNOSIS — C85.90 LYMPHOMA, UNSPECIFIED BODY REGION, UNSPECIFIED LYMPHOMA TYPE (HCC): ICD-10-CM

## 2024-08-19 DIAGNOSIS — R79.9 ABNORMAL FINDING OF BLOOD CHEMISTRY, UNSPECIFIED: ICD-10-CM

## 2024-08-19 DIAGNOSIS — Z71.2 ENCOUNTER TO DISCUSS TEST RESULTS: ICD-10-CM

## 2024-08-19 DIAGNOSIS — E03.9 ACQUIRED HYPOTHYROIDISM: ICD-10-CM

## 2024-08-19 DIAGNOSIS — E11.9 TYPE 2 DIABETES MELLITUS WITHOUT COMPLICATION, WITHOUT LONG-TERM CURRENT USE OF INSULIN (HCC): ICD-10-CM

## 2024-08-19 DIAGNOSIS — E11.22 TYPE 2 DIABETES MELLITUS WITH STAGE 3A CHRONIC KIDNEY DISEASE, WITHOUT LONG-TERM CURRENT USE OF INSULIN (HCC): ICD-10-CM

## 2024-08-19 DIAGNOSIS — F32.A CHRONIC DEPRESSION: ICD-10-CM

## 2024-08-19 DIAGNOSIS — E66.01 MORBID OBESITY (HCC): ICD-10-CM

## 2024-08-19 DIAGNOSIS — Z00.00 ENCOUNTER FOR MEDICARE ANNUAL WELLNESS EXAM: ICD-10-CM

## 2024-08-19 DIAGNOSIS — R69 MULTIPLE MEDICAL PROBLEMS: ICD-10-CM

## 2024-08-19 DIAGNOSIS — N18.31 TYPE 2 DIABETES MELLITUS WITH STAGE 3A CHRONIC KIDNEY DISEASE, WITHOUT LONG-TERM CURRENT USE OF INSULIN (HCC): ICD-10-CM

## 2024-08-19 PROCEDURE — G0439 PPPS, SUBSEQ VISIT: HCPCS | Performed by: FAMILY MEDICINE

## 2024-08-19 PROCEDURE — 99215 OFFICE O/P EST HI 40 MIN: CPT | Performed by: FAMILY MEDICINE

## 2024-08-19 RX ORDER — VIT A/VIT C/VIT E/ZINC/COPPER 2148-113
1 TABLET ORAL DAILY
COMMUNITY

## 2024-08-19 RX ORDER — CITALOPRAM HYDROBROMIDE 20 MG/1
20 TABLET ORAL DAILY
Qty: 90 TABLET | Refills: 3 | Status: SHIPPED | OUTPATIENT
Start: 2024-08-19

## 2024-08-19 NOTE — TELEPHONE ENCOUNTER
Spoke with patient regarding need to reschedule upcoming appointment with Dr. Israel from 10/14 to 11/4 at 10:20 due to provider availability.  Patient verbalized understanding and asked to be placed on waitlist.

## 2024-08-19 NOTE — PROGRESS NOTES
Ambulatory Visit  Name: Janette Little      : 1950      MRN: [id number]  Encounter Provider: SHANTELLE Smith DO  Encounter Date: 2024   Encounter department: [facility]    Assessment & Plan  Essential hypertension    Orders:    Hemoglobin A1C; Future    Comprehensive metabolic panel; Future    CBC and differential; Future    TSH, 3rd generation with Free T4 reflex; Future    Lipid Panel with Direct LDL reflex; Future    Microalbumin, Random Urine (W/Creatinine) ([facility] ONLY); Future    Microalbumin, Random Urine (W/Creatinine) ([facility] ONLY)    Acquired hypothyroidism    Orders:    TSH, 3rd generation with Free T4 reflex; Future    Stage 3a chronic kidney disease (HCC)  Lab Results   Component Value Date    EGFR 48 08/15/2024    EGFR 49 2024    EGFR 51 2024    CREATININE 1.10 08/15/2024    CREATININE 1.08 2024    CREATININE 1.06 2024       Orders:    Comprehensive metabolic panel; Future    Lymphoma, unspecified body region, unspecified lymphoma type (HCC)    Orders:    Hemoglobin A1C; Future    Comprehensive metabolic panel; Future    CBC and differential; Future    TSH, 3rd generation with Free T4 reflex; Future    Lipid Panel with Direct LDL reflex; Future    Microalbumin, Random Urine (W/Creatinine) ([facility] ONLY); Future    Microalbumin, Random Urine (W/Creatinine) ([facility] ONLY)    Medicare annual wellness visit, subsequent         Medication management         Chronic depression    Orders:    citalopram (CeleXA) 20 mg tablet; Take 1 tablet (20 mg total) by mouth daily    Morbid obesity (HCC)    Orders:    Hemoglobin A1C; Future    Comprehensive metabolic panel; Future    CBC and differential; Future    TSH, 3rd generation with Free T4 reflex; Future    Lipid Panel with Direct LDL reflex; Future    Microalbumin, Random Urine (W/Creatinine) ([facility] ONLY); Future    Microalbumin, Random Urine (W/Creatinine) ([facility] ONLY)    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)    Orders:    Hemoglobin A1C; Future    Comprehensive metabolic panel; Future    CBC and differential; Future    TSH, 3rd generation with Free T4 reflex; Future    Lipid Panel with Direct LDL reflex; Future    Microalbumin, Random Urine (W/Creatinine) ([facility] ONLY); Future    Microalbumin, Random Urine (W/Creatinine) ([facility] ONLY)    Type 2 diabetes mellitus with stage 3a chronic kidney disease, without long-term current use of insulin (Formerly [facility])    Lab Results   Component Value Date    HGBA1C 6.9 (A) 04/11/2024       Orders:    Hemoglobin A1C; Future    Comprehensive metabolic panel; Future    CBC and differential; Future    TSH, 3rd generation with Free T4 reflex; Future    Lipid Panel with Direct LDL reflex; Future    Microalbumin, Random Urine (W/Creatinine) ([facility] ONLY); Future    Microalbumin, Random Urine (W/Creatinine) ([facility] ONLY)    Type 2 diabetes mellitus without complication, without long-term current use of insulin (Formerly [facility])    Lab Results   Component Value Date    HGBA1C 6.9 (A) 04/11/2024            Primary osteoarthritis of left hip         Vitamin B12 deficiency    Orders:    Vitamin B12; Future    Vitamin D deficiency    Orders:    Vitamin D 25 hydroxy; Future    Encounter to discuss test results         Encounter for Medicare annual wellness exam    Orders:    Lipid panel; Future    UA w Reflex to Microscopic w Reflex to Culture; Future    Vitamin B12; Future    Vitamin D 25 hydroxy; Future    Multiple medical problems    Orders:    Lipid panel; Future    UA w Reflex to Microscopic w Reflex to Culture; Future    Vitamin B12; Future    Vitamin D 25 hydroxy; Future    Abnormal finding of blood chemistry, unspecified    Orders:    Lipid panel; Future    Chronic depression    Orders:    citalopram (CeleXA) 20 mg tablet; Take 1 tablet (20 mg total) by mouth daily           1. Essential hypertension  Comments:  BP controlled 132/70  Assessment & Plan:    Orders:    Hemoglobin A1C; Future     Comprehensive metabolic panel; Future    CBC and differential; Future    TSH, 3rd generation with Free T4 reflex; Future    Lipid Panel with Direct LDL reflex; Future    Microalbumin, Random Urine (W/Creatinine) ([facility] ONLY); Future    Microalbumin, Random Urine (W/Creatinine) ([facility] ONLY)    Orders:  -     Hemoglobin A1C; Future; Expected date: 08/19/2024  -     Comprehensive metabolic panel; Future; Expected date: 08/19/2024  -     CBC and differential; Future; Expected date: 08/19/2024  -     TSH, 3rd generation with Free T4 reflex; Future; Expected date: 08/19/2024  -     Lipid Panel with Direct LDL reflex; Future; Expected date: 08/19/2024  -     Microalbumin, Random Urine (W/Creatinine) ([facility] ONLY); Future; Expected date: 08/19/2024  -     Microalbumin, Random Urine (W/Creatinine) ([facility] ONLY)  2. Acquired hypothyroidism  Comments:  doing well on synthroid  Assessment & Plan:    Orders:    TSH, 3rd generation with Free T4 reflex; Future    Orders:  -     TSH, 3rd generation with Free T4 reflex; Future; Expected date: 08/19/2024  3. Stage 3a chronic kidney disease (HCC)  Assessment & Plan:  Lab Results   Component Value Date    EGFR 48 08/15/2024    EGFR 49 04/05/2024    EGFR 51 01/08/2024    CREATININE 1.10 08/15/2024    CREATININE 1.08 04/05/2024    CREATININE 1.06 01/08/2024       Orders:    Comprehensive metabolic panel; Future    Orders:  -     Comprehensive metabolic panel; Future; Expected date: 08/19/2024  4. Lymphoma, unspecified body region, unspecified lymphoma type (HCC)  Comments:  in resmission and stable  Assessment & Plan:    Orders:    Hemoglobin A1C; Future    Comprehensive metabolic panel; Future    CBC and differential; Future    TSH, 3rd generation with Free T4 reflex; Future    Lipid Panel with Direct LDL reflex; Future    Microalbumin, Random Urine (W/Creatinine) ([facility] ONLY); Future    Microalbumin, Random Urine (W/Creatinine) ([facility] ONLY)    Orders:  -     Hemoglobin A1C; Future;  Expected date: 08/19/2024  -     Comprehensive metabolic panel; Future; Expected date: 08/19/2024  -     CBC and differential; Future; Expected date: 08/19/2024  -     TSH, 3rd generation with Free T4 reflex; Future; Expected date: 08/19/2024  -     Lipid Panel with Direct LDL reflex; Future; Expected date: 08/19/2024  -     Microalbumin, Random Urine (W/Creatinine) ([facility] ONLY); Future; Expected date: 08/19/2024  -     Microalbumin, Random Urine (W/Creatinine) ([facility] ONLY)  5. Medicare annual wellness visit, subsequent  Comments:  completed today, , Ed, present  6. Medication management  Comments:  All meds reveiwed and discussed with pt and husb.   Will in Celexa 10 mg up to 2 tabs OD  Assessment & Plan:         7. Chronic depression  Comments:  Overwhelmed by her wt, R knee pain, polyarthritis, etc.  Inc Celexa up to 20 mg OD  Assessment & Plan:    Orders:    citalopram (CeleXA) 20 mg tablet; Take 1 tablet (20 mg total) by mouth daily      Orders:  -     citalopram (CeleXA) 20 mg tablet; Take 1 tablet (20 mg total) by mouth daily  8. Morbid obesity (HCC)  Comments:  BMI 46 - lengthy discussion  Must lose wt  BUT, she is neg on Mounjaro!!  Assessment & Plan:    Orders:    Hemoglobin A1C; Future    Comprehensive metabolic panel; Future    CBC and differential; Future    TSH, 3rd generation with Free T4 reflex; Future    Lipid Panel with Direct LDL reflex; Future    Microalbumin, Random Urine (W/Creatinine) ([facility] ONLY); Future    Microalbumin, Random Urine (W/Creatinine) ([facility] ONLY)    Orders:  -     Hemoglobin A1C; Future; Expected date: 08/19/2024  -     Comprehensive metabolic panel; Future; Expected date: 08/19/2024  -     CBC and differential; Future; Expected date: 08/19/2024  -     TSH, 3rd generation with Free T4 reflex; Future; Expected date: 08/19/2024  -     Lipid Panel with Direct LDL reflex; Future; Expected date: 08/19/2024  -     Microalbumin, Random Urine (W/Creatinine) ([facility] ONLY); Future;  Expected date: 08/19/2024  -     Microalbumin, Random Urine (W/Creatinine) ([facility] ONLY)  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)  Assessment & Plan:    Orders:    Hemoglobin A1C; Future    Comprehensive metabolic panel; Future    CBC and differential; Future    TSH, 3rd generation with Free T4 reflex; Future    Lipid Panel with Direct LDL reflex; Future    Microalbumin, Random Urine (W/Creatinine) ([facility] ONLY); Future    Microalbumin, Random Urine (W/Creatinine) ([facility] ONLY)    Orders:  -     Hemoglobin A1C; Future; Expected date: 08/19/2024  -     Comprehensive metabolic panel; Future; Expected date: 08/19/2024  -     CBC and differential; Future; Expected date: 08/19/2024  -     TSH, 3rd generation with Free T4 reflex; Future; Expected date: 08/19/2024  -     Lipid Panel with Direct LDL reflex; Future; Expected date: 08/19/2024  -     Microalbumin, Random Urine (W/Creatinine) ([facility] ONLY); Future; Expected date: 08/19/2024  -     Microalbumin, Random Urine (W/Creatinine) ([facility] ONLY)  10. Type 2 diabetes mellitus with stage 3a chronic kidney disease, without long-term current use of insulin (Formerly [facility])  -     Hemoglobin A1C; Future; Expected date: 08/19/2024  -     Comprehensive metabolic panel; Future; Expected date: 08/19/2024  -     CBC and differential; Future; Expected date: 08/19/2024  -     TSH, 3rd generation with Free T4 reflex; Future; Expected date: 08/19/2024  -     Lipid Panel with Direct LDL reflex; Future; Expected date: 08/19/2024  -     Microalbumin, Random Urine (W/Creatinine) ([facility] ONLY); Future; Expected date: 08/19/2024  -     Microalbumin, Random Urine (W/Creatinine) ([facility] ONLY)  11. Type 2 diabetes mellitus without complication, without long-term current use of insulin ([facility]ly [facility])  Assessment & Plan:    Lab Results   Component Value Date    HGBA1C 6.9 (A) 04/11/2024            12. Primary osteoarthritis of left hip  Assessment & Plan:         13. Vitamin B12 deficiency  -      "Vitamin B12; Future; Expected date: 08/20/2024  14. Vitamin D deficiency  Assessment & Plan:    Orders:    Vitamin D 25 hydroxy; Future    Orders:  -     Vitamin D 25 hydroxy; Future; Expected date: 08/20/2024  15. Encounter to discuss test results  Comments:  All recent and past labs of 2 yrs reviewed with pt  Assessment & Plan:         16. Encounter for Medicare annual wellness exam  Comments:  Completed today  Orders:  -     Lipid panel; Future; Expected date: 08/20/2024  -     UA w Reflex to Microscopic w Reflex to Culture; Future  -     Vitamin B12; Future; Expected date: 08/20/2024  -     Vitamin D 25 hydroxy; Future; Expected date: 08/20/2024  17. Multiple medical problems  Comments:  Many issues medically are overwhelming her   Discussed  Orders:  -     Lipid panel; Future; Expected date: 08/20/2024  -     UA w Reflex to Microscopic w Reflex to Culture; Future  -     Vitamin B12; Future; Expected date: 08/20/2024  -     Vitamin D 25 hydroxy; Future; Expected date: 08/20/2024  18. Abnormal finding of blood chemistry, unspecified  -     Lipid panel; Future; Expected date: 08/20/2024  19. Chronic depression  Comments:  Feels achy and pain \"all over\"  Assessment & Plan:    Orders:    citalopram (CeleXA) 20 mg tablet; Take 1 tablet (20 mg total) by mouth daily      Orders:  -     citalopram (CeleXA) 20 mg tablet; Take 1 tablet (20 mg total) by mouth daily      History of Present Illness     History of Present Illness  The patient is a 74-year-old female who presents for evaluation of multiple medical concerns. She is accompanied by an adult male.    She reports experiencing depression, which she attributes to joint pain. She expresses concern about potential memory issues. She is currently taking citalopram 10 mg once daily and is considering increasing the dosage to twice daily. She also takes Naprosyn as needed for pain relief. She is also dealing with anxiety.    She has pain in her neck, shoulders, and arms, " and takes Tylenol Extra Strength as needed. She used to enjoy swimming but now struggles to put on her bathing suit due to body pain.    She has been diagnosed with mild arthritis in her ankle but reports no issues with her feet. She experiences ingrown toenails approximately every 9 weeks, which require professional removal. She had discontinued metformin due to diarrhea, but a nutritionist advised her to reintroduce it gradually. She maintains a low food intake and has lost 20 pounds since starting gym workouts. She also takes calcium and vitamin D supplements.    She is seeking a specialist for persistent earaches. She has difficulty hearing and experiences pain that extends into her neck. She has had ear issues since childhood. She previously saw Dr. Poon, who discovered her lymphoma, but she did not follow up with him regarding her ear problems.    She was informed that her white blood cell count was slightly elevated and her red blood cell count was slightly decreased. She is interested in undergoing comprehensive blood and urine tests.    She has dry, scaly skin but is not currently using any treatments. She has experienced large bites in the past, but it is unclear what caused them. She does not have a living will.    She has trouble with stairs because of her knees. She lives in a 2-story house and has to climb 16 steps up and down. She usually tries at least once a week to get a shower, but she does sponge bathing in between. She has carotid artery issues on one side. She had 2 EKGs on Friday.     Review of Systems   Constitutional:  Negative for activity change, appetite change, chills, diaphoresis, fatigue and fever.   HENT:  Negative for congestion, ear discharge, ear pain, facial swelling, nosebleeds, sore throat, tinnitus, trouble swallowing and voice change.    Eyes:  Negative for photophobia, pain, discharge, redness, itching and visual disturbance.   Respiratory:  Negative for apnea, cough,  "choking, chest tightness and shortness of breath.    Cardiovascular:  Negative for chest pain, palpitations and leg swelling.   Gastrointestinal:  Negative for abdominal distention, abdominal pain, blood in stool, constipation, diarrhea, nausea and vomiting.   Endocrine: Negative for cold intolerance, heat intolerance, polydipsia, polyphagia and polyuria.   Genitourinary:  Negative for decreased urine volume, difficulty urinating, dysuria, enuresis, frequency, hematuria, pelvic pain, urgency and vaginal bleeding.   Musculoskeletal:  Negative for arthralgias, back pain, gait problem, joint swelling, neck pain and neck stiffness.   Skin:  Negative for color change, pallor and rash.   Allergic/Immunologic: Negative for immunocompromised state.   Neurological:  Negative for dizziness, seizures, facial asymmetry, light-headedness, numbness and headaches.   Hematological:  Negative for adenopathy.   Psychiatric/Behavioral:  Negative for agitation, behavioral problems, confusion, decreased concentration, dysphoric mood and hallucinations.      Objective     /70 (BP Location: Left arm, Patient Position: Sitting, Cuff Size: Large)   Pulse 77   Temp 97.8 °F (36.6 °C) (Temporal)   Resp 17   Ht 5' 2\" (1.575 m)   Wt 115 kg (253 lb)   LMP  (LMP Unknown)   SpO2 97%   BMI 46.27 kg/m²     Physical Exam  Vital Signs  BMI is 46. Weight is 253 pounds.  Physical Exam  Vitals and nursing note reviewed.   Constitutional:       General: She is not in acute distress.     Appearance: Normal appearance. She is obese. She is not ill-appearing, toxic-appearing or diaphoretic.   HENT:      Head: Normocephalic.      Nose: Nose normal.      Mouth/Throat:      Mouth: Mucous membranes are moist.      Pharynx: No oropharyngeal exudate or posterior oropharyngeal erythema.   Eyes:      Extraocular Movements: Extraocular movements intact.      Pupils: Pupils are equal, round, and reactive to light.   Cardiovascular:      Rate and Rhythm: " Normal rate and regular rhythm.      Pulses: Normal pulses.      Heart sounds: Normal heart sounds.      No gallop.   Pulmonary:      Effort: No respiratory distress.      Breath sounds: Normal breath sounds. No wheezing, rhonchi or rales.   Abdominal:      General: Abdomen is flat.   Musculoskeletal:         General: No tenderness.      Cervical back: Normal range of motion and neck supple.      Right lower leg: No edema.      Left lower leg: No edema.   Skin:     General: Skin is warm.   Neurological:      General: No focal deficit present.      Mental Status: She is alert and oriented to person, place, and time. Mental status is at baseline.   Psychiatric:         Mood and Affect: Mood normal.         Behavior: Behavior normal.         Thought Content: Thought content normal.         Judgment: Judgment normal.       Administrative Statements   I have spent a total time of 47 minutes in caring for this patient on the day of the visit/encounter including Diagnostic results, Prognosis, Risks and benefits of tx options, Instructions for management, Patient and family education, Importance of tx compliance, Risk factor reductions, Impressions, Counseling / Coordination of care, Documenting in the medical record, Reviewing / ordering tests, medicine, procedures  , and Obtaining or reviewing history  .

## 2024-08-19 NOTE — PATIENT INSTRUCTIONS
Medicare Preventive Visit Patient Instructions  Thank you for completing your Welcome to Medicare Visit or Medicare Annual Wellness Visit today. Your next wellness visit will be due in one year (8/20/2025).  The screening/preventive services that you may require over the next 5-10 years are detailed below. Some tests may not apply to you based off risk factors and/or age. Screening tests ordered at today's visit but not completed yet may show as past due. Also, please note that scanned in results may not display below.  Preventive Screenings:  Service Recommendations Previous Testing/Comments   Colorectal Cancer Screening  * Colonoscopy    * Fecal Occult Blood Test (FOBT)/Fecal Immunochemical Test (FIT)  * Fecal DNA/Cologuard Test  * Flexible Sigmoidoscopy Age: 45-75 years old   Colonoscopy: every 10 years (may be performed more frequently if at higher risk)  OR  FOBT/FIT: every 1 year  OR  Cologuard: every 3 years  OR  Sigmoidoscopy: every 5 years  Screening may be recommended earlier than age 45 if at higher risk for colorectal cancer. Also, an individualized decision between you and your healthcare provider will decide whether screening between the ages of 76-85 would be appropriate. Colonoscopy: 07/30/2020  FOBT/FIT: Not on file  Cologuard: Not on file  Sigmoidoscopy: Not on file          Breast Cancer Screening Age: 40+ years old  Frequency: every 1-2 years  Not required if history of left and right mastectomy Mammogram: 01/23/2024    Screening Current  Screening Current   Cervical Cancer Screening Between the ages of 21-29, pap smear recommended once every 3 years.   Between the ages of 30-65, can perform pap smear with HPV co-testing every 5 years.   Recommendations may differ for women with a history of total hysterectomy, cervical cancer, or abnormal pap smears in past. Pap Smear: 11/10/2022    Screening Not Indicated  Screening Not Indicated   Hepatitis C Screening Once for adults born between 1945 and  1965  More frequently in patients at high risk for Hepatitis C Hep C Antibody: 05/04/2021    Screening Current  Screening Current   Diabetes Screening 1-2 times per year if you're at risk for diabetes or have pre-diabetes Fasting glucose: 117 mg/dL (4/5/2024)  A1C: 6.9 (4/11/2024)  Screening Not Indicated  History Diabetes  Screening Not Indicated  History Diabetes   Cholesterol Screening Once every 5 years if you don't have a lipid disorder. May order more often based on risk factors. Lipid panel: 01/08/2024    Screening Current  Screening Current     Other Preventive Screenings Covered by Medicare:  Abdominal Aortic Aneurysm (AAA) Screening: covered once if your at risk. You're considered to be at risk if you have a family history of AAA.  Lung Cancer Screening: covers low dose CT scan once per year if you meet all of the following conditions: (1) Age 55-77; (2) No signs or symptoms of lung cancer; (3) Current smoker or have quit smoking within the last 15 years; (4) You have a tobacco smoking history of at least 20 pack years (packs per day multiplied by number of years you smoked); (5) You get a written order from a healthcare provider.  Glaucoma Screening: covered annually if you're considered high risk: (1) You have diabetes OR (2) Family history of glaucoma OR (3)  aged 50 and older OR (4)  American aged 65 and older  Osteoporosis Screening: covered every 2 years if you meet one of the following conditions: (1) You're estrogen deficient and at risk for osteoporosis based off medical history and other findings; (2) Have a vertebral abnormality; (3) On glucocorticoid therapy for more than 3 months; (4) Have primary hyperparathyroidism; (5) On osteoporosis medications and need to assess response to drug therapy.   Last bone density test (DXA Scan): Not on file.  HIV Screening: covered annually if you're between the age of 15-65. Also covered annually if you are younger than 15 and older  than 65 with risk factors for HIV infection. For pregnant patients, it is covered up to 3 times per pregnancy.    Immunizations:  Immunization Recommendations   Influenza Vaccine Annual influenza vaccination during flu season is recommended for all persons aged >= 6 months who do not have contraindications   Pneumococcal Vaccine   * Pneumococcal conjugate vaccine = PCV13 (Prevnar 13), PCV15 (Vaxneuvance), PCV20 (Prevnar 20)  * Pneumococcal polysaccharide vaccine = PPSV23 (Pneumovax) Adults 19-63 yo with certain risk factors or if 65+ yo  If never received any pneumonia vaccine: recommend Prevnar 20 (PCV20)  Give PCV20 if previously received 1 dose of PCV13 or PPSV23   Hepatitis B Vaccine 3 dose series if at intermediate or high risk (ex: diabetes, end stage renal disease, liver disease)   Respiratory syncytial virus (RSV) Vaccine - COVERED BY MEDICARE PART D  * RSVPreF3 (Arexvy) CDC recommends that adults 60 years of age and older may receive a single dose of RSV vaccine using shared clinical decision-making (SCDM)   Tetanus (Td) Vaccine - COST NOT COVERED BY MEDICARE PART B Following completion of primary series, a booster dose should be given every 10 years to maintain immunity against tetanus. Td may also be given as tetanus wound prophylaxis.   Tdap Vaccine - COST NOT COVERED BY MEDICARE PART B Recommended at least once for all adults. For pregnant patients, recommended with each pregnancy.   Shingles Vaccine (Shingrix) - COST NOT COVERED BY MEDICARE PART B  2 shot series recommended in those 19 years and older who have or will have weakened immune systems or those 50 years and older     Health Maintenance Due:      Topic Date Due   • Breast Cancer Screening: Mammogram  01/23/2025   • Hepatitis C Screening  Completed   • Colorectal Cancer Screening  Discontinued     Immunizations Due:      Topic Date Due   • COVID-19 Vaccine (7 - 2023-24 season) 01/05/2024   • Influenza Vaccine (1) 09/01/2024     Advance  Directives   What are advance directives?  Advance directives are legal documents that state your wishes and plans for medical care. These plans are made ahead of time in case you lose your ability to make decisions for yourself. Advance directives can apply to any medical decision, such as the treatments you want, and if you want to donate organs.   What are the types of advance directives?  There are many types of advance directives, and each state has rules about how to use them. You may choose a combination of any of the following:  Living will:  This is a written record of the treatment you want. You can also choose which treatments you do not want, which to limit, and which to stop at a certain time. This includes surgery, medicine, IV fluid, and tube feedings.   Durable power of  for healthcare (DPAHC):  This is a written record that states who you want to make healthcare choices for you when you are unable to make them for yourself. This person, called a proxy, is usually a family member or a friend. You may choose more than 1 proxy.  Do not resuscitate (DNR) order:  A DNR order is used in case your heart stops beating or you stop breathing. It is a request not to have certain forms of treatment, such as CPR. A DNR order may be included in other types of advance directives.  Medical directive:  This covers the care that you want if you are in a coma, near death, or unable to make decisions for yourself. You can list the treatments you want for each condition. Treatment may include pain medicine, surgery, blood transfusions, dialysis, IV or tube feedings, and a ventilator (breathing machine).  Values history:  This document has questions about your views, beliefs, and how you feel and think about life. This information can help others choose the care that you would choose.  Why are advance directives important?  An advance directive helps you control your care. Although spoken wishes may be used, it  is better to have your wishes written down. Spoken wishes can be misunderstood, or not followed. Treatments may be given even if you do not want them. An advance directive may make it easier for your family to make difficult choices about your care.   Weight Management   Why it is important to manage your weight:  Being overweight increases your risk of health conditions such as heart disease, high blood pressure, type 2 diabetes, and certain types of cancer. It can also increase your risk for osteoarthritis, sleep apnea, and other respiratory problems. Aim for a slow, steady weight loss. Even a small amount of weight loss can lower your risk of health problems.  How to lose weight safely:  A safe and healthy way to lose weight is to eat fewer calories and get regular exercise. You can lose up about 1 pound a week by decreasing the number of calories you eat by 500 calories each day.   Healthy meal plan for weight management:  A healthy meal plan includes a variety of foods, contains fewer calories, and helps you stay healthy. A healthy meal plan includes the following:  Eat whole-grain foods more often.  A healthy meal plan should contain fiber. Fiber is the part of grains, fruits, and vegetables that is not broken down by your body. Whole-grain foods are healthy and provide extra fiber in your diet. Some examples of whole-grain foods are whole-wheat breads and pastas, oatmeal, brown rice, and bulgur.  Eat a variety of vegetables every day.  Include dark, leafy greens such as spinach, kale, rex greens, and mustard greens. Eat yellow and orange vegetables such as carrots, sweet potatoes, and winter squash.   Eat a variety of fruits every day.  Choose fresh or canned fruit (canned in its own juice or light syrup) instead of juice. Fruit juice has very little or no fiber.  Eat low-fat dairy foods.  Drink fat-free (skim) milk or 1% milk. Eat fat-free yogurt and low-fat cottage cheese. Try low-fat cheeses such as  mozzarella and other reduced-fat cheeses.  Choose meat and other protein foods that are low in fat.  Choose beans or other legumes such as split peas or lentils. Choose fish, skinless poultry (chicken or turkey), or lean cuts of red meat (beef or pork). Before you cook meat or poultry, cut off any visible fat.   Use less fat and oil.  Try baking foods instead of frying them. Add less fat, such as margarine, sour cream, regular salad dressing and mayonnaise to foods. Eat fewer high-fat foods. Some examples of high-fat foods include french fries, doughnuts, ice cream, and cakes.  Eat fewer sweets.  Limit foods and drinks that are high in sugar. This includes candy, cookies, regular soda, and sweetened drinks.  Exercise:  Exercise at least 30 minutes per day on most days of the week. Some examples of exercise include walking, biking, dancing, and swimming. You can also fit in more physical activity by taking the stairs instead of the elevator or parking farther away from stores. Ask your healthcare provider about the best exercise plan for you.      © Copyright [employer] 2018 Information is for End User's use only and may not be sold, redistributed or otherwise used for commercial purposes. All illustrations and images included in CareNotes® are the copyrighted property of [employer] Inc. or [employer]      Medicare Preventive Visit Patient Instructions  Thank you for completing your Welcome to Medicare Visit or Medicare Annual Wellness Visit today. Your next wellness visit will be due in one year (8/20/2025).  The screening/preventive services that you may require over the next 5-10 years are detailed below. Some tests may not apply to you based off risk factors and/or age. Screening tests ordered at today's visit but not completed yet may show as past due. Also, please note that scanned in results may not display below.  Preventive Screenings:  Service Recommendations Previous Testing/Comments    Colorectal Cancer Screening  * Colonoscopy    * Fecal Occult Blood Test (FOBT)/Fecal Immunochemical Test (FIT)  * Fecal DNA/Cologuard Test  * Flexible Sigmoidoscopy Age: 45-75 years old   Colonoscopy: every 10 years (may be performed more frequently if at higher risk)  OR  FOBT/FIT: every 1 year  OR  Cologuard: every 3 years  OR  Sigmoidoscopy: every 5 years  Screening may be recommended earlier than age 45 if at higher risk for colorectal cancer. Also, an individualized decision between you and your healthcare provider will decide whether screening between the ages of 76-85 would be appropriate. Colonoscopy: 07/30/2020  FOBT/FIT: Not on file  Cologuard: Not on file  Sigmoidoscopy: Not on file          Breast Cancer Screening Age: 40+ years old  Frequency: every 1-2 years  Not required if history of left and right mastectomy Mammogram: 01/23/2024    Screening Current  Screening Current   Cervical Cancer Screening Between the ages of 21-29, pap smear recommended once every 3 years.   Between the ages of 30-65, can perform pap smear with HPV co-testing every 5 years.   Recommendations may differ for women with a history of total hysterectomy, cervical cancer, or abnormal pap smears in past. Pap Smear: 11/10/2022    Screening Not Indicated  Screening Not Indicated   Hepatitis C Screening Once for adults born between 1945 and 1965  More frequently in patients at high risk for Hepatitis C Hep C Antibody: 05/04/2021    Screening Current  Screening Current   Diabetes Screening 1-2 times per year if you're at risk for diabetes or have pre-diabetes Fasting glucose: 117 mg/dL (4/5/2024)  A1C: 6.9 (4/11/2024)  Screening Not Indicated  History Diabetes  Screening Not Indicated  History Diabetes   Cholesterol Screening Once every 5 years if you don't have a lipid disorder. May order more often based on risk factors. Lipid panel: 01/08/2024    Screening Current  Screening Current     Other Preventive Screenings Covered by  Medicare:  Abdominal Aortic Aneurysm (AAA) Screening: covered once if your at risk. You're considered to be at risk if you have a family history of AAA.  Lung Cancer Screening: covers low dose CT scan once per year if you meet all of the following conditions: (1) Age 55-77; (2) No signs or symptoms of lung cancer; (3) Current smoker or have quit smoking within the last 15 years; (4) You have a tobacco smoking history of at least 20 pack years (packs per day multiplied by number of years you smoked); (5) You get a written order from a healthcare provider.  Glaucoma Screening: covered annually if you're considered high risk: (1) You have diabetes OR (2) Family history of glaucoma OR (3)  aged 50 and older OR (4)  American aged 65 and older  Osteoporosis Screening: covered every 2 years if you meet one of the following conditions: (1) You're estrogen deficient and at risk for osteoporosis based off medical history and other findings; (2) Have a vertebral abnormality; (3) On glucocorticoid therapy for more than 3 months; (4) Have primary hyperparathyroidism; (5) On osteoporosis medications and need to assess response to drug therapy.   Last bone density test (DXA Scan): Not on file.  HIV Screening: covered annually if you're between the age of 15-65. Also covered annually if you are younger than 15 and older than 65 with risk factors for HIV infection. For pregnant patients, it is covered up to 3 times per pregnancy.    Immunizations:  Immunization Recommendations   Influenza Vaccine Annual influenza vaccination during flu season is recommended for all persons aged >= 6 months who do not have contraindications   Pneumococcal Vaccine   * Pneumococcal conjugate vaccine = PCV13 (Prevnar 13), PCV15 (Vaxneuvance), PCV20 (Prevnar 20)  * Pneumococcal polysaccharide vaccine = PPSV23 (Pneumovax) Adults 19-63 yo with certain risk factors or if 65+ yo  If never received any pneumonia vaccine: recommend  Prevnar 20 (PCV20)  Give PCV20 if previously received 1 dose of PCV13 or PPSV23   Hepatitis B Vaccine 3 dose series if at intermediate or high risk (ex: diabetes, end stage renal disease, liver disease)   Respiratory syncytial virus (RSV) Vaccine - COVERED BY MEDICARE PART D  * RSVPreF3 (Arexvy) CDC recommends that adults 60 years of age and older may receive a single dose of RSV vaccine using shared clinical decision-making (SCDM)   Tetanus (Td) Vaccine - COST NOT COVERED BY MEDICARE PART B Following completion of primary series, a booster dose should be given every 10 years to maintain immunity against tetanus. Td may also be given as tetanus wound prophylaxis.   Tdap Vaccine - COST NOT COVERED BY MEDICARE PART B Recommended at least once for all adults. For pregnant patients, recommended with each pregnancy.   Shingles Vaccine (Shingrix) - COST NOT COVERED BY MEDICARE PART B  2 shot series recommended in those 19 years and older who have or will have weakened immune systems or those 50 years and older     Health Maintenance Due:      Topic Date Due   • Breast Cancer Screening: Mammogram  01/23/2025   • Hepatitis C Screening  Completed   • Colorectal Cancer Screening  Discontinued     Immunizations Due:      Topic Date Due   • COVID-19 Vaccine (7 - 2023-24 season) 01/05/2024   • Influenza Vaccine (1) 09/01/2024     Advance Directives   What are advance directives?  Advance directives are legal documents that state your wishes and plans for medical care. These plans are made ahead of time in case you lose your ability to make decisions for yourself. Advance directives can apply to any medical decision, such as the treatments you want, and if you want to donate organs.   What are the types of advance directives?  There are many types of advance directives, and each state has rules about how to use them. You may choose a combination of any of the following:  Living will:  This is a written record of the treatment you  want. You can also choose which treatments you do not want, which to limit, and which to stop at a certain time. This includes surgery, medicine, IV fluid, and tube feedings.   Durable power of  for healthcare (DPAHC):  This is a written record that states who you want to make healthcare choices for you when you are unable to make them for yourself. This person, called a proxy, is usually a family member or a friend. You may choose more than 1 proxy.  Do not resuscitate (DNR) order:  A DNR order is used in case your heart stops beating or you stop breathing. It is a request not to have certain forms of treatment, such as CPR. A DNR order may be included in other types of advance directives.  Medical directive:  This covers the care that you want if you are in a coma, near death, or unable to make decisions for yourself. You can list the treatments you want for each condition. Treatment may include pain medicine, surgery, blood transfusions, dialysis, IV or tube feedings, and a ventilator (breathing machine).  Values history:  This document has questions about your views, beliefs, and how you feel and think about life. This information can help others choose the care that you would choose.  Why are advance directives important?  An advance directive helps you control your care. Although spoken wishes may be used, it is better to have your wishes written down. Spoken wishes can be misunderstood, or not followed. Treatments may be given even if you do not want them. An advance directive may make it easier for your family to make difficult choices about your care.   Weight Management   Why it is important to manage your weight:  Being overweight increases your risk of health conditions such as heart disease, high blood pressure, type 2 diabetes, and certain types of cancer. It can also increase your risk for osteoarthritis, sleep apnea, and other respiratory problems. Aim for a slow, steady weight loss. Even a  small amount of weight loss can lower your risk of health problems.  How to lose weight safely:  A safe and healthy way to lose weight is to eat fewer calories and get regular exercise. You can lose up about 1 pound a week by decreasing the number of calories you eat by 500 calories each day.   Healthy meal plan for weight management:  A healthy meal plan includes a variety of foods, contains fewer calories, and helps you stay healthy. A healthy meal plan includes the following:  Eat whole-grain foods more often.  A healthy meal plan should contain fiber. Fiber is the part of grains, fruits, and vegetables that is not broken down by your body. Whole-grain foods are healthy and provide extra fiber in your diet. Some examples of whole-grain foods are whole-wheat breads and pastas, oatmeal, brown rice, and bulgur.  Eat a variety of vegetables every day.  Include dark, leafy greens such as spinach, kale, rex greens, and mustard greens. Eat yellow and orange vegetables such as carrots, sweet potatoes, and winter squash.   Eat a variety of fruits every day.  Choose fresh or canned fruit (canned in its own juice or light syrup) instead of juice. Fruit juice has very little or no fiber.  Eat low-fat dairy foods.  Drink fat-free (skim) milk or 1% milk. Eat fat-free yogurt and low-fat cottage cheese. Try low-fat cheeses such as mozzarella and other reduced-fat cheeses.  Choose meat and other protein foods that are low in fat.  Choose beans or other legumes such as split peas or lentils. Choose fish, skinless poultry (chicken or turkey), or lean cuts of red meat (beef or pork). Before you cook meat or poultry, cut off any visible fat.   Use less fat and oil.  Try baking foods instead of frying them. Add less fat, such as margarine, sour cream, regular salad dressing and mayonnaise to foods. Eat fewer high-fat foods. Some examples of high-fat foods include french fries, doughnuts, ice cream, and cakes.  Eat fewer sweets.   Limit foods and drinks that are high in sugar. This includes candy, cookies, regular soda, and sweetened drinks.  Exercise:  Exercise at least 30 minutes per day on most days of the week. Some examples of exercise include walking, biking, dancing, and swimming. You can also fit in more physical activity by taking the stairs instead of the elevator or parking farther away from stores. Ask your healthcare provider about the best exercise plan for you.      © Copyright [employer] 2018 Information is for End User's use only and may not be sold, redistributed or otherwise used for commercial purposes. All illustrations and images included in CareNotes® are the copyrighted property of [employer] or [employer]

## 2024-08-19 NOTE — ASSESSMENT & PLAN NOTE
Orders:    Hemoglobin A1C; Future    Comprehensive metabolic panel; Future    CBC and differential; Future    TSH, 3rd generation with Free T4 reflex; Future    Lipid Panel with Direct LDL reflex; Future    Microalbumin, Random Urine (W/Creatinine) ([facility] ONLY); Future    Microalbumin, Random Urine (W/Creatinine) ([facility] ONLY)

## 2024-08-19 NOTE — ASSESSMENT & PLAN NOTE
Lab Results   Component Value Date    EGFR 48 08/15/2024    EGFR 49 04/05/2024    EGFR 51 01/08/2024    CREATININE 1.10 08/15/2024    CREATININE 1.08 04/05/2024    CREATININE 1.06 01/08/2024       Orders:    Comprehensive metabolic panel; Future

## 2024-08-19 NOTE — PROGRESS NOTES
Diabetic Foot Exam    Patient's shoes and socks removed.    Right Foot/Ankle   Right Foot Inspection  Skin Exam: skin normal and skin intact. No dry skin, no warmth, no callus, no erythema, no maceration, no abnormal color, no pre-ulcer, no ulcer and no callus.     Toe Exam: ROM and strength within normal limits.     Sensory   Vibration: intact  Proprioception: intact  Monofilament testing: intact    Vascular  Capillary refills: < 3 seconds  The right DP pulse is 1+. The right PT pulse is 1+.     Left Foot/Ankle  Left Foot Inspection  Skin Exam: skin normal and skin intact. No dry skin, no warmth, no erythema, no maceration, normal color, no pre-ulcer, no ulcer and no callus.     Toe Exam: ROM and strength within normal limits.     Sensory   Vibration: intact  Proprioception: intact  Monofilament testing: intact    Vascular  Capillary refills: < 3 seconds  The left DP pulse is 1+. The left PT pulse is 1+.     Assign Risk Category  No deformity present  No loss of protective sensation  No weak pulses  Risk: 0      Ambulatory Visit  Name: Janette Little      : 1950      MRN: [id number]  Encounter Provider: SHANTELLE Smith DO  Encounter Date: 2024   Encounter department: [facility] [place]    Assessment & Plan     1. Essential hypertension  Comments:  BP controlled 132/70  Assessment & Plan:    Orders:    Hemoglobin A1C; Future    Comprehensive metabolic panel; Future    CBC and differential; Future    TSH, 3rd generation with Free T4 reflex; Future    Lipid Panel with Direct LDL reflex; Future    Microalbumin, Random Urine (W/Creatinine) ([facility] ONLY); Future    Microalbumin, Random Urine (W/Creatinine) ([facility] ONLY)    Orders:  -     Hemoglobin A1C; Future; Expected date: 2024  -     Comprehensive metabolic panel; Future; Expected date: 2024  -     CBC and differential; Future; Expected date: 2024  -     TSH, 3rd generation with Free T4 reflex; Future; Expected date:  08/19/2024  -     Lipid Panel with Direct LDL reflex; Future; Expected date: 08/19/2024  -     Microalbumin, Random Urine (W/Creatinine) ([facility] ONLY); Future; Expected date: 08/19/2024  -     Microalbumin, Random Urine (W/Creatinine) ([facility] ONLY)  2. Acquired hypothyroidism  Comments:  doing well on synthroid  Assessment & Plan:    Orders:    TSH, 3rd generation with Free T4 reflex; Future    Orders:  -     TSH, 3rd generation with Free T4 reflex; Future; Expected date: 08/19/2024  3. Stage 3a chronic kidney disease (HCC)  Assessment & Plan:  Lab Results   Component Value Date    EGFR 48 08/15/2024    EGFR 49 04/05/2024    EGFR 51 01/08/2024    CREATININE 1.10 08/15/2024    CREATININE 1.08 04/05/2024    CREATININE 1.06 01/08/2024       Orders:    Comprehensive metabolic panel; Future    Orders:  -     Comprehensive metabolic panel; Future; Expected date: 08/19/2024  4. Lymphoma, unspecified body region, unspecified lymphoma type (HCC)  Comments:  in resmission and stable  Assessment & Plan:    Orders:    Hemoglobin A1C; Future    Comprehensive metabolic panel; Future    CBC and differential; Future    TSH, 3rd generation with Free T4 reflex; Future    Lipid Panel with Direct LDL reflex; Future    Microalbumin, Random Urine (W/Creatinine) ([facility] ONLY); Future    Microalbumin, Random Urine (W/Creatinine) ([facility] ONLY)    Orders:  -     Hemoglobin A1C; Future; Expected date: 08/19/2024  -     Comprehensive metabolic panel; Future; Expected date: 08/19/2024  -     CBC and differential; Future; Expected date: 08/19/2024  -     TSH, 3rd generation with Free T4 reflex; Future; Expected date: 08/19/2024  -     Lipid Panel with Direct LDL reflex; Future; Expected date: 08/19/2024  -     Microalbumin, Random Urine (W/Creatinine) ([facility] ONLY); Future; Expected date: 08/19/2024  -     Microalbumin, Random Urine (W/Creatinine) ([facility] ONLY)  5. Medicare annual wellness visit, subsequent  Comments:  completed today, , Ed,  present  6. Medication management  Comments:  All meds reveiwed and discussed with pt and husb.   Will in Celexa 10 mg up to 2 tabs OD  Assessment & Plan:         7. Chronic depression  Comments:  Overwhelmed by her wt, R knee pain, polyarthritis, etc.  Inc Celexa up to 20 mg OD  Assessment & Plan:    Orders:    citalopram (CeleXA) 20 mg tablet; Take 1 tablet (20 mg total) by mouth daily      Orders:  -     citalopram (CeleXA) 20 mg tablet; Take 1 tablet (20 mg total) by mouth daily  8. Morbid obesity (HCC)  Comments:  BMI 46 - lengthy discussion  Must lose wt  BUT, she is neg on Mounjaro!!  Assessment & Plan:    Orders:    Hemoglobin A1C; Future    Comprehensive metabolic panel; Future    CBC and differential; Future    TSH, 3rd generation with Free T4 reflex; Future    Lipid Panel with Direct LDL reflex; Future    Microalbumin, Random Urine (W/Creatinine) ([facility] ONLY); Future    Microalbumin, Random Urine (W/Creatinine) ([facility] ONLY)    Orders:  -     Hemoglobin A1C; Future; Expected date: 08/19/2024  -     Comprehensive metabolic panel; Future; Expected date: 08/19/2024  -     CBC and differential; Future; Expected date: 08/19/2024  -     TSH, 3rd generation with Free T4 reflex; Future; Expected date: 08/19/2024  -     Lipid Panel with Direct LDL reflex; Future; Expected date: 08/19/2024  -     Microalbumin, Random Urine (W/Creatinine) ([facility] ONLY); Future; Expected date: 08/19/2024  -     Microalbumin, Random Urine (W/Creatinine) ([facility] ONLY)  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)  Assessment & Plan:    Orders:    Hemoglobin A1C; Future    Comprehensive metabolic panel; Future    CBC and differential; Future    TSH, 3rd generation with Free T4 reflex; Future    Lipid Panel with Direct LDL reflex; Future    Microalbumin, Random Urine (W/Creatinine) ([facility] ONLY); Future    Microalbumin, Random Urine (W/Creatinine) ([facility] ONLY)    Orders:  -     Hemoglobin A1C; Future; Expected date:  08/19/2024  -     Comprehensive metabolic panel; Future; Expected date: 08/19/2024  -     CBC and differential; Future; Expected date: 08/19/2024  -     TSH, 3rd generation with Free T4 reflex; Future; Expected date: 08/19/2024  -     Lipid Panel with Direct LDL reflex; Future; Expected date: 08/19/2024  -     Microalbumin, Random Urine (W/Creatinine) ([facility] ONLY); Future; Expected date: 08/19/2024  -     Microalbumin, Random Urine (W/Creatinine) ([facility] ONLY)  10. Type 2 diabetes mellitus with stage 3a chronic kidney disease, without long-term current use of insulin ([facility])  -     Hemoglobin A1C; Future; Expected date: 08/19/2024  -     Comprehensive metabolic panel; Future; Expected date: 08/19/2024  -     CBC and differential; Future; Expected date: 08/19/2024  -     TSH, 3rd generation with Free T4 reflex; Future; Expected date: 08/19/2024  -     Lipid Panel with Direct LDL reflex; Future; Expected date: 08/19/2024  -     Microalbumin, Random Urine (W/Creatinine) ([facility] ONLY); Future; Expected date: 08/19/2024  -     Microalbumin, Random Urine (W/Creatinine) ([facility] ONLY)  11. Type 2 diabetes mellitus without complication, without long-term current use of insulin ([facility])  Assessment & Plan:    Lab Results   Component Value Date    HGBA1C 6.9 (A) 04/11/2024            12. Primary osteoarthritis of left hip  Assessment & Plan:         13. Vitamin B12 deficiency  -     Vitamin B12; Future; Expected date: 08/20/2024  14. Vitamin D deficiency  Assessment & Plan:    Orders:    Vitamin D 25 hydroxy; Future    Orders:  -     Vitamin D 25 hydroxy; Future; Expected date: 08/20/2024  15. Encounter to discuss test results  Comments:  All recent and past labs of 2 yrs reviewed with pt  Assessment & Plan:         16. Encounter for Medicare annual wellness exam  Comments:  Completed today  Orders:  -     Lipid panel; Future; Expected date: 08/20/2024  -     UA w Reflex to Microscopic w Reflex to Culture; Future  -     Vitamin B12;  "Future; Expected date: 08/20/2024  -     Vitamin D 25 hydroxy; Future; Expected date: 08/20/2024  17. Multiple medical problems  Comments:  Many issues medically are overwhelming her   Discussed  Orders:  -     Lipid panel; Future; Expected date: 08/20/2024  -     UA w Reflex to Microscopic w Reflex to Culture; Future  -     Vitamin B12; Future; Expected date: 08/20/2024  -     Vitamin D 25 hydroxy; Future; Expected date: 08/20/2024  18. Abnormal finding of blood chemistry, unspecified  -     Lipid panel; Future; Expected date: 08/20/2024  19. Chronic depression  Comments:  Feels achy and pain \"all over\"  Assessment & Plan:    Orders:    citalopram (CeleXA) 20 mg tablet; Take 1 tablet (20 mg total) by mouth daily      Orders:  -     citalopram (CeleXA) 20 mg tablet; Take 1 tablet (20 mg total) by mouth daily        Depression Screening and Follow-up Plan: Patient's depression screening was positive with a PHQ-9 score of 5. Clincally patient does not have depression. No treatment is required. Patient assessed for underlying major depression. Brief counseling provided and recommend additional follow-up/re-evaluation next office visit. Patient with underlying depression and was advised to continue current medications as prescribed.       Preventive health issues were discussed with patient, and age appropriate screening tests were ordered as noted in patient's After Visit Summary. Personalized health advice and appropriate referrals for health education or preventive services given if needed, as noted in patient's After Visit Summary.    History of Present Illness     HPI   Patient Care Team:  SHANTELLE Smith DO as PCP - General (Family Medicine)    Review of Systems  Medical History Reviewed by provider this encounter:       Annual Wellness Visit Questionnaire   Janette is here for her Subsequent Wellness visit.     Health Risk Assessment:   Patient rates overall health as fair. Patient feels that their physical health " rating is slightly worse. Patient is satisfied with their life. Eyesight was rated as same. Hearing was rated as slightly worse. Patient feels that their emotional and mental health rating is slightly worse. Patients states they are often angry. Patient states they are often unusually tired/fatigued. Pain experienced in the last 7 days has been some. Patient's pain rating has been 7/10. Patient states that she has experienced no weight loss or gain in last 6 months.     Depression Screening:   PHQ-9 Score: 5      Fall Risk Screening:   In the past year, patient has experienced: no history of falling in past year      Urinary Incontinence Screening:   Patient has not leaked urine accidently in the last six months.     Home Safety:  Patient has trouble with stairs inside or outside of their home. Patient has working smoke alarms and has working carbon monoxide detector. Home safety hazards include: none.     Nutrition:   Current diet is Regular.     Medications:   Patient is not currently taking any over-the-counter supplements. Patient is able to manage medications.     Activities of Daily Living (ADLs)/Instrumental Activities of Daily Living (IADLs):   Walk and transfer into and out of bed and chair?: Yes  Dress and groom yourself?: Yes    Bathe or shower yourself?: No    Feed yourself? Yes  Do your laundry/housekeeping?: No  Manage your money, pay your bills and track your expenses?: No  Make your own meals?: No    Do your own shopping?: No    Previous Hospitalizations:   Any hospitalizations or ED visits within the last 12 months?: Yes    How many hospitalizations have you had in the last year?: 1-2    Advance Care Planning:   Living will: Yes    Advanced directive: Yes      PREVENTIVE SCREENINGS      Cardiovascular Screening:    General: Screening Current      Diabetes Screening:     General: Screening Not Indicated and History Diabetes      Breast Cancer Screening:     General: Screening Current      Cervical  "Cancer Screening:    General: Screening Not Indicated      Lung Cancer Screening:     General: Screening Not Indicated      Hepatitis C Screening:    General: Screening Current    Screening, Brief Intervention, and Referral to Treatment (SBIRT)    Screening  Typical number of drinks in a day: 0  Typical number of drinks in a week: 0  Interpretation: Low risk drinking behavior.    Single Item Drug Screening:  How often have you used an illegal drug (including marijuana) or a prescription medication for non-medical reasons in the past year? never    Single Item Drug Screen Score: 0  Interpretation: Negative screen for possible drug use disorder    Social Determinants of Health     Financial Resource Strain: Low Risk  (5/30/2023)    Overall Financial Resource Strain (CARDIA)     Difficulty of Paying Living Expenses: Not hard at all   Transportation Needs: No Transportation Needs (5/30/2023)    PRAPARE - Transportation     Lack of Transportation (Medical): No     Lack of Transportation (Non-Medical): No     No results found.    Objective     /70 (BP Location: Left arm, Patient Position: Sitting, Cuff Size: Large)   Pulse 77   Temp 97.8 °F (36.6 °C) (Temporal)   Resp 17   Ht 5' 2\" (1.575 m)   Wt 115 kg (253 lb)   LMP  (LMP Unknown)   SpO2 97%   BMI 46.27 kg/m²     Physical Exam  Cardiovascular:      Pulses: no weak pulses.           Dorsalis pedis pulses are 1+ on the right side and 1+ on the left side.        Posterior tibial pulses are 1+ on the right side and 1+ on the left side.   Feet:      Right foot:      Skin integrity: No ulcer, skin breakdown, erythema, warmth, callus or dry skin.      Left foot:      Skin integrity: No ulcer, skin breakdown, erythema, warmth, callus or dry skin.       Administrative Statements   I have spent a total time of 45 minutes in caring for this patient on the day of the visit/encounter including Diagnostic results, Prognosis, Risks and benefits of tx options, " Instructions for management, Patient and family education, Importance of tx compliance, Risk factor reductions, Impressions, Counseling / Coordination of care, Documenting in the medical record, Reviewing / ordering tests, medicine, procedures  , and Obtaining or reviewing history  .

## 2024-08-19 NOTE — PROGRESS NOTES
Ambulatory Visit  Name: Janette Little      : 1950      MRN: [id number]  Encounter Provider: SHANTELLE Smith DO  Encounter Date: 2024   Encounter department: [facility]    Assessment & Plan     1. Essential hypertension  Comments:  BP controlled 132/70  Assessment & Plan:    Orders:    Hemoglobin A1C; Future    Comprehensive metabolic panel; Future    CBC and differential; Future    TSH, 3rd generation with Free T4 reflex; Future    Lipid Panel with Direct LDL reflex; Future    Microalbumin, Random Urine (W/Creatinine) ([facility] ONLY); Future    Microalbumin, Random Urine (W/Creatinine) ([facility] ONLY)    Orders:  -     Hemoglobin A1C; Future; Expected date: 2024  -     Comprehensive metabolic panel; Future; Expected date: 2024  -     CBC and differential; Future; Expected date: 2024  -     TSH, 3rd generation with Free T4 reflex; Future; Expected date: 2024  -     Lipid Panel with Direct LDL reflex; Future; Expected date: 2024  -     Microalbumin, Random Urine (W/Creatinine) ([facility] ONLY); Future; Expected date: 2024  -     Microalbumin, Random Urine (W/Creatinine) ([facility] ONLY)  2. Acquired hypothyroidism  Comments:  doing well on synthroid  Assessment & Plan:    Orders:    TSH, 3rd generation with Free T4 reflex; Future    Orders:  -     TSH, 3rd generation with Free T4 reflex; Future; Expected date: 2024  3. Stage 3a chronic kidney disease (HCC)  Assessment & Plan:  Lab Results   Component Value Date    EGFR 48 08/15/2024    EGFR 49 2024    EGFR 51 2024    CREATININE 1.10 08/15/2024    CREATININE 1.08 2024    CREATININE 1.06 2024       Orders:    Comprehensive metabolic panel; Future    Orders:  -     Comprehensive metabolic panel; Future; Expected date: 2024  4. Lymphoma, unspecified body region, unspecified lymphoma type (HCC)  Comments:  in resmission and stable  Assessment & Plan:    Orders:    Hemoglobin A1C;  Future    Comprehensive metabolic panel; Future    CBC and differential; Future    TSH, 3rd generation with Free T4 reflex; Future    Lipid Panel with Direct LDL reflex; Future    Microalbumin, Random Urine (W/Creatinine) ([facility] ONLY); Future    Microalbumin, Random Urine (W/Creatinine) ([facility] ONLY)    Orders:  -     Hemoglobin A1C; Future; Expected date: 08/19/2024  -     Comprehensive metabolic panel; Future; Expected date: 08/19/2024  -     CBC and differential; Future; Expected date: 08/19/2024  -     TSH, 3rd generation with Free T4 reflex; Future; Expected date: 08/19/2024  -     Lipid Panel with Direct LDL reflex; Future; Expected date: 08/19/2024  -     Microalbumin, Random Urine (W/Creatinine) ([facility] ONLY); Future; Expected date: 08/19/2024  -     Microalbumin, Random Urine (W/Creatinine) ([facility] ONLY)  5. Medicare annual wellness visit, subsequent  Comments:  completed today, , Ed, present  6. Medication management  Comments:  All meds reveiwed and discussed with pt and husb.   Will in Celexa 10 mg up to 2 tabs OD  Assessment & Plan:         7. Chronic depression  Comments:  Overwhelmed by her wt, R knee pain, polyarthritis, etc.  Inc Celexa up to 20 mg OD  Assessment & Plan:    Orders:    citalopram (CeleXA) 20 mg tablet; Take 1 tablet (20 mg total) by mouth daily      Orders:  -     citalopram (CeleXA) 20 mg tablet; Take 1 tablet (20 mg total) by mouth daily  8. Morbid obesity (HCC)  Comments:  BMI 46 - lengthy discussion  Must lose wt  BUT, she is neg on Mounjaro!!  Assessment & Plan:    Orders:    Hemoglobin A1C; Future    Comprehensive metabolic panel; Future    CBC and differential; Future    TSH, 3rd generation with Free T4 reflex; Future    Lipid Panel with Direct LDL reflex; Future    Microalbumin, Random Urine (W/Creatinine) ([facility] ONLY); Future    Microalbumin, Random Urine (W/Creatinine) ([facility] ONLY)    Orders:  -     Hemoglobin A1C; Future; Expected date: 08/19/2024  -     Comprehensive  metabolic panel; Future; Expected date: 08/19/2024  -     CBC and differential; Future; Expected date: 08/19/2024  -     TSH, 3rd generation with Free T4 reflex; Future; Expected date: 08/19/2024  -     Lipid Panel with Direct LDL reflex; Future; Expected date: 08/19/2024  -     Microalbumin, Random Urine (W/Creatinine) ([facility] ONLY); Future; Expected date: 08/19/2024  -     Microalbumin, Random Urine (W/Creatinine) ([facility] ONLY)  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)  Assessment & Plan:    Orders:    Hemoglobin A1C; Future    Comprehensive metabolic panel; Future    CBC and differential; Future    TSH, 3rd generation with Free T4 reflex; Future    Lipid Panel with Direct LDL reflex; Future    Microalbumin, Random Urine (W/Creatinine) ([facility] ONLY); Future    Microalbumin, Random Urine (W/Creatinine) ([facility] ONLY)    Orders:  -     Hemoglobin A1C; Future; Expected date: 08/19/2024  -     Comprehensive metabolic panel; Future; Expected date: 08/19/2024  -     CBC and differential; Future; Expected date: 08/19/2024  -     TSH, 3rd generation with Free T4 reflex; Future; Expected date: 08/19/2024  -     Lipid Panel with Direct LDL reflex; Future; Expected date: 08/19/2024  -     Microalbumin, Random Urine (W/Creatinine) ([facility] ONLY); Future; Expected date: 08/19/2024  -     Microalbumin, Random Urine (W/Creatinine) ([facility] ONLY)  10. Type 2 diabetes mellitus with stage 3a chronic kidney disease, without long-term current use of insulin ([facility])  -     Hemoglobin A1C; Future; Expected date: 08/19/2024  -     Comprehensive metabolic panel; Future; Expected date: 08/19/2024  -     CBC and differential; Future; Expected date: 08/19/2024  -     TSH, 3rd generation with Free T4 reflex; Future; Expected date: 08/19/2024  -     Lipid Panel with Direct LDL reflex; Future; Expected date: 08/19/2024  -     Microalbumin, Random Urine (W/Creatinine) ([facility] ONLY); Future; Expected date: 08/19/2024  -      "Microalbumin, Random Urine (W/Creatinine) ([facility] ONLY)  11. Type 2 diabetes mellitus without complication, without long-term current use of insulin ([facility])  Assessment & Plan:    Lab Results   Component Value Date    HGBA1C 6.9 (A) 04/11/2024            12. Primary osteoarthritis of left hip  Assessment & Plan:         13. Vitamin B12 deficiency  -     Vitamin B12; Future; Expected date: 08/20/2024  14. Vitamin D deficiency  Assessment & Plan:    Orders:    Vitamin D 25 hydroxy; Future    Orders:  -     Vitamin D 25 hydroxy; Future; Expected date: 08/20/2024  15. Encounter to discuss test results  Comments:  All recent and past labs of 2 yrs reviewed with pt  Assessment & Plan:         16. Encounter for Medicare annual wellness exam  Comments:  Completed today  Orders:  -     Lipid panel; Future; Expected date: 08/20/2024  -     UA w Reflex to Microscopic w Reflex to Culture; Future  -     Vitamin B12; Future; Expected date: 08/20/2024  -     Vitamin D 25 hydroxy; Future; Expected date: 08/20/2024  17. Multiple medical problems  Comments:  Many issues medically are overwhelming her   Discussed  Orders:  -     Lipid panel; Future; Expected date: 08/20/2024  -     UA w Reflex to Microscopic w Reflex to Culture; Future  -     Vitamin B12; Future; Expected date: 08/20/2024  -     Vitamin D 25 hydroxy; Future; Expected date: 08/20/2024  18. Abnormal finding of blood chemistry, unspecified  -     Lipid panel; Future; Expected date: 08/20/2024  19. Chronic depression  Comments:  Feels achy and pain \"all over\"  Assessment & Plan:    Orders:    citalopram (CeleXA) 20 mg tablet; Take 1 tablet (20 mg total) by mouth daily      Orders:  -     citalopram (CeleXA) 20 mg tablet; Take 1 tablet (20 mg total) by mouth daily         Preventive health issues were discussed with patient, and age appropriate screening tests were ordered as noted in patient's After Visit Summary. Personalized health advice and appropriate referrals for " health education or preventive services given if needed, as noted in patient's After Visit Summary.    History of Present Illness     HPI   Patient Care Team:  SHANTELLE Smith DO as PCP - General (Family Medicine)    Review of Systems  Medical History Reviewed by provider this encounter:       Annual Wellness Visit Questionnaire   Janette is here for her Subsequent Wellness visit. Last Medicare Wellness visit information reviewed, patient interviewed, no change since last AWV.     Health Risk Assessment:   Patient rates overall health as fair. Patient feels that their physical health rating is slightly worse. Patient is satisfied with their life. Eyesight was rated as slightly worse. Hearing was rated as same. Patient feels that their emotional and mental health rating is slightly worse. Patients states they are often angry. Patient states they are often unusually tired/fatigued. Pain experienced in the last 7 days has been some. Patient's pain rating has been 7/10. Patient states that she has experienced no weight loss or gain in last 6 months.     Depression Screening:   PHQ-9 Score: 5      Fall Risk Screening:   In the past year, patient has experienced: no history of falling in past year      Urinary Incontinence Screening:   Patient has not leaked urine accidently in the last six months.     Home Safety:  Patient has trouble with stairs inside or outside of their home. Patient has working smoke alarms and has working carbon monoxide detector. Home safety hazards include: none. Knees are painful - has 16 steps to go upstairs    Nutrition:   Current diet is Regular.     Medications:   Patient is not currently taking any over-the-counter supplements. Patient is able to manage medications.     Activities of Daily Living (ADLs)/Instrumental Activities of Daily Living (IADLs):   Walk and transfer into and out of bed and chair?: Yes  Dress and groom yourself?: Yes    Bathe or shower yourself?: No    Feed yourself?  Yes  Do your laundry/housekeeping?: No  Manage your money, pay your bills and track your expenses?: No  Make your own meals?: No    Do your own shopping?: No    Previous Hospitalizations:   Any hospitalizations or ED visits within the last 12 months?: Yes    How many hospitalizations have you had in the last year?: 1-2    Advance Care Planning:   Living will: Yes    Durable POA for healthcare: No    Advanced directive: Yes    Advanced directive counseling given: Yes    ACP document given: Yes    Patient declined ACP directive: No    End of Life Decisions reviewed with patient: Yes    Provider agrees with end of life decisions: Yes      Cognitive Screening:   Provider or family/friend/caregiver concerned regarding cognition?: No    PREVENTIVE SCREENINGS      Cardiovascular Screening:    General: Screening Current and Risks and Benefits Discussed      Diabetes Screening:     General: Screening Not Indicated, History Diabetes and Risks and Benefits Discussed      Colorectal Cancer Screening:     General: Risks and Benefits Discussed      Breast Cancer Screening:     General: Screening Current and Risks and Benefits Discussed      Cervical Cancer Screening:    General: Screening Not Indicated and Risks and Benefits Discussed      Osteoporosis Screening:    General: Risks and Benefits Discussed      Abdominal Aortic Aneurysm (AAA) Screening:        General: Risks and Benefits Discussed      Lung Cancer Screening:     General: Screening Not Indicated and Risks and Benefits Discussed      Hepatitis C Screening:    General: Screening Current and Risks and Benefits Discussed    Hep C Screening Accepted: No     Screening, Brief Intervention, and Referral to Treatment (SBIRT)    Screening  Typical number of drinks in a day: 0  Typical number of drinks in a week: 0  Interpretation: Low risk drinking behavior.    Single Item Drug Screening:  How often have you used an illegal drug (including marijuana) or a prescription  "medication for non-medical reasons in the past year? never    Single Item Drug Screen Score: 0  Interpretation: Negative screen for possible drug use disorder    Brief Intervention  Alcohol & drug use screenings were reviewed. No concerns regarding substance use disorder identified. Healthy alcohol use/limits discussed.     Other Counseling Topics:   Car/seat belt/driving safety, skin self-exam, sunscreen and calcium and vitamin D intake and regular weightbearing exercise.     Social Determinants of Health     Financial Resource Strain: Low Risk  (5/30/2023)    Overall Financial Resource Strain (CARDIA)     Difficulty of Paying Living Expenses: Not hard at all   Food Insecurity: No Food Insecurity (8/19/2024)    Hunger Vital Sign     Worried About Running Out of Food in the Last Year: Never true     Ran Out of Food in the Last Year: Never true   Transportation Needs: No Transportation Needs (8/19/2024)    PRAPARE - Transportation     Lack of Transportation (Medical): No     Lack of Transportation (Non-Medical): No   Housing Stability: Low Risk  (8/19/2024)    Housing Stability Vital Sign     Unable to Pay for Housing in the Last Year: No     Number of Times Moved in the Last Year: 1     Homeless in the Last Year: No   Utilities: Not At Risk (8/19/2024)    [facility] Utilities     Threatened with loss of utilities: No     No results found.    Objective     /70 (BP Location: Left arm, Patient Position: Sitting, Cuff Size: Large)   Pulse 77   Temp 97.8 °F (36.6 °C) (Temporal)   Resp 17   Ht 5' 2\" (1.575 m)   Wt 115 kg (253 lb)   LMP  (LMP Unknown)   SpO2 97%   BMI 46.27 kg/m²     Physical Exam      "

## 2024-09-09 DIAGNOSIS — E03.9 ACQUIRED HYPOTHYROIDISM: ICD-10-CM

## 2024-09-09 RX ORDER — LEVOTHYROXINE SODIUM 100 UG/1
100 TABLET ORAL DAILY
Qty: 90 TABLET | Refills: 1 | Status: SHIPPED | OUTPATIENT
Start: 2024-09-09

## 2024-09-27 ENCOUNTER — TELEPHONE (OUTPATIENT)
Age: 74
End: 2024-09-27

## 2024-09-27 NOTE — TELEPHONE ENCOUNTER
Call received from patient. Questioning when she needs BW done since she has a CT scan on 10/7 and then an appointment with Dr Israel on 11/4. Informed her she should get labs done prior to her CT scan and then again prior to seeing Dr Israel. Verbalized understanding.

## 2024-10-18 ENCOUNTER — APPOINTMENT (OUTPATIENT)
Dept: LAB | Facility: AMBULARY SURGERY CENTER | Age: 74
End: 2024-10-18
Payer: COMMERCIAL

## 2024-10-18 DIAGNOSIS — C82.31 GRADE 3A FOLLICULAR LYMPHOMA OF LYMPH NODES OF NECK (HCC): ICD-10-CM

## 2024-10-18 LAB
ALBUMIN SERPL BCG-MCNC: 4.1 G/DL (ref 3.5–5)
ALP SERPL-CCNC: 90 U/L (ref 34–104)
ALT SERPL W P-5'-P-CCNC: 33 U/L (ref 7–52)
ANION GAP SERPL CALCULATED.3IONS-SCNC: 11 MMOL/L (ref 4–13)
AST SERPL W P-5'-P-CCNC: 22 U/L (ref 13–39)
BASOPHILS # BLD AUTO: 0.06 THOUSANDS/ΜL (ref 0–0.1)
BASOPHILS NFR BLD AUTO: 1 % (ref 0–1)
BILIRUB SERPL-MCNC: 0.6 MG/DL (ref 0.2–1)
BUN SERPL-MCNC: 25 MG/DL (ref 5–25)
CALCIUM SERPL-MCNC: 10.2 MG/DL (ref 8.4–10.2)
CHLORIDE SERPL-SCNC: 100 MMOL/L (ref 96–108)
CO2 SERPL-SCNC: 27 MMOL/L (ref 21–32)
CREAT SERPL-MCNC: 1.05 MG/DL (ref 0.6–1.3)
EOSINOPHIL # BLD AUTO: 0.25 THOUSAND/ΜL (ref 0–0.61)
EOSINOPHIL NFR BLD AUTO: 3 % (ref 0–6)
ERYTHROCYTE [DISTWIDTH] IN BLOOD BY AUTOMATED COUNT: 14 % (ref 11.6–15.1)
GFR SERPL CREATININE-BSD FRML MDRD: 51 ML/MIN/1.73SQ M
GLUCOSE P FAST SERPL-MCNC: 108 MG/DL (ref 65–99)
HCT VFR BLD AUTO: 41.9 % (ref 34.8–46.1)
HGB BLD-MCNC: 13.3 G/DL (ref 11.5–15.4)
IMM GRANULOCYTES # BLD AUTO: 0.05 THOUSAND/UL (ref 0–0.2)
IMM GRANULOCYTES NFR BLD AUTO: 1 % (ref 0–2)
LDH SERPL-CCNC: 199 U/L (ref 140–271)
LYMPHOCYTES # BLD AUTO: 1.7 THOUSANDS/ΜL (ref 0.6–4.47)
LYMPHOCYTES NFR BLD AUTO: 21 % (ref 14–44)
MAGNESIUM SERPL-MCNC: 2.5 MG/DL (ref 1.9–2.7)
MCH RBC QN AUTO: 31.5 PG (ref 26.8–34.3)
MCHC RBC AUTO-ENTMCNC: 31.7 G/DL (ref 31.4–37.4)
MCV RBC AUTO: 99 FL (ref 82–98)
MONOCYTES # BLD AUTO: 0.92 THOUSAND/ΜL (ref 0.17–1.22)
MONOCYTES NFR BLD AUTO: 11 % (ref 4–12)
NEUTROPHILS # BLD AUTO: 5.09 THOUSANDS/ΜL (ref 1.85–7.62)
NEUTS SEG NFR BLD AUTO: 63 % (ref 43–75)
NRBC BLD AUTO-RTO: 0 /100 WBCS
PLATELET # BLD AUTO: 284 THOUSANDS/UL (ref 149–390)
PMV BLD AUTO: 9.6 FL (ref 8.9–12.7)
POTASSIUM SERPL-SCNC: 4.7 MMOL/L (ref 3.5–5.3)
PROT SERPL-MCNC: 7.4 G/DL (ref 6.4–8.4)
RBC # BLD AUTO: 4.22 MILLION/UL (ref 3.81–5.12)
SODIUM SERPL-SCNC: 138 MMOL/L (ref 135–147)
WBC # BLD AUTO: 8.07 THOUSAND/UL (ref 4.31–10.16)

## 2024-10-18 PROCEDURE — 83735 ASSAY OF MAGNESIUM: CPT

## 2024-10-18 PROCEDURE — 83615 LACTATE (LD) (LDH) ENZYME: CPT

## 2024-10-18 PROCEDURE — 36415 COLL VENOUS BLD VENIPUNCTURE: CPT

## 2024-10-18 PROCEDURE — 85025 COMPLETE CBC W/AUTO DIFF WBC: CPT

## 2024-10-18 PROCEDURE — 80053 COMPREHEN METABOLIC PANEL: CPT

## 2024-10-24 ENCOUNTER — HOSPITAL ENCOUNTER (OUTPATIENT)
Dept: RADIOLOGY | Age: 74
Discharge: HOME | End: 2024-10-24
Payer: COMMERCIAL

## 2024-10-24 DIAGNOSIS — C82.31 GRADE 3A FOLLICULAR LYMPHOMA OF LYMPH NODES OF NECK (HCC): ICD-10-CM

## 2024-10-24 PROCEDURE — 74177 CT ABD & PELVIS W/CONTRAST: CPT

## 2024-10-24 PROCEDURE — 71260 CT THORAX DX C+: CPT

## 2024-10-24 PROCEDURE — 70491 CT SOFT TISSUE NECK W/DYE: CPT

## 2024-10-24 RX ADMIN — IOHEXOL 100 ML: 350 INJECTION, SOLUTION INTRAVENOUS at 14:11

## 2024-10-28 ENCOUNTER — TELEPHONE (OUTPATIENT)
Age: 74
End: 2024-10-28

## 2024-10-28 NOTE — TELEPHONE ENCOUNTER
Pt called to check if the labs that she had drawn on 10/18 will be sufficient for her 6 months return visit with Dr Israel. I checked with KEYSHAWN Sanchez and explained her that those labs are sufficient and she would not need repeat labs done. She verbalized understanding and was appreciative.

## 2024-11-03 NOTE — PROGRESS NOTES
HEMATOLOGY / ONCOLOGY CLINIC FOLLOW UP NOTE    Patient Janette Little  MRN: [id number]  : 1950  Date of Encounter 2024         Referring Provider:  FIDELIA Cheema 2023      Reason for Encounter: follow up          Oncology History   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 (HCC)   2021 Initial Diagnosis    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 (HCC)     2021 - 2021 Chemotherapy    riTUXimab (RITUXAN) subsequent titrated chemo infusion, 806.2 mg, Intravenous, Once, 5 of 5 cycles  Administration: 800 mg (2021), 800 mg (2021), 800 mg (10/12/2021), 800 mg (2021), 800 mg (2021)  riTUXimab (RITUXAN) first titrated chemo infusion, 806.2 mg, Intravenous, Once, 1 of 1 cycle  Administration: 800 mg (2021)  bendamustine HCL(BENDEKA) IVPB, 70 mg/m2 = 150.5 mg (77.8 % of original dose 90 mg/m2), Intravenous, Once, 6 of 6 cycles  Dose modification: 70 mg/m2 (original dose 90 mg/m2, Cycle 1, Reason: Anticipated Tolerance)  Administration: 150.5 mg (2021), 150.5 mg (2021), 150.5 mg (2021), 150.5 mg (2021), 150.5 mg (2021), 150.5 mg (9/15/2021), 150.5 mg (10/12/2021), 150.5 mg (10/13/2021), 150.5 mg (2021), 150.5 mg (11/10/2021), 150.5 mg (2021), 150.5 mg (2021)         Assessment / Plan:        A 74 year-old female with follicular lymphoma, grade 3A, at least stage IIIA disease with diffuse hypermetabolic lymphadenopathy in the chest axillary, abdominal as well as pelvis.  SUV based on PET-CT scan was up to 15.  She was treated with bendamustine rituximab x6 cycles, resulting in complete remission.       She is currently on observation.     Clinically as well as radiographically, she remains in complete remission.     She had a CT CAP ordered by Dr Cheema with the following in 2024     New small, 3 mm right lower lobe  lung nodule. 3-month follow-up is suggested.     No new adenopathy in the chest  abdomen or pelvis.     Based on the above, she had a CT PET 3/26/2024     CT PET 3/2024 with Deauville 1   IMPRESSION:  1. Stable exam. No new hypermetabolic lesions that are concerning for malignancy/metastases. Deauville score of 1.     Thus, she remains with a durable CR 3 years from definitive treatment           Follow up      6 months     CT Neck/CAP with contrast  Labs     If remains negative at that time, yearly exams         HPI:   Dr Cheema       A 71-year-old female who had 1st Pfizer COVID vaccine in March 16, 2021. 2 weeks after the 1st vaccine, she noticed the swelling in her left upper neck close to the ER.  She brought this to medical attention.  She had injection of vaccine in the left deltoid.  She underwent CT scan of the neck which showed multiple enlarged level 1 and level 2 lymphadenopathy as well as swelling of the parotid gland.  She was seen by Dr. Poon who recommended excisional lymph node biopsy.  She is scheduled to have this procedure in Fariba 3, 2021. She presents today with her daughter and son-in-law to discuss further evaluation.  She feels well.  She has no fever, chills or no or night sweats.  She has no recent weight loss.  She denied any respiratory symptoms.  She has hypertension as well as arthritis.  She is status post left hip replacement last year.  Therefore, she has mild ambulatory dysfunction.  She quit smoking 40 years ago.  She does not drink alcohol.  She is in usual state of health.  Her performance status is 0 to 1/4 on the ECOG scale.        Interval History:  A 73-year-old female with left parotid gland enlargement as well as multiple lymphadenopathy of the neck.   Therefore, she underwent left neck lymph node biopsy by Dr. Poon which showed follicular lymphoma, grade 3A.  She subsequent underwent PET-CT scan which showed diffuse hypermetabolic lymphadenopathy in the chest, axillary, abdominal and pelvis.  SUV or is up to 15.  She underwent systemic chemotherapy  with bendamustine and rituximab x6 cycles with excellent tolerance.  She completed chemotherapy in December 2021, resulting in complete remission.  She is currently on observation.  She presents today for routine follow-up.  She has frequent bladder infection for which she was evaluated by urologist.  Recent CT scan showed thickening of bladder wall but no lymphadenopathy.  She feels well.  She has no fever, chills or night sweats.  Her weight is stable.  Has no respiratory symptoms.  Her performance status is normal.        Interval Follow up 4/8/2024     Patient complains of intermittent diarrhea.  This is a bowel change for her and will be having a colonoscopy in the coming months.  She has no pain, no urinary symptoms, no B symptoms.   She had her last mammogram in Jan 2023.  She is attempting to loose weight and has issues with her hips, knees and lower back.  She has SOB mostly with stairs.  She has no neurological issues.          Primary Oncologic  Diagnosis:          Follicular lymphoma, grade 3A.  Diagnosed in June 2021.        Cancer Staging:  Cancer Staging     Previous Hematologic/ Oncologic Treatment:       Bendamustine and rituximab x6 cycles completed in December 2021.     Current Hematologic/ Oncologic Treatment:       Observation.     Disease Status:      complete remission.     Test Results:     Pathology:\      left neck lymph node biopsy showed follicular lymphoma, grade 3A.     Radiology:     CT scan of neck, chest, abdomen and pelvis in August 2023 showed no evidence of lymphadenopathy.       CT PET  3/26/2024        IMPRESSION:     . Stable exam. No new hypermetabolic lesions that are concerning for malignancy/metastases. Deauville score of 1.             Interval History: Patient presents for follow up of      CT CAP 10/24/2024      CHEST     LUNGS: Stable pulmonary nodules.  There is no tracheal or endobronchial lesion     Pulmonary nodules:  Stable 2 mm nodule in the left upper lobe image  30 series 6  Stable 4 mm nodule in the right lower lobe image 71 series 6  Stable 2 mm nodule in the right upper lobe image 42 series 6  Previous 3 mm nodule in the right lower lobe has resolved likely postinfectious.     PLEURA:  Unremarkable.     HEART/GREAT VESSELS: Heart is unremarkable for patient's age. Minimal coronary artery calcification. No thoracic aortic aneurysm. Mild aortic atherosclerotic calcification.     MEDIASTINUM AND ANGELIQUE:  Unremarkable.     CHEST WALL AND LOWER NECK:  Unremarkable.     ABDOMEN     LIVER/BILIARY TREE:  Unremarkable.     GALLBLADDER:  Gallbladder is surgically absent.     SPLEEN:  Unremarkable.     PANCREAS:  Unremarkable.     ADRENAL GLANDS:  Unremarkable.     KIDNEYS/URETERS:  One or more simple renal cyst(s) is noted.  Otherwise unremarkable kidneys.  No hydronephrosis.     STOMACH AND BOWEL:  There is colonic diverticulosis without evidence of acute diverticulitis      APPENDIX:  No findings to suggest appendicitis.     ABDOMINOPELVIC CAVITY:  No ascites.  No pneumoperitoneum. No enlarged para-aortic or pelvic lymph nodes. Stable aortocaval lymph node measures 7 mm in short axis image 145 series 4.     VESSELS:  Unremarkable for patient's age.     PELVIS     REPRODUCTIVE ORGANS:  Surgical changes of prior hysterectomy.     URINARY BLADDER:  Unremarkable.     ABDOMINAL WALL/INGUINAL REGIONS:  There is a small fat-containing umbilical hernia.     OSSEOUS STRUCTURES:  No acute fracture or destructive osseous lesion.  Spinal degenerative changes are noted. Total left hip arthroplasty     IMPRESSION:     1. No recurrent or metastatic disease. No thoracic or abdominal lymphadenopathy. No axillary lymphadenopathy. No inguinal lymphadenopathy. Normal size spleen.     2. Resolution of previous 3 mm nodule in the right lower lobe. Remaining pulmonary nodules are stable. Continued surveillance as per oncology.     3. Small fat-containing umbilical hernia     Electronically signed:  10/28/2024 01:52 PM Wilson Berg MD      CT neck 10/24/2024      FINDINGS:     VISUALIZED BRAIN PARENCHYMA:  No acute abnormality, noting this examination is not tailored for assessment.     VISUALIZED ORBITS: No acute abnormality.     PARANASAL SINUSES: Scattered trace mucosal thickening.     NECK, NASAL CAVITY AND NASOPHARYNX: No focal mass of the aerodigestive tract     THYROID GLAND: No dominant nodule.     PAROTID AND SUBMANDIBULAR GLANDS: No focal mass.     LYMPH NODES:  No pathologic cervical lymph nodes by imaging criteria.     VASCULAR STRUCTURES: No acute abnormality, noting this examination is not tailored for assessment.     THORACIC INLET:  Lung apices and upper mediastinum are unremarkable.     BONY STRUCTURES: Odontogenic disease.     IMPRESSION:     No pathologic cervical lymph nodes by imaging criteria.       REVIEW OF SYSTEMS:  Please note that a 14-point review of systems was performed to include Constitutional, HEENT, Respiratory, CVS, GI, , Musculoskeletal, Integumentary, Neurologic, Rheumatologic, Endocrinologic, Psychiatric, Lymphatic, and Hematologic/Oncologic systems were reviewed and are negative unless otherwise stated in HPI. Positive and negative findings pertinent to this evaluation are incorporated into the history of present illness.      ECOG PS: ***    PROBLEM LIST:  Patient Active Problem List   Diagnosis    Vitamin D deficiency    Psoriasis    Pre-diabetes    Osteoarthritis    NSAID long-term use    Morbid obesity (HCC)    Hypothyroidism    Essential hypertension    Elevated blood sugar    Stage 3a chronic kidney disease (HCC)    Chronic depression    Chronic anxiety    Ambulatory dysfunction    Class 3 severe obesity with serious comorbidity and body mass index (BMI) of 45.0 to 49.9 in adult ([facility])    Primary osteoarthritis of right hip    Lumbar pain    History of total left hip replacement    PAD (peripheral artery disease) ([facility])    Grade 3a follicular lymphoma of lymph nodes  of neck (HCC)    Lymphoma (HCC)    Recurrent major depressive disorder, in partial remission (HCC)    Pre-op evaluation    Encounter to discuss test results    Encounter for long-term (current) use of medications    Medication management    Type 2 diabetes mellitus with hyperglycemia, without long-term current use of insulin (HCC)    Type 2 diabetes mellitus without complication, without long-term current use of insulin (HCC)    Functional diarrhea       Past Medical History:   has a past medical history of Anxiety, Arthritis, Back problem, Cancer (HCC), Depression, Disease of thyroid gland, Diverticulitis of colon, Fibrocystic disease of both breasts, Follicular lymphoma grade 3a (HCC), Hernia, Hip pain, History of staph infection, HL (hearing loss), Hypertension, Knee pain, Lymphoma (HCC), Obesity, Spinal stenosis, and Thyroid disease.    PAST SURGICAL HISTORY:   has a past surgical history that includes Cataract extraction (01/01/2017); Refractive surgery (Bilateral); Other surgical history; Cholecystectomy; Rotator cuff repair (Right); Revision total hip arthroplasty; Joint replacement; Hysterectomy (01/12/2024); Abdominal surgery; Upper gastrointestinal endoscopy; FL guided needle plac bx/asp/inj (10/22/2018); and FL guided needle plac bx/asp/inj (5/6/2019).    CURRENT MEDICATIONS  Current Outpatient Medications   Medication Sig Dispense Refill    acetaminophen (TYLENOL) 500 mg tablet Take 500 mg by mouth every 4 (four) hours      amLODIPine (NORVASC) 5 mg tablet TAKE 1 TABLET DAILY 90 tablet 1    amoxicillin (AMOXIL) 500 MG tablet Take 4 tablets by mouth in the morning For dental procedures      benazepril (LOTENSIN) 40 MG tablet TAKE 1 TABLET DAILY 90 tablet 1    cholecalciferol 1,000 units tablet Take 1,000 Units by mouth daily 15,000 units per week. 3 days a week takes per patient.      citalopram (CeleXA) 20 mg tablet Take 1 tablet (20 mg total) by mouth daily 90 tablet 3    Cranberry 300 MG tablet Take by  mouth as needed (FOR UTI)      estrogens, conjugated (Premarin) vaginal cream Insert 0.5 g into the vagina 3 (three) times a week (Patient taking differently: Insert 0.5 g into the vagina as needed) 30 g 6    fexofenadine (ALLEGRA) 180 MG tablet Take 180 mg by mouth as needed      hydrocortisone 0.5 % cream as needed      ketoconazole (NIZORAL) 2 % cream as needed      LACTOBACILLUS ACID-PECTIN PO Take 1 Capful by mouth as needed PROBIOTIC      levothyroxine 100 mcg tablet TAKE 1 TABLET DAILY 90 tablet 1    Multiple Vitamins-Minerals (PreserVision AREDS) TABS Take 1 tablet by mouth in the morning      multivitamin-iron-minerals-folic acid (CENTRUM) chewable tablet Chew 1 tablet daily      Naproxen Sodium 220 MG CAPS Take by mouth      Omega 3 1200 MG CAPS Take by mouth       No current facility-administered medications for this visit.     [unfilled]    SOCIAL HISTORY:   reports that she has quit smoking. Her smoking use included cigarettes. She has never used smokeless tobacco. She reports current alcohol use of about 1.0 standard drink of alcohol per week. She reports that she does not use drugs.     FAMILY HISTORY:  family history includes Arthritis in her sister; Breast cancer in her other; Cancer in her father; Diabetes in her brother; Heart failure in her mother; Lung cancer in her father; Other in her mother; Vision loss in her paternal aunt.     ALLERGIES:  is allergic to diphenhydramine, erythromycin, penicillin g, tetanus toxoid, tetanus toxoids, tetracycline, and medical tape.      Physical Exam:  Vital Signs:   Visit Vitals  LMP  (LMP Unknown)   OB Status Postmenopausal   Smoking Status Former     There is no height or weight on file to calculate BMI.  There is no height or weight on file to calculate BSA.    GEN: Alert, awake oriented x3, in no acute distress  HEENT- No pallor, icterus, cyanosis, no oral mucosal lesions,   LAD - no palpable cervical, clavicle, axillary, inguinal LAD  Heart- normal S1 S2,  regular rate and rhythm, No murmur, rubs.   Lungs- clear breathing sound bilateral.   Abdomen- soft, Non tender, bowel sounds present  Extremities- No cyanosis, clubbing, edema  Neuro- No focal neurological deficit    Labs:  Lab Results   Component Value Date    WBC 8.07 10/18/2024    HGB 13.3 10/18/2024    HCT 41.9 10/18/2024    MCV 99 (H) 10/18/2024     10/18/2024     Lab Results   Component Value Date    SODIUM 138 10/18/2024    K 4.7 10/18/2024     10/18/2024    CO2 27 10/18/2024    AGAP 11 10/18/2024    BUN 25 10/18/2024    CREATININE 1.05 10/18/2024    GLUC 114 08/15/2024    GLUF 108 (H) 10/18/2024    CALCIUM 10.2 10/18/2024    AST 22 10/18/2024    ALT 33 10/18/2024    ALKPHOS 90 10/18/2024    TP 7.4 10/18/2024    TBILI 0.60 10/18/2024    EGFR 51 10/18/2024           I spent *** minutes on chart review, face to face counseling time, coordination of care and documentation.    Maribel Israel MD PhD

## 2024-11-04 ENCOUNTER — OFFICE VISIT (OUTPATIENT)
Dept: HEMATOLOGY ONCOLOGY | Facility: CLINIC | Age: 74
End: 2024-11-04
Payer: COMMERCIAL

## 2024-11-04 VITALS
HEART RATE: 78 BPM | HEIGHT: 62 IN | SYSTOLIC BLOOD PRESSURE: 126 MMHG | OXYGEN SATURATION: 96 % | WEIGHT: 253 LBS | DIASTOLIC BLOOD PRESSURE: 74 MMHG | RESPIRATION RATE: 16 BRPM | TEMPERATURE: 98.2 F | BODY MASS INDEX: 46.56 KG/M2

## 2024-11-04 DIAGNOSIS — R91.8 PULMONARY NODULES/LESIONS, MULTIPLE: ICD-10-CM

## 2024-11-04 DIAGNOSIS — D75.89 MACROCYTOSIS WITHOUT ANEMIA: ICD-10-CM

## 2024-11-04 DIAGNOSIS — C82.31 GRADE 3A FOLLICULAR LYMPHOMA OF LYMPH NODES OF NECK (HCC): Primary | ICD-10-CM

## 2024-11-04 PROCEDURE — 99214 OFFICE O/P EST MOD 30 MIN: CPT | Performed by: INTERNAL MEDICINE

## 2024-11-04 NOTE — PROGRESS NOTES
Hematology/Oncology Outpatient Follow-up  Janette Little 74 y.o. female 1950 [id number]    Date:  11/4/2024  Reason for Visit: FIDELIA Cheema 9/5/2023, Grade IIIa Follicular Lymphoma   Interval history:    Patient notes limited mobility due to right knee pain  Plans for surgical intervention however states she needs to lose weight first prior to surgery and is considering ozempic or different variation    Denies any B cell symptoms     Poor appetite per family     ECOG 1 limited ot due OA right knee and hip     HPI:    Diagnosed 3/16/2021 status post first Pfizer COVID-vaccine developed lymphadenopathy in ER underwent CT scan of neck which showed enlarged lymphadenopathy level 1 and 2 status post surgical excision Fariba 3, 2021 with pathology consistent with grade 3A follicular lymphoma and subsequent PET scan showing diffuse hypermetabolic lymphadenopathy in chest axillary abdominal/pelvic regions with highest SUV up to 15.  The patient was given Bendamustine with Rituxan x 6 cycles completed 12/2021 resulting with complete remission.  The patient has been under active surveillance.    Additional past medical history includes, hypothyroidism, hypertension, mood instability on citalopram    Oncology History   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 (HCC)   6/9/2021 Initial Diagnosis    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 (HCC)     7/20/2021 - 12/8/2021 Chemotherapy    riTUXimab (RITUXAN) subsequent titrated chemo infusion, 806.2 mg, Intravenous, Once, 5 of 5 cycles  Administration: 800 mg (8/17/2021), 800 mg (9/14/2021), 800 mg (10/12/2021), 800 mg (11/9/2021), 800 mg (12/7/2021)  riTUXimab (RITUXAN) first titrated chemo infusion, 806.2 mg, Intravenous, Once, 1 of 1 cycle  Administration: 800 mg (7/20/2021)  bendamustine HCL(BENDEKA) IVPB, 70 mg/m2 = 150.5 mg (77.8 % of original dose 90 mg/m2), Intravenous, Once, 6 of 6 cycles  Dose modification: 70 mg/m2 (original dose 90 mg/m2,  Cycle 1, Reason: Anticipated Tolerance)  Administration: 150.5 mg (7/20/2021), 150.5 mg (7/21/2021), 150.5 mg (8/17/2021), 150.5 mg (8/18/2021), 150.5 mg (9/14/2021), 150.5 mg (9/15/2021), 150.5 mg (10/12/2021), 150.5 mg (10/13/2021), 150.5 mg (11/9/2021), 150.5 mg (11/10/2021), 150.5 mg (12/7/2021), 150.5 mg (12/8/2021)           ROS: Review of Systems   All other systems reviewed and are negative.      Past Medical History:   Diagnosis Date    Anxiety     on med     Arthritis     Back problem     4 Bulging Disks    Cancer (HCC)     Depression     Disease of thyroid gland     Diverticulitis of colon     Fibrocystic disease of both breasts     Follicular lymphoma grade 3a (HCC)     Hernia     Hip pain     Left    History of staph infection     HL (hearing loss)     Hypertension     Knee pain     Right    Lymphoma (HCC)     Obesity     Spinal stenosis     Thyroid disease        Allergies   Allergen Reactions    Diphenhydramine Other (See Comments)     Per patient and daughter have been told this is not an allergy, patient has had chest pressure/pain with benadryl once in the past.    Erythromycin Other (See Comments)    Penicillin G Other (See Comments)     As a child      Patient does okay with amoxicillin.    Tetanus Toxoid Swelling and Other (See Comments)     Only the arm getting the vaccine gets hot and swelled, per patient.    Tetanus Toxoids Other (See Comments)    Tetracycline Other (See Comments)    Medical Tape Rash         Current Outpatient Medications:     acetaminophen (TYLENOL) 500 mg tablet, Take 500 mg by mouth every 4 (four) hours, Disp: , Rfl:     amLODIPine (NORVASC) 5 mg tablet, TAKE 1 TABLET DAILY, Disp: 90 tablet, Rfl: 1    amoxicillin (AMOXIL) 500 MG tablet, Take 4 tablets by mouth in the morning For dental procedures, Disp: , Rfl:     benazepril (LOTENSIN) 40 MG tablet, TAKE 1 TABLET DAILY, Disp: 90 tablet, Rfl: 1    cholecalciferol 1,000 units tablet, Take 1,000 Units by mouth daily 15,000  units per week. 3 days a week takes per patient., Disp: , Rfl:     citalopram (CeleXA) 20 mg tablet, Take 1 tablet (20 mg total) by mouth daily, Disp: 90 tablet, Rfl: 3    Cranberry 300 MG tablet, Take by mouth as needed (FOR UTI), Disp: , Rfl:     estrogens, conjugated (Premarin) vaginal cream, Insert 0.5 g into the vagina 3 (three) times a week (Patient taking differently: Insert 0.5 g into the vagina as needed), Disp: 30 g, Rfl: 6    fexofenadine (ALLEGRA) 180 MG tablet, Take 180 mg by mouth as needed, Disp: , Rfl:     hydrocortisone 0.5 % cream, as needed, Disp: , Rfl:     ketoconazole (NIZORAL) 2 % cream, as needed, Disp: , Rfl:     LACTOBACILLUS ACID-PECTIN PO, Take 1 Capful by mouth as needed PROBIOTIC, Disp: , Rfl:     levothyroxine 100 mcg tablet, TAKE 1 TABLET DAILY, Disp: 90 tablet, Rfl: 1    Multiple Vitamins-Minerals (PreserVision AREDS) TABS, Take 1 tablet by mouth in the morning, Disp: , Rfl:     multivitamin-iron-minerals-folic acid (CENTRUM) chewable tablet, Chew 1 tablet daily, Disp: , Rfl:     Naproxen Sodium 220 MG CAPS, Take by mouth, Disp: , Rfl:     Omega 3 1200 MG CAPS, Take by mouth, Disp: , Rfl:       Physical Exam:  LMP  (LMP Unknown)     Physical Exam  Constitutional:       General: She is not in acute distress.     Appearance: She is obese. She is not ill-appearing.   HENT:      Head: Normocephalic and atraumatic.      Nose: Nose normal.      Mouth/Throat:      Mouth: Mucous membranes are moist.   Eyes:      Extraocular Movements: Extraocular movements intact.      Pupils: Pupils are equal, round, and reactive to light.   Cardiovascular:      Rate and Rhythm: Normal rate and regular rhythm.      Heart sounds: No murmur heard.  Pulmonary:      Effort: Pulmonary effort is normal.      Breath sounds: No wheezing.   Musculoskeletal:         General: Normal range of motion.      Cervical back: Normal range of motion.      Right lower leg: No edema.      Left lower leg: No edema.       Comments: No palpable axillary lymph nodes   Lymphadenopathy:      Cervical: No cervical adenopathy.   Skin:     General: Skin is warm.   Neurological:      General: No focal deficit present.      Mental Status: She is oriented to person, place, and time.   Psychiatric:         Mood and Affect: Mood normal.       Labs: additional labs reviewed  Lab Results   Component Value Date    WBC 8.07 10/18/2024    HGB 13.3 10/18/2024    HCT 41.9 10/18/2024    MCV 99 (H) 10/18/2024     10/18/2024   CMP: unremarkable  LDH: WNL    Imaging:    CT chest abd and pelvis & Neck 10/24/2024:  Impression:    1. No recurrent or metastatic disease. No thoracic or abdominal lymphadenopathy. No axillary lymphadenopathy. No inguinal lymphadenopathy. Normal size spleen.     2. Resolution of previous 3 mm nodule in the right lower lobe. Remaining pulmonary nodules are stable. Continued surveillance as per oncology.     3. Small fat-containing umbilical hernia    No pathologic cervical lymph nodes by imaging criteria    NM PET CT skull base to mid thigh  Result Date: 3/26/2024  Impression   1. Stable exam. No new hypermetabolic lesions that are concerning for malignancy/metastases. Deauville score of 1.      Pathology:  6/2021:  Final Diagnosis   A. Lymph node, left neck, excision  -  Follicular lymphoma, WHO grade 3A (see note).   The sample shows lymph node parenchyma with nodular expansion by atypical neoplastic appearing follicles with loss of polarity and tangible body macrophages. Cytologically, the follicles are composed predominantly of mixed cellularity with increased large atypical centroblasts (averaging >15/hpf). Areas of diffuse growth and overt large cell transformation are not identified. A portion of the sample was submitted for flow cytometric analysis ([facility]#[id number]; BALDEMAR Simon MD) which is a B-cell lymphoma of follicle center origin (monoclonal kappa/CD10 positive) with a large cell component. Overall, the  combination of findings is diagnostic of follicular lymphoma and consistent with WHO grade 3A.     Assessment and Plan:    1. 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 (HCC)    74-year-old female diagnosed in 3/2021 with grade 3A follicular lymphoma status post BR x 6 cycles completed 7/21-12/2021 in CR under active surveillance with last imaging 10/2024 CT chest abdomen pelvis showing no signs of recurrent disease in addition to stable LDH.    Plan follow-up patient in 6 months with repeat CBC/CMP/LDH  Will repeat CT chest abdomen pelvis with contrast in 1 year or sooner if patient develops after B-cell symptoms    No contraindication for surgical intervention for osteoarthritis from oncological standpoint    2. Macrocytosis without anemia  -Primary care ordered TSH plus B12 labs pending    3. Pulmonary nodules/lesions, multiple  -Stable on imaging     Gabriel Almeida, DO  PGY4 Hematology/Oncology Fellow      Patient voiced understanding and agreement in the above discussion. Aware to contact our office with questions/symptoms in the interim.     This note has been generated by voice recognition software system.  Therefore, there may be spelling, grammar, and or syntax errors. Please contact if questions arise.

## 2024-11-15 ENCOUNTER — ANNUAL EXAM (OUTPATIENT)
Dept: OBGYN CLINIC | Facility: CLINIC | Age: 74
End: 2024-11-15
Payer: COMMERCIAL

## 2024-11-15 VITALS
WEIGHT: 254 LBS | HEIGHT: 62 IN | SYSTOLIC BLOOD PRESSURE: 120 MMHG | BODY MASS INDEX: 46.74 KG/M2 | DIASTOLIC BLOOD PRESSURE: 72 MMHG

## 2024-11-15 DIAGNOSIS — Z01.419 ENCOUNTER FOR ROUTINE GYNECOLOGIC EXAMINATION IN MEDICARE PATIENT: Primary | ICD-10-CM

## 2024-11-15 DIAGNOSIS — Z78.0 POSTMENOPAUSAL STATE: ICD-10-CM

## 2024-11-15 DIAGNOSIS — Z13.820 SCREENING FOR OSTEOPOROSIS: ICD-10-CM

## 2024-11-15 PROCEDURE — G0101 CA SCREEN;PELVIC/BREAST EXAM: HCPCS | Performed by: PHYSICIAN ASSISTANT

## 2024-11-15 NOTE — PROGRESS NOTES
ASSESSMENT & PLAN: Janette Little is a 74 y.o.  with normal gynecologic exam.    1.  Routine well woman exam done today  2.  Pap and HPV:  The patient's last pap and hpv was unknown.    She denies any history of abnormal Pap smears.  Current ASCCP Guidelines reviewed.  Recommend discontinuation of Pap smear screening at this time secondary to total hysterectomy, age and guideline recommendations.  3.  Mammogram screening yearly performed at [facility] with next scheduled 2025  4.  Colonoscopy -patient reports aware and will reschedule  5.  DEXA -ordered for her today  6. The following were reviewed in today's visit: breast self exam, adequate intake of calcium and vitamin D, exercise, healthy diet, DEXA ordered, and age-appropriate recommendations Reading screenings and prevention.   7.  Patient noting occasional vaginal dryness and irritation.  She has not been using the Premarin cream as previously prescribed by her urologist for atrophic vagina.  Recommended restarting use inserting 0.5 g PV 3 times weekly with small amount applied externally.  If patient has ongoing issues she was encouraged to follow-up.    Otherwise I feel that her PCP can manage her mammogram, colonoscopy and DEXA scan screening.  Based on her mobility limitation, age and no need for Pap smear we will see patient back only on an as-needed basis.    CC:  Annual Gynecologic Examination    HPI: Janette Little is a 74 y.o.  who presents for annual gynecologic examination.     She has the following concerns:  hx of robotic TH BSO secondary to persistent thickened endometrium.   She is doing well since - had done through [facility].  Pelvic pressure she previous had has improved.     Janette is under management for hypertension, peripheral artery disease, hypothyroidism, type 2 diabetes, osteoarthritis of hip and knees, psoriasis, vitamin D deficiency, history of lymphoma currently in remission, CKD, ambulatory dysfunction,  anxiety/depression and morbid obesity.    She has not been using the premarin cream. States hasn't been having issues.      No LMP recorded (lmp unknown). Patient has had a hysterectomy.    Post-menopausal bleeding: N/A  Occasional constipation/diarrhea. No acute urinary sxs.         Health Maintenance:      She does perform irregular monthly self breast exams.  Denies breast pain, lump, skin change or nipple discharge.    She feels safe at home.     Last PAP & HPV: N/A  Last mammogram: 2024   Last colonoscopy: due - pt states needed to rescheule.   Last DEXA: 2018 - normal    Past Medical History:   Diagnosis Date    Anxiety     on med     Arthritis     Back problem     4 Bulging Disks    Cancer (HCC)     Depression     Disease of thyroid gland     Diverticulitis of colon     Fibrocystic disease of both breasts     Fibroid     Follicular lymphoma grade 3a (HCC)     Hernia     Hip pain     Left    History of staph infection     HL (hearing loss)     Hypertension     Hypothyroidism     Knee pain     Right    Lymphoma (HCC)     Obesity     Spinal stenosis     Thyroid disease        Past Surgical History:   Procedure Laterality Date    ABDOMINAL SURGERY      Hysterectomy    CATARACT EXTRACTION  2017    CHOLECYSTECTOMY      FL GUIDED NEEDLE PLAC BX/ASP/INJ  10/22/2018    FL GUIDED NEEDLE PLAC BX/ASP/INJ  2019    HYSTERECTOMY  2024    JOINT REPLACEMENT      OTHER SURGICAL HISTORY      Warts removal    REFRACTIVE SURGERY Bilateral     REVISION TOTAL HIP ARTHROPLASTY      ROTATOR CUFF REPAIR Right     UPPER GASTROINTESTINAL ENDOSCOPY         Past OB/Gyn History:  OB History          1    Para   1    Term   1            AB   0    Living   1         SAB   0    IAB   0    Ectopic   0    Multiple        Live Births   1           Obstetric Comments   · If post menopausal, age at menopause:   50                History of abnormal pap smears: denies .        Family History   Problem Relation Age of  Onset    Heart failure Mother     Lung cancer Father     Cancer Father     Breast cancer Other     Arthritis Sister     Diabetes Brother     Vision loss Paternal Aunt        Family History of Breast/Uterine/Ovarian/Colon:  as in HPI    Social History:  Social History     Socioeconomic History    Marital status: /Civil Union     Spouse name: Not on file    Number of children: 1    Years of education: 12    Highest education level: 12th grade   Occupational History    Occupation: reitred    Tobacco Use    Smoking status: Former     Average packs/day: 0.3 packs/day for 14.0 years (3.5 ttl pk-yrs)     Types: Cigarettes    Smokeless tobacco: Never   Vaping Use    Vaping status: Never Used   Substance and Sexual Activity    Alcohol use: Yes     Alcohol/week: 1.0 standard drink of alcohol     Types: 1 Glasses of wine per week     Comment: Occasional     Drug use: Never    Sexual activity: Not Currently     Partners: Male     Birth control/protection: Post-menopausal   Other Topics Concern    Not on file   Social History Narrative    · Most recent tobacco use screenin2019      · Do you currently or have you served in the [employer]:   No      · Were you activated, into active duty, as a member of the [employer] or as a Reservist:   No      · Caffeine intake:   Occasional      · Guns present in home:   No      · Seat belts used routinely:   Yes      · Sunscreen used routinely:   Yes      · Smoke alarm in home:   Yes      · Advance directive:   Yes      Social Drivers of Health     Financial Resource Strain: Low Risk  (2023)    Overall Financial Resource Strain (CARDIA)     Difficulty of Paying Living Expenses: Not hard at all   Food Insecurity: No Food Insecurity (2024)    Nursing - Inadequate Food Risk Classification     Worried About Running Out of Food in the Last Year: Never true     Ran Out of Food in the Last Year: Never true     Ran Out of Food in the Last Year: Not on file    Transportation Needs: No Transportation Needs (8/19/2024)    PRAPARE - Transportation     Lack of Transportation (Medical): No     Lack of Transportation (Non-Medical): No   Physical Activity: Insufficiently Active (5/4/2020)    Exercise Vital Sign     Days of Exercise per Week: 2 days     Minutes of Exercise per Session: 30 min   Stress: Not on file   Social Connections: Not on file   Intimate Partner Violence: Not on file   Housing Stability: Low Risk  (8/19/2024)    Housing Stability Vital Sign     Unable to Pay for Housing in the Last Year: No     Number of Times Moved in the Last Year: 1     Homeless in the Last Year: No       Allergies   Allergen Reactions    Diphenhydramine Other (See Comments)     Per patient and daughter have been told this is not an allergy, patient has had chest pressure/pain with benadryl once in the past.    Erythromycin Other (See Comments)    Penicillin G Other (See Comments)     As a child      Patient does okay with amoxicillin.    Tetanus Toxoid Swelling and Other (See Comments)     Only the arm getting the vaccine gets hot and swelled, per patient.    Tetanus Toxoids Other (See Comments)    Tetracycline Other (See Comments)    Medical Tape Rash         Current Outpatient Medications:     acetaminophen (TYLENOL) 500 mg tablet, Take 500 mg by mouth every 4 (four) hours, Disp: , Rfl:     amLODIPine (NORVASC) 5 mg tablet, TAKE 1 TABLET DAILY, Disp: 90 tablet, Rfl: 1    amoxicillin (AMOXIL) 500 MG tablet, Take 4 tablets by mouth in the morning For dental procedures, Disp: , Rfl:     benazepril (LOTENSIN) 40 MG tablet, TAKE 1 TABLET DAILY, Disp: 90 tablet, Rfl: 1    cholecalciferol 1,000 units tablet, Take 1,000 Units by mouth daily 15,000 units per week. 3 days a week takes per patient., Disp: , Rfl:     citalopram (CeleXA) 20 mg tablet, Take 1 tablet (20 mg total) by mouth daily, Disp: 90 tablet, Rfl: 3    Cranberry 300 MG tablet, Take by mouth as needed (FOR UTI), Disp: , Rfl:      estrogens, conjugated (Premarin) vaginal cream, Insert 0.5 g into the vagina 3 (three) times a week, Disp: 30 g, Rfl: 6    fexofenadine (ALLEGRA) 180 MG tablet, Take 180 mg by mouth as needed, Disp: , Rfl:     hydrocortisone 0.5 % cream, as needed, Disp: , Rfl:     ketoconazole (NIZORAL) 2 % cream, as needed, Disp: , Rfl:     LACTOBACILLUS ACID-PECTIN PO, Take 1 Capful by mouth as needed PROBIOTIC, Disp: , Rfl:     levothyroxine 100 mcg tablet, TAKE 1 TABLET DAILY, Disp: 90 tablet, Rfl: 1    Multiple Vitamins-Minerals (PreserVision AREDS) TABS, Take 1 tablet by mouth in the morning, Disp: , Rfl:     multivitamin-iron-minerals-folic acid (CENTRUM) chewable tablet, Chew 1 tablet daily, Disp: , Rfl:     Naproxen Sodium 220 MG CAPS, Take by mouth, Disp: , Rfl:     Omega 3 1200 MG CAPS, Take by mouth, Disp: , Rfl:     Review of Systems  Constitutional :no fever, feels well, no tiredness, no recent weight gain or loss  ENT: no ear ache, no loss of hearing, no nosebleeds or nasal discharge, no sore throat or hoarseness.  Cardiovascular: no complaints of slow or fast heart beat, no chest pain, no palpitations, no leg claudication or lower extremity edema.  Respiratory: no complaints of shortness of shortness of breath, no HECK  Breasts:no complaints of breast pain, breast lump, or nipple discharge  Gastrointestinal: no complaints of abdominal pain, constipation, nausea, vomiting, or diarrhea or bloody stools  Genitourinary : occasional vaginal dryness. no complaints of dysuria, worsening incontinence, pelvic pain, vaginal discharge/odor/bleeding or PMB.  Musculoskeletal: chronic orthopaedic issues and pain.  Integumentary: no complaints of skin rash or lesion, itching or dry skin  Neurological: no complaints of headache, no confusion, no numbness or tingling, no dizziness or fainting  MH: denies worsening anxiety/depression    Objective      /72 (BP Location: Left arm, Patient Position: Sitting, Cuff Size: Adult)   Ht  "5' 2\" (1.575 m)   Wt 115 kg (254 lb)   LMP  (LMP Unknown)   BMI 46.46 kg/m²   General:   appears stated age, cooperative, alert normal mood and affect.  BMI 46.46   Neck: normal, supple,trachea midline, no masses   Heart: regular rate and rhythm, S1, S2 normal, no murmur, click, rub or gallop   Lungs: clear to auscultation bilaterally   Breasts: normal appearance, no masses or tenderness, No nipple retraction or dimpling, No nipple discharge or bleeding, No axillary or supraclavicular adenopathy, Normal to palpation without dominant masses.  Mild moist erythematous rash with satellite lesion in the creases inferior breast bilaterally   Abdomen: soft, non-tender, without masses or organomegaly   Vulva: normal female genitalia, no lesions   Vagina: normal vagina, no discharge, exudate, lesion, or erythema   Urethra: normal   Cervix: Cervix surgically absent.  Vaginal cuff inspected and intact.   Uterus: uterus absent   Adnexa: surgically absent   Lymphatic palpation of lymph nodes in neck, axilla, groin and/or other locations: no lymphadenopathy or masses noted   Skin normal skin turgor and no rashes.   Psychiatric orientation to person, place, and time: normal. mood and affect: normal       "

## 2024-12-02 ENCOUNTER — APPOINTMENT (OUTPATIENT)
Dept: LAB | Facility: AMBULARY SURGERY CENTER | Age: 74
End: 2024-12-02
Payer: COMMERCIAL

## 2024-12-02 DIAGNOSIS — Z00.00 ENCOUNTER FOR MEDICARE ANNUAL WELLNESS EXAM: ICD-10-CM

## 2024-12-02 DIAGNOSIS — E66.01 MORBID OBESITY (HCC): ICD-10-CM

## 2024-12-02 DIAGNOSIS — C82.51 DIFFUSE FOLLICLE CENTER LYMPHOMA OF LYMPH NODES OF NECK (HCC): ICD-10-CM

## 2024-12-02 DIAGNOSIS — N18.31 STAGE 3A CHRONIC KIDNEY DISEASE (HCC): ICD-10-CM

## 2024-12-02 DIAGNOSIS — E03.9 ACQUIRED HYPOTHYROIDISM: ICD-10-CM

## 2024-12-02 DIAGNOSIS — R79.9 ABNORMAL FINDING OF BLOOD CHEMISTRY, UNSPECIFIED: ICD-10-CM

## 2024-12-02 DIAGNOSIS — E53.8 VITAMIN B12 DEFICIENCY: ICD-10-CM

## 2024-12-02 DIAGNOSIS — E11.22 TYPE 2 DIABETES MELLITUS WITH STAGE 3A CHRONIC KIDNEY DISEASE, WITHOUT LONG-TERM CURRENT USE OF INSULIN (HCC): ICD-10-CM

## 2024-12-02 DIAGNOSIS — R69 MULTIPLE MEDICAL PROBLEMS: ICD-10-CM

## 2024-12-02 DIAGNOSIS — N18.31 TYPE 2 DIABETES MELLITUS WITH STAGE 3A CHRONIC KIDNEY DISEASE, WITHOUT LONG-TERM CURRENT USE OF INSULIN (HCC): ICD-10-CM

## 2024-12-02 DIAGNOSIS — E55.9 VITAMIN D DEFICIENCY: ICD-10-CM

## 2024-12-02 DIAGNOSIS — C85.90 LYMPHOMA, UNSPECIFIED BODY REGION, UNSPECIFIED LYMPHOMA TYPE (HCC): ICD-10-CM

## 2024-12-02 DIAGNOSIS — I10 ESSENTIAL HYPERTENSION: ICD-10-CM

## 2024-12-02 LAB
25(OH)D3 SERPL-MCNC: 60.8 NG/ML (ref 30–100)
ALBUMIN SERPL BCG-MCNC: 4.4 G/DL (ref 3.5–5)
ALP SERPL-CCNC: 84 U/L (ref 34–104)
ALT SERPL W P-5'-P-CCNC: 27 U/L (ref 7–52)
ANION GAP SERPL CALCULATED.3IONS-SCNC: 10 MMOL/L (ref 4–13)
AST SERPL W P-5'-P-CCNC: 20 U/L (ref 13–39)
BACTERIA UR QL AUTO: ABNORMAL /HPF
BASOPHILS # BLD AUTO: 0.04 THOUSANDS/ΜL (ref 0–0.1)
BASOPHILS NFR BLD AUTO: 1 % (ref 0–1)
BILIRUB SERPL-MCNC: 0.58 MG/DL (ref 0.2–1)
BILIRUB UR QL STRIP: NEGATIVE
BUN SERPL-MCNC: 27 MG/DL (ref 5–25)
CALCIUM SERPL-MCNC: 10.3 MG/DL (ref 8.4–10.2)
CHLORIDE SERPL-SCNC: 100 MMOL/L (ref 96–108)
CHOLEST SERPL-MCNC: 174 MG/DL (ref ?–200)
CLARITY UR: CLEAR
CO2 SERPL-SCNC: 27 MMOL/L (ref 21–32)
COLOR UR: ABNORMAL
CREAT SERPL-MCNC: 1.08 MG/DL (ref 0.6–1.3)
EOSINOPHIL # BLD AUTO: 0.19 THOUSAND/ΜL (ref 0–0.61)
EOSINOPHIL NFR BLD AUTO: 2 % (ref 0–6)
ERYTHROCYTE [DISTWIDTH] IN BLOOD BY AUTOMATED COUNT: 14.1 % (ref 11.6–15.1)
EST. AVERAGE GLUCOSE BLD GHB EST-MCNC: 154 MG/DL
GFR SERPL CREATININE-BSD FRML MDRD: 49 ML/MIN/1.73SQ M
GLUCOSE P FAST SERPL-MCNC: 114 MG/DL (ref 65–99)
GLUCOSE UR STRIP-MCNC: NEGATIVE MG/DL
HBA1C MFR BLD: 7 %
HCT VFR BLD AUTO: 40.8 % (ref 34.8–46.1)
HDLC SERPL-MCNC: 59 MG/DL
HGB BLD-MCNC: 13 G/DL (ref 11.5–15.4)
HGB UR QL STRIP.AUTO: NEGATIVE
IMM GRANULOCYTES # BLD AUTO: 0.03 THOUSAND/UL (ref 0–0.2)
IMM GRANULOCYTES NFR BLD AUTO: 0 % (ref 0–2)
KETONES UR STRIP-MCNC: NEGATIVE MG/DL
LDLC SERPL CALC-MCNC: 92 MG/DL (ref 0–100)
LEUKOCYTE ESTERASE UR QL STRIP: ABNORMAL
LYMPHOCYTES # BLD AUTO: 1.94 THOUSANDS/ΜL (ref 0.6–4.47)
LYMPHOCYTES NFR BLD AUTO: 24 % (ref 14–44)
MCH RBC QN AUTO: 31 PG (ref 26.8–34.3)
MCHC RBC AUTO-ENTMCNC: 31.9 G/DL (ref 31.4–37.4)
MCV RBC AUTO: 97 FL (ref 82–98)
MONOCYTES # BLD AUTO: 0.95 THOUSAND/ΜL (ref 0.17–1.22)
MONOCYTES NFR BLD AUTO: 12 % (ref 4–12)
MUCOUS THREADS UR QL AUTO: ABNORMAL
NEUTROPHILS # BLD AUTO: 5.11 THOUSANDS/ΜL (ref 1.85–7.62)
NEUTS SEG NFR BLD AUTO: 61 % (ref 43–75)
NITRITE UR QL STRIP: NEGATIVE
NON-SQ EPI CELLS URNS QL MICRO: ABNORMAL /HPF
NRBC BLD AUTO-RTO: 0 /100 WBCS
PH UR STRIP.AUTO: 6 [PH]
PLATELET # BLD AUTO: 255 THOUSANDS/UL (ref 149–390)
PMV BLD AUTO: 9.9 FL (ref 8.9–12.7)
POTASSIUM SERPL-SCNC: 4.2 MMOL/L (ref 3.5–5.3)
PROT SERPL-MCNC: 7.4 G/DL (ref 6.4–8.4)
PROT UR STRIP-MCNC: NEGATIVE MG/DL
RBC # BLD AUTO: 4.19 MILLION/UL (ref 3.81–5.12)
RBC #/AREA URNS AUTO: ABNORMAL /HPF
SODIUM SERPL-SCNC: 137 MMOL/L (ref 135–147)
SP GR UR STRIP.AUTO: 1.01 (ref 1–1.03)
TRIGL SERPL-MCNC: 114 MG/DL (ref ?–150)
TSH SERPL DL<=0.05 MIU/L-ACNC: 1.48 UIU/ML (ref 0.45–4.5)
UROBILINOGEN UR STRIP-ACNC: <2 MG/DL
VIT B12 SERPL-MCNC: 429 PG/ML (ref 180–914)
WBC # BLD AUTO: 8.26 THOUSAND/UL (ref 4.31–10.16)
WBC #/AREA URNS AUTO: ABNORMAL /HPF

## 2024-12-02 PROCEDURE — 82306 VITAMIN D 25 HYDROXY: CPT

## 2024-12-02 PROCEDURE — 36415 COLL VENOUS BLD VENIPUNCTURE: CPT

## 2024-12-02 PROCEDURE — 82607 VITAMIN B-12: CPT

## 2024-12-02 PROCEDURE — 81001 URINALYSIS AUTO W/SCOPE: CPT

## 2024-12-02 PROCEDURE — 80053 COMPREHEN METABOLIC PANEL: CPT

## 2024-12-02 PROCEDURE — 80061 LIPID PANEL: CPT

## 2024-12-02 PROCEDURE — 83036 HEMOGLOBIN GLYCOSYLATED A1C: CPT

## 2024-12-02 PROCEDURE — 85025 COMPLETE CBC W/AUTO DIFF WBC: CPT

## 2024-12-02 PROCEDURE — 84443 ASSAY THYROID STIM HORMONE: CPT

## 2024-12-04 ENCOUNTER — OFFICE VISIT (OUTPATIENT)
Dept: FAMILY MEDICINE CLINIC | Facility: CLINIC | Age: 74
End: 2024-12-04
Payer: COMMERCIAL

## 2024-12-04 VITALS
HEIGHT: 62 IN | WEIGHT: 253 LBS | OXYGEN SATURATION: 95 % | BODY MASS INDEX: 46.56 KG/M2 | SYSTOLIC BLOOD PRESSURE: 128 MMHG | TEMPERATURE: 97.9 F | HEART RATE: 71 BPM | DIASTOLIC BLOOD PRESSURE: 70 MMHG

## 2024-12-04 DIAGNOSIS — Z79.899 MEDICATION MANAGEMENT: Primary | ICD-10-CM

## 2024-12-04 DIAGNOSIS — C82.31 GRADE 3A FOLLICULAR LYMPHOMA OF LYMPH NODES OF NECK (HCC): ICD-10-CM

## 2024-12-04 DIAGNOSIS — N18.31 STAGE 3A CHRONIC KIDNEY DISEASE (HCC): ICD-10-CM

## 2024-12-04 DIAGNOSIS — E66.01 MORBID OBESITY (HCC): ICD-10-CM

## 2024-12-04 DIAGNOSIS — E11.22 TYPE 2 DIABETES MELLITUS WITH STAGE 3A CHRONIC KIDNEY DISEASE, WITHOUT LONG-TERM CURRENT USE OF INSULIN (HCC): ICD-10-CM

## 2024-12-04 DIAGNOSIS — I10 ESSENTIAL HYPERTENSION: ICD-10-CM

## 2024-12-04 DIAGNOSIS — N18.31 TYPE 2 DIABETES MELLITUS WITH STAGE 3A CHRONIC KIDNEY DISEASE, WITHOUT LONG-TERM CURRENT USE OF INSULIN (HCC): ICD-10-CM

## 2024-12-04 DIAGNOSIS — L21.9 SEBORRHEIC DERMATITIS: ICD-10-CM

## 2024-12-04 DIAGNOSIS — Z71.2 ENCOUNTER TO DISCUSS TEST RESULTS: ICD-10-CM

## 2024-12-04 PROCEDURE — G2211 COMPLEX E/M VISIT ADD ON: HCPCS | Performed by: FAMILY MEDICINE

## 2024-12-04 PROCEDURE — 99214 OFFICE O/P EST MOD 30 MIN: CPT | Performed by: FAMILY MEDICINE

## 2024-12-04 RX ORDER — KETOCONAZOLE 20 MG/G
CREAM TOPICAL AS NEEDED
Qty: 30 G | Refills: 1 | Status: SHIPPED | OUTPATIENT
Start: 2024-12-04

## 2024-12-04 NOTE — ASSESSMENT & PLAN NOTE
All medications reviewed for safety efficacy and tolerance.  She is only taking Norvasc 5 and Lotensin 40 for blood pressure blood pressure is 128/70 which is good.  A1c however remains elevated at 7.0 she could not tolerate metformin will begin Jardiance given her GFR of 49 and use that for dual purposes of blood sugar control and CKD  Orders:    Empagliflozin (JARDIANCE) 10 MG TABS tablet; Take 1 tablet (10 mg total) by mouth daily

## 2024-12-04 NOTE — PROGRESS NOTES
Name: Janette Little      : 1950      MRN: [id number]  Encounter Provider: SHANTELLE Smith DO  Encounter Date: 2024   Encounter department: [facility] [place]    Assessment & Plan  Medication management  All medications reviewed for safety efficacy and tolerance.  She is only taking Norvasc 5 and Lotensin 40 for blood pressure blood pressure is 128/70 which is good.  A1c however remains elevated at 7.0 she could not tolerate metformin will begin Jardiance given her GFR of 49 and use that for dual purposes of blood sugar control and CKD  Orders:    Empagliflozin (JARDIANCE) 10 MG TABS tablet; Take 1 tablet (10 mg total) by mouth daily    Type 2 diabetes mellitus with stage 3a chronic kidney disease, without long-term current use of insulin ([facility])    Lab Results   Component Value Date    HGBA1C 7.0 (H) 2024     Will begin Jardiance 10 mg once daily or one half of 25 mg tablet once daily and recheck levels in 3 months  Orders:    Empagliflozin (JARDIANCE) 10 MG TABS tablet; Take 1 tablet (10 mg total) by mouth daily    Albumin / creatinine urine ratio; Future    Comprehensive metabolic panel; Future    Hemoglobin A1C; Future    Seborrheic dermatitis  Mostly over the nasolabial fold and periorbital area some around the ear  Orders:    ketoconazole (NIZORAL) 2 % cream; Apply topically as needed for rash    Essential hypertension  Blood pressure controlled at 128/70 taking amlodipine and Lotensin discussed same       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 She is accompanied by an adult male.    She reports experiencing aches and pains, which she attributes to her arthritis. She has been taking Naprosyn 220 mg for this condition but has recently switched to Tylenol Extra Strength, do to gfr 49 range, and we reviewed all the last few yrs gfr's and discussed avoidance of NSAIDs as much as possible    She expresses concern about her kidney function, noting that it has been stable but has decreased from 60 to around 49-51. She is curious if her recent COVID-19 infection could have affected her kidney function. She also mentions that she is not currently taking any medication for her blood sugar levels.    She reports experiencing vaginal dryness, which she finds irritating. She is unsure if she can develop a yeast infection post-hysterectomy. She has Premarin Vag cream at home, but does not use it    She has been dealing with dry skin for some time, which has started to peel and spread to her eye. She recalls being prescribed a cream for her ears in the past, which was effective, and wonders if it could be used for her current skin condition.Will Rx Nizoral cr    She went to her cancer doctor and she is still in remission (lymphoma) for 4 years. She was told to come back in 6 months and then in a year, she will do another CAT scan as soon as everything is alright. She had a knee replacement done in . She takes 3 fish oils a day.    FAMILY HISTORY  Her mother  at the age of 40 due to breast cancer. Her brother  at the age of 60 due to diabetes. Her sister  at the age of 56 due to rheumatoid arthritis.     Review of Systems   Constitutional:  Negative for activity change, appetite change, chills, diaphoresis, fatigue and fever.   HENT:  Positive for hearing loss. Negative for  "congestion, ear discharge, ear pain, facial swelling, nosebleeds, sore throat, tinnitus, trouble swallowing and voice change.    Eyes:  Negative for photophobia, pain, discharge, redness, itching and visual disturbance.   Respiratory:  Negative for apnea, cough, choking, chest tightness and shortness of breath.    Cardiovascular:  Negative for chest pain, palpitations and leg swelling.   Gastrointestinal:  Negative for abdominal distention, abdominal pain, blood in stool, constipation, diarrhea, nausea and vomiting.   Endocrine: Negative for cold intolerance, heat intolerance, polydipsia, polyphagia and polyuria.   Genitourinary:  Negative for decreased urine volume, difficulty urinating, dysuria, enuresis, frequency, hematuria, pelvic pain, urgency and vaginal bleeding.   Musculoskeletal:  Negative for arthralgias, back pain, gait problem, joint swelling, neck pain and neck stiffness.   Skin:  Negative for color change, pallor and rash.   Allergic/Immunologic: Negative for immunocompromised state.   Neurological:  Negative for dizziness, seizures, facial asymmetry, light-headedness, numbness and headaches.   Hematological:  Negative for adenopathy.   Psychiatric/Behavioral:  Negative for agitation, behavioral problems, confusion, decreased concentration, dysphoric mood and hallucinations.      Objective   /70 (BP Location: Left arm, Patient Position: Sitting, Cuff Size: Standard)   Pulse 71   Temp 97.9 °F (36.6 °C) (Temporal)   Ht 5' 2\" (1.575 m)   Wt 115 kg (253 lb)   LMP  (LMP Unknown)   SpO2 95%   BMI 46.27 kg/m²     Physical Exam  Vital Signs  Blood pressure reading is 128/70.  Physical Exam  Vitals and nursing note reviewed.   Constitutional:       General: She is not in acute distress.     Appearance: Normal appearance. She is not ill-appearing, toxic-appearing or diaphoretic.   HENT:      Head: Normocephalic.      Nose: Nose normal.      Mouth/Throat:      Mouth: Mucous membranes are moist.      " Pharynx: No oropharyngeal exudate or posterior oropharyngeal erythema.   Eyes:      Extraocular Movements: Extraocular movements intact.      Pupils: Pupils are equal, round, and reactive to light.   Cardiovascular:      Rate and Rhythm: Normal rate and regular rhythm.      Pulses: Normal pulses.      Heart sounds: Normal heart sounds.      No gallop.   Pulmonary:      Effort: No respiratory distress.      Breath sounds: Normal breath sounds. No wheezing, rhonchi or rales.   Abdominal:      General: Abdomen is flat.   Musculoskeletal:         General: No tenderness.      Cervical back: Normal range of motion and neck supple.      Right lower leg: No edema.      Left lower leg: No edema.   Skin:     General: Skin is warm.   Neurological:      General: No focal deficit present.      Mental Status: She is alert and oriented to person, place, and time. Mental status is at baseline.   Psychiatric:         Mood and Affect: Mood normal.         Behavior: Behavior normal.         Thought Content: Thought content normal.         Judgment: Judgment normal.       Administrative Statements   I have spent a total time of 30 minutes in caring for this patient on the day of the visit/encounter including Diagnostic results, Prognosis, Risks and benefits of tx options, Instructions for management, Patient and family education, Importance of tx compliance, Risk factor reductions, Impressions, Counseling / Coordination of care, Documenting in the medical record, Reviewing / ordering tests, medicine, procedures  , and Obtaining or reviewing history  .

## 2024-12-05 NOTE — ASSESSMENT & PLAN NOTE
Lab Results   Component Value Date    EGFR 49 12/02/2024    EGFR 51 10/18/2024    EGFR 48 08/15/2024    CREATININE 1.08 12/02/2024    CREATININE 1.05 10/18/2024    CREATININE 1.10 08/15/2024

## 2025-01-13 DIAGNOSIS — I10 ESSENTIAL HYPERTENSION: ICD-10-CM

## 2025-01-14 RX ORDER — AMLODIPINE BESYLATE 5 MG/1
5 TABLET ORAL DAILY
Qty: 90 TABLET | Refills: 1 | Status: SHIPPED | OUTPATIENT
Start: 2025-01-14

## 2025-01-14 RX ORDER — BENAZEPRIL HYDROCHLORIDE 40 MG/1
40 TABLET ORAL DAILY
Qty: 90 TABLET | Refills: 1 | Status: SHIPPED | OUTPATIENT
Start: 2025-01-14

## 2025-03-10 DIAGNOSIS — E03.9 ACQUIRED HYPOTHYROIDISM: ICD-10-CM

## 2025-03-10 RX ORDER — LEVOTHYROXINE SODIUM 100 UG/1
100 TABLET ORAL DAILY
Qty: 90 TABLET | Refills: 1 | Status: SHIPPED | OUTPATIENT
Start: 2025-03-10

## 2025-04-15 ENCOUNTER — TELEPHONE (OUTPATIENT)
Dept: HEMATOLOGY ONCOLOGY | Facility: CLINIC | Age: 75
End: 2025-04-15

## 2025-04-16 ENCOUNTER — APPOINTMENT (OUTPATIENT)
Dept: LAB | Facility: CLINIC | Age: 75
End: 2025-04-16
Payer: COMMERCIAL

## 2025-04-16 ENCOUNTER — HOSPITAL ENCOUNTER (OUTPATIENT)
Dept: RADIOLOGY | Facility: HOSPITAL | Age: 75
Discharge: HOME | End: 2025-04-16
Payer: COMMERCIAL

## 2025-04-16 ENCOUNTER — OFFICE VISIT (OUTPATIENT)
Dept: FAMILY MEDICINE CLINIC | Facility: CLINIC | Age: 75
End: 2025-04-16
Payer: COMMERCIAL

## 2025-04-16 VITALS
RESPIRATION RATE: 17 BRPM | DIASTOLIC BLOOD PRESSURE: 74 MMHG | BODY MASS INDEX: 46.27 KG/M2 | HEIGHT: 62 IN | HEART RATE: 68 BPM | SYSTOLIC BLOOD PRESSURE: 140 MMHG | OXYGEN SATURATION: 93 % | TEMPERATURE: 97.9 F

## 2025-04-16 DIAGNOSIS — S09.93XD BLUNT TRAUMA OF FACE, SUBSEQUENT ENCOUNTER: ICD-10-CM

## 2025-04-16 DIAGNOSIS — E11.22 TYPE 2 DIABETES MELLITUS WITH STAGE 3A CHRONIC KIDNEY DISEASE, WITHOUT LONG-TERM CURRENT USE OF INSULIN (HCC): ICD-10-CM

## 2025-04-16 DIAGNOSIS — E66.01 MORBID OBESITY (HCC): ICD-10-CM

## 2025-04-16 DIAGNOSIS — N18.31 STAGE 3A CHRONIC KIDNEY DISEASE (HCC): ICD-10-CM

## 2025-04-16 DIAGNOSIS — R51.9 LEFT FACIAL PAIN: Primary | ICD-10-CM

## 2025-04-16 DIAGNOSIS — R51.9 LEFT FACIAL PAIN: ICD-10-CM

## 2025-04-16 DIAGNOSIS — N18.31 TYPE 2 DIABETES MELLITUS WITH STAGE 3A CHRONIC KIDNEY DISEASE, WITHOUT LONG-TERM CURRENT USE OF INSULIN (HCC): ICD-10-CM

## 2025-04-16 DIAGNOSIS — C82.31 GRADE 3A FOLLICULAR LYMPHOMA OF LYMPH NODES OF NECK (HCC): ICD-10-CM

## 2025-04-16 PROCEDURE — 70150 X-RAY EXAM OF FACIAL BONES: CPT

## 2025-04-16 PROCEDURE — 99213 OFFICE O/P EST LOW 20 MIN: CPT | Performed by: NURSE PRACTITIONER

## 2025-04-16 PROCEDURE — G2211 COMPLEX E/M VISIT ADD ON: HCPCS | Performed by: NURSE PRACTITIONER

## 2025-04-16 NOTE — PROGRESS NOTES
"Name: Janette Little      : 1950      MRN: [id number]  Encounter Provider: DUY Samson  Encounter Date: 2025   Encounter department: 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)  In remission managed by onc       Morbid obesity (HCC)           Stage 3a chronic kidney disease (HCC)  Lab Results   Component Value Date    EGFR 49 2024    EGFR 51 10/18/2024    EGFR 48 08/15/2024    CREATININE 1.08 2024    CREATININE 1.05 10/18/2024    CREATININE 1.10 08/15/2024            Type 2 diabetes mellitus with stage 3a chronic kidney disease, without long-term current use of insulin (HCC)    Lab Results   Component Value Date    HGBA1C 7.0 (H) 2024   Managed by pcp                History of Present Illness   74 yo female accompanied by spouse presents for eval of transient onset of minor pain, swelling of left cheek  Started yesterday  She had fall over one month ago  Left cheek bruised from direct impact  It had healed however it suddenly started swelling again, was painful    She denies re-injurying the site    Facial Injury   Incident onset: more than one month ago. The injury mechanism was a fall. There was no loss of consciousness.   Review of Systems    Objective   /74 (BP Location: Left arm, Patient Position: Sitting, Cuff Size: Large)   Pulse 68   Temp 97.9 °F (36.6 °C) (Temporal)   Resp 17   Ht 5' 2\" (1.575 m)   LMP  (LMP Unknown)   SpO2 93%   BMI 46.27 kg/m²      Physical Exam    "

## 2025-04-20 ENCOUNTER — RESULTS FOLLOW-UP (OUTPATIENT)
Dept: FAMILY MEDICINE CLINIC | Facility: CLINIC | Age: 75
End: 2025-04-20

## 2025-04-21 NOTE — RESULT ENCOUNTER NOTE
Called pt -- left detailed note informing of Alice's message and advised call back should there be any changes/new Sx.

## 2025-04-22 ENCOUNTER — ESTABLISHED COMPREHENSIVE EXAM (OUTPATIENT)
Dept: URBAN - METROPOLITAN AREA CLINIC 6 | Facility: CLINIC | Age: 75
End: 2025-04-22

## 2025-04-22 DIAGNOSIS — Z96.1: ICD-10-CM

## 2025-04-22 DIAGNOSIS — H40.013: ICD-10-CM

## 2025-04-22 DIAGNOSIS — E11.9: ICD-10-CM

## 2025-04-22 DIAGNOSIS — H35.3132: ICD-10-CM

## 2025-04-22 PROCEDURE — 92134 CPTRZ OPH DX IMG PST SGM RTA: CPT

## 2025-04-22 PROCEDURE — 92014 COMPRE OPH EXAM EST PT 1/>: CPT

## 2025-04-22 PROCEDURE — 92133 CPTRZD OPH DX IMG PST SGM ON: CPT | Mod: NC

## 2025-04-22 ASSESSMENT — VISUAL ACUITY
OS_CC: 20/40+2
OD_CC: 20/25

## 2025-04-22 ASSESSMENT — TONOMETRY
OS_IOP_MMHG: 15
OD_IOP_MMHG: 13

## 2025-04-25 ENCOUNTER — TELEPHONE (OUTPATIENT)
Dept: HEMATOLOGY ONCOLOGY | Facility: CLINIC | Age: 75
End: 2025-04-25

## 2025-04-25 NOTE — TELEPHONE ENCOUNTER
Called pt to see if interested in virtual visit w provider     Advised to call [facility] of needed

## 2025-05-02 ENCOUNTER — APPOINTMENT (OUTPATIENT)
Dept: LAB | Facility: AMBULARY SURGERY CENTER | Age: 75
End: 2025-05-02
Payer: COMMERCIAL

## 2025-05-02 DIAGNOSIS — Z12.31 SCREENING MAMMOGRAM FOR BREAST CANCER: Primary | ICD-10-CM

## 2025-05-02 DIAGNOSIS — C82.31 GRADE 3A FOLLICULAR LYMPHOMA OF LYMPH NODES OF NECK (HCC): ICD-10-CM

## 2025-05-02 DIAGNOSIS — D75.89 MACROCYTOSIS WITHOUT ANEMIA: ICD-10-CM

## 2025-05-02 DIAGNOSIS — R91.8 PULMONARY NODULES/LESIONS, MULTIPLE: ICD-10-CM

## 2025-05-02 LAB
BASOPHILS # BLD AUTO: 0.08 THOUSANDS/ÂΜL (ref 0–0.1)
BASOPHILS NFR BLD AUTO: 1 % (ref 0–1)
EOSINOPHIL # BLD AUTO: 0.25 THOUSAND/ÂΜL (ref 0–0.61)
EOSINOPHIL NFR BLD AUTO: 2 % (ref 0–6)
ERYTHROCYTE [DISTWIDTH] IN BLOOD BY AUTOMATED COUNT: 14.5 % (ref 11.6–15.1)
HCT VFR BLD AUTO: 44.7 % (ref 34.8–46.1)
HGB BLD-MCNC: 14.2 G/DL (ref 11.5–15.4)
IMM GRANULOCYTES # BLD AUTO: 0.05 THOUSAND/UL (ref 0–0.2)
IMM GRANULOCYTES NFR BLD AUTO: 1 % (ref 0–2)
LDH SERPL-CCNC: 180 U/L (ref 140–271)
LYMPHOCYTES # BLD AUTO: 1.91 THOUSANDS/ÂΜL (ref 0.6–4.47)
LYMPHOCYTES NFR BLD AUTO: 19 % (ref 14–44)
MCH RBC QN AUTO: 32 PG (ref 26.8–34.3)
MCHC RBC AUTO-ENTMCNC: 31.8 G/DL (ref 31.4–37.4)
MCV RBC AUTO: 101 FL (ref 82–98)
MONOCYTES # BLD AUTO: 1.3 THOUSAND/ÂΜL (ref 0.17–1.22)
MONOCYTES NFR BLD AUTO: 13 % (ref 4–12)
NEUTROPHILS # BLD AUTO: 6.76 THOUSANDS/ÂΜL (ref 1.85–7.62)
NEUTS SEG NFR BLD AUTO: 64 % (ref 43–75)
NRBC BLD AUTO-RTO: 0 /100 WBCS
PLATELET # BLD AUTO: 298 THOUSANDS/UL (ref 149–390)
PMV BLD AUTO: 10.1 FL (ref 8.9–12.7)
RBC # BLD AUTO: 4.44 MILLION/UL (ref 3.81–5.12)
WBC # BLD AUTO: 10.35 THOUSAND/UL (ref 4.31–10.16)

## 2025-05-02 PROCEDURE — 83615 LACTATE (LD) (LDH) ENZYME: CPT

## 2025-05-02 PROCEDURE — 36415 COLL VENOUS BLD VENIPUNCTURE: CPT

## 2025-05-02 PROCEDURE — 85025 COMPLETE CBC W/AUTO DIFF WBC: CPT

## 2025-05-03 NOTE — ASSESSMENT & PLAN NOTE
MCV remains 101 with Hgb 14.2      Summary/Recommendations    CT neck/CAP in Nov  as well as labs    Yearly surveillance scans     Follow up mid Nov 2025

## 2025-05-03 NOTE — ASSESSMENT & PLAN NOTE
A 75 year old presented initially  with diffuse hypermetabolic lymphadenopathy in the chest axillary, abdominal as well as pelvis. SUV based on PET-CT scan was up to 15.  She was treated with bendamustine rituximab x6 cycles, resulting in complete remission.      She was diagnosed 3/16/2021 status post first Pfizer COVID-vaccine developed lymphadenopathy in ER underwent CT scan of neck which showed enlarged lymphadenopathy level 1 and 2 status post surgical excision Fariba 3, 2021 with pathology consistent with grade 3A follicular lymphoma and subsequent PET scan showing diffuse hypermetabolic lymphadenopathy in chest axillary abdominal/pelvic regions with highest SUV up to 15.  The patient was given Bendamustine with Rituxan x 6 cycles completed 12/2021 resulting with complete remission.  The patient has been under active surveillance.     Additional past medical history includes, hypothyroidism, hypertension, mood instability on citalopram     She is currently on observation.     Clinically as well as radiographically, she remains in complete remission.     She had a CT CAP ordered by Dr Cheema with the following in Feb 27 2024     New small, 3 mm right lower lobe  lung nodule. 3-month follow-up is suggested.     No new adenopathy in the chest abdomen or pelvis.     Based on the above, she had a CT PET 3/26/2024     CT PET 3/2024 with Deauville 1   IMPRESSION:  1. Stable exam. No new hypermetabolic lesions that are concerning for malignancy/metastases. Deauville score of 1.     Thus, she remains with a durable CR 3 years from definitive treatment      11/4/2024        10/2024 CT chest abdomen pelvis showing no signs of recurrent disease in addition to stable LDH.      CT yearly as has been stable     Labs in 6 months     No issues     5/5/2025    Visit today lab check-CT neck/CAP in Nov 2025    Will be 4 years from completion of treatment    Labs 5/2/2025 with WBC 10.4 Hgb 14.2  plts 298     She fell  recently, hurt left side face/knee and was concerned with left hip which was replaced recently    Otherwise no complaints, no B symptoms.          Follow up      6 months with labs

## 2025-05-03 NOTE — PROGRESS NOTES
Name: Janette Little      : 1950      MRN: [id number]  Encounter Provider: Maribel Israel MD  Encounter Date: 2025   Encounter department: [facility]  :  Assessment & Plan  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 (HCC)      A 75 year old presented initially  with diffuse hypermetabolic lymphadenopathy in the chest axillary, abdominal as well as pelvis. SUV based on PET-CT scan was up to 15.  She was treated with bendamustine rituximab x6 cycles, resulting in complete remission.      She was diagnosed 3/16/2021 status post first Pfizer COVID-vaccine developed lymphadenopathy in ER underwent CT scan of neck which showed enlarged lymphadenopathy level 1 and 2 status post surgical excision Fariba 3, 2021 with pathology consistent with grade 3A follicular lymphoma and subsequent PET scan showing diffuse hypermetabolic lymphadenopathy in chest axillary abdominal/pelvic regions with highest SUV up to 15.  The patient was given Bendamustine with Rituxan x 6 cycles completed 2021 resulting with complete remission.  The patient has been under active surveillance.     Additional past medical history includes, hypothyroidism, hypertension, mood instability on citalopram     She is currently on observation.     Clinically as well as radiographically, she remains in complete remission.     She had a CT CAP ordered by Dr Cheema with the following in 2024     New small, 3 mm right lower lobe  lung nodule. 3-month follow-up is suggested.     No new adenopathy in the chest abdomen or pelvis.     Based on the above, she had a CT PET 3/26/2024     CT PET 3/2024 with Deauville 1   IMPRESSION:  1. Stable exam. No new hypermetabolic lesions that are concerning for malignancy/metastases. Deauville score of 1.     Thus, she remains with a durable CR 3 years from definitive treatment      2024        10/2024 CT chest abdomen pelvis showing no signs of  recurrent disease in addition to stable LDH.      CT yearly as has been stable     Labs in 6 months     No issues     5/5/2025    Visit today lab check-CT neck/CAP in Nov 2025    Will be 4 years from completion of treatment    Labs 5/2/2025 with WBC 10.4 Hgb 14.2  plts 298     She fell recently, hurt left side face/knee and was concerned with left hip which was replaced recently    Otherwise no complaints, no B symptoms.          Follow up      6 months with labs       Macrocytosis without anemia    MCV remains 101 with Hgb 14.2      Summary/Recommendations    CT neck/CAP in Nov  as well as labs    Yearly surveillance scans     Follow up mid Nov 2025       History of Present Illness   No chief complaint on file.    Diagnosed 3/16/2021 status post first Pfizer COVID-vaccine developed lymphadenopathy in ER underwent CT scan of neck which showed enlarged lymphadenopathy level 1 and 2 status post surgical excision Fariba 3, 2021 with pathology consistent with grade 3A follicular lymphoma and subsequent PET scan showing diffuse hypermetabolic lymphadenopathy in chest axillary abdominal/pelvic regions with highest SUV up to 15.  The patient was given Bendamustine with Rituxan x 6 cycles completed 12/2021 resulting with complete remission.  The patient has been under active surveillance.     Additional past medical history includes, hypothyroidism, hypertension, mood instability on citalopram    11/4/2024    Lymphoma   Interval history:     Patient notes limited mobility due to right knee pain  Plans for surgical intervention however states she needs to lose weight first prior to surgery and is considering ozempic or different variation     Denies any B cell symptoms      Poor appetite per family      ECOG 1 limited ot due OA right knee and hip     5/5/2025    See above        Oncology History   Cancer Staging   No matching staging information was found for the patient.  Oncology History   Grade 3a follicular lymphoma  of lymph nodes of neck (HCC)   6/9/2021 Initial Diagnosis    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 (HCC)     7/20/2021 - 12/8/2021 Chemotherapy    riTUXimab (RITUXAN) subsequent titrated chemo infusion, 806.2 mg, Intravenous, Once, 5 of 5 cycles  Administration: 800 mg (8/17/2021), 800 mg (9/14/2021), 800 mg (10/12/2021), 800 mg (11/9/2021), 800 mg (12/7/2021)  riTUXimab (RITUXAN) first titrated chemo infusion, 806.2 mg, Intravenous, Once, 1 of 1 cycle  Administration: 800 mg (7/20/2021)  bendamustine HCL(BENDEKA) IVPB, 70 mg/m2 = 150.5 mg (77.8 % of original dose 90 mg/m2), Intravenous, Once, 6 of 6 cycles  Dose modification: 70 mg/m2 (original dose 90 mg/m2, Cycle 1, Reason: Anticipated Tolerance)  Administration: 150.5 mg (7/20/2021), 150.5 mg (7/21/2021), 150.5 mg (8/17/2021), 150.5 mg (8/18/2021), 150.5 mg (9/14/2021), 150.5 mg (9/15/2021), 150.5 mg (10/12/2021), 150.5 mg (10/13/2021), 150.5 mg (11/9/2021), 150.5 mg (11/10/2021), 150.5 mg (12/7/2021), 150.5 mg (12/8/2021)        Pertinent Medical History      Review of Systems   Constitutional:  Positive for fatigue.   HENT: Negative.     Respiratory: Negative.     Cardiovascular: Negative.    Gastrointestinal: Negative.    Genitourinary: Negative.    Musculoskeletal:  Positive for arthralgias and myalgias.   Neurological: Negative.    Hematological: Negative.    Psychiatric/Behavioral: Negative.             Objective   LMP  (LMP Unknown)     Pain Screening:     ECOG   2  Physical Exam  Vitals reviewed.   HENT:      Head: Normocephalic.      Nose: Nose normal.      Mouth/Throat:      Mouth: Mucous membranes are moist.      Comments: Bruise upper left maxillary area healing   Eyes:      Conjunctiva/sclera: Conjunctivae normal.   Cardiovascular:      Pulses: Normal pulses.   Pulmonary:      Effort: Pulmonary effort is normal.   Abdominal:      General: Bowel sounds are normal.   Musculoskeletal:         General: Normal range of motion.      Cervical  back: Normal range of motion.   Skin:     General: Skin is warm.   Neurological:      General: No focal deficit present.      Mental Status: She is alert.   Psychiatric:         Mood and Affect: Mood normal.         Labs: I have reviewed the following labs:  Lab Results   Component Value Date/Time    WBC 10.35 (H) 05/02/2025 12:03 PM    RBC 4.44 05/02/2025 12:03 PM    Hemoglobin 14.2 05/02/2025 12:03 PM    Hematocrit 44.7 05/02/2025 12:03 PM     (H) 05/02/2025 12:03 PM    MCH 32.0 05/02/2025 12:03 PM    RDW 14.5 05/02/2025 12:03 PM    Platelets 298 05/02/2025 12:03 PM    Segmented % 64 05/02/2025 12:03 PM    Lymphocytes % 19 05/02/2025 12:03 PM    Monocytes % 13 (H) 05/02/2025 12:03 PM    Eosinophils Relative 2 05/02/2025 12:03 PM    Basophils Relative 1 05/02/2025 12:03 PM    Immature Grans % 1 05/02/2025 12:03 PM    Absolute Neutrophils 6.76 05/02/2025 12:03 PM     Lab Results   Component Value Date/Time    Potassium 4.2 12/02/2024 11:07 AM    Chloride 100 12/02/2024 11:07 AM    CO2 27 12/02/2024 11:07 AM    BUN 27 (H) 12/02/2024 11:07 AM    Creatinine 1.08 12/02/2024 11:07 AM    Glucose, Fasting 114 (H) 12/02/2024 11:07 AM    Calcium 10.3 (H) 12/02/2024 11:07 AM    AST 20 12/02/2024 11:07 AM    ALT 27 12/02/2024 11:07 AM    Alkaline Phosphatase 84 12/02/2024 11:07 AM    Total Protein 7.4 12/02/2024 11:07 AM    Albumin 4.4 12/02/2024 11:07 AM    Total Bilirubin 0.58 12/02/2024 11:07 AM    eGFR 49 12/02/2024 11:07 AM           Administrative Statements   I have spent a total time of 30 minutes in caring for this patient on the day of the visit/encounter including Impressions, Counseling / Coordination of care, Documenting in the medical record, Reviewing/placing orders in the medical record (including tests, medications, and/or procedures), and Obtaining or reviewing history  .

## 2025-05-05 ENCOUNTER — OFFICE VISIT (OUTPATIENT)
Dept: HEMATOLOGY ONCOLOGY | Facility: CLINIC | Age: 75
End: 2025-05-05
Payer: COMMERCIAL

## 2025-05-05 VITALS
OXYGEN SATURATION: 97 % | WEIGHT: 252 LBS | DIASTOLIC BLOOD PRESSURE: 77 MMHG | SYSTOLIC BLOOD PRESSURE: 126 MMHG | BODY MASS INDEX: 46.38 KG/M2 | HEIGHT: 62 IN | TEMPERATURE: 98 F | HEART RATE: 85 BPM | RESPIRATION RATE: 17 BRPM

## 2025-05-05 DIAGNOSIS — C82.31 GRADE 3A FOLLICULAR LYMPHOMA OF LYMPH NODES OF NECK (HCC): Primary | ICD-10-CM

## 2025-05-05 DIAGNOSIS — D75.89 MACROCYTOSIS WITHOUT ANEMIA: ICD-10-CM

## 2025-05-05 PROCEDURE — 99214 OFFICE O/P EST MOD 30 MIN: CPT | Performed by: INTERNAL MEDICINE

## 2025-05-05 NOTE — PATIENT INSTRUCTIONS
Labs in 6 months    CT neck/chest/abdomen/pelvis in 6 months    Follow up after scans/labs in 6 months

## 2025-06-04 DIAGNOSIS — E03.9 ACQUIRED HYPOTHYROIDISM: ICD-10-CM

## 2025-06-04 RX ORDER — LEVOTHYROXINE SODIUM 100 UG/1
TABLET ORAL
Qty: 100 TABLET | Refills: 1 | Status: SHIPPED | OUTPATIENT
Start: 2025-06-04

## 2025-06-04 NOTE — TELEPHONE ENCOUNTER
Patient called and stated she takes Synthroid (brand only) 100 mcg 1 tablet daily.  The prescription was recently changed and she takes 1 tablet daily and 1-1/2 tablets on Saturday.  The increased dosage caused her to finish the medication a week before she is due to refill.  She would like to know if a new script for a short supply of Synthroid can be sent to [facility] on [address] as she is out and the full prescription sent to her mail order pharmacy [facility].  Please advise.  Thank you!

## 2025-06-09 ENCOUNTER — OFFICE VISIT (OUTPATIENT)
Dept: FAMILY MEDICINE CLINIC | Facility: CLINIC | Age: 75
End: 2025-06-09
Payer: COMMERCIAL

## 2025-06-09 VITALS
HEIGHT: 62 IN | BODY MASS INDEX: 46.78 KG/M2 | WEIGHT: 254.2 LBS | SYSTOLIC BLOOD PRESSURE: 120 MMHG | OXYGEN SATURATION: 97 % | RESPIRATION RATE: 20 BRPM | HEART RATE: 70 BPM | DIASTOLIC BLOOD PRESSURE: 70 MMHG | TEMPERATURE: 97.4 F

## 2025-06-09 DIAGNOSIS — S09.93XD BLUNT TRAUMA OF FACE, SUBSEQUENT ENCOUNTER: ICD-10-CM

## 2025-06-09 DIAGNOSIS — R22.0 CHEEK SWELLING: ICD-10-CM

## 2025-06-09 DIAGNOSIS — Z85.72 HISTORY OF LYMPHOMA: ICD-10-CM

## 2025-06-09 DIAGNOSIS — R51.9 LEFT FACIAL PAIN: Primary | ICD-10-CM

## 2025-06-09 PROCEDURE — 99213 OFFICE O/P EST LOW 20 MIN: CPT | Performed by: NURSE PRACTITIONER

## 2025-06-09 PROCEDURE — G2211 COMPLEX E/M VISIT ADD ON: HCPCS | Performed by: NURSE PRACTITIONER

## 2025-06-09 NOTE — PROGRESS NOTES
"Name: Janette Little      : 1950      MRN: [id number]  Encounter Provider: DUY Samson  Encounter Date: 2025   Encounter department: [facility]  :  Assessment & Plan  Left facial pain  Patient reassured  Exam unrevealing  No evidence of infection  I recommend escalating w/u-checking CT of facial bones. Pt agrees  Treatment plan to be determined  Orders:    CT facial bones wo contrast; Future    Blunt trauma of face, subsequent encounter    Orders:    CT facial bones wo contrast; Future    Cheek swelling    Orders:    CT facial bones wo contrast; Future    History of lymphoma    Orders:    CT facial bones wo contrast; Future    Encouraged pt to schedule her regularly schedule follow up with her pcp      Depression Screening and Follow-up Plan: Patient was screened for depression during today's encounter. They screened negative with a PHQ-9 score of 3.        History of Present Illness   74 yo female accompanied by spouse presents for ongoing minor pain, swelling of left cheek    She had a fall in 2025   Left cheek bruised from direct impact  It had healed however it suddenly started swelling again, was painful--I saw her in 2025. Xray of facial bone was normal.     She returns today citing concern-    She denies re-injurying the site    Facial Injury   Incident onset: more than 3 month ago. The injury mechanism was a fall. There was no loss of consciousness.     Review of Systems    Objective   /70 (Patient Position: Sitting, Cuff Size: Large)   Pulse 70   Temp (!) 97.4 °F (36.3 °C) (Temporal)   Resp 20   Ht 5' 2\" (1.575 m)   Wt 115 kg (254 lb 3.2 oz)   LMP  (LMP Unknown)   SpO2 97%   BMI 46.49 kg/m²      Physical Exam  Vitals and nursing note reviewed.   Constitutional:       Appearance: Normal appearance.   HENT:      Head:       Neurological:      General: No focal deficit present.      Mental Status: She is alert.         "

## 2025-06-10 ENCOUNTER — TELEPHONE (OUTPATIENT)
Dept: ADMINISTRATIVE | Facility: OTHER | Age: 75
End: 2025-06-10

## 2025-06-10 NOTE — TELEPHONE ENCOUNTER
----- Message from Tonja MA sent at 6/9/2025 12:11 PM EDT -----  Regarding: Care Gap Request  06/09/25 12:11 PMHello, our patient above has had Diabetic Eye Exam completed/performed. Please assist in updating the patient chart by making an External outreach to [facility] facility located in [place]. The date of service is January/February 2025.Thank you,Tonja Ann, [facility] [place]

## 2025-06-10 NOTE — TELEPHONE ENCOUNTER
Upon review of the In Basket request and the patient's chart, initial outreach has been made via fax to facility. Please see Contacts section for details.     Thank you  Akira Hollis MA

## 2025-06-10 NOTE — LETTER
Diabetic Eye Exam Form    Date Requested: 06/10/25     Please complete form  Patient: Janette Little  Patient : 1950   Referring Provider: SHANTELLE Smith,       DIABETIC Eye Exam Date _______________________________      Type of Exam MUST be documented for Diabetic Eye Exams. Please CHECK ONE.     Retinal Exam       Dilated Retinal Exam       OCT       Optomap-Iris Exam      Fundus Photography       Left Eye - Please check Retinopathy or No Retinopathy        Exam did show retinopathy    Exam did not show retinopathy       Right Eye - Please check Retinopathy or No Retinopathy       Exam did show retinopathy    Exam did not show retinopathy       Comments __________________________________________________________    Practice Providing Exam ______________________________________________    Exam Performed By (print name) _______________________________________      Provider Signature ___________________________________________________      These reports are needed for  compliance.    Please fax this completed form and a copy of the Diabetic Eye Exam report to the [facility] Department as soon as possible via Fax [phone], attention Akira: Phone [phone]. Our office is located at [address] [place] [address].     We thank you for your assistance in treating our mutual patient.

## 2025-06-10 NOTE — LETTER
Diabetic Eye Exam Form    Date Requested: 25     2nd Request  Patient: Janette Ltitle     Please complete form  Patient : 1950   Referring Provider: SHANTELLE Smith,       DIABETIC Eye Exam Date _______________________________      Type of Exam MUST be documented for Diabetic Eye Exams. Please CHECK ONE.     Retinal Exam       Dilated Retinal Exam       OCT       Optomap-Iris Exam      Fundus Photography       Left Eye - Please check Retinopathy or No Retinopathy        Exam did show retinopathy    Exam did not show retinopathy       Right Eye - Please check Retinopathy or No Retinopathy       Exam did show retinopathy    Exam did not show retinopathy       Comments __________________________________________________________    Practice Providing Exam ______________________________________________    Exam Performed By (print name) _______________________________________      Provider Signature ___________________________________________________      These reports are needed for  compliance.    Please fax this completed form and a copy of the Diabetic Eye Exam report to the [facility] Department as soon as possible via Fax [phone], attention Akira: Phone [phone]. Our office is located at [address] [place] [address].     We thank you for your assistance in treating our mutual patient.

## 2025-06-13 DIAGNOSIS — Z79.899 MEDICATION MANAGEMENT: ICD-10-CM

## 2025-06-13 DIAGNOSIS — E11.22 TYPE 2 DIABETES MELLITUS WITH STAGE 3A CHRONIC KIDNEY DISEASE, WITHOUT LONG-TERM CURRENT USE OF INSULIN (HCC): ICD-10-CM

## 2025-06-13 DIAGNOSIS — N18.31 TYPE 2 DIABETES MELLITUS WITH STAGE 3A CHRONIC KIDNEY DISEASE, WITHOUT LONG-TERM CURRENT USE OF INSULIN (HCC): ICD-10-CM

## 2025-06-13 RX ORDER — EMPAGLIFLOZIN 10 MG/1
10 TABLET, FILM COATED ORAL DAILY
Qty: 90 TABLET | Refills: 0 | Status: SHIPPED | OUTPATIENT
Start: 2025-06-13

## 2025-06-16 NOTE — TELEPHONE ENCOUNTER
As a follow-up, a second attempt has been made for outreach via fax to facility. Please see Contacts section for details.    Thank you  Akira Hollis MA

## 2025-06-17 ENCOUNTER — HOSPITAL ENCOUNTER (OUTPATIENT)
Dept: RADIOLOGY | Age: 75
Discharge: HOME | End: 2025-06-17
Attending: NURSE PRACTITIONER
Payer: COMMERCIAL

## 2025-06-17 DIAGNOSIS — R22.0 CHEEK SWELLING: ICD-10-CM

## 2025-06-17 DIAGNOSIS — Z85.72 HISTORY OF LYMPHOMA: ICD-10-CM

## 2025-06-17 DIAGNOSIS — S09.93XD BLUNT TRAUMA OF FACE, SUBSEQUENT ENCOUNTER: ICD-10-CM

## 2025-06-17 DIAGNOSIS — R51.9 LEFT FACIAL PAIN: ICD-10-CM

## 2025-06-17 PROCEDURE — 70486 CT MAXILLOFACIAL W/O DYE: CPT

## 2025-06-19 NOTE — TELEPHONE ENCOUNTER
As a final attempt, a third outreach has been made via telephone call to facility. Please see Contacts section for details. This encounter will be closed and completed by end of day. Should we receive the requested information because of previous outreach attempts, the requested patient's chart will be updated appropriately.     Thank you  Akira Hollis MA

## 2025-06-20 ENCOUNTER — RESULTS FOLLOW-UP (OUTPATIENT)
Dept: FAMILY MEDICINE CLINIC | Facility: CLINIC | Age: 75
End: 2025-06-20

## 2025-06-20 NOTE — TELEPHONE ENCOUNTER
Spoke with patient and made aware of result as well as PCP message. Patient had no questions at this time.

## 2025-06-20 NOTE — TELEPHONE ENCOUNTER
----- Message from DUY Chen sent at 6/20/2025  8:40 AM EDT -----  Please advise normal CT of facial bones and soft tissues.  There is nothing of concern at tender site of previous impact post fall.  I suspect this may be related to contusion/scarring it may take longer to heal  ----- Message -----  From: Interface, Radiology Results In  Sent: 6/19/2025  10:36 AM EDT  To: DUY Chen

## 2025-07-14 DIAGNOSIS — I10 ESSENTIAL HYPERTENSION: ICD-10-CM

## 2025-07-15 RX ORDER — BENAZEPRIL HYDROCHLORIDE 40 MG/1
40 TABLET ORAL DAILY
Qty: 90 TABLET | Refills: 1 | Status: SHIPPED | OUTPATIENT
Start: 2025-07-15

## 2025-07-15 RX ORDER — AMLODIPINE BESYLATE 5 MG/1
5 TABLET ORAL DAILY
Qty: 90 TABLET | Refills: 1 | Status: SHIPPED | OUTPATIENT
Start: 2025-07-15

## 2025-07-16 ENCOUNTER — APPOINTMENT (EMERGENCY)
Dept: RADIOLOGY | Facility: HOSPITAL | Age: 75
End: 2025-07-16
Payer: COMMERCIAL

## 2025-07-16 ENCOUNTER — HOSPITAL ENCOUNTER (EMERGENCY)
Facility: HOSPITAL | Age: 75
Discharge: HOME | End: 2025-07-16
Attending: EMERGENCY MEDICINE
Payer: COMMERCIAL

## 2025-07-16 VITALS
TEMPERATURE: 98.2 F | SYSTOLIC BLOOD PRESSURE: 139 MMHG | BODY MASS INDEX: 47.54 KG/M2 | WEIGHT: 259.92 LBS | DIASTOLIC BLOOD PRESSURE: 63 MMHG | HEART RATE: 76 BPM | RESPIRATION RATE: 16 BRPM | OXYGEN SATURATION: 97 %

## 2025-07-16 DIAGNOSIS — M25.561 RIGHT KNEE PAIN: ICD-10-CM
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
## 2025-07-16 DIAGNOSIS — M25.511 RIGHT SHOULDER PAIN: ICD-10-CM

## 2025-07-16 PROCEDURE — 73564 X-RAY EXAM KNEE 4 OR MORE: CPT

## 2025-07-16 PROCEDURE — 73030 X-RAY EXAM OF SHOULDER: CPT

## 2025-07-16 PROCEDURE — 99284 EMERGENCY DEPT VISIT MOD MDM: CPT

## 2025-07-16 NOTE — ED PROVIDER NOTES
Time reflects when diagnosis was documented in both MDM as applicable and the Disposition within this note       Time User Action Codes Description Comment    7/16/2025  8:25 PM Nicholas Burns Add [W19.XXXA] Fall, initial encounter     7/16/2025  8:25 PM Nicholas Burns Add [M25.511] Right shoulder pain     7/16/2025  8:25 PM Nicholas Burns Add [M25.561] Right knee pain           ED Disposition       ED Disposition   Discharge    Condition   Stable    Date/Time   Wed Jul 16, 2025  8:25 PM    Comment   Janette Little discharge to home/self care.                   Assessment & Plan       Medical Decision Making  75 year old female presenting via EMS after a fall in the [employer] parking lot. Patient was walking to her car when she tripped and landed on her right side, complaining of right shoulder pain and right knee pain. She denies a headstrike, neck pain, or blood thinners. She has normal ROM in her shoulder and knee. She has not tried ambulating since the fall. She took 2 Tylenol at the [employer]. She denies any other injuries at this time. Denies chest pain, SOB, abdominal pain, NVD, paresthesias, numbness, syncope, weakness, dizziness, or lightheadedness.     DDX including but not limited to: musculoskeletal pain, dislocation, fracture, contusion, strain    XR shoulder and knee negative for acute abnormalities per my read in ED. Patient took 2 tylenol prior to arrival, declined additional pain medication. Patient able to ambulate with walker in ED under her own power. Vitals stable, imaging reassuring. Recommended tylenol for pain. Advised to follow up with PCP. At this time, doubt any major or acute life threatening etiologies, patient safe for discharge home. Prior to discharge, discharge instructions were discussed with patient at bedside. Patient was provided both verbal and written instructions. Patient is understanding of the discharge instructions and is agreeable to plan of care. Return precautions were discussed  with patient bedside, patient verbalized understanding of signs and symptoms that would necessitate return to the ED. All questions were answered. Patient was comfortable with the plan of care and discharged to home.       Problems Addressed:  Fall, initial encounter: acute illness or injury  Right knee pain: acute illness or injury  Right shoulder pain: acute illness or injury    Amount and/or Complexity of Data Reviewed  Radiology: ordered and independent interpretation performed.             Medications - No data to display    ED Risk Strat Scores                    No data recorded        SBIRT 22yo+      Flowsheet Row Most Recent Value   Initial Alcohol Screen: US AUDIT-C     1. How often do you have a drink containing alcohol? 0 Filed at: 07/16/2025 1922   2. How many drinks containing alcohol do you have on a typical day you are drinking?  0 Filed at: 07/16/2025 1922   3a. Male UNDER 65: How often do you have five or more drinks on one occasion? 0 Filed at: 07/16/2025 1922   3b. FEMALE Any Age, or MALE 65+: How often do you have 4 or more drinks on one occassion? 0 Filed at: 07/16/2025 1922   Audit-C Score 0 Filed at: 07/16/2025 1922   JITENDRA: How many times in the past year have you...    Used an illegal drug or used a prescription medication for non-medical reasons? Never Filed at: 07/16/2025 1922                            History of Present Illness       Chief Complaint   Patient presents with    Fall     Pt was ambulating with walker at [facility] when she fell on her right side on concrete. C/o right sided pain in shoulder and leg. Denies headstrike, -LOC. GCS 15.        Past Medical History[1]   Past Surgical History[2]   Family History[3]   Social History[4]   E-Cigarette/Vaping    E-Cigarette Use Never User       E-Cigarette/Vaping Substances    Nicotine No     THC No     CBD No     Flavoring No     Other No     Unknown No       I have reviewed and agree with the history as documented.     75 year  old female presenting via EMS after a fall in the [employer] parking lot. Patient was walking to her car when she tripped and landed on her right side, complaining of right shoulder pain and right knee pain. She denies a headstrike, neck pain, or blood thinners. She has normal ROM in her shoulder and knee. She has not tried ambulating since the fall. She took 2 Tylenol at the [employer]. She denies any other injuries at this time. Denies chest pain, SOB, abdominal pain, NVD, paresthesias, numbness, syncope, weakness, dizziness, or lightheadedness.       Fall  Associated symptoms: no abdominal pain, no back pain, no chest pain, no headaches, no nausea, no neck pain and no vomiting        Review of Systems   Eyes:  Negative for visual disturbance.   Respiratory:  Negative for shortness of breath.    Cardiovascular:  Negative for chest pain.   Gastrointestinal:  Negative for abdominal pain, diarrhea, nausea and vomiting.   Musculoskeletal:  Negative for back pain, neck pain and neck stiffness.        R shoulder and R knee pain   Skin:  Negative for color change and rash.   Neurological:  Negative for dizziness, syncope, weakness and headaches.   Psychiatric/Behavioral: Negative.     All other systems reviewed and are negative.          Objective       ED Triage Vitals [07/16/25 1914]   Temperature Pulse Blood Pressure Respirations SpO2 Patient Position - Orthostatic VS   98.2 °F (36.8 °C) 76 139/63 16 97 % Sitting      Temp Source Heart Rate Source BP Location FiO2 (%) Pain Score    Oral Monitor Left arm -- 5      Vitals      Date and Time Temp Pulse SpO2 Resp BP Pain Score FACES Pain Rating User   07/16/25 1914 98.2 °F (36.8 °C) 76 97 % 16 139/63 5 -- RL            Physical Exam  Vitals reviewed.   Constitutional:       General: She is not in acute distress.     Appearance: Normal appearance. She is not ill-appearing, toxic-appearing or diaphoretic.   HENT:      Head: Normocephalic and atraumatic.      Nose: Nose normal.       Mouth/Throat:      Mouth: Mucous membranes are moist.      Pharynx: Oropharynx is clear.     Eyes:      Conjunctiva/sclera: Conjunctivae normal.       Cardiovascular:      Rate and Rhythm: Normal rate and regular rhythm.      Pulses: Normal pulses.      Heart sounds: Normal heart sounds.   Pulmonary:      Effort: Pulmonary effort is normal.      Breath sounds: Normal breath sounds.   Abdominal:      General: Abdomen is flat. Bowel sounds are normal. There is no distension.      Palpations: Abdomen is soft.      Tenderness: There is no abdominal tenderness. There is no guarding or rebound.     Musculoskeletal:         General: Normal range of motion.      Cervical back: Normal range of motion. No tenderness.      Comments: No erythema, edema, ecchymosis to right shoulder. Mild tenderness to anterior aspect. Normal ROM. No obvious deformity. Normal ROM of right knee. Mild superficial abrasion to R knee, no bleeding. No erythema, edema, or ecchymosis.      Skin:     General: Skin is warm and dry.      Capillary Refill: Capillary refill takes less than 2 seconds.     Neurological:      General: No focal deficit present.      Mental Status: She is alert and oriented to person, place, and time. Mental status is at baseline.     Psychiatric:         Mood and Affect: Mood normal.         Behavior: Behavior normal.         Thought Content: Thought content normal.         Judgment: Judgment normal.         Results Reviewed       None            XR shoulder 2+ views RIGHT   ED Interpretation by Nicholas Burns PA-C (07/16 2016)   No acute abnormalities per my read in ED.      XR knee 4+ views Right injury   ED Interpretation by Nicholas Burns PA-C (07/16 2016)   No acute abnormalities per my read in ED.           Procedures    ED Medication and Procedure Management   Prior to Admission Medications   Prescriptions Last Dose Informant Patient Reported? Taking?   Cranberry 300 MG tablet  Self Yes No   Sig: Take by mouth as needed  (FOR UTI)   Empagliflozin (Jardiance) 10 MG TABS tablet   No No   Sig: TAKE 1 TABLET DAILY   LACTOBACILLUS ACID-PECTIN PO  Self Yes No   Sig: Take 1 Capful by mouth as needed PROBIOTIC   Multiple Vitamins-Minerals (PreserVision AREDS) TABS   Yes No   Sig: Take 1 tablet by mouth in the morning   Naproxen Sodium 220 MG CAPS  Self Yes No   Sig: Take by mouth   Omega 3 1200 MG CAPS  Self Yes No   Sig: Take by mouth   acetaminophen (TYLENOL) 500 mg tablet  Self Yes No   Sig: Take 500 mg by mouth every 4 (four) hours   amLODIPine (NORVASC) 5 mg tablet   No No   Sig: TAKE 1 TABLET DAILY   amoxicillin (AMOXIL) 500 MG tablet  Self Yes No   Sig: Take 4 tablets by mouth in the morning For dental procedures   benazepril (LOTENSIN) 40 MG tablet   No No   Sig: TAKE 1 TABLET DAILY   cholecalciferol 1,000 units tablet  Self Yes No   Sig: Take 1,000 Units by mouth in the morning. 15,000 units per week. 3 days a week takes per patient. .   citalopram (CeleXA) 20 mg tablet   No No   Sig: Take 1 tablet (20 mg total) by mouth daily   estrogens, conjugated (Premarin) vaginal cream  Self No No   Sig: Insert 0.5 g into the vagina 3 (three) times a week   fexofenadine (ALLEGRA) 180 MG tablet  Self Yes No   Sig: Take 180 mg by mouth as needed   hydrocortisone 0.5 % cream  Self Yes No   Sig: as needed   ketoconazole (NIZORAL) 2 % cream   No No   Sig: Apply topically as needed for rash   levothyroxine 100 mcg tablet   No No   Sig: Take 1 tablet (100 mcg) by mouth daily Sunday through Friday, and 1.5 tablets (150 mcg) on Saturday.   multivitamin-iron-minerals-folic acid (CENTRUM) chewable tablet  Self Yes No   Sig: Chew 1 tablet in the morning.      Facility-Administered Medications: None     Discharge Medication List as of 7/16/2025  8:28 PM        CONTINUE these medications which have NOT CHANGED    Details   acetaminophen (TYLENOL) 500 mg tablet Take 500 mg by mouth every 4 (four) hours, Historical Med      amLODIPine (NORVASC) 5 mg tablet  TAKE 1 TABLET DAILY, Starting Tue 7/15/2025, Normal      amoxicillin (AMOXIL) 500 MG tablet Take 4 tablets by mouth in the morning For dental procedures, Starting Fri 1/20/2023, Historical Med      benazepril (LOTENSIN) 40 MG tablet TAKE 1 TABLET DAILY, Starting Tue 7/15/2025, Normal      cholecalciferol 1,000 units tablet Take 1,000 Units by mouth in the morning. 15,000 units per week. 3 days a week takes per patient. ., Historical Med      citalopram (CeleXA) 20 mg tablet Take 1 tablet (20 mg total) by mouth daily, Starting Mon 8/19/2024, Normal      Cranberry 300 MG tablet Take by mouth as needed (FOR UTI), Historical Med      Empagliflozin (Jardiance) 10 MG TABS tablet TAKE 1 TABLET DAILY, Starting Fri 6/13/2025, Normal      estrogens, conjugated (Premarin) vaginal cream Insert 0.5 g into the vagina 3 (three) times a week, Starting Wed 8/16/2023, Normal      fexofenadine (ALLEGRA) 180 MG tablet Take 180 mg by mouth as needed, Historical Med      hydrocortisone 0.5 % cream as needed, Historical Med      ketoconazole (NIZORAL) 2 % cream Apply topically as needed for rash, Starting Wed 12/4/2024, Normal      LACTOBACILLUS ACID-PECTIN PO Take 1 Capful by mouth as needed PROBIOTIC, Historical Med      levothyroxine 100 mcg tablet Take 1 tablet (100 mcg) by mouth daily Sunday through Friday, and 1.5 tablets (150 mcg) on Saturday., Normal      Multiple Vitamins-Minerals (PreserVision AREDS) TABS Take 1 tablet by mouth in the morning, Historical Med      multivitamin-iron-minerals-folic acid (CENTRUM) chewable tablet Chew 1 tablet in the morning., Historical Med      Naproxen Sodium 220 MG CAPS Take by mouth, Historical Med      Omega 3 1200 MG CAPS Take by mouth, Historical Med           No discharge procedures on file.  ED SEPSIS DOCUMENTATION   Time reflects when diagnosis was documented in both MDM as applicable and the Disposition within this note       Time User Action Codes Description Comment    7/16/2025  8:25  PM Nicholas Burns Add [W19.XXXA] Fall, initial encounter     7/16/2025  8:25 PM Nicholas Burns Add [M25.511] Right shoulder pain     7/16/2025  8:25 PM Nicholas Burns Add [M25.561] Right knee pain                    [1]   Past Medical History:  Diagnosis Date    Anxiety     on med     Arthritis     Back problem     4 Bulging Disks    Cancer (HCC)     Depression     Disease of thyroid gland     Diverticulitis of colon     Fibrocystic disease of both breasts     Fibroid     Follicular lymphoma grade 3a (HCC)     Hernia     Hip pain     Left    History of staph infection     HL (hearing loss)     Hypertension     Hypothyroidism     Knee pain     Right    Lymphoma (HCC)     Obesity     Spinal stenosis     Thyroid disease    [2]   Past Surgical History:  Procedure Laterality Date    ABDOMINAL SURGERY      Hysterectomy    CATARACT EXTRACTION  01/01/2017    CHOLECYSTECTOMY      FL GUIDED NEEDLE PLAC BX/ASP/INJ  10/22/2018    FL GUIDED NEEDLE PLAC BX/ASP/INJ  5/6/2019    HYSTERECTOMY  01/12/2024    JOINT REPLACEMENT      OTHER SURGICAL HISTORY      Warts removal    REFRACTIVE SURGERY Bilateral     REVISION TOTAL HIP ARTHROPLASTY      ROTATOR CUFF REPAIR Right     UPPER GASTROINTESTINAL ENDOSCOPY     [3]   Family History  Problem Relation Name Age of Onset    Heart failure Mother Cathleen     Lung cancer Father Flex     Cancer Father Flex     Breast cancer Other neice     Arthritis Sister Lou     Diabetes Brother FlexJr     Vision loss Paternal Aunt Mariah    [4]   Social History  Tobacco Use    Smoking status: Former     Average packs/day: 0.3 packs/day for 14.0 years (3.5 ttl pk-yrs)     Types: Cigarettes    Smokeless tobacco: Never   Vaping Use    Vaping status: Never Used   Substance Use Topics    Alcohol use: Not Currently     Alcohol/week: 1.0 standard drink of alcohol     Types: 1 Glasses of wine per week     Comment: Occasional     Drug use: Never        Nicholas Burns PA-C  07/17/25 [id number]

## 2025-07-17 ENCOUNTER — VBI (OUTPATIENT)
Dept: FAMILY MEDICINE CLINIC | Facility: CLINIC | Age: 75
End: 2025-07-17

## 2025-07-17 NOTE — TELEPHONE ENCOUNTER
07/17/25 1:51 PM    Patient contacted post ED visit, [facility] department spoke with patient/caregiver and outreach was successful.    Thank you.  Robyn Shukla MA  [facility]

## 2025-07-17 NOTE — DISCHARGE INSTRUCTIONS
Tylenol 650 mg every 6 hours as needed for pain.  RICE (Rest, Ice Compression, Elevation)  Weight bearing as tolerated.  Gentle stretching as tolerated.   Follow  up with PCP.  Follow up with orthopedics with unresolving symptoms.  Return to ER with worsening pain, swelling, fevers, or any other concerning symptoms.

## 2025-08-07 ENCOUNTER — OFFICE VISIT (OUTPATIENT)
Dept: FAMILY MEDICINE CLINIC | Facility: CLINIC | Age: 75
End: 2025-08-07
Payer: COMMERCIAL

## 2025-08-07 VITALS
DIASTOLIC BLOOD PRESSURE: 66 MMHG | RESPIRATION RATE: 17 BRPM | TEMPERATURE: 97.6 F | HEIGHT: 62 IN | SYSTOLIC BLOOD PRESSURE: 140 MMHG | WEIGHT: 252.6 LBS | BODY MASS INDEX: 46.48 KG/M2 | HEART RATE: 60 BPM | OXYGEN SATURATION: 98 %

## 2025-08-07 DIAGNOSIS — E55.9 VITAMIN D DEFICIENCY: ICD-10-CM

## 2025-08-07 DIAGNOSIS — Z12.31 SCREENING MAMMOGRAM, ENCOUNTER FOR: ICD-10-CM

## 2025-08-07 DIAGNOSIS — N95.2 SENILE ATROPHIC VAGINITIS: ICD-10-CM

## 2025-08-07 DIAGNOSIS — E11.9 TYPE 2 DIABETES MELLITUS WITHOUT COMPLICATION, WITHOUT LONG-TERM CURRENT USE OF INSULIN (HCC): Primary | ICD-10-CM

## 2025-08-07 DIAGNOSIS — Z13.220 ENCOUNTER FOR SCREENING FOR LIPID DISORDER: ICD-10-CM

## 2025-08-07 DIAGNOSIS — M19.90 ARTHRITIS: ICD-10-CM

## 2025-08-07 DIAGNOSIS — Z79.899 MEDICATION MANAGEMENT: ICD-10-CM

## 2025-08-07 DIAGNOSIS — E03.9 ACQUIRED HYPOTHYROIDISM: ICD-10-CM

## 2025-08-07 DIAGNOSIS — Z91.81 HISTORY OF FALLING: ICD-10-CM

## 2025-08-07 DIAGNOSIS — Z13.0 SCREENING FOR IRON DEFICIENCY ANEMIA: ICD-10-CM

## 2025-08-07 DIAGNOSIS — Z99.89 DEPENDENCE ON CANE: ICD-10-CM

## 2025-08-07 DIAGNOSIS — Z13.89 SCREENING FOR BLOOD OR PROTEIN IN URINE: ICD-10-CM

## 2025-08-07 PROCEDURE — 99214 OFFICE O/P EST MOD 30 MIN: CPT | Performed by: FAMILY MEDICINE

## 2025-08-07 PROCEDURE — G2211 COMPLEX E/M VISIT ADD ON: HCPCS | Performed by: FAMILY MEDICINE

## 2025-08-16 ENCOUNTER — HOSPITAL ENCOUNTER (OUTPATIENT)
Facility: HOSPITAL | Age: 75
Discharge: HOME | End: 2025-08-16
Payer: COMMERCIAL

## 2025-08-16 VITALS — HEIGHT: 62 IN | BODY MASS INDEX: 46.38 KG/M2 | WEIGHT: 252 LBS

## 2025-08-16 DIAGNOSIS — Z12.31 SCREENING MAMMOGRAM, ENCOUNTER FOR: ICD-10-CM

## 2025-08-16 PROCEDURE — 77067 SCR MAMMO BI INCL CAD: CPT

## 2025-08-16 PROCEDURE — 77063 BREAST TOMOSYNTHESIS BI: CPT

## 2025-08-19 DIAGNOSIS — E03.9 ACQUIRED HYPOTHYROIDISM: ICD-10-CM

## 2025-08-19 RX ORDER — LEVOTHYROXINE SODIUM 100 MCG
TABLET ORAL
Qty: 100 TABLET | Refills: 2 | Status: SHIPPED | OUTPATIENT
Start: 2025-08-19